# Patient Record
Sex: FEMALE | Race: WHITE | NOT HISPANIC OR LATINO | Employment: FULL TIME | URBAN - METROPOLITAN AREA
[De-identification: names, ages, dates, MRNs, and addresses within clinical notes are randomized per-mention and may not be internally consistent; named-entity substitution may affect disease eponyms.]

---

## 2017-01-05 ENCOUNTER — PATIENT MESSAGE (OUTPATIENT)
Dept: OTHER | Facility: OTHER | Age: 42
End: 2017-01-05

## 2017-01-05 ENCOUNTER — PATIENT OUTREACH (OUTPATIENT)
Dept: OTHER | Facility: OTHER | Age: 42
End: 2017-01-05
Payer: COMMERCIAL

## 2017-01-05 NOTE — PROGRESS NOTES
Last 5 Patient Entered Readings                                                               Current 30 Day Average: 133/78     Recent Readings 1/2/2017 1/1/2017 12/26/2016 12/25/2016 12/24/2016    Systolic BP (mmHg) 127 141 132 135 147    Diastolic BP (mmHg) 69 88 79 84 81    Pulse 68 68 68 66 64        Sent [facility] message to follow up with Mrs. Enriqueta Mccarthy (she was unavailable at her desk number). Inquired about how her low sodium diet and exercise is going. Overall, her readings are looking good and she continues to take readings weekly. Advised pt to call or message back if she has any questions or concerns.

## 2017-01-16 RX ORDER — DILTIAZEM HYDROCHLORIDE 180 MG/1
CAPSULE, COATED, EXTENDED RELEASE ORAL
Qty: 60 CAPSULE | Refills: 3 | Status: SHIPPED | OUTPATIENT
Start: 2017-01-16 | End: 2017-04-17 | Stop reason: SDUPTHER

## 2017-01-19 ENCOUNTER — INITIAL CONSULT (OUTPATIENT)
Dept: OPTOMETRY | Facility: CLINIC | Age: 42
End: 2017-01-19
Payer: COMMERCIAL

## 2017-01-19 DIAGNOSIS — E11.3292 TYPE 2 DIABETES MELLITUS WITH LEFT EYE AFFECTED BY MILD NONPROLIFERATIVE RETINOPATHY WITHOUT MACULAR EDEMA, WITHOUT LONG-TERM CURRENT USE OF INSULIN: Primary | ICD-10-CM

## 2017-01-19 DIAGNOSIS — H04.123 BILATERAL DRY EYES: ICD-10-CM

## 2017-01-19 DIAGNOSIS — H52.7 REFRACTIVE ERROR: ICD-10-CM

## 2017-01-19 PROCEDURE — 92004 COMPRE OPH EXAM NEW PT 1/>: CPT | Mod: S$GLB,,, | Performed by: OPTOMETRIST

## 2017-01-19 PROCEDURE — 99999 PR PBB SHADOW E&M-EST. PATIENT-LVL II: CPT | Mod: PBBFAC,,, | Performed by: OPTOMETRIST

## 2017-01-19 NOTE — PATIENT INSTRUCTIONS
Thank you very much for coming in for your eye examination. It was a pleasure working with you today. Below is some information you might find helpful.     You have dryness of your eyes. Please use over-the-counter artificial tears or lubricants (such as Systane, Refresh, Blink, Genteal), 1 drop in each eye 3-4 times per day. Please avoid drops that advertise redness-relief as these can be unhealthy for your eyes and can cause permanent redness if used long-term.    ==============================================        What Is Diabetic Retinopathy?  Diabetic retinopathy is the leading cause of blindness in adults. It occurs when diabetes harms blood vessels inside the eye. These weak vessels leak fluid into an area of the eye called the retina. Weak new vessels can break and bleed into the retina. Old vessels can leak and cause swelling. These vessels can damage areas of the retina, causing blurry, distorted vision.    What causes diabetic retinopathy?  Diabetes is the cause of this eye disease. Over time, diabetes makes blood vessels weaken all over the body, including in the eyes. Poor blood sugar control can make retinopathy worse. So can smoking, high cholesterol, or poorly controlled high blood pressure. Pregnancy can also cause retinopathy to worsen.  What are the symptoms?  You can have diabetic retinopathy without knowing it. Usually, there is no pain and no outward sign. Over time, you may notice gradual blurring or some vision loss. Symptoms may come and go. Early treatment and good control of risk factors may help prevent vision loss or blindness.  What you can do  Have your eyes examined regularly by an eye specialist. Your healthcare provider will tell you when and how often you need these exams. You can also help control your diabetes through exercise, diet, and medicine, as instructed by your healthcare provider. These same steps may also help control diabetic retinopathy.           Treating Diabetic  Retinopathy  Treatment may help slow the progress of diabetic retinopathy. Your treatment plan depends on your condition. It may include frequent exams to monitor your condition, laser treatment, and other procedures.      Laser is one type of treatment that may help limit vision loss from diabetic retinopathy.      Monitoring Your Vision  At first, your doctor may simply want to monitor your vision. In some cases, you may also have an angiogram. This test uses a special dye to create detailed images of the retina. These images help your doctor decide whether special treatments are needed. You may also have ocular coherence tomography (OCT) testing. This uses light waves to see if there is fluid leaking into certain parts of the eye. It can also measure the thickness of the retina.  Types of Treatment  Special treatments can help stop bleeding, slow or stop new vessel growth, and preserve vision. The type of treatment you get depends on your condition:  · Laser treatment can help stop leaks and limit abnormal vessel growth.  · Surgery can remove the vitreous or repair a retina that is damaged by scar tissue formation. This may help if the vitreous becomes filled with blood and obscures your vision.  · Cryotherapy shrinks blood vessels and repairs the retina.  · Medications injected in the eye can help decrease swelling of the retina or slow the abnormal growth of blood vessels.   ·   © 9445-0816 [employer]. [address] [place] [address], [place] [address]. All rights reserved. This information is not intended as a substitute for professional medical care. Always follow your healthcare professional's instructions.         Managing Type 2 Diabetes  Type 2 diabetes is a long-term (chronic) condition. Managing your diabetes means making some changes that may be hard. Your health care provider, nurse, diabetes educator, and others can help you.  Managing type 2 diabetes means balancing your medicine with diet and  activity. Managing your diabetes may also include checking your blood sugar. And, working with your health care provider to prevent complications.  Take your medicine as prescribed  You may take pills (oral medicine) or give yourself shots (insulin injections) for diabetes. Or you may use both. Taking your medicines or giving yourself insulin at the right times will help you control your blood sugar. Think about ways that will help you remember to take your medicines the right way every day. Ask your health care provider, nurse, or diabetes educator for ideas.  Although you may only take pills for your diabetes, this may change. Over time, most people with type 2 diabetes also use insulin.  Eat healthy  A healthy, well-planned diet helps control the amount of sugar in your blood. It also helps you stay at a healthy weight or lose weight, if you are overweight. Extra weight makes it harder to control diabetes.  Your health care provider, nurse, a dietitian, or diabetes educator will help you create a plan that works for you. You don't have to give up all the foods you like. Having meals and snacks with vegetables, fruits, lean meats, or other healthy proteins, whole grains, and low- or no-fat dairy products will help control your blood sugar.  Be physically active  Being active helps lower your blood sugar. It does this by helping your body use insulin to turn food into energy. Activity also helps you manage your weight:  Ask your health care provider to work with you to create an activity program that's right for you. Your activity programs is based on your age, general health, and types of activity you enjoy. You should start slowly, but aim for at least 30 minutes of exercise or activity on most days.  Check your blood sugar  Checking your own blood sugar may be a regular part of your care. Or you may only check your blood sugar from time to time. Your health care provider will give you instructions about checking  your blood sugar at home. Checking it tells you if your blood sugar is in your target range. A target range means that you are managing your diabetes well.  If your blood sugar levels are too high or too low, your health care provider may suggest changes to your diet or activity level. He or she may also adjust your medication.  Your health care provider may also tell you to check your blood sugar more often when you are sick. At certain times, for example, when you have a cold or the flu, you may need to check it more often.  Take care of yourself  When you have diabetes, you may be more likely to develop other health problems. They include foot, eye, heart, and kidney problems. By controlling your blood sugar, and taking good care of yourself, you can help prevent these problems. Your health care provider, nurse, diabetes educator, and others can assist you.  · Checkups. You should have regular checkups with your health care provider. At those visits, you will have a physical exam that includes checking your feet. Your health care provider will also check your blood pressure and weight.  · Other exams. You should also have complete eye, foot, and dental exams at least once every year.  · Lab tests. You will have blood and urine tests.  ¨ At least 2 times a year, your health care provider will check your hemoglobin A1C. This blood test shows how well you have been controlling your blood sugar over 2 to 3 months. The results help your health care provider manage your diabetes.    ¨ You will also have other lab tests. For example, to check for kidney problems and abnormal cholesterol levels.  · Smoking. If you smoke, you will need to quit. Smoking increases the chance that you will develop complications from diabetes. Ask your health care provider about ways to quit.  · Vaccines. Get a yearly flu shot. And, ask your health care provider about vaccines to prevent pneumonia and hepatitis B.  Stress and depression  Most  people have challenges throughout their lives. Living with diabetes, or any serious condition, can increase your stress and make you feel a lot of different emotions. In diabetes, feeling stressed or depressed can actually affect your blood sugar levels.  If you are having trouble dealing with diabetes, tell your health care provider. He or she can help or refer you to other health care providers or programs.  Support and resources  Know where you can get help. You can try the following:  · Support. Ask family and friends to support your effects to take care of yourself. Or, look for a diabetes support group locally or on the internet. (Check the Connect with Others link on www.diabetes.org)  · Counseling. Talk with a , psychologist, psychiatrist, or other counselor.  · Information. Contact the American Diabetes Association at 929-590-3953 or www.diabetes.org      © 5402-4626 [employer]. [address] [place] [address], [place] [address]. All rights reserved. This information is not intended as a substitute for professional medical care. Always follow your healthcare professional's instructions.    Adriana Lynn OD

## 2017-01-19 NOTE — Clinical Note
Dear Dr. Lr,  I had the pleasure of seeing Ms. Mccarthy today for a diabetic eye examination. She has mild non-proliferative diabetic retinopathy in the left eye only (it appears the same compared to the diabetic eye screening photos she had performed in November 2016). I will check her eyes again in 6 months. Please let me know if you have questions.  Sincerely, Adriana Lynn OD

## 2017-01-19 NOTE — MR AVS SNAPSHOT
[facility]  [address]  [facility] [address]  Phone: [phone]                  Enriqueta Mccarthy   2017 9:40 AM   Initial consult    Description:  Female : 1975   Provider:  Adriana Lynn OD   Department:  [facility]           Reason for Visit     Diabetic Eye Exam                To Do List           Future Appointments        Provider Department Dept Phone    2017 7:10 AM LAB, APPOINTMENT [place] [facility] [phone]    2017 8:00 AM Jose Luis Parekh MD [facility] [phone]    2017 7:10 AM LAB, APPOINTMENT [place] [facility] [phone]    3/24/2017 8:45 AM Dez Lr MD [facility] [phone]    3/30/2017 2:00 PM Apple Sampson RD [facility] [phone]      Goals (5 Years of Data)              17    Blood Pressure <= 140/90   135/77  125/64  127/69    Exercise at least 150 minutes per week.           Take at least one BP reading per week at various times of the day           Weight < 96.956 kg (213 lb 12 oz)           Notes - Note created  3/17/2016  2:53 PM by Tracy Cotton, PharmD    Decrease weight by 5% to improve cardiovascular outcomes.         Follow-Up and Disposition     Return in about 6 months (around 2017) for recheck diabetic retinopathy.      [facility]     [facility] -  Assistance  Registered nurses in the [facility] provide clinical advisement, health education, appointment booking, and other advisory services.  Call for this free service at [phone].             Medications           Message regarding Medications     Verify the changes and/or additions to your medication regime listed below are the same as discussed with your clinician today.  If any of these changes or additions are incorrect, please notify your  healthcare provider.        STOP taking these medications     metformin (GLUCOPHAGE-XR) 500 MG 24 hr tablet Take 1 tablet (500 mg total) by mouth 2 (two) times daily with meals.    ondansetron (ZOFRAN, AS HYDROCHLORIDE,) 4 MG tablet Take 1 tablet (4 mg total) by mouth every 8 (eight) hours as needed for Nausea.           Verify that the below list of medications is an accurate representation of the medications you are currently taking.  If none reported, the list may be blank. If incorrect, please contact your healthcare provider. Carry this list with you in case of emergency.           Current Medications     atorvastatin (LIPITOR) 80 MG tablet Take 80 mg by mouth every evening.     azelastine (ASTELIN) 137 mcg (0.1 %) nasal spray     blood sugar diagnostic (ACCU-CHEK MIGUEL) Strp 1 each by Misc.(Non-Drug; Combo Route) route 3 (three) times daily.    carvedilol (COREG) 25 MG tablet Take 1 tablet (25 mg total) by mouth 2 (two) times daily. .5-1 twice daily or as directed    chlorthalidone (HYGROTEN) 25 MG Tab Take a half tablet (12.5mg) daily    diltiaZEM (CARDIZEM CD) 180 MG 24 hr capsule TAKE 2 CAPSULES (360 MG TOTAL) BY MOUTH ONCE DAILY.    ergocalciferol (ERGOCALCIFEROL) 50,000 unit Cap One tab twice weekly.    gabapentin (NEURONTIN) 300 MG capsule Take 1 capsule (300 mg total) by mouth every evening.    hydrALAZINE (APRESOLINE) 100 MG tablet Take 1 tablet (100 mg total) by mouth every 8 (eight) hours.    icosapent ethyl (VASCEPA) 1 gram Cap Take 2 g by mouth 2 (two) times daily.    lancets (ACCU-CHEK FASTCLIX) Misc 1 each by Misc.(Non-Drug; Combo Route) route 3 (three) times daily.    TRULICITY 1.5 mg/0.5 mL PnIj Inject 1.5 mg into the skin every 7 days.    valacyclovir (VALTREX) 1000 MG tablet Take 1,000 mg by mouth 2 (two) times daily.    valsartan (DIOVAN) 320 MG tablet Take 1 tablet (320 mg total) by mouth once daily.    multivitamin with minerals tablet Take 1 tablet by mouth once daily.    potassium  gluconate 595 mg (99 mg) Tab Take 2 tablets by mouth once daily.           Clinical Reference Information           Allergies as of 1/19/2017     No Known Allergies      Immunizations Administered on Date of Encounter - 1/19/2017     None      Instructions    Thank you very much for coming in for your eye examination. It was a pleasure working with you today. Below is some information you might find helpful.     You have dryness of your eyes. Please use over-the-counter artificial tears or lubricants (such as Systane, Refresh, Blink, Genteal), 1 drop in each eye 3-4 times per day. Please avoid drops that advertise redness-relief as these can be unhealthy for your eyes and can cause permanent redness if used long-term.    ==============================================        What Is Diabetic Retinopathy?  Diabetic retinopathy is the leading cause of blindness in adults. It occurs when diabetes harms blood vessels inside the eye. These weak vessels leak fluid into an area of the eye called the retina. Weak new vessels can break and bleed into the retina. Old vessels can leak and cause swelling. These vessels can damage areas of the retina, causing blurry, distorted vision.    What causes diabetic retinopathy?  Diabetes is the cause of this eye disease. Over time, diabetes makes blood vessels weaken all over the body, including in the eyes. Poor blood sugar control can make retinopathy worse. So can smoking, high cholesterol, or poorly controlled high blood pressure. Pregnancy can also cause retinopathy to worsen.  What are the symptoms?  You can have diabetic retinopathy without knowing it. Usually, there is no pain and no outward sign. Over time, you may notice gradual blurring or some vision loss. Symptoms may come and go. Early treatment and good control of risk factors may help prevent vision loss or blindness.  What you can do  Have your eyes examined regularly by an eye specialist. Your healthcare provider will  tell you when and how often you need these exams. You can also help control your diabetes through exercise, diet, and medicine, as instructed by your healthcare provider. These same steps may also help control diabetic retinopathy.           Treating Diabetic Retinopathy  Treatment may help slow the progress of diabetic retinopathy. Your treatment plan depends on your condition. It may include frequent exams to monitor your condition, laser treatment, and other procedures.      Laser is one type of treatment that may help limit vision loss from diabetic retinopathy.      Monitoring Your Vision  At first, your doctor may simply want to monitor your vision. In some cases, you may also have an angiogram. This test uses a special dye to create detailed images of the retina. These images help your doctor decide whether special treatments are needed. You may also have ocular coherence tomography (OCT) testing. This uses light waves to see if there is fluid leaking into certain parts of the eye. It can also measure the thickness of the retina.  Types of Treatment  Special treatments can help stop bleeding, slow or stop new vessel growth, and preserve vision. The type of treatment you get depends on your condition:  · Laser treatment can help stop leaks and limit abnormal vessel growth.  · Surgery can remove the vitreous or repair a retina that is damaged by scar tissue formation. This may help if the vitreous becomes filled with blood and obscures your vision.  · Cryotherapy shrinks blood vessels and repairs the retina.  · Medications injected in the eye can help decrease swelling of the retina or slow the abnormal growth of blood vessels.   ·   © 7506-9658 [employer]. [address] [place] [address], [place] [address]. All rights reserved. This information is not intended as a substitute for professional medical care. Always follow your healthcare professional's instructions.         Managing Type 2 Diabetes  Type 2  diabetes is a long-term (chronic) condition. Managing your diabetes means making some changes that may be hard. Your health care provider, nurse, diabetes educator, and others can help you.  Managing type 2 diabetes means balancing your medicine with diet and activity. Managing your diabetes may also include checking your blood sugar. And, working with your health care provider to prevent complications.  Take your medicine as prescribed  You may take pills (oral medicine) or give yourself shots (insulin injections) for diabetes. Or you may use both. Taking your medicines or giving yourself insulin at the right times will help you control your blood sugar. Think about ways that will help you remember to take your medicines the right way every day. Ask your health care provider, nurse, or diabetes educator for ideas.  Although you may only take pills for your diabetes, this may change. Over time, most people with type 2 diabetes also use insulin.  Eat healthy  A healthy, well-planned diet helps control the amount of sugar in your blood. It also helps you stay at a healthy weight or lose weight, if you are overweight. Extra weight makes it harder to control diabetes.  Your health care provider, nurse, a dietitian, or diabetes educator will help you create a plan that works for you. You don't have to give up all the foods you like. Having meals and snacks with vegetables, fruits, lean meats, or other healthy proteins, whole grains, and low- or no-fat dairy products will help control your blood sugar.  Be physically active  Being active helps lower your blood sugar. It does this by helping your body use insulin to turn food into energy. Activity also helps you manage your weight:  Ask your health care provider to work with you to create an activity program that's right for you. Your activity programs is based on your age, general health, and types of activity you enjoy. You should start slowly, but aim for at least 30  minutes of exercise or activity on most days.  Check your blood sugar  Checking your own blood sugar may be a regular part of your care. Or you may only check your blood sugar from time to time. Your health care provider will give you instructions about checking your blood sugar at home. Checking it tells you if your blood sugar is in your target range. A target range means that you are managing your diabetes well.  If your blood sugar levels are too high or too low, your health care provider may suggest changes to your diet or activity level. He or she may also adjust your medication.  Your health care provider may also tell you to check your blood sugar more often when you are sick. At certain times, for example, when you have a cold or the flu, you may need to check it more often.  Take care of yourself  When you have diabetes, you may be more likely to develop other health problems. They include foot, eye, heart, and kidney problems. By controlling your blood sugar, and taking good care of yourself, you can help prevent these problems. Your health care provider, nurse, diabetes educator, and others can assist you.  · Checkups. You should have regular checkups with your health care provider. At those visits, you will have a physical exam that includes checking your feet. Your health care provider will also check your blood pressure and weight.  · Other exams. You should also have complete eye, foot, and dental exams at least once every year.  · Lab tests. You will have blood and urine tests.  ¨ At least 2 times a year, your health care provider will check your hemoglobin A1C. This blood test shows how well you have been controlling your blood sugar over 2 to 3 months. The results help your health care provider manage your diabetes.    ¨ You will also have other lab tests. For example, to check for kidney problems and abnormal cholesterol levels.  · Smoking. If you smoke, you will need to quit. Smoking increases  the chance that you will develop complications from diabetes. Ask your health care provider about ways to quit.  · Vaccines. Get a yearly flu shot. And, ask your health care provider about vaccines to prevent pneumonia and hepatitis B.  Stress and depression  Most people have challenges throughout their lives. Living with diabetes, or any serious condition, can increase your stress and make you feel a lot of different emotions. In diabetes, feeling stressed or depressed can actually affect your blood sugar levels.  If you are having trouble dealing with diabetes, tell your health care provider. He or she can help or refer you to other health care providers or programs.  Support and resources  Know where you can get help. You can try the following:  · Support. Ask family and friends to support your effects to take care of yourself. Or, look for a diabetes support group locally or on the internet. (Check the Connect with Others link on www.diabetes.org)  · Counseling. Talk with a , psychologist, psychiatrist, or other counselor.  · Information. Contact the American Diabetes Association at 021-593-1127 or www.diabetes.org      © 0807-5006 [employer]. [address] [place] [address]. All rights reserved. This information is not intended as a substitute for professional medical care. Always follow your healthcare professional's instructions.    Adriana Lynn OD

## 2017-01-19 NOTE — PROGRESS NOTES
HPI     Ms. Mccarthy was referred by Dr. Lucia for diabetic eye exam after   diabetic eye photo 11/22/16, Dr. Lucia noted mild BDR OS     She reports satisfactory vision all ranges without glasses.     Patient complains of occasionally feeling of pressure behind OU, may last   for hours at a time, last occurred 1 month ago, no pressure today.  Pt c/o it often seems like she is looking through a film or fog OU.    (-)drops  (-)pain  (-)flashes  (+)floaters OU had had for about 3 years   (-)diplopia    Diabetic yes    this morning (pt reports fluctuations in am blood glucose   depending on what she ate the night before).  Hemoglobin A1C       Date                     Value               Ref Range             Status                11/15/2016               9.6 (H)             4.5 - 6.2 %           Final                 11/15/2016               9.6 (H)             4.5 - 6.2 %           Final                 10/03/2012               4.6                   4.0 - 6.2 %           Final            ----------    OCULAR HISTORY  Last Eye Exam photo 11/22/16, urgent care visit with Dr. Germain 2007,   comprehensive eye exam Dr. Bhakta 2004  (-)eye surgery   (-)eye injury   +mild background diabetic retinopathy OS (noted on diabetic eye screening   photo 11/2016)    FAMILY HISTORY  (-)Glaucoma none   (-)Macular Degeneration none          Last edited by Adriana Lynn, OD on 1/19/2017 10:27 AM.         Assessment /Plan     For exam results, see Encounter Report.    Type 2 diabetes mellitus with left eye affected by mild nonproliferative retinopathy without macular edema, without long-term current use of insulin   No retinopathy OD, mild non-proliferative diabetic retinopathy OS (appears relatively stable vs 11/2016 photos). Continue management of DM as directed by PCP. Continue regular exercise and diet changes. Recheck in 6 months.    Bilateral dry eyes   Likely cause of pressure sensation and intermittent blurred  vision. Recommended artificial tears BID OU. RTC if no improvement in at last 2 weeks.    Refractive error   Minimal refractive error OU based on uncorrected visual acuity and auto-refraction. Refraction deferred today due to high/unstable blood glucose. Patient educated that refractive error changes with blood glucose fluctuations. RTC once blood glucose lower/stable for refraction in the future if needed.       RTC 6 months to recheck diabetic retinopathy

## 2017-01-20 ENCOUNTER — PATIENT MESSAGE (OUTPATIENT)
Dept: OPTOMETRY | Facility: CLINIC | Age: 42
End: 2017-01-20

## 2017-01-24 ENCOUNTER — PATIENT MESSAGE (OUTPATIENT)
Dept: INTERNAL MEDICINE | Facility: CLINIC | Age: 42
End: 2017-01-24

## 2017-01-24 ENCOUNTER — PATIENT MESSAGE (OUTPATIENT)
Dept: DIABETES | Facility: CLINIC | Age: 42
End: 2017-01-24

## 2017-01-24 DIAGNOSIS — E11.42 TYPE 2 DIABETES MELLITUS WITH DIABETIC POLYNEUROPATHY, WITHOUT LONG-TERM CURRENT USE OF INSULIN: Chronic | ICD-10-CM

## 2017-01-24 RX ORDER — GABAPENTIN 300 MG/1
600 CAPSULE ORAL NIGHTLY
Qty: 60 CAPSULE | Refills: 3 | Status: SHIPPED | OUTPATIENT
Start: 2017-01-24 | End: 2017-12-22 | Stop reason: SDUPTHER

## 2017-01-26 DIAGNOSIS — I10 ESSENTIAL HYPERTENSION: ICD-10-CM

## 2017-01-26 RX ORDER — HYDRALAZINE HYDROCHLORIDE 100 MG/1
100 TABLET, FILM COATED ORAL EVERY 8 HOURS
Qty: 90 TABLET | Refills: 2 | Status: CANCELLED | OUTPATIENT
Start: 2017-01-26

## 2017-01-26 RX ORDER — HYDRALAZINE HYDROCHLORIDE 100 MG/1
TABLET, FILM COATED ORAL
Qty: 90 TABLET | Refills: 2 | Status: SHIPPED | OUTPATIENT
Start: 2017-01-26 | End: 2017-06-15 | Stop reason: SDUPTHER

## 2017-01-27 ENCOUNTER — PATIENT MESSAGE (OUTPATIENT)
Dept: DIABETES | Facility: CLINIC | Age: 42
End: 2017-01-27

## 2017-01-30 ENCOUNTER — LAB VISIT (OUTPATIENT)
Dept: LAB | Facility: HOSPITAL | Age: 42
End: 2017-01-30
Attending: INTERNAL MEDICINE
Payer: COMMERCIAL

## 2017-01-30 ENCOUNTER — PATIENT MESSAGE (OUTPATIENT)
Dept: DIABETES | Facility: CLINIC | Age: 42
End: 2017-01-30

## 2017-01-30 DIAGNOSIS — E11.65 TYPE 2 DIABETES MELLITUS WITH HYPERGLYCEMIA, WITHOUT LONG-TERM CURRENT USE OF INSULIN: Primary | ICD-10-CM

## 2017-01-30 DIAGNOSIS — R73.01 IMPAIRED FASTING GLUCOSE: ICD-10-CM

## 2017-01-30 DIAGNOSIS — E78.5 HYPERLIPIDEMIA, UNSPECIFIED HYPERLIPIDEMIA TYPE: ICD-10-CM

## 2017-01-30 LAB
ALBUMIN SERPL BCP-MCNC: 3.9 G/DL
ALP SERPL-CCNC: 47 U/L
ALT SERPL W/O P-5'-P-CCNC: 26 U/L
ANION GAP SERPL CALC-SCNC: 7 MMOL/L
AST SERPL-CCNC: 24 U/L
BILIRUB SERPL-MCNC: 0.7 MG/DL
BUN SERPL-MCNC: 11 MG/DL
CALCIUM SERPL-MCNC: 9 MG/DL
CHLORIDE SERPL-SCNC: 107 MMOL/L
CHOLEST/HDLC SERPL: 4.2 {RATIO}
CO2 SERPL-SCNC: 26 MMOL/L
CREAT SERPL-MCNC: 1.1 MG/DL
EST. GFR  (AFRICAN AMERICAN): >60 ML/MIN/1.73 M^2
EST. GFR  (NON AFRICAN AMERICAN): >60 ML/MIN/1.73 M^2
GLUCOSE SERPL-MCNC: 121 MG/DL
HDL/CHOLESTEROL RATIO: 23.6 %
HDLC SERPL-MCNC: 110 MG/DL
HDLC SERPL-MCNC: 26 MG/DL
LDLC SERPL CALC-MCNC: 44.6 MG/DL
NONHDLC SERPL-MCNC: 84 MG/DL
POTASSIUM SERPL-SCNC: 3.8 MMOL/L
PROT SERPL-MCNC: 7.2 G/DL
SODIUM SERPL-SCNC: 140 MMOL/L
TRIGL SERPL-MCNC: 197 MG/DL

## 2017-01-30 PROCEDURE — 80053 COMPREHEN METABOLIC PANEL: CPT

## 2017-01-30 PROCEDURE — 36415 COLL VENOUS BLD VENIPUNCTURE: CPT

## 2017-01-30 PROCEDURE — 80061 LIPID PANEL: CPT

## 2017-01-30 PROCEDURE — 83036 HEMOGLOBIN GLYCOSYLATED A1C: CPT

## 2017-01-30 RX ORDER — BLOOD-GLUCOSE METER
1 EACH MISCELLANEOUS 3 TIMES DAILY
Qty: 100 STRIP | Refills: 11 | Status: SHIPPED | OUTPATIENT
Start: 2017-01-30 | End: 2018-01-31

## 2017-01-31 ENCOUNTER — PATIENT OUTREACH (OUTPATIENT)
Dept: OTHER | Facility: OTHER | Age: 42
End: 2017-01-31
Payer: COMMERCIAL

## 2017-01-31 ENCOUNTER — OFFICE VISIT (OUTPATIENT)
Dept: CARDIOLOGY | Facility: CLINIC | Age: 42
End: 2017-01-31
Payer: COMMERCIAL

## 2017-01-31 VITALS
WEIGHT: 212.06 LBS | BODY MASS INDEX: 37.57 KG/M2 | SYSTOLIC BLOOD PRESSURE: 132 MMHG | DIASTOLIC BLOOD PRESSURE: 78 MMHG | HEIGHT: 63 IN | HEART RATE: 72 BPM

## 2017-01-31 DIAGNOSIS — I10 ESSENTIAL HYPERTENSION: Primary | Chronic | ICD-10-CM

## 2017-01-31 DIAGNOSIS — E55.9 VITAMIN D DEFICIENCY: ICD-10-CM

## 2017-01-31 DIAGNOSIS — E78.1 HYPERTRIGLYCERIDEMIA: Chronic | ICD-10-CM

## 2017-01-31 DIAGNOSIS — E11.42 TYPE 2 DIABETES MELLITUS WITH DIABETIC POLYNEUROPATHY, WITHOUT LONG-TERM CURRENT USE OF INSULIN: Chronic | ICD-10-CM

## 2017-01-31 DIAGNOSIS — E11.65 UNCONTROLLED TYPE 2 DIABETES MELLITUS WITH HYPERGLYCEMIA, WITHOUT LONG-TERM CURRENT USE OF INSULIN: Chronic | ICD-10-CM

## 2017-01-31 DIAGNOSIS — E66.9 OBESITY (BMI 30-39.9): Chronic | ICD-10-CM

## 2017-01-31 DIAGNOSIS — E78.5 DYSLIPIDEMIA: ICD-10-CM

## 2017-01-31 LAB — HEMOGLOBIN A1C REFERRAL TEST: 5.9 % (ref 4–5.6)

## 2017-01-31 PROCEDURE — 99999 PR PBB SHADOW E&M-EST. PATIENT-LVL III: CPT | Mod: PBBFAC,,, | Performed by: INTERNAL MEDICINE

## 2017-01-31 PROCEDURE — 99215 OFFICE O/P EST HI 40 MIN: CPT | Mod: S$GLB,,, | Performed by: INTERNAL MEDICINE

## 2017-01-31 NOTE — PROGRESS NOTES
Last 5 Patient Entered Readings                                                               Current 30 Day Average: 127/72     Recent Readings 1/30/2017 1/20/2017 1/8/2017 1/6/2017 1/2/2017    Systolic BP (mmHg) 118 117 135 125 127    Diastolic BP (mmHg) 69 68 77 64 69    Pulse 62 66 89 63 68        LVM to follow up with Mrs. Enriqueta Mccarthy. Inquired about how her low sodium diet and exercise is going. Overall, her readings are looking good and she continues to take readings weekly. Advised pt to call or message back if she has any questions or concerns.

## 2017-01-31 NOTE — PATIENT INSTRUCTIONS
Discussed diet , achieving and maintaining ideal body weight, and exercise.   We reviewed meds in detail.  Reassured  Discussed goals

## 2017-01-31 NOTE — PROGRESS NOTES
Subjective:   Patient ID:  Enriqueta Mccarthy is a 41 y.o. female who presents for follow-up of Hypertension      HPI: The patient is here for CAD risk factors.Patient is here for evaluation and treatment of hypertension.     The patient has no chest pain, SOB, TIA, palpitations, syncope or pre-syncope.Patient does  exercise as much as directed and lost weight.-low 130s.        Review of Systems   Constitution: Positive for weight loss. Negative for chills, decreased appetite, diaphoresis, fever, weakness, malaise/fatigue, night sweats and weight gain.   HENT: Negative for congestion, headaches, hoarse voice, nosebleeds, sore throat and tinnitus.    Eyes: Negative for blurred vision, double vision, vision loss in left eye, vision loss in right eye, visual disturbance and visual halos.   Cardiovascular: Negative for chest pain, claudication, cyanosis, dyspnea on exertion, irregular heartbeat, leg swelling, near-syncope, orthopnea, palpitations, paroxysmal nocturnal dyspnea and syncope.   Respiratory: Negative for cough, hemoptysis, shortness of breath, sleep disturbances due to breathing, snoring, sputum production and wheezing.    Endocrine: Negative for cold intolerance, heat intolerance, polydipsia, polyphagia and polyuria.   Hematologic/Lymphatic: Negative for adenopathy and bleeding problem. Does not bruise/bleed easily.   Skin: Negative for color change, dry skin, flushing, itching, nail changes, poor wound healing, rash, skin cancer, suspicious lesions and unusual hair distribution.   Musculoskeletal: Negative for arthritis, back pain, falls, gout, joint pain, joint swelling, muscle cramps, muscle weakness, myalgias and stiffness.   Gastrointestinal: Negative for abdominal pain, anorexia, change in bowel habit, constipation, diarrhea, dysphagia, heartburn, hematemesis, hematochezia, melena and vomiting.   Genitourinary: Negative for decreased libido, dysuria, hematuria, hesitancy and urgency.  "  Neurological: Negative for excessive daytime sleepiness, dizziness, focal weakness, light-headedness, loss of balance, numbness, paresthesias, seizures, sensory change, tremors and vertigo.   Psychiatric/Behavioral: Negative for altered mental status, depression, hallucinations, memory loss, substance abuse and suicidal ideas. The patient does not have insomnia and is not nervous/anxious.    Allergic/Immunologic: Negative for environmental allergies and hives.       Objective:   Visit Vitals    /78 (BP Location: Left arm, Patient Position: Sitting, BP Method: Manual)    Pulse 72    Ht 5' 3" (1.6 m)    Wt 96.2 kg (212 lb 1.3 oz)    BMI 37.57 kg/m2        Physical Exam   Constitutional: She is oriented to person, place, and time. She appears well-developed and well-nourished.   HENT:   Head: Normocephalic.   Eyes: EOM are normal. Pupils are equal, round, and reactive to light.   Neck: Normal range of motion. Normal carotid pulses, no hepatojugular reflux and no JVD present. Carotid bruit is not present. No thyromegaly present.   Cardiovascular: Normal rate, regular rhythm, normal heart sounds and intact distal pulses.  Exam reveals no gallop and no friction rub.    No murmur heard.  Pulmonary/Chest: Effort normal and breath sounds normal. No tachypnea. No respiratory distress. She has no wheezes. She has no rales. She exhibits no tenderness.   Abdominal: Soft. Bowel sounds are normal. She exhibits no distension and no mass. There is no tenderness. There is no rebound and no guarding.   Musculoskeletal: Normal range of motion. She exhibits no edema or tenderness.   Lymphadenopathy:     She has no cervical adenopathy.   Neurological: She is alert and oriented to person, place, and time. No cranial nerve deficit. Coordination normal.   Skin: Skin is warm. No rash noted. No erythema.   Psychiatric: She has a normal mood and affect. Her behavior is normal. Judgment and thought content normal.       Assessment: "     1. Essential hypertension    2. Hypertriglyceridemia    3. Obesity (BMI 30-39.9)    4. Type 2 diabetes mellitus with diabetic polyneuropathy, without long-term current use of insulin    5. Uncontrolled type 2 diabetes mellitus with hyperglycemia, without long-term current use of insulin    6. Vitamin D deficiency    7. Dyslipidemia        Plan:   Discussed diet , achieving and maintaining ideal body weight, and exercise.   We reviewed meds in detail.  Reassured  Discussed goals    Enriqueta was seen today for hypertension.    Diagnoses and all orders for this visit:    Essential hypertension  -     Comprehensive metabolic panel; Future; Expected date: 1/31/18  -     TSH; Future; Expected date: 1/31/18    Hypertriglyceridemia  -     Lipid panel; Future; Expected date: 1/31/18  -     Comprehensive metabolic panel; Future; Expected date: 1/31/18  -     TSH; Future; Expected date: 1/31/18    Obesity (BMI 30-39.9)  -     TSH; Future; Expected date: 1/31/18    Type 2 diabetes mellitus with diabetic polyneuropathy, without long-term current use of insulin  -     Comprehensive metabolic panel; Future; Expected date: 1/31/18  -     Hemoglobin A1c; Future; Expected date: 1/31/18    Uncontrolled type 2 diabetes mellitus with hyperglycemia, without long-term current use of insulin    Vitamin D deficiency    Dyslipidemia  -     Lipid panel; Future; Expected date: 1/31/18  -     Comprehensive metabolic panel; Future; Expected date: 1/31/18  -     TSH; Future; Expected date: 1/31/18            Return in about 1 year (around 1/31/2018) for with labs.

## 2017-01-31 NOTE — MR AVS SNAPSHOT
[facility]  [address]  [facility] [address]  Phone: [phone]                  Enriqueta Mccarthy   2017 8:00 AM   Office Visit    Description:  Female : 1975   Provider:  Jose Luis Parekh MD   Department:  [facility]           Reason for Visit     Hypertension           Diagnoses this Visit        Comments    Essential hypertension    -  Primary     Hypertriglyceridemia         Obesity (BMI 30-39.9)         Type 2 diabetes mellitus with diabetic polyneuropathy, without long-term current use of insulin         Uncontrolled type 2 diabetes mellitus with hyperglycemia, without long-term current use of insulin         Vitamin D deficiency         Dyslipidemia                To Do List           Future Appointments        Provider Department Dept Phone    2017 7:10 AM LAB, APPOINTMENT [place] [facility] [phone]    3/30/2017 2:00 PM [facility] [phone]      Goals (5 Years of Data)              Today    17    Blood Pressure <= 140/90   132/78  118/69  117/68    Exercise at least 150 minutes per week.           Take at least one BP reading per week at various times of the day           Weight < 96.956 kg (213 lb 12 oz)   96.2 kg (212 lb 1.3 oz)        Notes - Note created  3/17/2016  2:53 PM by Tracy Cotton, PharmD    Decrease weight by 5% to improve cardiovascular outcomes.         Follow-Up and Disposition     Return in about 1 year (around 2018) for with labs.    Follow-up and Disposition History      [facility]     [facility] -  Assistance  Registered nurses in the [facility] provide clinical advisement, health education, appointment booking, and other advisory services.  Call for this free service at [phone].             Medications           Message regarding Medications     Verify the changes and/or additions  to your medication regime listed below are the same as discussed with your clinician today.  If any of these changes or additions are incorrect, please notify your healthcare provider.        STOP taking these medications     valacyclovir (VALTREX) 1000 MG tablet Take 1,000 mg by mouth 2 (two) times daily.           Verify that the below list of medications is an accurate representation of the medications you are currently taking.  If none reported, the list may be blank. If incorrect, please contact your healthcare provider. Carry this list with you in case of emergency.           Current Medications     atorvastatin (LIPITOR) 80 MG tablet Take 80 mg by mouth every evening.     azelastine (ASTELIN) 137 mcg (0.1 %) nasal spray     carvedilol (COREG) 25 MG tablet Take 1 tablet (25 mg total) by mouth 2 (two) times daily. .5-1 twice daily or as directed    chlorthalidone (HYGROTEN) 25 MG Tab Take a half tablet (12.5mg) daily    diltiaZEM (CARDIZEM CD) 180 MG 24 hr capsule TAKE 2 CAPSULES (360 MG TOTAL) BY MOUTH ONCE DAILY.    ergocalciferol (ERGOCALCIFEROL) 50,000 unit Cap One tab twice weekly.    gabapentin (NEURONTIN) 300 MG capsule Take 2 capsules (600 mg total) by mouth every evening.    hydrALAZINE (APRESOLINE) 100 MG tablet TAKE 1 TABLET (100 MG TOTAL) BY MOUTH EVERY 8 (EIGHT) HOURS.    icosapent ethyl (VASCEPA) 1 gram Cap Take 2 g by mouth 2 (two) times daily.    multivitamin with minerals tablet Take 1 tablet by mouth once daily.    ONETOUCH DELICA LANCETS 30 gauge Misc 1 lancet by Misc.(Non-Drug; Combo Route) route 3 (three) times daily. Test blood glucose up to 3 times daily as needed.    ONETOUCH VERIO Strp 1 strip by Misc.(Non-Drug; Combo Route) route 3 (three) times daily. Test blood glucose up to 3 times daily as needed.    valsartan (DIOVAN) 320 MG tablet Take 1 tablet (320 mg total) by mouth once daily.    potassium gluconate 595 mg (99 mg) Tab Take 2 tablets by mouth once daily.    TRULICITY 1.5 mg/0.5  "mL PnIj Inject 1.5 mg into the skin every 7 days.           Clinical Reference Information           Vital Signs - Last Recorded  Most recent update: 1/31/2017  8:12 AM by Gina Hall MA    BP Pulse Ht Wt BMI    132/78 (BP Location: Left arm, Patient Position: Sitting, BP Method: Manual) 72 5' 3" (1.6 m) 96.2 kg (212 lb 1.3 oz) 37.57 kg/m2      Blood Pressure          Most Recent Value    Right Arm BP - Sitting  128/78    Left Arm BP - Sitting  132/78    BP  132/78      Allergies as of 1/31/2017     No Known Allergies      Immunizations Administered on Date of Encounter - 1/31/2017     None      Orders Placed During Today's Visit     Future Labs/Procedures Expected by Expires    Comprehensive metabolic panel  1/31/2018 (Approximate) 4/30/2018    Hemoglobin A1c  1/31/2018 (Approximate) 4/30/2018    Lipid panel  1/31/2018 (Approximate) 4/30/2018    TSH  1/31/2018 (Approximate) 4/30/2018      Instructions    Discussed diet , achieving and maintaining ideal body weight, and exercise.   We reviewed meds in detail.  Reassured  Discussed goals       "

## 2017-02-02 ENCOUNTER — PATIENT MESSAGE (OUTPATIENT)
Dept: DIABETES | Facility: CLINIC | Age: 42
End: 2017-02-02

## 2017-02-03 NOTE — TELEPHONE ENCOUNTER
Spoke with the pt and cancelled the [facility]t for [facility] since she just had the same labs done last week.

## 2017-02-08 ENCOUNTER — DOCUMENTATION ONLY (OUTPATIENT)
Dept: RESEARCH | Facility: HOSPITAL | Age: 42
End: 2017-02-08

## 2017-02-08 NOTE — RESEARCH
Documentation of Informed Consent Obtained for Research by Non-[facility] Personnel    Study: PROmoting Successful Weight Loss in Primary CarE in [place] (PROP)  IRB Number: #[id number]; [facility] IRB ID: [id number]  : Archie Ridley, PhD.  Coordinating Site: [facility] Co-Investigators: Haleigh Max MD and Arnie Parekh MD  [facility] IRB Approval Date: 11/6/15  This study is reviewed and acknowledged by the [facility] IRB. The IRB of Record is the [facility] ([facility]) IRB.    Is the volunteer currently enrolled in any other research trials (per Epic)? [ ] Yes  [X] No  Former[facility] staff administering informed consent: TRACI CORDERO   Date:   1/3/2017  Does the volunteer agree to participate in the trial? [X] Yes  [ ] No  If no, document the reason here:       [X] I acknowledge that I have reviewed the informed consent form and the volunteer signed and dated the signature section of the consent forms.    Name: ([facility] personnel reviewing consent form):           Braxton Erickson  Review and Scan Date (if different than date of note): 1/24/17, 2/8/2017  Comments: See  for Consent Forms    [facility]-trained recruiters will obtain informed consent form all participants as well as HIPAA authorization.  Study protocol states all questions and concerns are clarified before any forms are signed. The following elements of the informed consent document were to be discussed:  · What you should know about a research study (e.g. purpose of the consent form; right to refuse or agree and change your mind later; participation is voluntary)?  · Who is doing the study?    · Where is the study being conducted?    · What is the purpose of this study?  · Who is eligible to participate in the study?     · What will happen to you if you take part in the study (e.g. Screening, Measurement visits, Lifestyle change meetings activities)?  · What  are the possible risks and discomforts? Benefits?  · If you do not want to take part in the study, are there other choices?  · If you have any questions or problems, whom can you call?  · What information will be kept private?  · Can your taking part in the study end early?  · What if information becomes available that might affect your decision to stay in the study?  · What charges will you have to pay? What payment will you receive?  · Will you be compensated for a study-related injury or medical illness?

## 2017-02-13 ENCOUNTER — PATIENT MESSAGE (OUTPATIENT)
Dept: ADMINISTRATIVE | Facility: OTHER | Age: 42
End: 2017-02-13

## 2017-02-16 ENCOUNTER — HOSPITAL ENCOUNTER (OUTPATIENT)
Dept: RADIOLOGY | Facility: HOSPITAL | Age: 42
Discharge: HOME | End: 2017-02-16
Attending: NURSE PRACTITIONER
Payer: COMMERCIAL

## 2017-02-16 ENCOUNTER — PATIENT MESSAGE (OUTPATIENT)
Dept: GASTROENTEROLOGY | Facility: CLINIC | Age: 42
End: 2017-02-16

## 2017-02-16 ENCOUNTER — OFFICE VISIT (OUTPATIENT)
Dept: GASTROENTEROLOGY | Facility: CLINIC | Age: 42
End: 2017-02-16
Payer: COMMERCIAL

## 2017-02-16 VITALS
HEIGHT: 63 IN | DIASTOLIC BLOOD PRESSURE: 55 MMHG | SYSTOLIC BLOOD PRESSURE: 112 MMHG | WEIGHT: 207.69 LBS | HEART RATE: 68 BPM | BODY MASS INDEX: 36.8 KG/M2

## 2017-02-16 DIAGNOSIS — R10.13 ABDOMINAL PAIN, EPIGASTRIC: ICD-10-CM

## 2017-02-16 DIAGNOSIS — R10.11 ABDOMINAL PAIN, RUQ (RIGHT UPPER QUADRANT): Primary | ICD-10-CM

## 2017-02-16 DIAGNOSIS — R19.7 DIARRHEA, UNSPECIFIED TYPE: ICD-10-CM

## 2017-02-16 DIAGNOSIS — R10.11 ABDOMINAL PAIN, RUQ (RIGHT UPPER QUADRANT): ICD-10-CM

## 2017-02-16 PROCEDURE — 99204 OFFICE O/P NEW MOD 45 MIN: CPT | Mod: S$GLB,,, | Performed by: NURSE PRACTITIONER

## 2017-02-16 PROCEDURE — 76700 US EXAM ABDOM COMPLETE: CPT | Mod: TC,PO

## 2017-02-16 PROCEDURE — 99999 PR PBB SHADOW E&M-EST. PATIENT-LVL V: CPT | Mod: PBBFAC,,, | Performed by: NURSE PRACTITIONER

## 2017-02-16 RX ORDER — HYOSCYAMINE SULFATE 0.125 MG
125 TABLET ORAL EVERY 6 HOURS PRN
Qty: 90 TABLET | Refills: 6 | Status: SHIPPED | OUTPATIENT
Start: 2017-02-16 | End: 2017-03-16

## 2017-02-16 NOTE — PROGRESS NOTES
"Subjective:       Patient ID: Enriqueta Mccarthy is a 41 y.o. female.    Chief Complaint: Diarrhea    HPI  Reports 6 weeks of belching with foul odor and loose stools with associated "twitching" and gurgling in the stomach.  Recent development of upper abdominal pain that is a sharp pain.  She left work yesterday due to the pain.  Occasional nausea.  Diagnosed with diabetes in November.  Initially had diarrhea with metformin but this was discontinued and diarrhea resolved until the last 6 weeks.  Reports watery stools up to 8 daily with fecal urgency.  No blood with bowel movements.  Sporadic dark stools.  Added probiotic with no relief.  No fever.  No recent antibiotics.  2005 had EGD and GES with diagnosis of gastroparesis.  Was on reglan for a few years.  She has never had colonoscopy.  She denies family history of IBD, colon cancer or colon polyps.    She had abdominal US 2 years ago with gallbladder sludge vs stones.  Recent labs unremarkable.    Review of Systems   Constitutional: Negative.  Negative for activity change, appetite change, fatigue, fever and unexpected weight change.   HENT: Negative.  Negative for sore throat and trouble swallowing.    Respiratory: Negative.  Negative for cough, choking and shortness of breath.    Cardiovascular: Negative.  Negative for chest pain.   Gastrointestinal: Positive for abdominal pain, diarrhea and nausea. Negative for blood in stool, constipation and vomiting.   Genitourinary: Negative.  Negative for difficulty urinating, dysuria and hematuria.   Musculoskeletal: Negative.  Negative for neck pain and neck stiffness.   Skin: Negative.    Neurological: Negative.  Negative for dizziness, syncope, weakness and light-headedness.   Psychiatric/Behavioral: Negative.        Objective:      Physical Exam   Constitutional: She is oriented to person, place, and time. She appears well-developed and well-nourished. No distress.   HENT:   Head: Normocephalic.   Eyes: No scleral " icterus.   Neck: Neck supple.   Cardiovascular: Normal rate.    Pulmonary/Chest: Effort normal. No respiratory distress.   Abdominal: Soft. Bowel sounds are normal. She exhibits no distension and no mass. There is tenderness in the right upper quadrant and epigastric area.   Musculoskeletal: Normal range of motion.   Neurological: She is alert and oriented to person, place, and time.   Skin: Skin is warm and dry. She is not diaphoretic. No pallor.   Psychiatric: She has a normal mood and affect. Her behavior is normal. Judgment and thought content normal.   Vitals reviewed.      Assessment:       1. Abdominal pain, RUQ (right upper quadrant)    2. Abdominal pain, epigastric    3. Diarrhea, unspecified type        Plan:         Enriqueta was seen today for diarrhea.    Diagnoses and all orders for this visit:    Abdominal pain, RUQ (right upper quadrant)  -     US Abdomen Complete; Future    Abdominal pain, epigastric  -     US Abdomen Complete; Future    Diarrhea, unspecified type  -     Clostridium difficile EIA; Future  -     Giardia / Cryptosporidum, EIA; Future  -     Stool culture; Future  -     Stool Exam-Ova,Cysts,Parasites; Future    Other orders  -     Case request GI: ESOPHAGOGASTRODUODENOSCOPY (EGD)  -     hyoscyamine (ANASPAZ,LEVSIN) 0.125 mg Tab; Take 1 tablet (125 mcg total) by mouth every 6 (six) hours as needed.    I have explained the planned procedures to the patient.The risks, benefits and alternatives of the procedure were also explained in detail. Patient verbalized understanding, all questions were answered. The patient agrees to proceed as planned.    Can consider course of xifaxan if stool studies unremarkable.  May need to consider colonoscopy.

## 2017-02-20 ENCOUNTER — PATIENT MESSAGE (OUTPATIENT)
Dept: GASTROENTEROLOGY | Facility: CLINIC | Age: 42
End: 2017-02-20

## 2017-02-20 ENCOUNTER — TELEPHONE (OUTPATIENT)
Dept: GASTROENTEROLOGY | Facility: CLINIC | Age: 42
End: 2017-02-20

## 2017-02-20 RX ORDER — ERGOCALCIFEROL 1.25 MG/1
CAPSULE ORAL
Qty: 8 CAPSULE | Refills: 7 | Status: SHIPPED | OUTPATIENT
Start: 2017-02-20 | End: 2018-01-31

## 2017-02-23 ENCOUNTER — PATIENT MESSAGE (OUTPATIENT)
Dept: GASTROENTEROLOGY | Facility: CLINIC | Age: 42
End: 2017-02-23

## 2017-02-23 ENCOUNTER — TELEPHONE (OUTPATIENT)
Dept: GASTROENTEROLOGY | Facility: CLINIC | Age: 42
End: 2017-02-23

## 2017-02-23 NOTE — TELEPHONE ENCOUNTER
Spoke with Pt and she explained to me that she had talked to Zayra right before I called. Verbal agreement.  ----- Message from CROW Higginbotham sent at 2/23/2017  3:25 PM CST -----  Let pt know that all stool studies have now returned and are normal

## 2017-02-27 ENCOUNTER — PATIENT OUTREACH (OUTPATIENT)
Dept: OTHER | Facility: OTHER | Age: 42
End: 2017-02-27
Payer: COMMERCIAL

## 2017-03-01 ENCOUNTER — PATIENT OUTREACH (OUTPATIENT)
Dept: OTHER | Facility: OTHER | Age: 42
End: 2017-03-01
Payer: COMMERCIAL

## 2017-03-01 NOTE — PROGRESS NOTES
Last 5 Patient Entered Readings                                                               Current 30 Day Average: 118/68     Recent Readings 2/28/2017 2/27/2017 2/4/2017 1/30/2017 1/20/2017    Systolic BP (mmHg) 118 117 120 118 117    Diastolic BP (mmHg) 68 68 68 69 68    Pulse 68 71 66 62 66        LVM to follow up with Mrs. Enriqueta Mccarthy. Inquired about how her low sodium diet and exercise is going. Checking in to see how she is doing. I noticed she took a break for a few weeks with taking readings so I requested that she call or message me to let me know ifs he is having any issues or needs assistance. It appears that she started taking readings again on Monday so I encouraged her to continue to do this weekly. Advised pt to call or message back if she has any questions or concerns.

## 2017-03-07 ENCOUNTER — ANESTHESIA EVENT (OUTPATIENT)
Dept: ENDOSCOPY | Facility: HOSPITAL | Age: 42
End: 2017-03-07
Payer: COMMERCIAL

## 2017-03-07 ENCOUNTER — SURGERY (OUTPATIENT)
Age: 42
End: 2017-03-07

## 2017-03-07 ENCOUNTER — ANESTHESIA (OUTPATIENT)
Dept: ENDOSCOPY | Facility: HOSPITAL | Age: 42
End: 2017-03-07
Attending: INTERNAL MEDICINE
Payer: COMMERCIAL

## 2017-03-07 ENCOUNTER — HOSPITAL ENCOUNTER (OUTPATIENT)
Facility: HOSPITAL | Age: 42
Discharge: HOME | End: 2017-03-07
Attending: INTERNAL MEDICINE | Admitting: INTERNAL MEDICINE
Payer: COMMERCIAL

## 2017-03-07 DIAGNOSIS — K80.00 GALLSTONES AND INFLAMMATION OF GALLBLADDER WITHOUT OBSTRUCTION: ICD-10-CM

## 2017-03-07 DIAGNOSIS — R10.11 RIGHT UPPER QUADRANT ABDOMINAL PAIN: Primary | ICD-10-CM

## 2017-03-07 DIAGNOSIS — R10.13 ABDOMINAL PAIN, EPIGASTRIC: ICD-10-CM

## 2017-03-07 LAB
GLUCOSE SERPL-MCNC: 134 MG/DL (ref 70–110)
POCT GLUCOSE: 134 MG/DL (ref 70–110)

## 2017-03-07 PROCEDURE — 25000003 PHARM REV CODE 250: Performed by: NURSE ANESTHETIST, CERTIFIED REGISTERED

## 2017-03-07 PROCEDURE — 43239 EGD BIOPSY SINGLE/MULTIPLE: CPT | Mod: ,,, | Performed by: INTERNAL MEDICINE

## 2017-03-07 PROCEDURE — 25000003 PHARM REV CODE 250: Performed by: INTERNAL MEDICINE

## 2017-03-07 PROCEDURE — 88305 TISSUE EXAM BY PATHOLOGIST: CPT | Mod: 26,,, | Performed by: PATHOLOGY

## 2017-03-07 PROCEDURE — 37000009 HC ANESTHESIA EA ADD 15 MINS: Performed by: INTERNAL MEDICINE

## 2017-03-07 PROCEDURE — 88305 TISSUE EXAM BY PATHOLOGIST: CPT | Performed by: PATHOLOGY

## 2017-03-07 PROCEDURE — 27201012 HC FORCEPS, HOT/COLD, DISP: Performed by: INTERNAL MEDICINE

## 2017-03-07 PROCEDURE — 37000008 HC ANESTHESIA 1ST 15 MINUTES: Performed by: INTERNAL MEDICINE

## 2017-03-07 PROCEDURE — 43239 EGD BIOPSY SINGLE/MULTIPLE: CPT | Performed by: INTERNAL MEDICINE

## 2017-03-07 PROCEDURE — 63600175 PHARM REV CODE 636 W HCPCS: Performed by: NURSE ANESTHETIST, CERTIFIED REGISTERED

## 2017-03-07 RX ORDER — PROPOFOL 10 MG/ML
VIAL (ML) INTRAVENOUS
Status: DISCONTINUED | OUTPATIENT
Start: 2017-03-07 | End: 2017-03-07

## 2017-03-07 RX ORDER — SODIUM CHLORIDE 9 MG/ML
INJECTION, SOLUTION INTRAVENOUS CONTINUOUS
Status: DISCONTINUED | OUTPATIENT
Start: 2017-03-08 | End: 2017-03-07 | Stop reason: HOSPADM

## 2017-03-07 RX ORDER — LIDOCAINE HCL/PF 100 MG/5ML
SYRINGE (ML) INTRAVENOUS
Status: DISCONTINUED | OUTPATIENT
Start: 2017-03-07 | End: 2017-03-07

## 2017-03-07 RX ORDER — PROPOFOL 10 MG/ML
VIAL (ML) INTRAVENOUS CONTINUOUS PRN
Status: DISCONTINUED | OUTPATIENT
Start: 2017-03-07 | End: 2017-03-07

## 2017-03-07 RX ADMIN — LIDOCAINE HYDROCHLORIDE 50 MG: 20 INJECTION, SOLUTION INTRAVENOUS at 10:03

## 2017-03-07 RX ADMIN — TOPICAL ANESTHETIC 1 EACH: 200 SPRAY DENTAL; PERIODONTAL at 10:03

## 2017-03-07 RX ADMIN — PROPOFOL 150 MCG/KG/MIN: 10 INJECTION, EMULSION INTRAVENOUS at 10:03

## 2017-03-07 RX ADMIN — PROPOFOL 70 MG: 10 INJECTION, EMULSION INTRAVENOUS at 10:03

## 2017-03-07 RX ADMIN — SODIUM CHLORIDE: 0.9 INJECTION, SOLUTION INTRAVENOUS at 10:03

## 2017-03-07 NOTE — INTERVAL H&P NOTE
The patient has been examined and the H&P has been reviewed:    I concur with the findings and changes have been noted since the H&P was written: Abd U/S consistent w GB disease / stones    Anesthesia/Surgery risks, benefits and alternative options discussed and understood by patient/family.          Active Hospital Problems    Diagnosis  POA    Right upper quadrant abdominal pain [R10.11]  Yes      Resolved Hospital Problems    Diagnosis Date Resolved POA   No resolved problems to display.

## 2017-03-07 NOTE — TRANSFER OF CARE
"Anesthesia Transfer of Care Note    Patient: Enriqueta Mccarthy    Procedure(s) Performed: Procedure(s) (LRB):  ESOPHAGOGASTRODUODENOSCOPY (EGD) (N/A)    Patient location: GI    Anesthesia Type: MAC    Transport from OR: Transported from OR on room air with adequate spontaneous ventilation    Post pain: adequate analgesia    Post assessment: no apparent anesthetic complications and tolerated procedure well    Post vital signs: stable    Level of consciousness: awake, alert and oriented    Nausea/Vomiting: no nausea/vomiting    Complications: none          Last vitals:   Visit Vitals    BP (!) 119/58 (BP Location: Right arm, Patient Position: Lying)    Pulse 71    Temp 36.8 °C (98.2 °F) (Oral)    Resp 16    Ht 5' 3" (1.6 m)    Wt 94.3 kg (208 lb)    SpO2 99%    Breastfeeding No    BMI 36.85 kg/m2     "

## 2017-03-07 NOTE — ANESTHESIA POSTPROCEDURE EVALUATION
"Anesthesia Post Evaluation    Patient: Enriqueta Mccarthy    Procedure(s) Performed: Procedure(s) (LRB):  ESOPHAGOGASTRODUODENOSCOPY (EGD) (N/A)    Final Anesthesia Type: MAC  Patient location during evaluation: GI PACU  Patient participation: Yes- Able to Participate  Level of consciousness: awake and alert and oriented  Post-procedure vital signs: reviewed and stable  Pain management: adequate  Airway patency: patent  PONV status at discharge: No PONV  Anesthetic complications: no      Cardiovascular status: blood pressure returned to baseline and hemodynamically stable  Respiratory status: unassisted, room air and spontaneous ventilation  Hydration status: euvolemic  Follow-up not needed.        Visit Vitals    BP (!) 119/58 (BP Location: Right arm, Patient Position: Lying)    Pulse 71    Temp 36.8 °C (98.2 °F) (Oral)    Resp 16    Ht 5' 3" (1.6 m)    Wt 94.3 kg (208 lb)    SpO2 99%    Breastfeeding No    BMI 36.85 kg/m2       Pain/Becka Score: Presence of Pain: denmaia (3/7/2017  9:50 AM)      "

## 2017-03-07 NOTE — IP AVS SNAPSHOT
[facility]  [address]  [place] [address]  Phone: [phone]           Patient Discharge Instructions     Our goal is to set you up for success. This packet includes information on your condition, medications, and your home care. It will help you to care for yourself so you don't get sicker and need to go back to the hospital.     Please ask your nurse if you have any questions.        There are many details to remember when preparing to leave the hospital. Here is what you will need to do:    1. Take your medicine. If you are prescribed medications, review your Medication List in the following pages. You may have new medications to  at the pharmacy and others that you'll need to stop taking. Review the instructions for how and when to take your medications. Talk with your doctor or nurses if you are unsure of what to do.     2. Go to your follow-up appointments. Specific follow-up information is listed in the following pages. Your may be contacted by a transition nurse or clinical provider about future appointments. Be sure we have all of the phone numbers to reach you, if needed. Please contact your provider's office if you are unable to make an appointment.     3. Watch for warning signs. Your doctor or nurse will give you detailed warning signs to watch for and when to call for assistance. These instructions may also include educational information about your condition. If you experience any of warning signs to your health, call your doctor.               ** Verify the list of medication(s) below is accurate and up to date. Carry this with you in case of emergency. If your medications have changed, please notify your healthcare provider.             Medication List      CONTINUE taking these medications        Additional Info                      atorvastatin 80 MG tablet   Commonly known as:  LIPITOR   Refills:  0   Dose:  80 mg    Instructions:  Take 80 mg by mouth every  evening.     Begin Date    AM    Noon    PM    Bedtime       azelastine 137 mcg (0.1 %) nasal spray   Commonly known as:  ASTELIN   Refills:  0      Begin Date    AM    Noon    PM    Bedtime       carvedilol 25 MG tablet   Commonly known as:  COREG   Quantity:  180 tablet   Refills:  3   Dose:  25 mg    Instructions:  Take 1 tablet (25 mg total) by mouth 2 (two) times daily. .5-1 twice daily or as directed     Begin Date    AM    Noon    PM    Bedtime       chlorthalidone 25 MG Tab   Commonly known as:  HYGROTEN   Quantity:  90 tablet   Refills:  1    Instructions:  Take a half tablet (12.5mg) daily     Begin Date    AM    Noon    PM    Bedtime       diltiaZEM 180 MG 24 hr capsule   Commonly known as:  CARDIZEM CD   Quantity:  60 capsule   Refills:  3    Instructions:  TAKE 2 CAPSULES (360 MG TOTAL) BY MOUTH ONCE DAILY.     Begin Date    AM    Noon    PM    Bedtime       gabapentin 300 MG capsule   Commonly known as:  NEURONTIN   Quantity:  60 capsule   Refills:  3   Dose:  600 mg    Instructions:  Take 2 capsules (600 mg total) by mouth every evening.     Begin Date    AM    Noon    PM    Bedtime       hydrALAZINE 100 MG tablet   Commonly known as:  APRESOLINE   Quantity:  90 tablet   Refills:  2    Instructions:  TAKE 1 TABLET (100 MG TOTAL) BY MOUTH EVERY 8 (EIGHT) HOURS.     Begin Date    AM    Noon    PM    Bedtime       hyoscyamine 0.125 mg Tab   Commonly known as:  ANASPAZ,LEVSIN   Quantity:  90 tablet   Refills:  6   Dose:  125 mcg    Instructions:  Take 1 tablet (125 mcg total) by mouth every 6 (six) hours as needed.     Begin Date    AM    Noon    PM    Bedtime       icosapent ethyl 1 gram Cap   Commonly known as:  VASCEPA   Quantity:  120 capsule   Refills:  11   Dose:  2 g    Instructions:  Take 2 g by mouth 2 (two) times daily.     Begin Date    AM    Noon    PM    Bedtime       multivitamin with minerals tablet   Refills:  0   Dose:  1 tablet    Instructions:  Take 1 tablet by mouth once daily.      Begin Date    AM    Noon    PM    Bedtime       ONETOUCH DELICA LANCETS 30 gauge Misc   Quantity:  100 each   Refills:  11   Dose:  1 lancet   Generic drug:  lancets    Instructions:  1 lancet by Misc.(Non-Drug; Combo Route) route 3 (three) times daily. Test blood glucose up to 3 times daily as needed.     Begin Date    AM    Noon    PM    Bedtime       ONETOUCH VERIO Strp   Quantity:  100 strip   Refills:  11   Dose:  1 strip   Generic drug:  blood sugar diagnostic    Instructions:  1 strip by Misc.(Non-Drug; Combo Route) route 3 (three) times daily. Test blood glucose up to 3 times daily as needed.     Begin Date    AM    Noon    PM    Bedtime       TRULICITY 1.5 mg/0.5 mL Pnij   Quantity:  4 Syringe   Refills:  6   Dose:  1.5 mg   Generic drug:  dulaglutide    Instructions:  Inject 1.5 mg into the skin every 7 days.     Begin Date    AM    Noon    PM    Bedtime       valsartan 320 MG tablet   Commonly known as:  DIOVAN   Quantity:  90 tablet   Refills:  1   Dose:  320 mg    Instructions:  Take 1 tablet (320 mg total) by mouth once daily.     Begin Date    AM    Noon    PM    Bedtime       VITAMIN D2 50,000 unit Cap   Quantity:  8 capsule   Refills:  7   Generic drug:  ergocalciferol    Instructions:  TAKE 1 CAPSULE TWICE WEEKLY     Begin Date    AM    Noon    PM    Bedtime         ASK your doctor about these medications        Additional Info                      potassium gluconate 595 mg (99 mg) Tab   Refills:  0   Dose:  2 tablet    Instructions:  Take 2 tablets by mouth once daily.     Begin Date    AM    Noon    PM    Bedtime                  Please bring to all follow up appointments:    1. A copy of your discharge instructions.  2. All medicines you are currently taking in their original bottles.  3. Identification and insurance card.    Please arrive 15 minutes ahead of scheduled appointment time.    Please call 24 hours in advance if you must reschedule your appointment and/or time.        Your Scheduled  Appointments     Mar 30, 2017  2:00 PM CDT   Diabetes Education with Apple Sampson RD   [facility] ([facility] )    [address]  [facility] [address] [phone]            Jun 23, 2017 11:00 AM CDT   Established Patient Visit with Erlinda Murdock MD   [facility]    [address]  [address]  [facility] [address] [phone]            Jul 18, 2017  9:40 AM CDT   Established Patient Visit with YVONNE [facility] ([facility])    [address]  [facility] [address] [phone]              Your Future Surgeries/Procedures     Mar 07, 2017   Surgery with Stef Saba MD   [facility] ([facility])    [address]  [facility] [address] [phone]              Follow-up Information     Follow up with Dez Lr MD.    Specialty:  Internal Medicine    Why:  As needed    Contact information:    [address]  [facility] [id number] [phone]          Follow up with CROW Higginbotham In 2 weeks.    Specialty:  Gastroenterology    Contact information:    [address]  [facility] [address] [phone]          Follow up with Stef Saba MD.    Specialty:  Gastroenterology    Why:  As needed, Office will call with biopsy / pathology report    Contact information:    [address]  [address]  [facility] [address] [phone]          Discharge Instructions     Future Orders    Diet general     Questions:    Total calories:      Fat restriction, if any:      Protein restriction, if any:      Na restriction, if any:      Fluid restriction:      Additional restrictions:          Discharge Instructions       Post EGD Discharge Instruction    Enriqueta Mccarthy  3/7/2017  Stef Cornelius*    RESTRICTIONS ON ACTIVITY:    -DO NOT drive a car or operate machinery until the day after  "procedure.  -Following Day: Return to full activities including work.  -Diet: Eat and drink normally unless instructed otherwise.    TREATMENT FOR COMMON SIDE EFFECTS:  *Sore Throat - treat with throat lozenges, gargle with warm salt water.  *Mild abdominal pain & bloating- rest and take liquids only.    SYMPTOMS TO WATCH FOR AND REPORT TO YOUR PHYSICIAN:  1. Chills or fever occurring 24 hours after procedure.  2. Pain in chest.  3. SEVERE abdominal pain or bloating.  4. Rectal bleeding which could be maroon or black.    If you have any questions or problems, please call your Physician:    Stef Cornelius* Phone: ***    Lab Results: [phone]    If a complication or emergency situation arises and you are unable to reach your Physician - GO TO THE EMERGENCY ROOM.        Primary Diagnosis     Your primary diagnosis was:  Abdominal Pain, Right Upper Quadrant      Admission Information     Date & Time Provider Department CSN    3/7/2017  9:05 AM Stef Saba MD [facility] [id number]      Care Providers     Provider Role Specialty Primary office phone    Stef Saba MD Attending Provider Gastroenterology [phone]    Stef Saba MD Surgeon  Gastroenterology [phone]      Your Vitals Were     BP Pulse Temp Resp Height Weight    108/52 74 98.2 °F (36.8 °C) (Oral) 18 5' 3" (1.6 m) 94.3 kg (208 lb)    SpO2 BMI             97% 36.85 kg/m2         Recent Lab Values        11/30/2005 1/4/2010 1/14/2011 10/3/2012 11/15/2016               8:43 AM 12:47 PM  4:42 PM  7:45 AM  7:26 AM       A1C 4.1 (L) 4.9 4.6 4.6 9.6 (H)            9.6 (H)       Comment for A1C at  7:26 AM on 11/15/2016:  According to ADA guidelines, hemoglobin A1C <7.0% represents  optimal control in non-pregnant diabetic patients.  Different  metrics may apply to specific populations.   Standards of Medical Care in Diabetes - 2016.  For the purpose of screening for the presence " of diabetes:  <5.7%     Consistent with the absence of diabetes  5.7-6.4%  Consistent with increasing risk for diabetes   (prediabetes)  >or=6.5%  Consistent with diabetes  Currently no consensus exists for use of hemoglobin A1C  for diagnosis of diabetes for children.      Comment for A1C at  7:26 AM on 11/15/2016:  According to ADA guidelines, hemoglobin A1C <7.0% represents  optimal control in non-pregnant diabetic patients.  Different  metrics may apply to specific populations.   Standards of Medical Care in Diabetes - 2016.  For the purpose of screening for the presence of diabetes:  <5.7%     Consistent with the absence of diabetes  5.7-6.4%  Consistent with increasing risk for diabetes   (prediabetes)  >or=6.5%  Consistent with diabetes  Currently no consensus exists for use of hemoglobin A1C  for diagnosis of diabetes for children.        Pending Labs     Order Current Status    Specimen to Pathology - Surgery Collected (03/07/17 [id number])      Allergies as of 3/7/2017        Reactions    No Known Allergies       [facility]     [facility] Nurse Care Line - 24/7 Assistance  Unless otherwise directed by your provider, please contact [facility], our nurse care line that is available for 24/7 assistance.     Registered nurses in the [facility] provide clinical advisement, health education, appointment booking, and other advisory services.  Call for this free service at [phone].        Advance Directives     An advance directive is a document which, in the event you are no longer able to make decisions for yourself, tells your healthcare team what kind of treatment you do or do not want to receive, or who you would like to make those decisions for you.  If you do not currently have an advance directive, [facility] encourages you to create one.  For more information call:  [phone]), [phone] ([phone]),  or log on to [email or web address].        Language  Assistance Services     ATTENTION: Language assistance services are available, free of charge. Please call [phone].      ATENCIÓN: Si habla donita, tiene a baires disposición servicios gratuitos de asistencia lingüística. Llame al [phone].     CHÚ Ý: N?u b?n nói Ti?ng Vi?t, có các d?ch v? h? tr? ngôn ng? mi?n phí dành cho b?n. G?i s? [phone].        Diabetes Discharge Instructions                                    [facility] complies with applicable Federal civil rights laws and does not discriminate on the basis of race, color, national origin, age, disability, or sex.

## 2017-03-07 NOTE — ANESTHESIA PREPROCEDURE EVALUATION
03/07/2017  Enriqueta Mccarthy is a 41 y.o., female for EGD under MAC    Past Medical History:   Diagnosis Date    Essential hypertension     Herpes simplex virus (HSV) infection     Hypertension     Hypertriglyceridemia 11/17/2016    Neuropathy     Obesity (BMI 30-39.9) 4/5/2016    Type 2 diabetes mellitus with diabetic polyneuropathy, without long-term current use of insulin 11/17/2016    Uterine fibroid            [facility] Anesthesia Evaluation     I have reviewed the Nursing Notes.   I have reviewed the Medications.     Review of Systems  Anesthesia Hx:   Denies Personal Hx of Anesthesia complications.   Social:  Non-Smoker, No Alcohol Use   Hematology/Oncology:         -- Anemia:   Cardiovascular:   Exercise tolerance: good Hypertension, well controlled hyperlipidemia    Pulmonary:  Pulmonary Normal    Renal/:  Renal/ Normal     Neurological:  Neurology Normal    Endocrine:   Diabetes        Physical Exam  General:  Well nourished, Obesity    Airway/Jaw/Neck:  Airway Findings: Mouth Opening: Normal Tongue: Normal  Mallampati: II  TM Distance: Normal, at least 6 cm  Jaw/Neck Findings:  Neck ROM: Normal ROM      Dental:  Dental Findings: In tact   Chest/Lungs:  Chest/Lungs Findings: Clear to auscultation     Heart/Vascular:  Heart Findings: Rate: Normal  Rhythm: Regular Rhythm  Sounds: Normal        Mental Status:  Mental Status Findings:  Alert and Oriented, Cooperative         Anesthesia Plan  Type of Anesthesia, risks & benefits discussed:  Anesthesia Type:  MAC  Patient's Preference:   Intra-op Monitoring Plan:   Intra-op Monitoring Plan Comments:   Post Op Pain Control Plan:   Post Op Pain Control Plan Comments:   Induction:   IV  Beta Blocker:  Patient is not currently on a Beta-Blocker (No further documentation required).       Informed Consent: Patient understands risks and agrees with  Anesthesia plan.  Questions answered. Anesthesia consent signed with patient.  ASA Score: 2     Day of Surgery Review of History & Physical:            Ready For Surgery From Anesthesia Perspective.

## 2017-03-07 NOTE — DISCHARGE INSTRUCTIONS
Post EGD Discharge Instruction    Enriqueta Nationell  3/7/2017  Stef Cornelius*    RESTRICTIONS ON ACTIVITY:    -DO NOT drive a car or operate machinery until the day after procedure.  -Following Day: Return to full activities including work.  -Diet: Eat and drink normally unless instructed otherwise.    TREATMENT FOR COMMON SIDE EFFECTS:  *Sore Throat - treat with throat lozenges, gargle with warm salt water.  *Mild abdominal pain & bloating- rest and take liquids only.    SYMPTOMS TO WATCH FOR AND REPORT TO YOUR PHYSICIAN:  1. Chills or fever occurring 24 hours after procedure.  2. Pain in chest.  3. SEVERE abdominal pain or bloating.  4. Rectal bleeding which could be maroon or black.    If you have any questions or problems, please call your Physician:    Stef Cornelius* Phone: ***    Lab Results: [phone]    If a complication or emergency situation arises and you are unable to reach your Physician - GO TO THE EMERGENCY ROOM.

## 2017-03-08 ENCOUNTER — PATIENT MESSAGE (OUTPATIENT)
Dept: GASTROENTEROLOGY | Facility: CLINIC | Age: 42
End: 2017-03-08

## 2017-03-08 RX ORDER — DICYCLOMINE HYDROCHLORIDE 20 MG/1
20 TABLET ORAL EVERY 6 HOURS
Qty: 120 TABLET | Refills: 0 | Status: SHIPPED | OUTPATIENT
Start: 2017-03-08 | End: 2017-04-07

## 2017-03-08 NOTE — PROGRESS NOTES
Enriqueta Mccarthy is doing well. No questions or concerns. She awaits endoscopy results; thinks she may have gallbladder trouble.     Last 5 Patient Entered Readings                                                               Current 30 Day Average: 119/68     Recent Readings 3/5/2017 2/28/2017 2/27/2017 2/4/2017 1/30/2017    Systolic BP (mmHg) 122 118 117 120 118    Diastolic BP (mmHg) 69 68 68 68 69    Pulse 67 68 71 66 62          BP at goal  Continue monitoring    Hypertension Medications             carvedilol (COREG) 25 MG tablet Take 1 tablet (25 mg total) by mouth 2 (two) times daily. .5-1 twice daily or as directed    chlorthalidone (HYGROTEN) 25 MG Tab Take a half tablet (12.5mg) daily    diltiaZEM (CARDIZEM CD) 180 MG 24 hr capsule TAKE 2 CAPSULES (360 MG TOTAL) BY MOUTH ONCE DAILY.    hydrALAZINE (APRESOLINE) 100 MG tablet TAKE 1 TABLET (100 MG TOTAL) BY MOUTH EVERY 8 (EIGHT) HOURS.    valsartan (DIOVAN) 320 MG tablet Take 1 tablet (320 mg total) by mouth once daily.

## 2017-03-09 ENCOUNTER — PATIENT MESSAGE (OUTPATIENT)
Dept: DIABETES | Facility: CLINIC | Age: 42
End: 2017-03-09

## 2017-03-09 VITALS
TEMPERATURE: 98 F | HEIGHT: 63 IN | SYSTOLIC BLOOD PRESSURE: 130 MMHG | HEART RATE: 67 BPM | WEIGHT: 208 LBS | DIASTOLIC BLOOD PRESSURE: 72 MMHG | BODY MASS INDEX: 36.86 KG/M2 | RESPIRATION RATE: 18 BRPM | OXYGEN SATURATION: 98 %

## 2017-03-10 ENCOUNTER — NUTRITION (OUTPATIENT)
Dept: DIABETES | Facility: CLINIC | Age: 42
End: 2017-03-10
Payer: COMMERCIAL

## 2017-03-10 ENCOUNTER — PATIENT MESSAGE (OUTPATIENT)
Dept: GASTROENTEROLOGY | Facility: CLINIC | Age: 42
End: 2017-03-10

## 2017-03-10 DIAGNOSIS — E11.42 TYPE 2 DIABETES MELLITUS WITH DIABETIC POLYNEUROPATHY, WITHOUT LONG-TERM CURRENT USE OF INSULIN: Chronic | ICD-10-CM

## 2017-03-10 PROCEDURE — G0108 DIAB MANAGE TRN  PER INDIV: HCPCS | Mod: S$GLB,,, | Performed by: DIETITIAN, REGISTERED

## 2017-03-10 NOTE — Clinical Note
Micah Espinoza,   Ms. Mccarthy came back for [facility] follow-up and is doing great! Lost 15 pounds. And pre-prandial readings - 112, 111, 107, 114, 115, 131, 134, 109, 100. Wasn't sure if you had already sent the discharge from empowerment letter?   Thanks! Apple

## 2017-03-10 NOTE — PROGRESS NOTES
Diabetes Education  Author: Apple Sampson RD  Date: 3/10/2017    Diabetes Education Visit  Diabetes Education Record Assessment/Progress: Post Program/Follow-up    Diabetes Type  Diabetes Type : Type II         Nutrition  Meal Planning: 3 meals per day, eats out often, water (continues to focus on portion control and limiting sweets to occasional treats)    Monitoring   Self Monitoring : brought logs - all readings 100-134  Blood Glucose Logs: Yes    Exercise   Exercise Type: walking    Current Diabetes Treatment   Current Treatment: Injectable (Trulicity 1.5mg once weekly)    Social History  Preferred Learning Method: Face to Face, Reading Materials  Primary Support: Self  Occupation: [employer] employee                     Barriers to Change  Barriers to Change: None  Learning Challenges : None    Readiness to Learn   Readiness to Learn : Acceptance    Cultural Influences  Cultural Influences: No    Diabetes Education Assessment/Progress    Acute Complications (preventing, detecting, and treating acute complications): Discussion, Individual Session (Denies any hypoglycemia.)    Nutrition (Incorporating nutritional management into one's lifestyle): Instructed, Discussion, Individual Session, Written Materials Provided, Competent (verbalizes/demonstrates) (Pt shows good understanding of basic CHO counting principles - is reading labels and controlling portions at meals and snacks. Reports does not buy foods that will tempt her. Has lost 15 pounds! Recently having gallbladder problems and has questions about incorporating low fat diet. Provided education on gallbladder nutrition therapy - high fat foods to avoid and low fat food choices, label reading guidelines, appropriate portion sizes for fats, and provided low fat MNT written material and sample meal plan from the Nutrition Care Manual. )    Medications (states correct name, dose, onset, peak, duration, side effects & timing of meds): Discussion, Individual  Session, Competent (verbalizes/demonstrates) (Reviewed MOA of Trulicity. Pt reports taking appropriately each week. Denies any side effects or problems with injection site. )    Monitoring (monitoring blood glucose/other parameters & using results): Discussion, Individual Session (Reviewed logs and encouraged continuing to SMBG 2 times daily. Will share logs with NP. )    Goal Setting and Problem Solving (verbalizes behavior change strategies & sets realistic goals): Discussion, Individual Session, Competent (verbalizes/demonstrates)    Behavior Change (developing personal strategies to health & behavior change): Discussion, Individual Session, Comnpetent (verbalizes/demonstrates)    Goals  Healthy Eating: % Met (Pt to follow consistent CHO eating pattern with recommended amounts. )  Met Percentage : 100%  Monitoring: % Met (Check BG BID)         Diabetes Care Plan/Intervention  Education Plan/Intervention:  (Pt is meeting goals and additional DE follow-up not needed at this time. Pt will send message via patient portal with questions.)    Diabetes Meal Plan  Restrictions: Low Fat, Restricted Carbohydrate  Carbohydrate Per Meal: 30-45g  Carbohydrate Per Snack : 15-20g    Education Units of Time   Time Spent: 30 min              Health Maintenance Due   Topic Date Due    TETANUS VACCINE  12/12/1993    Mammogram  12/12/2015    Influenza Vaccine  08/01/2016

## 2017-03-11 ENCOUNTER — PATIENT MESSAGE (OUTPATIENT)
Dept: ADMINISTRATIVE | Facility: OTHER | Age: 42
End: 2017-03-11

## 2017-03-13 ENCOUNTER — PATIENT MESSAGE (OUTPATIENT)
Dept: ADMINISTRATIVE | Facility: OTHER | Age: 42
End: 2017-03-13

## 2017-03-13 ENCOUNTER — TELEPHONE (OUTPATIENT)
Dept: DIABETES | Facility: CLINIC | Age: 42
End: 2017-03-13

## 2017-03-13 DIAGNOSIS — E11.42 TYPE 2 DIABETES MELLITUS WITH DIABETIC POLYNEUROPATHY, WITHOUT LONG-TERM CURRENT USE OF INSULIN: Primary | Chronic | ICD-10-CM

## 2017-03-13 NOTE — TELEPHONE ENCOUNTER
A1c not completed, need repeat A1c. Please schedule A1c for patient. Please tell pt that if A1c at goal, she will be discharged from empowerment and will not need a f/u visit. I will notify her of results once I receive them.    Thanks

## 2017-03-13 NOTE — TELEPHONE ENCOUNTER
Spoke to the pt. Informed her A1c not completed, need repeat A1c. Pt is  scheduled for  A1c lab work. Informed the pt that if A1c at goal, she will be discharged from empowerment and will not need a f/u visit. Olga will notify her of results once she receive them. Pt verbalized understanding.

## 2017-03-16 ENCOUNTER — PATIENT MESSAGE (OUTPATIENT)
Dept: DIABETES | Facility: CLINIC | Age: 42
End: 2017-03-16

## 2017-03-16 ENCOUNTER — OFFICE VISIT (OUTPATIENT)
Dept: GASTROENTEROLOGY | Facility: CLINIC | Age: 42
End: 2017-03-16
Payer: COMMERCIAL

## 2017-03-16 ENCOUNTER — LAB VISIT (OUTPATIENT)
Dept: LAB | Facility: HOSPITAL | Age: 42
End: 2017-03-16
Attending: NURSE PRACTITIONER
Payer: COMMERCIAL

## 2017-03-16 VITALS
HEIGHT: 63 IN | HEART RATE: 66 BPM | WEIGHT: 208.13 LBS | SYSTOLIC BLOOD PRESSURE: 127 MMHG | BODY MASS INDEX: 36.88 KG/M2 | DIASTOLIC BLOOD PRESSURE: 71 MMHG

## 2017-03-16 DIAGNOSIS — R10.11 ABDOMINAL PAIN, RIGHT UPPER QUADRANT: ICD-10-CM

## 2017-03-16 DIAGNOSIS — E11.42 TYPE 2 DIABETES MELLITUS WITH DIABETIC POLYNEUROPATHY, WITHOUT LONG-TERM CURRENT USE OF INSULIN: Chronic | ICD-10-CM

## 2017-03-16 DIAGNOSIS — R93.5 ABNORMAL US (ULTRASOUND) OF ABDOMEN: Primary | ICD-10-CM

## 2017-03-16 DIAGNOSIS — R10.13 DYSPEPSIA: ICD-10-CM

## 2017-03-16 DIAGNOSIS — R19.7 DIARRHEA, UNSPECIFIED TYPE: ICD-10-CM

## 2017-03-16 PROCEDURE — 99999 PR PBB SHADOW E&M-EST. PATIENT-LVL IV: CPT | Mod: PBBFAC,,, | Performed by: NURSE PRACTITIONER

## 2017-03-16 PROCEDURE — 36415 COLL VENOUS BLD VENIPUNCTURE: CPT

## 2017-03-16 PROCEDURE — 99213 OFFICE O/P EST LOW 20 MIN: CPT | Mod: S$GLB,,, | Performed by: NURSE PRACTITIONER

## 2017-03-16 PROCEDURE — 83036 HEMOGLOBIN GLYCOSYLATED A1C: CPT

## 2017-03-16 NOTE — PROGRESS NOTES
Subjective:       Patient ID: Enriqueta Mccarthy is a 41 y.o. female.    Chief Complaint: Other (EGD Results)    HPI Presents to follow up after recent workup for abdominal pain, diarrhea, dyspepsia.  US reviewed:  1. Hepatosplenomegaly. Suspect fatty change in liver.   2. Borderline gallbladder wall thickening. There is a filling defect which may represent a stone although I cannot determine this with absolute certainty due to lack of posterior acoustical shadowing. Consider follow studies as indicated.  3. No additional significant findings.    Recent labs unremarkable.    Stool studies negative.    EGD:  - Mucosal variant in the duodenum.                        - Erythematous mucosa in the greater curvature of                         the gastric body and antrum. Biopsied.                        - A single mucosal papule (nodule) found in the                         stomach. Biopsied.                        - Normal cardia, gastric fundus, lesser curvature                         of the stomach, incisura and pylorus.                        - Normal esophagus.                        - Two biopsies were obtained in the duodenal bulb.  Pathology:  SPECIMEN  1) Duodenal bulb biopsy.  2) Gastric biopsy.  3) Antrum nodule biopsy.   FINAL PATHOLOGIC DIAGNOSIS  1. DUODENAL BULB (BIOPSY):  -Benign duodenal mucosal with mild reactive change and vascular congestion  -No increase in intraepithelial lymphocytes or expansion of villi with plasma cells  -Negative for dysplasia or malignancy  2. GASTRIC (BIOPSY):  -Focal mild chronic inflammation in background of reactive change  -Patchy epithelial denudation  -No Helicobacter organisms identified on H&E  -Negative for intestinal metaplasia, dysplasia or malignancy  3. ANTRAL NODULE (BIOPSY):  -Mild chronic inflammation in background of prominent reactive regenerative change  -No Helicobacter organisms identified on H&E  -Negative for intestinal metaplasia, dysplasia or  malignancy    Bentyl has been helpful with her abdominal pain.  She will use this as needed.  Reports pain when present in the upper abdomen and at times may be associated with eating.  She does not have pain every day.  Continues to have loose- mushy stools with 4-6 bowel movements daily.  She denies blood with bowel movements.   No family history of colon cancer or colon polyps or IBD.  She continues to report increased belching.    Review of Systems   Constitutional: Negative for activity change, appetite change, fatigue, fever and unexpected weight change.   Respiratory: Negative for shortness of breath.    Cardiovascular: Negative for chest pain.   Gastrointestinal: Positive for abdominal pain and diarrhea. Negative for blood in stool, constipation, nausea and vomiting.   Musculoskeletal: Negative.    Skin: Negative.    Psychiatric/Behavioral: Negative.        Objective:      Physical Exam   Constitutional: She is oriented to person, place, and time. She appears well-developed and well-nourished. No distress.   HENT:   Head: Normocephalic.   Eyes: No scleral icterus.   Cardiovascular: Normal rate.    Pulmonary/Chest: Effort normal. No respiratory distress.   Neurological: She is alert and oriented to person, place, and time.   Skin: Skin is warm and dry. She is not diaphoretic. No pallor.   Psychiatric: She has a normal mood and affect. Her behavior is normal. Judgment and thought content normal.   Vitals reviewed.      Assessment:       1. Abnormal US (ultrasound) of abdomen    2. Abdominal pain, right upper quadrant    3. Dyspepsia    4. Diarrhea, unspecified type        Plan:         Enriqueta was seen today for other.    Diagnoses and all orders for this visit:    Abnormal US (ultrasound) of abdomen  -     Ambulatory referral to General Surgery    Abdominal pain, right upper quadrant  -     Ambulatory referral to General Surgery    Dyspepsia  -     Ambulatory referral to General Surgery    Diarrhea, unspecified  type    Other orders  -     rifAXIMin (XIFAXAN) 550 mg Tab; Take 1 tablet (550 mg total) by mouth 3 (three) times daily.    Refer to surgery for abnormal gallbladder on US.    Xifaxan followed by probiotic.    If complaints continue, can consider colonoscopy.

## 2017-03-17 LAB
ESTIMATED AVG GLUCOSE: 91 MG/DL
HBA1C MFR BLD HPLC: 4.8 %

## 2017-03-19 ENCOUNTER — PATIENT MESSAGE (OUTPATIENT)
Dept: GASTROENTEROLOGY | Facility: CLINIC | Age: 42
End: 2017-03-19

## 2017-03-20 ENCOUNTER — PATIENT MESSAGE (OUTPATIENT)
Dept: GASTROENTEROLOGY | Facility: CLINIC | Age: 42
End: 2017-03-20

## 2017-03-21 ENCOUNTER — PATIENT MESSAGE (OUTPATIENT)
Dept: SURGERY | Facility: CLINIC | Age: 42
End: 2017-03-21

## 2017-03-21 ENCOUNTER — TELEPHONE (OUTPATIENT)
Dept: GASTROENTEROLOGY | Facility: CLINIC | Age: 42
End: 2017-03-21

## 2017-03-21 RX ORDER — METRONIDAZOLE 250 MG/1
250 TABLET ORAL 3 TIMES DAILY
Qty: 30 TABLET | Refills: 0 | Status: SHIPPED | OUTPATIENT
Start: 2017-03-21 | End: 2017-03-31

## 2017-03-21 NOTE — TELEPHONE ENCOUNTER
Spoke with Pt to inform her that her Rx was sent to her [facility]. Verbal Understanding.

## 2017-03-21 NOTE — TELEPHONE ENCOUNTER
----- Message from Katie Terrell MA sent at 3/21/2017  2:15 PM CDT -----  Contact: [facility]/[phone]      ----- Message -----     From: Tamra Reynoso     Sent: 3/21/2017   2:10 PM       To: [facility]ITTON Staff    Patient's script rifAXIMin (XIFAXAN) 550 mg Tab was denied by her insurance.  Please advise

## 2017-03-24 DIAGNOSIS — I10 ESSENTIAL HYPERTENSION: ICD-10-CM

## 2017-03-24 RX ORDER — VALSARTAN 320 MG/1
TABLET ORAL
Qty: 90 TABLET | Refills: 1 | Status: SHIPPED | OUTPATIENT
Start: 2017-03-24 | End: 2017-06-15 | Stop reason: SDUPTHER

## 2017-03-28 ENCOUNTER — PATIENT MESSAGE (OUTPATIENT)
Dept: SURGERY | Facility: CLINIC | Age: 42
End: 2017-03-28

## 2017-03-28 ENCOUNTER — PATIENT OUTREACH (OUTPATIENT)
Dept: OTHER | Facility: OTHER | Age: 42
End: 2017-03-28
Payer: COMMERCIAL

## 2017-03-28 ENCOUNTER — OFFICE VISIT (OUTPATIENT)
Dept: SURGERY | Facility: CLINIC | Age: 42
End: 2017-03-28
Payer: COMMERCIAL

## 2017-03-28 ENCOUNTER — LAB VISIT (OUTPATIENT)
Dept: LAB | Facility: HOSPITAL | Age: 42
End: 2017-03-28
Attending: SURGERY
Payer: COMMERCIAL

## 2017-03-28 ENCOUNTER — PATIENT MESSAGE (OUTPATIENT)
Dept: OTHER | Facility: OTHER | Age: 42
End: 2017-03-28

## 2017-03-28 VITALS
HEIGHT: 63 IN | DIASTOLIC BLOOD PRESSURE: 58 MMHG | SYSTOLIC BLOOD PRESSURE: 123 MMHG | HEART RATE: 68 BPM | BODY MASS INDEX: 36.64 KG/M2 | WEIGHT: 206.81 LBS | TEMPERATURE: 98 F

## 2017-03-28 DIAGNOSIS — K80.50 BILIARY COLIC: ICD-10-CM

## 2017-03-28 DIAGNOSIS — E11.9 CONTROLLED TYPE 2 DIABETES MELLITUS WITHOUT COMPLICATION, WITHOUT LONG-TERM CURRENT USE OF INSULIN: ICD-10-CM

## 2017-03-28 DIAGNOSIS — E11.42 TYPE 2 DIABETES MELLITUS WITH DIABETIC POLYNEUROPATHY, WITHOUT LONG-TERM CURRENT USE OF INSULIN: ICD-10-CM

## 2017-03-28 DIAGNOSIS — E66.01 SEVERE OBESITY (BMI 35.0-35.9 WITH COMORBIDITY): ICD-10-CM

## 2017-03-28 DIAGNOSIS — K80.10 CHRONIC CALCULOUS CHOLECYSTITIS: ICD-10-CM

## 2017-03-28 DIAGNOSIS — K80.50 BILIARY COLIC: Primary | ICD-10-CM

## 2017-03-28 LAB
ALBUMIN SERPL BCP-MCNC: 4.2 G/DL
ALP SERPL-CCNC: 42 U/L
ALT SERPL W/O P-5'-P-CCNC: 13 U/L
ANION GAP SERPL CALC-SCNC: 10 MMOL/L
AST SERPL-CCNC: 11 U/L
BASOPHILS # BLD AUTO: 0.03 K/UL
BASOPHILS NFR BLD: 0.4 %
BILIRUB SERPL-MCNC: 0.6 MG/DL
BUN SERPL-MCNC: 11 MG/DL
CALCIUM SERPL-MCNC: 9.5 MG/DL
CHLORIDE SERPL-SCNC: 107 MMOL/L
CO2 SERPL-SCNC: 24 MMOL/L
CREAT SERPL-MCNC: 1 MG/DL
DIFFERENTIAL METHOD: ABNORMAL
EOSINOPHIL # BLD AUTO: 0.2 K/UL
EOSINOPHIL NFR BLD: 2.2 %
ERYTHROCYTE [DISTWIDTH] IN BLOOD BY AUTOMATED COUNT: 14.2 %
EST. GFR  (AFRICAN AMERICAN): >60 ML/MIN/1.73 M^2
EST. GFR  (NON AFRICAN AMERICAN): >60 ML/MIN/1.73 M^2
GLUCOSE SERPL-MCNC: 107 MG/DL
HCT VFR BLD AUTO: 32 %
HGB BLD-MCNC: 10.8 G/DL
LYMPHOCYTES # BLD AUTO: 3 K/UL
LYMPHOCYTES NFR BLD: 35.7 %
MCH RBC QN AUTO: 28.3 PG
MCHC RBC AUTO-ENTMCNC: 33.8 %
MCV RBC AUTO: 84 FL
MONOCYTES # BLD AUTO: 0.4 K/UL
MONOCYTES NFR BLD: 5.2 %
NEUTROPHILS # BLD AUTO: 4.7 K/UL
NEUTROPHILS NFR BLD: 56.4 %
PLATELET # BLD AUTO: 275 K/UL
PMV BLD AUTO: 10.1 FL
POTASSIUM SERPL-SCNC: 3.8 MMOL/L
PROT SERPL-MCNC: 7.5 G/DL
RBC # BLD AUTO: 3.82 M/UL
SODIUM SERPL-SCNC: 141 MMOL/L
WBC # BLD AUTO: 8.28 K/UL

## 2017-03-28 PROCEDURE — 85025 COMPLETE CBC W/AUTO DIFF WBC: CPT

## 2017-03-28 PROCEDURE — 80053 COMPREHEN METABOLIC PANEL: CPT

## 2017-03-28 PROCEDURE — 99205 OFFICE O/P NEW HI 60 MIN: CPT | Mod: 57,S$GLB,, | Performed by: SURGERY

## 2017-03-28 PROCEDURE — 99999 PR PBB SHADOW E&M-EST. PATIENT-LVL III: CPT | Mod: PBBFAC,,, | Performed by: SURGERY

## 2017-03-28 PROCEDURE — 36415 COLL VENOUS BLD VENIPUNCTURE: CPT

## 2017-03-28 NOTE — PROGRESS NOTES
Last 5 Patient Entered Readings                                                               Current 30 Day Average: 124/71     Recent Readings 3/27/2017 3/18/2017 3/5/2017 2/28/2017 2/27/2017    Systolic BP (mmHg) 134 130 122 118 117    Diastolic BP (mmHg) 78 73 69 68 68    Pulse 64 65 67 68 71        Sent [employer] message to follow up with Mrs. Enriqueta Mccarthy. Inquired about how her low sodium diet and exercise is going. Overall, her readings are looking good and I did remind her to take her readings at least once per week. Advised pt to call or message back if she has any questions or concerns.

## 2017-03-28 NOTE — LETTER
March 28, 2017      Zayra Barnett, KEVIN  [address]  [place] [address]           [facility]  [address]  [address]  [place] [address]  Phone: [phone]          Patient: Enriqueta Mccarthy   MR Number: [id number]   YOB: 1975   Date of Visit: 3/28/2017       Dear Zayra Barnett:    Thank you for referring Enriqueta Mccarthy to me for evaluation. Attached you will find relevant portions of my assessment and plan of care.    If you have questions, please do not hesitate to call me. I look forward to following Enriqueta Mccarthy along with you.    Sincerely,    Sangita Phelan, DO    Enclosure  CC:  No Recipients    If you would like to receive this communication electronically, please contact [email or web address] or [phone] to request more information on [employer] Link access.    For providers and/or their staff who would like to refer a patient to [facility], please contact us through our one-stop-shop provider referral line, Ender Contreras, at [phone].    If you feel you have received this communication in error or would no longer like to receive these types of communications, please e-mail [email or web address]

## 2017-03-28 NOTE — PROGRESS NOTES
Subjective:      Patient ID: Enriqueta Mccarthy is a 41 y.o. female.    Chief Complaint: Consult  Patient presents for evaluation of possible bladder disease.  She has had multiple abdominal ultrasounds for abdominal pain over the years dating back as far , which documents gallstones.  Her most recent ultrasound now documents borderline gallbladder wall thickening.  The patient complains of epigastric discomfort that radiates to the right upper quadrant with associated dyspepsia and occasional diarrhea.  The pain is intermittent and sometimes is severe as 10 out of 10.  It has been increasing in frequency, not always associated with meals.  No nausea or vomiting.  No fevers or chills.  She was diagnosed this year with diabetes and has lost 15 pounds while on treatment.  She now has a normal hemoglobin A1c She was started on Bentyl week and a half ago and states this has helped his discomfort.  She had an EGD with biopsies that were negative performed on 2017.  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    Past Medical History:   Diagnosis Date    Essential hypertension     Herpes simplex virus (HSV) infection     Hypertension     Hypertriglyceridemia 2016    Neuropathy     Obesity (BMI 30-39.9) 2016    Type 2 diabetes mellitus with diabetic polyneuropathy, without long-term current use of insulin 2016    Uterine fibroid      Past Surgical History:   Procedure Laterality Date    ablation       ADENOIDECTOMY       SECTION      x 2    cyst removal left shoulder Left 2013    cyst removed right thigh      SINUS SURGERY      TONSILLECTOMY      TUBAL LIGATION       Family History   Problem Relation Age of Onset    Diabetes Father     Hypertension Father     Hyperlipidemia Father     Hypertension Mother     Anemia Daughter     Anemia Son     Diabetes Maternal Uncle     Diabetes Maternal Grandmother      Social History     Social  History    Marital status:      Spouse name: N/A    Number of children: N/A    Years of education: N/A     Social History Main Topics    Smoking status: Never Smoker    Smokeless tobacco: None    Alcohol use No    Drug use: No    Sexual activity: Not Currently     Partners: Male     Birth control/ protection: Surgical     Other Topics Concern    None     Social History Narrative       Current Outpatient Prescriptions   Medication Sig Dispense Refill    atorvastatin (LIPITOR) 80 MG tablet Take 80 mg by mouth every evening.       azelastine (ASTELIN) 137 mcg (0.1 %) nasal spray       carvedilol (COREG) 25 MG tablet Take 1 tablet (25 mg total) by mouth 2 (two) times daily. .5-1 twice daily or as directed 180 tablet 3    chlorthalidone (HYGROTEN) 25 MG Tab Take a half tablet (12.5mg) daily 90 tablet 1    dicyclomine (BENTYL) 20 mg tablet Take 1 tablet (20 mg total) by mouth every 6 (six) hours. 120 tablet 0    diltiaZEM (CARDIZEM CD) 180 MG 24 hr capsule TAKE 2 CAPSULES (360 MG TOTAL) BY MOUTH ONCE DAILY. 60 capsule 3    gabapentin (NEURONTIN) 300 MG capsule Take 2 capsules (600 mg total) by mouth every evening. 60 capsule 3    hydrALAZINE (APRESOLINE) 100 MG tablet TAKE 1 TABLET (100 MG TOTAL) BY MOUTH EVERY 8 (EIGHT) HOURS. 90 tablet 2    icosapent ethyl (VASCEPA) 1 gram Cap Take 2 g by mouth 2 (two) times daily. 120 capsule 11    metronidazole (FLAGYL) 250 MG tablet Take 1 tablet (250 mg total) by mouth 3 (three) times daily. 30 tablet 0    multivitamin with minerals tablet Take 1 tablet by mouth once daily.      ONETOUCH DELICA LANCETS 30 gauge Misc 1 lancet by Misc.(Non-Drug; Combo Route) route 3 (three) times daily. Test blood glucose up to 3 times daily as needed. 100 each 11    ONETOUCH VERIO Strp 1 strip by Misc.(Non-Drug; Combo Route) route 3 (three) times daily. Test blood glucose up to 3 times daily as needed. 100 strip 11    potassium gluconate 595 mg (99 mg) Tab Take 2  "tablets by mouth once daily.      TRULICITY 1.5 mg/0.5 mL PnIj Inject 1.5 mg into the skin every 7 days. 4 Syringe 6    valsartan (DIOVAN) 320 MG tablet TAKE 1 TABLET EVERY DAY 90 tablet 1    VITAMIN D2 50,000 unit capsule TAKE 1 CAPSULE TWICE WEEKLY 8 capsule 7     No current facility-administered medications for this visit.      Review of patient's allergies indicates:   Allergen Reactions    No known allergies        ROS:  All systems were reviewed and are negative, except that mentioned in the HPI.    Objective:     Vitals:    03/28/17 1357   BP: (!) 123/58   Pulse: 68   Temp: 98.3 °F (36.8 °C)   Weight: 93.8 kg (206 lb 12.7 oz)   Height: 5' 3" (1.6 m)     Physical Exam   Constitutional: She is oriented to person, place, and time. She appears well-developed and well-nourished. No distress.   HENT:   Head: Normocephalic and atraumatic.   Eyes: EOM are normal. Pupils are equal, round, and reactive to light. No scleral icterus.   Neck: Normal range of motion. No JVD present.   Cardiovascular: Normal rate and regular rhythm.    Pulmonary/Chest: Effort normal and breath sounds normal. No respiratory distress.   Abdominal: Soft. Bowel sounds are normal. She exhibits no distension and no mass. There is no tenderness. There is no rebound and no guarding.   Musculoskeletal: Normal range of motion.   Neurological: She is alert and oriented to person, place, and time. No cranial nerve deficit.   Skin: Skin is warm and dry. She is not diaphoretic.   Psychiatric: She has a normal mood and affect.       Lab Review: CBC:   Lab Results   Component Value Date    WBC 8.28 03/28/2017    RBC 3.82 (L) 03/28/2017    HGB 10.8 (L) 03/28/2017    HCT 32.0 (L) 03/28/2017     03/28/2017     BMP:   Lab Results   Component Value Date     03/28/2017     03/28/2017    K 3.8 03/28/2017     03/28/2017    CO2 24 03/28/2017    BUN 11 03/28/2017    CREATININE 1.0 03/28/2017    CALCIUM 9.5 03/28/2017     Lab Results "   Component Value Date    ALT 13 03/28/2017    AST 11 03/28/2017    ALKPHOS 42 (L) 03/28/2017    BILITOT 0.6 03/28/2017     Lab Results   Component Value Date    HGBA1C 4.8 03/16/2017       Diagnostics Review:  U/S 2/16/17 images and reports were personally reviewed.  The report states:  The liver is mildly enlarged measuring up to 17.1 cm in length. There is mild splenomegaly with spleen measuring up to 15.4 cm. The liver does show increased echogenicity consistent with fatty change.  The right and left kidneys are grossly normal. Portal vein is patent. Doppler analysis shows hepatopedal flow.  The gallbladder demonstrates a 4 mm filling defect which is mobile. Difficult to call shadowing with certainty however on the submitted images. Gallbladder wall measures 3 mm in diameter. No dilatation of the common duct.  Impression:  1. Hepatosplenomegaly. Suspect fatty change in liver.   2. Borderline gallbladder wall thickening. There is a filling defect which may represent a stone although I cannot determine this with absolute certainty due to lack of posterior acoustical shadowing. Consider follow studies as indicated.  3. No additional significant findings.    *Ultrasound performed in 2005 and 2011 report of gallstones    EGD 3/7/17 biopsies were benign    Assessment:     1. Biliary colic    2. Chronic calculous cholecystitis    3. Severe obesity (BMI 35.0-35.9 with comorbidity)    4. Controlled type 2 diabetes mellitus without complication, without long-term current use of insulin    5. Type 2 diabetes mellitus with diabetic polyneuropathy, without long-term current use of insulin      Plan:   The pathology of the patient's disease was discussed. Written handouts were explained and given to the patient.  Elective laparoscopic cholecystectomy was recommended. The surgical procedure, risks, postop recovery and consent were discussed. All questions were answered and the consent was signed. Signs and symptoms of  worsening disease was discussed.  The patient will call or go to ER should those symptoms develop.  Surgery scheduled for April 12 by patient request.  We requested that she hold fish oil for 1 week prior to surgery.  We will contact her cardiologist (Dr. Parekh) to see if any additional pre-op workup is needed.

## 2017-03-29 ENCOUNTER — PATIENT MESSAGE (OUTPATIENT)
Dept: RESEARCH | Facility: HOSPITAL | Age: 42
End: 2017-03-29

## 2017-03-29 DIAGNOSIS — K81.1 CHRONIC CHOLECYSTITIS: ICD-10-CM

## 2017-03-29 DIAGNOSIS — K80.50 BILIARY COLIC: Primary | ICD-10-CM

## 2017-03-30 RX ORDER — METRONIDAZOLE 500 MG/100ML
500 INJECTION, SOLUTION INTRAVENOUS
Status: CANCELLED | OUTPATIENT
Start: 2017-03-30

## 2017-03-31 ENCOUNTER — TELEPHONE (OUTPATIENT)
Dept: SURGERY | Facility: CLINIC | Age: 42
End: 2017-03-31

## 2017-03-31 NOTE — TELEPHONE ENCOUNTER
----- Message from Sangita Phelan, DO sent at 3/29/2017  2:12 PM CDT -----  Please let patient know that her cardiologist (Dr. Jose Luis Parekh) does not need to see her pre-op and no additional studies are needed. We can proceed as planned with her surgery. Thanks  3-31-17 Per Dr. Phelan, patient notified of above recommendations. Surgery date confirmed for 4-12-17. Patient voiced understanding.

## 2017-04-05 DIAGNOSIS — Z12.31 OTHER SCREENING MAMMOGRAM: ICD-10-CM

## 2017-04-10 ENCOUNTER — PATIENT MESSAGE (OUTPATIENT)
Dept: SURGERY | Facility: CLINIC | Age: 42
End: 2017-04-10

## 2017-04-11 ENCOUNTER — ANESTHESIA EVENT (OUTPATIENT)
Dept: SURGERY | Facility: HOSPITAL | Age: 42
End: 2017-04-11
Payer: COMMERCIAL

## 2017-04-11 ENCOUNTER — HOSPITAL ENCOUNTER (OUTPATIENT)
Dept: PREADMISSION TESTING | Facility: HOSPITAL | Age: 42
Discharge: HOME | End: 2017-04-11
Attending: SURGERY
Payer: COMMERCIAL

## 2017-04-11 VITALS
HEART RATE: 67 BPM | DIASTOLIC BLOOD PRESSURE: 63 MMHG | OXYGEN SATURATION: 100 % | RESPIRATION RATE: 18 BRPM | WEIGHT: 208 LBS | BODY MASS INDEX: 36.86 KG/M2 | HEIGHT: 63 IN | SYSTOLIC BLOOD PRESSURE: 121 MMHG

## 2017-04-11 DIAGNOSIS — I10 ESSENTIAL HYPERTENSION: ICD-10-CM

## 2017-04-11 DIAGNOSIS — K80.50 BILIARY COLIC: ICD-10-CM

## 2017-04-11 DIAGNOSIS — K81.1 CHRONIC CHOLECYSTITIS: ICD-10-CM

## 2017-04-11 RX ORDER — LIDOCAINE HYDROCHLORIDE 10 MG/ML
1 INJECTION, SOLUTION EPIDURAL; INFILTRATION; INTRACAUDAL; PERINEURAL ONCE
Status: CANCELLED | OUTPATIENT
Start: 2017-04-11 | End: 2017-04-11

## 2017-04-11 RX ORDER — CARVEDILOL 25 MG/1
25 TABLET ORAL 2 TIMES DAILY
Qty: 180 TABLET | Refills: 3 | Status: SHIPPED | OUTPATIENT
Start: 2017-04-11 | End: 2017-04-25 | Stop reason: SDUPTHER

## 2017-04-11 RX ORDER — SODIUM CHLORIDE, SODIUM LACTATE, POTASSIUM CHLORIDE, CALCIUM CHLORIDE 600; 310; 30; 20 MG/100ML; MG/100ML; MG/100ML; MG/100ML
INJECTION, SOLUTION INTRAVENOUS CONTINUOUS
Status: CANCELLED | OUTPATIENT
Start: 2017-04-11

## 2017-04-11 NOTE — DISCHARGE INSTRUCTIONS
Your surgery is scheduled for 4/12/17.    Please report to Outpatient Surgery Intake Office on the 2nd FLOOR at 7:45 a.m.          INSTRUCTIONS IMPORTANT!!!  ¨ Do not eat or drink after 12 midnight-including water. OK to brush teeth, no   gum, candy or mints!    ¨ Take only these medicines with a small swallow of water-morning of surgery:  Valsartan, Hydralazine and Diltiazem        ____  Proceed to [facility] on 4/11/17 for additional blood test.        ____  Do not wear makeup, including mascara.  ____  No powder, lotions or creams to surgical area.  ____  Please remove all jewelry, including piercings and leave at home.  ____  No money or valuables needed. Please leave at home.  ____  Please bring any documents given by your doctor.  ____  If going home the same day, arrange for a ride home. You will not be able to             drive if Anesthesia was used.  ____  Wear loose fitting clothing. Allow for dressings, bandages.  ____  Stop Aspirin, Ibuprofen, Motrin and Aleve at least 3-5 days before surgery, unless otherwise instructed by your doctor, or the nurse.   You MAY use Tylenol/acetaminophen until day of surgery.  ____  Wash the surgical area with Hibiclens the night before surgery, and again the             morning of surgery.  Be sure to rinse hibiclens off completely (if instructed by   nurse).  ____  If you take diabetic medication, do not take am of surgery unless instructed by Doctor.  ____  Call MD for temperature above 101 degrees.  ____ Stop taking any Fish Oil supplement or any Vitamins that contain Vitamin E at least 5 days prior to surgery.  ____ Do Not wear your contact lenses the day of your procedure.  You may wear your glasses.        I have read or had read and explained to me, and understand the above information.  Additional comments or instructions:  For additional questions call [phone]     Take a Hibiclens shower twice a day for 3 days prior to surgery, including the  morning of surgery.   Gargle with Listerine twice a day for 2 days prior to surgery, including the morning of surgery.      Pre-Op Bathing Instructions    Before surgery, you can play an important role in your own health.    Because skin is not sterile, we need to be sure that your skin is as free of germs as possible. By following the instructions below, you can reduce the number of germs on your skin before surgery.    IMPORTANT: You will need to shower with a special soap called Hibiclens*, available at any pharmacy.  If you are allergic to Chlorhexidine (the antiseptic in Hibiclens), use an antibacterial soap such as Dial Soap for your preoperative shower.  You will shower with Hibiclens both the night before your surgery and the morning of your surgery.  Do not use Hibiclens on the head, face or genitals to avoid injury to those areas.    STEP #1: THE NIGHT BEFORE YOUR SURGERY     1. Do not shave the area of your body where your surgery will be performed.  2. Shower and wash your hair and body as usual with your normal soap and shampoo.  3. Rinse your hair and body thoroughly after you shower to remove all soap residue.  4. With your hand, apply one packet of Hibiclens soap to the surgical site.   5. Wash the site gently for five (5) minutes. Do not scrub your skin too hard.   6. Do not wash with your regular soap after Hibiclens is used.  7. Rinse your body thoroughly.  8. Pat yourself dry with a clean, soft towel.  9. Do not use lotion, cream, or powder.  10. Wear clean clothes.    STEP #2: THE MORNING OF YOUR SURGERY     1. Repeat Step #1.    * Not to be used by people allergic to Chlorhexidine.          Anesthesia: General Anesthesia  Youre due to have surgery. During surgery, youll be given medication called anesthesia. (It is also called anesthetic.) This will keep you comfortable and pain-free. Your anesthesia provider will use general anesthesia. This sheet tells you more about it.  What is general  anesthesia?     You are watched continuously during your procedure by the anesthesia provider   General anesthesia puts you into a state like deep sleep. It goes into the bloodstream (IV anesthetics), into the lungs (gas anesthetics), or both. You feel nothing during the procedure. You will not remember it. During the procedure, the anesthesia provider monitors you continuously. He or she checks your heart rate and rhythm, blood pressure, breathing, and blood oxygen.  · IV Anesthetics. IV anesthetics are given through an IV line in your arm. Theyre often given first. This is so you are asleep before a gas anesthetic is started. Some kinds of IV anesthetics relieve pain. Others relax you. Your doctor will decide which kind is best in your case.  · Gas Anesthetics. Gas anesthetics are breathed into the lungs. They are often used to keep you asleep. They can be given through a facemask or a tube placed in your larynx or trachea (breathing tube).  ¨ If you have a facemask, your anesthesia provider will most likely place it over your nose and mouth while youre still awake. Youll breathe oxygen through the mask as your IV anesthetic is started. Gas anesthetic may be added through the mask.  ¨ If you have a tube in the larynx or trachea, it will be inserted into your throat after youre asleep.  Anesthesia tools and medications  You will likely have:  · IV anesthetics. These are put into an IV line into your bloodstream.  · Gas anesthetics. You breathe these anesthetics into your lungs, where they pass into your bloodstream.  · Pulse oximeter. This is a small clip that is attached to the end of your finger. This measures your blood oxygen level.  · Electrocardiography leads (electrodes). These are small sticky pads that are placed on your chest. They record your heart rate and rhythm.  · Blood pressure cuff. This reads your blood pressure.  Risks and possible complications  General anesthesia has some risks. These  include:  · Breathing problems  · Nausea and vomiting  · Sore throat or hoarseness (usually temporary)  · Allergic reaction to the anesthetic  · Irregular heartbeat (rare)  · Cardiac arrest (rare)   Anesthesia safety  · Follow all instructions you are given for how long not to eat or drink before your procedure.  · Be sure your doctor knows what medications and drugs you take. This includes over-the-counter medications, herbs, supplements, alcohol or other drugs. You will be asked when those were last taken.  · Have an adult family member or friend drive you home after the procedure.  · For the first 24 hours after your surgery:  ¨ Do not drive or use heavy equipment.  ¨ Have a trusted family member or spouse make important decisions or sign documents.  ¨ Avoid alcohol.  ¨ Have a responsible adult stay with you. He or she can watch for problems and help keep you safe.  Date Last Reviewed: 10/16/2014  © 4750-4354 [employer]. [address] [place] [address]. All rights reserved. This information is not intended as a substitute for professional medical care. Always follow your healthcare professional's instructions.        Cholecystectomy     Clips close off the duct connecting the gallbladder to the bile duct. The gallbladder is then removed.     Youve had painful attacks caused by gallstones. To treat the problem, your healthcare provider wants to remove your gallbladder. This surgery is called cholecystectomy. Removing the gallbladder can relieve pain. It will also prevent future attacks. You can live a healthy life without your gallbladder. You may also be able to go back to eating foods you enjoyed before your gallbladder problems started.  Before your surgery  Be prepared:  · Tell your provider what medicines you take. Include those bought over the counter. Also include herbs or supplements. Be sure to mention if you take prescription blood thinners. This includes warfarin, clopidogrel,  and aspirin.  · Have any tests your provider asks for, such as blood tests.  · Dont eat or drink after midnight, the night before your surgery. This includes water, coffee, and mints. However, you may need to take some medicine with sips of water--talk with your healthcare provider.  The day of surgery  When you arrive, you will prepare for surgery:  · An IV line will be put into a vein in your arm or hand. This gives you fluids and medicine.  · An anesthesiologist will talk with you about anesthesia. This is medicine used to prevent pain. You will receive general anesthesia. This puts you into a state like deep sleep through the procedure.  During surgery  There are 2 methods for removing the gallbladder. Your healthcare provider will choose which method is best for you:  · Laparoscopic cholecystectomy. This is most common. During surgery, 2 to 4 small incisions are made. A thin tube with a camera is used. This is called a laparoscope. The scope is put through one of the incisions. It sends images to a video screen. Surgical tools are put through other incisions. The gallbladder is removed using the scope and these tools.  · Open cholecystectomy. One larger incision is made. The surgeon sees and works through this incision. Open surgery is most often used when scarring or other factors make it a better choice for you.  In some cases, safety requires a change from laparoscopic to open surgery during the procedure.  After surgery  You will be sent to a room to wake up from the anesthesia. You will likely go home the same day. In some cases, an overnight stay is needed. If you had open cholecystectomy, you may need to stay in the hospital for a few days. When you are released to go home, have a family member or friend ready to drive you.  Risks and possible complications of gallbladder surgery  All surgeries have risks. The risks of gallbladder surgery include:  · Bleeding  · Infection  · Injury to the common bile  duct or nearby organs  · Blood clots in the legs  · Bile leaks  · Hernia at incision site  · Pnemonia   Date Last Reviewed: 7/1/2016 © 2000-2016 [employer] [address] [place] [address], [place] [address]. All rights reserved. This information is not intended as a substitute for professional medical care. Always follow your healthcare professional's instructions.

## 2017-04-11 NOTE — IP AVS SNAPSHOT
hospitals  [address]  [place] [address]  Phone: [phone]           Patient Discharge Instructions  Our goal is to set you up for success. This packet includes information on your condition, medications, and your home care. It will help you care for yourself to prevent having to return to the hospital.     Please ask your nurse if you have any questions.      There are many details to remember when preparing for your surgery. Here is what you will need to do, please ask your nurse if there are more specific instructions and if you have any questions:    1. Before procedure Do not smoke or drink alcoholic beverages 24 hours prior to your procedure. Do not eat or drink anything 8 hours before your procedure - this includes gum, mints, and candy.     2. Day of procedure Please remove all jewelry for the procedure. If you wear contact lenses, dentures, hearing aids or glasses, bring a container to put them in during your surgery and give to a family member.  If your doctor has scheduled you for an overnight stay, bring a small overnight bag with any personal items that you need.      3. After procedure  Make arrangements in advance for transportation home by a responsible adult. It is not safe to drive a vehicle during the 24 hours following surgery.     PLEASE NOTE: You may be contacted the day before your surgery to confirm your surgery date and arrival time. The Surgery schedule has many variables which may affect the time of your surgery case. Family members should be available if your surgery time changes.           [facility]  Unless otherwise directed by your provider, please   contact [facility], our nurse care line   that is available for 24/7 assistance.     [phone] (toll-free)     Registered nurses in the [facility]   provide: appointment scheduling, clinical advisement, health education, and other advisory services.                  ** Verify the list  of medication(s) below is accurate and up to date. Carry this with you in case of emergency. If your medications have changed, please notify your healthcare provider.             Medication List      TAKE these medications        Additional Info                      atorvastatin 80 MG tablet   Commonly known as:  LIPITOR   Refills:  0   Dose:  80 mg    Instructions:  Take 80 mg by mouth every evening.     Begin Date    AM    Noon    PM    Bedtime       azelastine 137 mcg (0.1 %) nasal spray   Commonly known as:  ASTELIN   Refills:  0      Begin Date    AM    Noon    PM    Bedtime       carvedilol 25 MG tablet   Commonly known as:  COREG   Quantity:  180 tablet   Refills:  3   Dose:  25 mg    Instructions:  Take 1 tablet (25 mg total) by mouth 2 (two) times daily. .5-1 twice daily or as directed     Begin Date    AM    Noon    PM    Bedtime       chlorthalidone 25 MG Tab   Commonly known as:  HYGROTEN   Quantity:  90 tablet   Refills:  1    Instructions:  Take a half tablet (12.5mg) daily     Begin Date    AM    Noon    PM    Bedtime       diltiaZEM 180 MG 24 hr capsule   Commonly known as:  CARDIZEM CD   Quantity:  60 capsule   Refills:  3    Instructions:  TAKE 2 CAPSULES (360 MG TOTAL) BY MOUTH ONCE DAILY.     Begin Date    AM    Noon    PM    Bedtime       gabapentin 300 MG capsule   Commonly known as:  NEURONTIN   Quantity:  60 capsule   Refills:  3   Dose:  600 mg    Instructions:  Take 2 capsules (600 mg total) by mouth every evening.     Begin Date    AM    Noon    PM    Bedtime       hydrALAZINE 100 MG tablet   Commonly known as:  APRESOLINE   Quantity:  90 tablet   Refills:  2    Instructions:  TAKE 1 TABLET (100 MG TOTAL) BY MOUTH EVERY 8 (EIGHT) HOURS.     Begin Date    AM    Noon    PM    Bedtime       icosapent ethyl 1 gram Cap   Commonly known as:  VASCEPA   Quantity:  120 capsule   Refills:  11   Dose:  2 g    Instructions:  Take 2 g by mouth 2 (two) times daily.     Begin Date    AM    Noon    PM     Bedtime       multivitamin with minerals tablet   Refills:  0   Dose:  1 tablet    Instructions:  Take 1 tablet by mouth once daily.     Begin Date    AM    Noon    PM    Bedtime       ONETOUCH DELICA LANCETS 30 gauge Misc   Quantity:  100 each   Refills:  11   Dose:  1 lancet   Generic drug:  lancets    Instructions:  1 lancet by Misc.(Non-Drug; Combo Route) route 3 (three) times daily. Test blood glucose up to 3 times daily as needed.     Begin Date    AM    Noon    PM    Bedtime       ONETOUCH VERIO Strp   Quantity:  100 strip   Refills:  11   Dose:  1 strip   Generic drug:  blood sugar diagnostic    Instructions:  1 strip by Misc.(Non-Drug; Combo Route) route 3 (three) times daily. Test blood glucose up to 3 times daily as needed.     Begin Date    AM    Noon    PM    Bedtime       TRULICITY 1.5 mg/0.5 mL Pnij   Quantity:  4 Syringe   Refills:  6   Dose:  1.5 mg   Generic drug:  dulaglutide    Instructions:  Inject 1.5 mg into the skin every 7 days.     Begin Date    AM    Noon    PM    Bedtime       valsartan 320 MG tablet   Commonly known as:  DIOVAN   Quantity:  90 tablet   Refills:  1    Instructions:  TAKE 1 TABLET EVERY DAY     Begin Date    AM    Noon    PM    Bedtime       VITAMIN D2 50,000 unit Cap   Quantity:  8 capsule   Refills:  7   Generic drug:  ergocalciferol    Instructions:  TAKE 1 CAPSULE TWICE WEEKLY     Begin Date    AM    Noon    PM    Bedtime                  Please bring to all follow up appointments:    1. A copy of your discharge instructions.  2. All medicines you are currently taking in their original bottles.  3. Identification and insurance card.    Please arrive 15 minutes ahead of scheduled appointment time.    Please call 24 hours in advance if you must reschedule your appointment and/or time.        Your Scheduled Appointments     Apr 24, 2017 11:20 AM CDT   Post OP with Sangita Phelan DO   [facility] ([facility])    [address] [place] [address]  4th Floor  [place] [address]   [phone]            Jul 18, 2017  9:40 AM CDT   Established Patient Visit with [facility] ([facility])    [address]  [facility] [id number] [phone]              Your Future Surgeries/Procedures     Apr 12, 2017   Surgery with Sangita Phelan DO   [facility] ([facility])    [address] [facility]  [place] [id number] [phone]                  Discharge Instructions       Your surgery is scheduled for 4/12/17.    Please report to Outpatient Surgery Intake Office on the 2nd FLOOR at 7:45 a.m.          INSTRUCTIONS IMPORTANT!!!  ¨ Do not eat or drink after 12 midnight-including water. OK to brush teeth, no   gum, candy or mints!    ¨ Take only these medicines with a small swallow of water-morning of surgery:  Valsartan, Hydralazine and Diltiazem        ____  Proceed to [facility] on 4/11/17 for additional blood test.        ____  Do not wear makeup, including mascara.  ____  No powder, lotions or creams to surgical area.  ____  Please remove all jewelry, including piercings and leave at home.  ____  No money or valuables needed. Please leave at home.  ____  Please bring any documents given by your doctor.  ____  If going home the same day, arrange for a ride home. You will not be able to             drive if Anesthesia was used.  ____  Wear loose fitting clothing. Allow for dressings, bandages.  ____  Stop Aspirin, Ibuprofen, Motrin and Aleve at least 3-5 days before surgery, unless otherwise instructed by your doctor, or the nurse.   You MAY use Tylenol/acetaminophen until day of surgery.  ____  Wash the surgical area with Hibiclens the night before surgery, and again the             morning of surgery.  Be sure to rinse hibiclens off completely (if instructed by   nurse).  ____  If you take diabetic medication, do not take am of surgery  unless instructed by Doctor.  ____  Call MD for temperature above 101 degrees.  ____ Stop taking any Fish Oil supplement or any Vitamins that contain Vitamin E at least 5 days prior to surgery.  ____ Do Not wear your contact lenses the day of your procedure.  You may wear your glasses.        I have read or had read and explained to me, and understand the above information.  Additional comments or instructions:  For additional questions call [phone]     Take a Hibiclens shower twice a day for 3 days prior to surgery, including the morning of surgery.   Gargle with Listerine twice a day for 2 days prior to surgery, including the morning of surgery.      Pre-Op Bathing Instructions    Before surgery, you can play an important role in your own health.    Because skin is not sterile, we need to be sure that your skin is as free of germs as possible. By following the instructions below, you can reduce the number of germs on your skin before surgery.    IMPORTANT: You will need to shower with a special soap called Hibiclens*, available at any pharmacy.  If you are allergic to Chlorhexidine (the antiseptic in Hibiclens), use an antibacterial soap such as Dial Soap for your preoperative shower.  You will shower with Hibiclens both the night before your surgery and the morning of your surgery.  Do not use Hibiclens on the head, face or genitals to avoid injury to those areas.    STEP #1: THE NIGHT BEFORE YOUR SURGERY     1. Do not shave the area of your body where your surgery will be performed.  2. Shower and wash your hair and body as usual with your normal soap and shampoo.  3. Rinse your hair and body thoroughly after you shower to remove all soap residue.  4. With your hand, apply one packet of Hibiclens soap to the surgical site.   5. Wash the site gently for five (5) minutes. Do not scrub your skin too hard.   6. Do not wash with your regular soap after Hibiclens is used.  7. Rinse your body thoroughly.  8. Pat  yourself dry with a clean, soft towel.  9. Do not use lotion, cream, or powder.  10. Wear clean clothes.    STEP #2: THE MORNING OF YOUR SURGERY     1. Repeat Step #1.    * Not to be used by people allergic to Chlorhexidine.          Anesthesia: General Anesthesia  Youre due to have surgery. During surgery, youll be given medication called anesthesia. (It is also called anesthetic.) This will keep you comfortable and pain-free. Your anesthesia provider will use general anesthesia. This sheet tells you more about it.  What is general anesthesia?     You are watched continuously during your procedure by the anesthesia provider   General anesthesia puts you into a state like deep sleep. It goes into the bloodstream (IV anesthetics), into the lungs (gas anesthetics), or both. You feel nothing during the procedure. You will not remember it. During the procedure, the anesthesia provider monitors you continuously. He or she checks your heart rate and rhythm, blood pressure, breathing, and blood oxygen.  · IV Anesthetics. IV anesthetics are given through an IV line in your arm. Theyre often given first. This is so you are asleep before a gas anesthetic is started. Some kinds of IV anesthetics relieve pain. Others relax you. Your doctor will decide which kind is best in your case.  · Gas Anesthetics. Gas anesthetics are breathed into the lungs. They are often used to keep you asleep. They can be given through a facemask or a tube placed in your larynx or trachea (breathing tube).  ¨ If you have a facemask, your anesthesia provider will most likely place it over your nose and mouth while youre still awake. Youll breathe oxygen through the mask as your IV anesthetic is started. Gas anesthetic may be added through the mask.  ¨ If you have a tube in the larynx or trachea, it will be inserted into your throat after youre asleep.  Anesthesia tools and medications  You will likely have:  · IV anesthetics. These are put into  an IV line into your bloodstream.  · Gas anesthetics. You breathe these anesthetics into your lungs, where they pass into your bloodstream.  · Pulse oximeter. This is a small clip that is attached to the end of your finger. This measures your blood oxygen level.  · Electrocardiography leads (electrodes). These are small sticky pads that are placed on your chest. They record your heart rate and rhythm.  · Blood pressure cuff. This reads your blood pressure.  Risks and possible complications  General anesthesia has some risks. These include:  · Breathing problems  · Nausea and vomiting  · Sore throat or hoarseness (usually temporary)  · Allergic reaction to the anesthetic  · Irregular heartbeat (rare)  · Cardiac arrest (rare)   Anesthesia safety  · Follow all instructions you are given for how long not to eat or drink before your procedure.  · Be sure your doctor knows what medications and drugs you take. This includes over-the-counter medications, herbs, supplements, alcohol or other drugs. You will be asked when those were last taken.  · Have an adult family member or friend drive you home after the procedure.  · For the first 24 hours after your surgery:  ¨ Do not drive or use heavy equipment.  ¨ Have a trusted family member or spouse make important decisions or sign documents.  ¨ Avoid alcohol.  ¨ Have a responsible adult stay with you. He or she can watch for problems and help keep you safe.  Date Last Reviewed: 10/16/2014  © 0668-6183 [employer]. [address] [place] [address]. All rights reserved. This information is not intended as a substitute for professional medical care. Always follow your healthcare professional's instructions.        Cholecystectomy     Clips close off the duct connecting the gallbladder to the bile duct. The gallbladder is then removed.     Youve had painful attacks caused by gallstones. To treat the problem, your healthcare provider wants to remove your  gallbladder. This surgery is called cholecystectomy. Removing the gallbladder can relieve pain. It will also prevent future attacks. You can live a healthy life without your gallbladder. You may also be able to go back to eating foods you enjoyed before your gallbladder problems started.  Before your surgery  Be prepared:  · Tell your provider what medicines you take. Include those bought over the counter. Also include herbs or supplements. Be sure to mention if you take prescription blood thinners. This includes warfarin, clopidogrel, and aspirin.  · Have any tests your provider asks for, such as blood tests.  · Dont eat or drink after midnight, the night before your surgery. This includes water, coffee, and mints. However, you may need to take some medicine with sips of water--talk with your healthcare provider.  The day of surgery  When you arrive, you will prepare for surgery:  · An IV line will be put into a vein in your arm or hand. This gives you fluids and medicine.  · An anesthesiologist will talk with you about anesthesia. This is medicine used to prevent pain. You will receive general anesthesia. This puts you into a state like deep sleep through the procedure.  During surgery  There are 2 methods for removing the gallbladder. Your healthcare provider will choose which method is best for you:  · Laparoscopic cholecystectomy. This is most common. During surgery, 2 to 4 small incisions are made. A thin tube with a camera is used. This is called a laparoscope. The scope is put through one of the incisions. It sends images to a video screen. Surgical tools are put through other incisions. The gallbladder is removed using the scope and these tools.  · Open cholecystectomy. One larger incision is made. The surgeon sees and works through this incision. Open surgery is most often used when scarring or other factors make it a better choice for you.  In some cases, safety requires a change from laparoscopic to  open surgery during the procedure.  After surgery  You will be sent to a room to wake up from the anesthesia. You will likely go home the same day. In some cases, an overnight stay is needed. If you had open cholecystectomy, you may need to stay in the hospital for a few days. When you are released to go home, have a family member or friend ready to drive you.  Risks and possible complications of gallbladder surgery  All surgeries have risks. The risks of gallbladder surgery include:  · Bleeding  · Infection  · Injury to the common bile duct or nearby organs  · Blood clots in the legs  · Bile leaks  · Hernia at incision site  · Pnemonia   Date Last Reviewed: 7/1/2016  © 1978-3481 [employer] [address] [place] [address], [place] [address]. All rights reserved. This information is not intended as a substitute for professional medical care. Always follow your healthcare professional's instructions.            Admission Information     Date & Time Provider Department CSN    4/11/2017  1:30 PM Sangita Phelan DO [facility] [id number]      Care Providers     Provider Role Specialty Primary office phone    Sangita Phelan DO Attending Provider General Surgery [phone]      Recent Lab Values        11/30/2005 1/4/2010 1/14/2011 10/3/2012 11/15/2016 3/16/2017            8:43 AM 12:47 PM  4:42 PM  7:45 AM  7:26 AM  8:21 AM      A1C 4.1 (L) 4.9 4.6 4.6 9.6 (H) 4.8           9.6 (H)       Comment for A1C at  7:26 AM on 11/15/2016:  According to ADA guidelines, hemoglobin A1C <7.0% represents  optimal control in non-pregnant diabetic patients.  Different  metrics may apply to specific populations.   Standards of Medical Care in Diabetes - 2016.  For the purpose of screening for the presence of diabetes:  <5.7%     Consistent with the absence of diabetes  5.7-6.4%  Consistent with increasing risk for diabetes   (prediabetes)  >or=6.5%  Consistent with diabetes  Currently no consensus  exists for use of hemoglobin A1C  for diagnosis of diabetes for children.      Comment for A1C at  8:21 AM on 3/16/2017:  According to ADA guidelines, hemoglobin A1C <7.0% represents  optimal control in non-pregnant diabetic patients.  Different  metrics may apply to specific populations.   Standards of Medical Care in Diabetes - 2016.  For the purpose of screening for the presence of diabetes:  <5.7%     Consistent with the absence of diabetes  5.7-6.4%  Consistent with increasing risk for diabetes   (prediabetes)  >or=6.5%  Consistent with diabetes  Currently no consensus exists for use of hemoglobin A1C  for diagnosis of diabetes for children.      Comment for A1C at  7:26 AM on 11/15/2016:  According to ADA guidelines, hemoglobin A1C <7.0% represents  optimal control in non-pregnant diabetic patients.  Different  metrics may apply to specific populations.   Standards of Medical Care in Diabetes - 2016.  For the purpose of screening for the presence of diabetes:  <5.7%     Consistent with the absence of diabetes  5.7-6.4%  Consistent with increasing risk for diabetes   (prediabetes)  >or=6.5%  Consistent with diabetes  Currently no consensus exists for use of hemoglobin A1C  for diagnosis of diabetes for children.        Allergies as of 4/11/2017        Reactions    No Known Allergies       Advance Directives     An advance directive is a document which, in the event you are no longer able to make decisions for yourself, tells your healthcare team what kind of treatment you do or do not want to receive, or who you would like to make those decisions for you.  If you do not currently have an advance directive, [facility] encourages you to create one.  For more information call:  [phone]), [phone] ([phone]),  or log on to [email or web address].        Language Assistance Services     ATTENTION: Language assistance services are available, free of charge. Please call [phone].       ATENCIÓN: Si habla español, tiene a baires disposición servicios gratuitos de asistencia lingüística. Arielle al [phone].     CHÚ Ý: N?u b?n nói Ti?ng Vi?t, có các d?ch v? h? tr? ngôn ng? mi?n phí dành cho b?n. G?i s? [phone].        Diabetes Discharge Instructions                                    [facility] complies with applicable Federal civil rights laws and does not discriminate on the basis of race, color, national origin, age, disability, or sex.

## 2017-04-11 NOTE — ANESTHESIA PREPROCEDURE EVALUATION
2017     Enriqueta Mccarthy is a 41 y.o., female is scheduled for laparoscopic cholecystectomy under [facility] on 2017.    Past Surgical History:   Procedure Laterality Date     SECTION      x 2    ENDOMETRIAL ABLATION  2016    LIPOMA RESECTION Left 2013    SINUS SURGERY  2006    TONSILLECTOMY, ADENOIDECTOMY      TUBAL LIGATION           [facility] Anesthesia Evaluation    I have reviewed the Patient Summary Reports.    I have reviewed the Nursing Notes.   I have reviewed the Medications.     Review of Systems  Anesthesia Hx:  No problems with previous Anesthesia  History of prior surgery of interest to airway management or planning: Previous anesthesia: General, MAC   Procedure performed at an [facility].  Denies Personal Hx of Anesthesia complications.   Social:  Non-Smoker, No Alcohol Use    Hematology/Oncology:         -- Anemia (chronic):   EENT/Dental:EENT/Dental Normal   Cardiovascular:   Exercise tolerance: good Hypertension, well controlled Denies Dysrhythmias.   Denies Angina. hyperlipidemia     ECG has been reviewed.    Pulmonary:   Denies Shortness of breath.    Renal/:  Renal/ Normal     Hepatic/GI:   GERD, well controlled Denies pain; does c/o intermittent nausea   Neurological:  Neurology Normal    Endocrine:   Diabetes, well controlled, type 2  Diabetes, Type 2 Diabetes , controlled by oral hypoglycemics. , most recent HgA1c value was 4.8 on 2017.        Physical Exam  General:  Obesity    Airway/Jaw/Neck:  Airway Findings: Mouth Opening: Normal Tongue: Normal  General Airway Assessment: Adult  Mallampati: II  TM Distance: Normal, at least 6 cm        Eyes/Ears/Nose:  EYES/EARS/NOSE FINDINGS: Normal   Dental:  Dental Findings: In tact, Periodontal disease, Mild   Chest/Lungs:  Chest/Lungs Clear    Heart/Vascular:  Heart Findings: Normal Heart murmur: negative     Abdomen:  Abdomen Findings: Normal      Mental Status:  Mental Status Findings: Normal      EKG 5/2016  Normal sinus rhythm  Normal ECG  When compared with ECG of 01-OCT-2013 16:00,  No significant change was found  Confirmed by DEE DEE REY MD ([id number]) on 5/31/2016 10:42:41 AM      Anesthesia Plan  Type of Anesthesia, risks & benefits discussed:  Anesthesia Type:  general  Patient's Preference:   Intra-op Monitoring Plan:   Intra-op Monitoring Plan Comments:   Post Op Pain Control Plan:   Post Op Pain Control Plan Comments:   Induction:   IV  Beta Blocker:  Patient is not currently on a Beta-Blocker (No further documentation required).       Informed Consent: Patient understands risks and agrees with Anesthesia plan.  Questions answered.   ASA Score: 2     Day of Surgery Review of History & Physical:        Anesthesia Plan Notes: Anesthesia consent will be obtained prior to procedure on 4/12/2017.          Ready For Surgery From Anesthesia Perspective.

## 2017-04-12 ENCOUNTER — HOSPITAL ENCOUNTER (OUTPATIENT)
Facility: HOSPITAL | Age: 42
Discharge: HOME | End: 2017-04-12
Attending: SURGERY | Admitting: SURGERY
Payer: COMMERCIAL

## 2017-04-12 ENCOUNTER — ANESTHESIA (OUTPATIENT)
Dept: SURGERY | Facility: HOSPITAL | Age: 42
End: 2017-04-12
Payer: COMMERCIAL

## 2017-04-12 ENCOUNTER — SURGERY (OUTPATIENT)
Age: 42
End: 2017-04-12

## 2017-04-12 DIAGNOSIS — K81.1 CHRONIC CHOLECYSTITIS: ICD-10-CM

## 2017-04-12 DIAGNOSIS — K80.50 BILIARY COLIC: ICD-10-CM

## 2017-04-12 LAB — POCT GLUCOSE: 120 MG/DL (ref 70–110)

## 2017-04-12 PROCEDURE — 25000003 PHARM REV CODE 250: Performed by: NURSE PRACTITIONER

## 2017-04-12 PROCEDURE — 63600175 PHARM REV CODE 636 W HCPCS: Performed by: ANESTHESIOLOGY

## 2017-04-12 PROCEDURE — 36000709 HC OR TIME LEV III EA ADD 15 MIN: Performed by: SURGERY

## 2017-04-12 PROCEDURE — 71000015 HC POSTOP RECOV 1ST HR: Performed by: SURGERY

## 2017-04-12 PROCEDURE — 25000003 PHARM REV CODE 250: Performed by: ANESTHESIOLOGY

## 2017-04-12 PROCEDURE — 25000003 PHARM REV CODE 250: Performed by: SURGERY

## 2017-04-12 PROCEDURE — 71000016 HC POSTOP RECOV ADDL HR: Performed by: SURGERY

## 2017-04-12 PROCEDURE — 27201423 OPTIME MED/SURG SUP & DEVICES STERILE SUPPLY: Performed by: SURGERY

## 2017-04-12 PROCEDURE — 11200 RMVL SKIN TAGS UP TO&INC 15: CPT | Mod: 51,,, | Performed by: SURGERY

## 2017-04-12 PROCEDURE — 37000009 HC ANESTHESIA EA ADD 15 MINS: Performed by: SURGERY

## 2017-04-12 PROCEDURE — 71000033 HC RECOVERY, INTIAL HOUR: Performed by: SURGERY

## 2017-04-12 PROCEDURE — 25000003 PHARM REV CODE 250: Performed by: NURSE ANESTHETIST, CERTIFIED REGISTERED

## 2017-04-12 PROCEDURE — 63600175 PHARM REV CODE 636 W HCPCS: Performed by: NURSE ANESTHETIST, CERTIFIED REGISTERED

## 2017-04-12 PROCEDURE — 37000008 HC ANESTHESIA 1ST 15 MINUTES: Performed by: SURGERY

## 2017-04-12 PROCEDURE — 47562 LAPAROSCOPIC CHOLECYSTECTOMY: CPT | Mod: ,,, | Performed by: SURGERY

## 2017-04-12 PROCEDURE — 63600175 PHARM REV CODE 636 W HCPCS: Performed by: SURGERY

## 2017-04-12 PROCEDURE — 88304 TISSUE EXAM BY PATHOLOGIST: CPT | Performed by: PATHOLOGY

## 2017-04-12 PROCEDURE — 36000708 HC OR TIME LEV III 1ST 15 MIN: Performed by: SURGERY

## 2017-04-12 PROCEDURE — 88304 TISSUE EXAM BY PATHOLOGIST: CPT | Mod: 26,,, | Performed by: PATHOLOGY

## 2017-04-12 RX ORDER — HYDROCODONE BITARTRATE AND ACETAMINOPHEN 5; 325 MG/1; MG/1
1 TABLET ORAL EVERY 4 HOURS PRN
Status: DISCONTINUED | OUTPATIENT
Start: 2017-04-12 | End: 2017-04-12 | Stop reason: HOSPADM

## 2017-04-12 RX ORDER — KETOROLAC TROMETHAMINE 30 MG/ML
INJECTION, SOLUTION INTRAMUSCULAR; INTRAVENOUS
Status: DISCONTINUED | OUTPATIENT
Start: 2017-04-12 | End: 2017-04-12

## 2017-04-12 RX ORDER — METRONIDAZOLE 500 MG/100ML
500 INJECTION, SOLUTION INTRAVENOUS
Status: COMPLETED | OUTPATIENT
Start: 2017-04-12 | End: 2017-04-12

## 2017-04-12 RX ORDER — ONDANSETRON 2 MG/ML
4 INJECTION INTRAMUSCULAR; INTRAVENOUS ONCE AS NEEDED
Status: DISCONTINUED | OUTPATIENT
Start: 2017-04-12 | End: 2017-04-12 | Stop reason: HOSPADM

## 2017-04-12 RX ORDER — SODIUM CHLORIDE 0.9 % (FLUSH) 0.9 %
3 SYRINGE (ML) INJECTION EVERY 8 HOURS
Status: DISCONTINUED | OUTPATIENT
Start: 2017-04-12 | End: 2017-04-12 | Stop reason: HOSPADM

## 2017-04-12 RX ORDER — SODIUM CHLORIDE 9 MG/ML
INJECTION, SOLUTION INTRAVENOUS CONTINUOUS
Status: DISCONTINUED | OUTPATIENT
Start: 2017-04-12 | End: 2017-04-12 | Stop reason: HOSPADM

## 2017-04-12 RX ORDER — NEOSTIGMINE METHYLSULFATE 1 MG/ML
INJECTION, SOLUTION INTRAVENOUS
Status: DISCONTINUED | OUTPATIENT
Start: 2017-04-12 | End: 2017-04-12

## 2017-04-12 RX ORDER — HYDROCODONE BITARTRATE AND ACETAMINOPHEN 5; 325 MG/1; MG/1
TABLET ORAL
Qty: 40 TABLET | Refills: 0 | Status: SHIPPED | OUTPATIENT
Start: 2017-04-12 | End: 2017-07-14

## 2017-04-12 RX ORDER — SODIUM CHLORIDE, SODIUM LACTATE, POTASSIUM CHLORIDE, CALCIUM CHLORIDE 600; 310; 30; 20 MG/100ML; MG/100ML; MG/100ML; MG/100ML
INJECTION, SOLUTION INTRAVENOUS CONTINUOUS
Status: DISCONTINUED | OUTPATIENT
Start: 2017-04-12 | End: 2017-04-12 | Stop reason: HOSPADM

## 2017-04-12 RX ORDER — HYDROCODONE BITARTRATE AND ACETAMINOPHEN 10; 325 MG/1; MG/1
1 TABLET ORAL EVERY 4 HOURS PRN
Status: DISCONTINUED | OUTPATIENT
Start: 2017-04-12 | End: 2017-04-12 | Stop reason: HOSPADM

## 2017-04-12 RX ORDER — HYDROMORPHONE HYDROCHLORIDE 2 MG/ML
0.5 INJECTION, SOLUTION INTRAMUSCULAR; INTRAVENOUS; SUBCUTANEOUS EVERY 5 MIN PRN
Status: DISCONTINUED | OUTPATIENT
Start: 2017-04-12 | End: 2017-04-12 | Stop reason: HOSPADM

## 2017-04-12 RX ORDER — ACETAMINOPHEN 10 MG/ML
INJECTION, SOLUTION INTRAVENOUS
Status: DISCONTINUED | OUTPATIENT
Start: 2017-04-12 | End: 2017-04-12

## 2017-04-12 RX ORDER — FENTANYL CITRATE 50 UG/ML
INJECTION, SOLUTION INTRAMUSCULAR; INTRAVENOUS
Status: DISCONTINUED | OUTPATIENT
Start: 2017-04-12 | End: 2017-04-12

## 2017-04-12 RX ORDER — LIDOCAINE HYDROCHLORIDE 10 MG/ML
1 INJECTION, SOLUTION EPIDURAL; INFILTRATION; INTRACAUDAL; PERINEURAL ONCE
Status: DISCONTINUED | OUTPATIENT
Start: 2017-04-12 | End: 2017-04-12 | Stop reason: HOSPADM

## 2017-04-12 RX ORDER — PROPOFOL 10 MG/ML
VIAL (ML) INTRAVENOUS
Status: DISCONTINUED | OUTPATIENT
Start: 2017-04-12 | End: 2017-04-12

## 2017-04-12 RX ORDER — BUPIVACAINE HYDROCHLORIDE 5 MG/ML
INJECTION, SOLUTION EPIDURAL; INTRACAUDAL
Status: DISCONTINUED | OUTPATIENT
Start: 2017-04-12 | End: 2017-04-12 | Stop reason: HOSPADM

## 2017-04-12 RX ORDER — LIDOCAINE HCL/PF 100 MG/5ML
SYRINGE (ML) INTRAVENOUS
Status: DISCONTINUED | OUTPATIENT
Start: 2017-04-12 | End: 2017-04-12

## 2017-04-12 RX ORDER — SODIUM CHLORIDE 0.9 % (FLUSH) 0.9 %
3 SYRINGE (ML) INJECTION
Status: DISCONTINUED | OUTPATIENT
Start: 2017-04-12 | End: 2017-04-12 | Stop reason: HOSPADM

## 2017-04-12 RX ORDER — ROCURONIUM BROMIDE 10 MG/ML
INJECTION, SOLUTION INTRAVENOUS
Status: DISCONTINUED | OUTPATIENT
Start: 2017-04-12 | End: 2017-04-12

## 2017-04-12 RX ORDER — ONDANSETRON HYDROCHLORIDE 2 MG/ML
INJECTION, SOLUTION INTRAMUSCULAR; INTRAVENOUS
Status: DISCONTINUED | OUTPATIENT
Start: 2017-04-12 | End: 2017-04-12

## 2017-04-12 RX ORDER — SUCCINYLCHOLINE CHLORIDE 20 MG/ML
INJECTION INTRAMUSCULAR; INTRAVENOUS
Status: DISCONTINUED | OUTPATIENT
Start: 2017-04-12 | End: 2017-04-12

## 2017-04-12 RX ORDER — GLYCOPYRROLATE 0.2 MG/ML
INJECTION INTRAMUSCULAR; INTRAVENOUS
Status: DISCONTINUED | OUTPATIENT
Start: 2017-04-12 | End: 2017-04-12

## 2017-04-12 RX ORDER — MIDAZOLAM HYDROCHLORIDE 1 MG/ML
INJECTION INTRAMUSCULAR; INTRAVENOUS
Status: DISCONTINUED | OUTPATIENT
Start: 2017-04-12 | End: 2017-04-12

## 2017-04-12 RX ADMIN — SODIUM CHLORIDE, SODIUM LACTATE, POTASSIUM CHLORIDE, AND CALCIUM CHLORIDE: .6; .31; .03; .02 INJECTION, SOLUTION INTRAVENOUS at 09:04

## 2017-04-12 RX ADMIN — SUCCINYLCHOLINE CHLORIDE 100 MG: 20 INJECTION, SOLUTION INTRAMUSCULAR; INTRAVENOUS at 09:04

## 2017-04-12 RX ADMIN — CEFTRIAXONE 2 G: 2 INJECTION, SOLUTION INTRAVENOUS at 10:04

## 2017-04-12 RX ADMIN — MIDAZOLAM HYDROCHLORIDE 2 MG: 1 INJECTION, SOLUTION INTRAMUSCULAR; INTRAVENOUS at 09:04

## 2017-04-12 RX ADMIN — SODIUM CHLORIDE, SODIUM LACTATE, POTASSIUM CHLORIDE, AND CALCIUM CHLORIDE 1000 ML: .6; .31; .03; .02 INJECTION, SOLUTION INTRAVENOUS at 08:04

## 2017-04-12 RX ADMIN — METRONIDAZOLE 500 MG: 500 SOLUTION INTRAVENOUS at 10:04

## 2017-04-12 RX ADMIN — KETOROLAC TROMETHAMINE 30 MG: 30 INJECTION, SOLUTION INTRAMUSCULAR; INTRAVENOUS at 11:04

## 2017-04-12 RX ADMIN — ACETAMINOPHEN 1000 MG: 10 INJECTION, SOLUTION INTRAVENOUS at 10:04

## 2017-04-12 RX ADMIN — ROCURONIUM BROMIDE 40 MG: 10 INJECTION, SOLUTION INTRAVENOUS at 10:04

## 2017-04-12 RX ADMIN — ONDANSETRON 8 MG: 2 INJECTION, SOLUTION INTRAMUSCULAR; INTRAVENOUS at 10:04

## 2017-04-12 RX ADMIN — BUPIVACAINE HYDROCHLORIDE 30 ML: 5 INJECTION, SOLUTION EPIDURAL; INTRACAUDAL; PERINEURAL at 10:04

## 2017-04-12 RX ADMIN — SODIUM CHLORIDE, SODIUM LACTATE, POTASSIUM CHLORIDE, AND CALCIUM CHLORIDE: .6; .31; .03; .02 INJECTION, SOLUTION INTRAVENOUS at 10:04

## 2017-04-12 RX ADMIN — SODIUM CHLORIDE, SODIUM LACTATE, POTASSIUM CHLORIDE, AND CALCIUM CHLORIDE: .6; .31; .03; .02 INJECTION, SOLUTION INTRAVENOUS at 01:04

## 2017-04-12 RX ADMIN — FENTANYL CITRATE 150 MCG: 50 INJECTION, SOLUTION INTRAMUSCULAR; INTRAVENOUS at 09:04

## 2017-04-12 RX ADMIN — GLYCOPYRROLATE 0.6 MG: 0.2 INJECTION, SOLUTION INTRAMUSCULAR; INTRAVENOUS at 10:04

## 2017-04-12 RX ADMIN — HYDROMORPHONE HYDROCHLORIDE 0.5 MG: 2 INJECTION, SOLUTION INTRAMUSCULAR; INTRAVENOUS; SUBCUTANEOUS at 12:04

## 2017-04-12 RX ADMIN — LIDOCAINE HYDROCHLORIDE 75 MG: 20 INJECTION, SOLUTION INTRAVENOUS at 09:04

## 2017-04-12 RX ADMIN — PROMETHAZINE HYDROCHLORIDE 6.25 MG: 25 INJECTION INTRAMUSCULAR; INTRAVENOUS at 12:04

## 2017-04-12 RX ADMIN — PROPOFOL 150 MG: 10 INJECTION, EMULSION INTRAVENOUS at 09:04

## 2017-04-12 RX ADMIN — NEOSTIGMINE METHYLSULFATE 3 MG: 1 INJECTION INTRAVENOUS at 11:04

## 2017-04-12 NOTE — OP NOTE
[facility]  Surgery Department  Operative Note    SUMMARY     Date of Procedure: 4/12/2017     Procedure: Procedure(s):  CHOLECYSTECTOMY-LAPAROSCOPIC   Excision skin tag    Surgeon(s) and Role:     * Sangita Phelan DO - Primary    Assisting Surgeon: None    Pre-Operative Diagnosis: Chronic cholecystitis [K81.1]  Biliary colic [K80.50]  Morbid obesity    Post-Operative Diagnosis: Post-Op Diagnosis Codes:     * Chronic cholecystitis [K81.1]     * Biliary colic [K80.50]     * Morbid obesity    Anesthesia: General    Technical Procedures Used:   The patient was seen again in the Holding Room. The risks, benefits, complications, treatment options, and expected outcomes were discussed with the patient. The possibilities of reaction to medication, pulmonary aspiration, perforation of viscus, bleeding, recurrent infection, finding a normal gallbladder, the need for additional procedures, failure to diagnose a condition, the possible need to convert to an open procedure, and creating a complication requiring transfusion or operation were discussed with the patient. The patient concurred with the proposed plan, giving informed consent. The patient was taken to Operating Room and a Time Out was held.    Prior to the induction of general anesthesia, antibiotic prophylaxis was administered.  General endotracheal anesthesia was then administered and tolerated well. After the induction, the abdomen was prepped in the usual sterile fashion. The patient was positioned in the supine position.  Local anesthetic agent was injected into the skin above the umbilicus and an incision made. The Veress needle was inserted through this defect and the abdomen was insufflated.  A 5mm trocar was placed through this defect next (using the Optiview technique) and the 5mm high definition camera was then inserted.  Additional trocars were introduced under direct vision. Two 5mm trocars were placed in the right upper quadrant  and an 11mm trocar was placed in the epigastric region. All skin incisions were infiltrated with a local anesthetic agent before making the incision and placing the trocars. Additional local was infiltrated at the conclusion of the procedure as well. A total of 30cc of 0.5% bupivicaine was used.    Large distended gallbladder noted, with adhesions from the gallbladder infundibulum to the stomach seen. The tail of the gallbladder was retracted cephalad. Adhesions were lysed bluntly and with the electrocautery where indicated, taking care not to injure any adjacent organs or viscus. The infundibulum was grasped and retracted laterally, exposing the peritoneum overlying the triangle of Calot. This was then divided and exposed in a blunt fashion. The cystic duct was clearly identified and bluntly dissected circumferentially. The junctions of the gallbladder and cystic duct were clearly identified prior to the division of any linear structure, photos were taken and stored.     The cystic duct was then doubly ligated with surgical clips proximally and singly clipped distally and divided. A laterally inserting cystic artery was identified, dissected free, ligated with clips and divided in a similar fashion.  The gallbladder was dissected from the liver bed in retrograde fashion with the electrocautery. There was bile leakage from the tail of the gallbladder during manipulation.  This was aspirated.  The gallbladder was removed using the endopouch bag. The liver bed inspected. Hemostasis was achieved with the electrocautery.  The abdomen was irrigated and the irrigant was aspirated.  Pneumoperitoneum was aspirated and the trocars were removed. The epigastric fascial layer was closed with a 0 Vicryl interrupted suture. The subcuticular tissues were then closed with 4-0 undyed monocryl, steri-strips were applied, followed by gauze and a tegaderm.  Of note, there was a skin tag at the site of the medial right upper quadrant  incision.  It was transected at the base with Metzenbaum scissors due to the concern of future irritation with the steri strips and adhesive dressing.  The wound steri-strip covered the base of the lesion (2mm base). There was no bleeding. The patient was extubated successfully and transferred in stable condition to PACU.    Instrument, sponge, and needle counts were correct at closure and at the conclusion of the case.     Description of the Findings of the Procedure: large distended gallbladder with moderate adhesions fromt he infundibulum to the stomach antrum    Significant Surgical Tasks Conducted by the Assistant(s), if Applicable: none    Complications: No    Estimated Blood Loss (EBL): * <10mL           Implants: * No implants in log *    Specimens:   Specimen (12h ago through future)    Start     Ordered    04/12/17 1104  Specimen to Pathology - Surgery  Once     Comments:  2. Skin tag-perm    04/12/17 1103    04/12/17 1054  Specimen to Pathology - Surgery  Once     Comments:  Gallbladder-perm    04/12/17 1054                  Condition: Good    Disposition: PACU - hemodynamically stable.    Attestation: I was present and scrubbed for the entire procedure.

## 2017-04-12 NOTE — PLAN OF CARE
Discharge criteria met,voicing desire to go home. Discharge instructions given to patient & mother ; verbalized understanding. Discharge home via wheelchair in care of mother.

## 2017-04-12 NOTE — DISCHARGE INSTRUCTIONS
NO heavy lifting, pushing, pulling, or other exertion greater than 10 pounds - to prevent the formation of postoperative hernias.      Incision Care  Remember: Follow-up visits allow your doctor to make sure your incision is healing well. Be sure to keep your appointments.   Sutures (stitches), surgical staples, adhesive tapes,or surgical glue may be used to close incisions. They also help stop bleeding and speed healing. Instead of sutures, your wound may have been closed with special strips of tape called Steri-Strips. Treat these the same way you would sutures. To help your incision heal, follow the tips on this handout.    Keeping Your Incision Clean and Dry  · Avoid doing things that could cause dirt or sweat to get on your incision.  · Dont pick at scabs. They help protect the wound.  · To keep the wound dry when around water, cover it with a plastic bag or plastic wrap. You could also use rubber gloves to protect sutures on a hand.  · If sutures get damp, pat them dry.    Changing Your Dressing  Leave the dressing (bandage) in place until 48 hours (2 days) after surgery. Change it only as directed, using clean hands.  · After the first 48 hours the incision wound will have closed. At this point, you may leave the incision uncovered and open to the air. However, if your incision is in the axilla or groin (or another area that gets damp/moist), apply gauze to this region to keep your incision dry.  Change the gauze daily and as needed.   · Cover your incision if your clothing is rubbing it or causing irritation.  · Change your dressing if it gets wet or soiled.  · Leave the Steri-Strips (surgical tape) in place, these will fall off on their own.    Call Your Health Care Provider If You Notice Any of These Signs:  · The wound opens spontaneously  · Increased soreness, pain, or tenderness after 24 hours  · A red streak, increased redness, or puffiness near the wound  · White, yellowish, or bad-smelling discharge  from the wound  · Bleeding that cant be stopped by applying pressure  · Steri-Strips fall off or stitches dissolve before the wound heals  · Fever above 101.0°F (38.3°C)   © 5246-5391 [employer]. [address] [place] [address], [place] [address]. All rights reserved. This information is not intended as a substitute for professional medical care. Always follow your healthcare professional's instructions.      CONSTIPATION  Narcotic pain medication may cause constipation. Take over-the-counter stool softeners as directed (Colace, Senna, Dulcolax, etc.)    Pain Control  You make alternate ibuprofen with your narcotic pain medication for additional pain relief. Do not take extra Tylenol with your narcotic presciption.    DIET: You may resume your home diet. If nausea is present, increase your diet gradually with fluids and bland foods    ACTIVITY LEVEL: If you have received sedation or an anesthetic, you may feel sleepy for several hours. Rest until you are more awake. Gradually resume your normal activities    Medications: Pain medication should be taken only if needed and as directed. If antibiotics are prescribed, the medication should be taken until completed. You will be given an updated list of you medications.    No driving, alcoholic beverages or signing legal documents for next 24 hours or while taking pain medication.       CALL THE DOCTOR:    For any obvious bleeding (some dried blood over the incision is normal).      Redness, swelling, foul smell around incision or fever over 101.   Shortness of breath, Coughing up Bloody sputum, Pains or Swelling in your Calves .   Persistent pain or nausea not relieved by medication.    If any unusual problems or difficulties occur contact your doctor. If you cannot contact your doctor but feel your signs and symptoms warrant a physicians attention return to the emergency room.

## 2017-04-12 NOTE — ANESTHESIA POSTPROCEDURE EVALUATION
"Anesthesia Post Evaluation    Patient: Enriqueta Mccarthy    Procedure(s) Performed: Procedure(s):  CHOLECYSTECTOMY-LAPAROSCOPIC    Final Anesthesia Type: general  Patient location during evaluation: PACU  Patient participation: Yes- Able to Participate  Level of consciousness: awake and alert  Post-procedure vital signs: reviewed and stable  Pain management: adequate  Airway patency: patent  PONV status at discharge: No PONV  Anesthetic complications: no      Cardiovascular status: hemodynamically stable  Respiratory status: unassisted  Hydration status: euvolemic  Follow-up not needed.        Visit Vitals    /65    Pulse 64    Temp 36.6 °C (97.8 °F) (Oral)    Resp 19    Ht 5' 3" (1.6 m)    Wt 94.3 kg (208 lb)    SpO2 96%    Breastfeeding No    BMI 36.85 kg/m2       Pain/Becka Score: Pain Assessment Performed: Yes (4/12/2017 12:18 PM)  Presence of Pain: complains of pain/discomfort (4/12/2017 12:18 PM)  Pain Rating Prior to Med Admin: 6 (4/12/2017 12:03 PM)  Becka Score: 9 (4/12/2017 12:18 PM)      "

## 2017-04-12 NOTE — DISCHARGE SUMMARY
[facility]  Discharge Summary  General Surgery      Admit Date: 4/12/2017    Discharge Date and Time:  04/12/2017 3:45 PM    Attending Physician: Sangita Phelan     Discharge Provider: Sangita Phelan    Reason for Admission: outpatient surgery    Procedures Performed: Procedure(s):  CHOLECYSTECTOMY-LAPAROSCOPIC    Hospital Course (synopsis of major diagnoses, care, treatment, and services provided during the course of the hospital stay):  Patient presented for outpatient surgery, tolerated it well and was discharged home.     Consults: none    Significant Diagnostic Studies:   Specimen (12h ago through future)    Start     Ordered    04/12/17 1104  Specimen to Pathology - Surgery  Once     Comments:  2. Skin tag-perm    04/12/17 1103    04/12/17 1054  Specimen to Pathology - Surgery  Once     Comments:  Gallbladder-perm    04/12/17 1054          Final Diagnoses:   Principal Problem: Biliary colic   Secondary Diagnoses:   Active Hospital Problems    Diagnosis  POA    *Biliary colic [K80.50]  Yes    Uncontrolled type 2 diabetes mellitus with hyperglycemia, without long-term current use of insulin [E11.65]  Yes     Chronic    Obesity (BMI 30-39.9) [E66.9]  Yes     Chronic    Essential hypertension [I10]  Yes     Chronic      Resolved Hospital Problems    Diagnosis Date Resolved POA   No resolved problems to display.       Discharged Condition: good    Disposition: Home or Self Care    Follow Up/Patient Instructions: see discharge instructions    Medications:  Reconciled Home Medications:   Current Discharge Medication List      START taking these medications    Details   hydrocodone-acetaminophen 5-325mg (NORCO) 5-325 mg per tablet Take 1-2 tablets PO q4-6hours PRN pain  Qty: 40 tablet, Refills: 0         CONTINUE these medications which have NOT CHANGED    Details   atorvastatin (LIPITOR) 80 MG tablet Take 80 mg by mouth every evening.       carvedilol (COREG) 25 MG tablet Take 1 tablet (25 mg  total) by mouth 2 (two) times daily. .5-1 twice daily or as directed  Qty: 180 tablet, Refills: 3    Associated Diagnoses: Essential hypertension      diltiaZEM (CARDIZEM CD) 180 MG 24 hr capsule TAKE 2 CAPSULES (360 MG TOTAL) BY MOUTH ONCE DAILY.  Qty: 60 capsule, Refills: 3      hydrALAZINE (APRESOLINE) 100 MG tablet TAKE 1 TABLET (100 MG TOTAL) BY MOUTH EVERY 8 (EIGHT) HOURS.  Qty: 90 tablet, Refills: 2    Associated Diagnoses: Essential hypertension      icosapent ethyl (VASCEPA) 1 gram Cap Take 2 g by mouth 2 (two) times daily.  Qty: 120 capsule, Refills: 11      multivitamin with minerals tablet Take 1 tablet by mouth once daily.      valsartan (DIOVAN) 320 MG tablet TAKE 1 TABLET EVERY DAY  Qty: 90 tablet, Refills: 1    Associated Diagnoses: Essential hypertension      azelastine (ASTELIN) 137 mcg (0.1 %) nasal spray       chlorthalidone (HYGROTEN) 25 MG Tab Take a half tablet (12.5mg) daily  Qty: 90 tablet, Refills: 1    Associated Diagnoses: Essential hypertension      gabapentin (NEURONTIN) 300 MG capsule Take 2 capsules (600 mg total) by mouth every evening.  Qty: 60 capsule, Refills: 3    Associated Diagnoses: Type 2 diabetes mellitus with diabetic polyneuropathy, without long-term current use of insulin      ONETOUCH DELICA LANCETS 30 gauge Misc 1 lancet by Misc.(Non-Drug; Combo Route) route 3 (three) times daily. Test blood glucose up to 3 times daily as needed.  Qty: 100 each, Refills: 11    Associated Diagnoses: Type 2 diabetes mellitus with hyperglycemia, without long-term current use of insulin      ONETOUCH VERIO Strp 1 strip by Misc.(Non-Drug; Combo Route) route 3 (three) times daily. Test blood glucose up to 3 times daily as needed.  Qty: 100 strip, Refills: 11    Associated Diagnoses: Type 2 diabetes mellitus with hyperglycemia, without long-term current use of insulin      TRULICITY 1.5 mg/0.5 mL PnIj Inject 1.5 mg into the skin every 7 days.  Qty: 4 Syringe, Refills: 6    Associated  Diagnoses: Type II or unspecified type diabetes mellitus without mention of complication, uncontrolled      VITAMIN D2 50,000 unit capsule TAKE 1 CAPSULE TWICE WEEKLY  Qty: 8 capsule, Refills: 7           No discharge procedures on file.  Follow-up Information     Follow up with Sangita Phelan DO On 4/24/2017.    Specialties:  General Surgery, Surgery    Why:  For wound re-check at 11:20am, As needed    Contact information:    [address]  [address]  [place] [address] [phone]

## 2017-04-12 NOTE — ANESTHESIA RELEASE NOTE
"Anesthesia Release from PACU Note    Patient: Enriqueta Mccarthy    Procedure(s) Performed: Procedure(s):  CHOLECYSTECTOMY-LAPAROSCOPIC    Anesthesia type: general    Post pain: Adequate analgesia    Post assessment: no apparent anesthetic complications, tolerated procedure well and no evidence of recall    Last Vitals:   Visit Vitals    /65    Pulse 64    Temp 36.6 °C (97.8 °F) (Oral)    Resp 19    Ht 5' 3" (1.6 m)    Wt 94.3 kg (208 lb)    SpO2 96%    Breastfeeding No    BMI 36.85 kg/m2       Post vital signs: stable    Level of consciousness: awake, alert  and oriented    Nausea/Vomiting: no nausea/no vomiting    Complications: none    Airway Patency: patent    Respiratory: unassisted    Cardiovascular: stable and blood pressure at baseline    Hydration: euvolemic  "

## 2017-04-12 NOTE — IP AVS SNAPSHOT
[facility]  [address]  [place] [address]  Phone: [phone]           Patient Discharge Instructions   Our goal is to set you up for success. This packet includes information on your condition, medications, and your home care.  It will help you care for yourself to prevent having to return to the hospital.     Please ask your nurse if you have any questions.      There are many details to remember when preparing to leave the hospital. Here is what you will need to do:    1. Take your medicine. If you are prescribed medications, review your Medication List on the following pages. You may have new medications to  at the pharmacy and others that you'll need to stop taking. Review the instructions for how and when to take your medications. Talk with your doctor or nurses if you are unsure of what to do.     2. Go to your follow-up appointments. Specific follow-up information is listed in the following pages. Your may be contacted by a nurse or clinical provider about future appointments. Be sure we have all of the phone numbers to reach you. Please contact your provider's office if you are unable to make an appointment.     3. Watch for warning signs. Your doctor or nurse will give you detailed warning signs to watch for and when to call for assistance. These instructions may also include educational information about your condition. If you experience any of warning signs to your health, call your doctor.               ** Verify the list of medication(s) below is accurate and up to date. Carry this with you in case of emergency. If your medications have changed, please notify your healthcare provider.             Medication List      START taking these medications        Additional Info                      hydrocodone-acetaminophen 5-325mg 5-325 mg per tablet   Commonly known as:  NORCO   Quantity:  40 tablet   Refills:  0    Instructions:  Take 1-2 tablets PO q4-6hours PRN pain      Begin Date    AM    Noon    PM    Bedtime         ASK your doctor about these medications        Additional Info                      atorvastatin 80 MG tablet   Commonly known as:  LIPITOR   Refills:  0   Dose:  80 mg    Instructions:  Take 80 mg by mouth every evening.     Begin Date    AM    Noon    PM    Bedtime       azelastine 137 mcg (0.1 %) nasal spray   Commonly known as:  ASTELIN   Refills:  0      Begin Date    AM    Noon    PM    Bedtime       carvedilol 25 MG tablet   Commonly known as:  COREG   Quantity:  180 tablet   Refills:  3   Dose:  25 mg    Instructions:  Take 1 tablet (25 mg total) by mouth 2 (two) times daily. .5-1 twice daily or as directed     Begin Date    AM    Noon    PM    Bedtime       chlorthalidone 25 MG Tab   Commonly known as:  HYGROTEN   Quantity:  90 tablet   Refills:  1    Instructions:  Take a half tablet (12.5mg) daily     Begin Date    AM    Noon    PM    Bedtime       diltiaZEM 180 MG 24 hr capsule   Commonly known as:  CARDIZEM CD   Quantity:  60 capsule   Refills:  3    Instructions:  TAKE 2 CAPSULES (360 MG TOTAL) BY MOUTH ONCE DAILY.     Begin Date    AM    Noon    PM    Bedtime       gabapentin 300 MG capsule   Commonly known as:  NEURONTIN   Quantity:  60 capsule   Refills:  3   Dose:  600 mg    Instructions:  Take 2 capsules (600 mg total) by mouth every evening.     Begin Date    AM    Noon    PM    Bedtime       hydrALAZINE 100 MG tablet   Commonly known as:  APRESOLINE   Quantity:  90 tablet   Refills:  2    Instructions:  TAKE 1 TABLET (100 MG TOTAL) BY MOUTH EVERY 8 (EIGHT) HOURS.     Begin Date    AM    Noon    PM    Bedtime       icosapent ethyl 1 gram Cap   Commonly known as:  VASCEPA   Quantity:  120 capsule   Refills:  11   Dose:  2 g    Instructions:  Take 2 g by mouth 2 (two) times daily.     Begin Date    AM    Noon    PM    Bedtime       multivitamin with minerals tablet   Refills:  0   Dose:  1 tablet    Instructions:  Take 1 tablet by mouth once daily.      Begin Date    AM    Noon    PM    Bedtime       ONETOUCH DELICA LANCETS 30 gauge Misc   Quantity:  100 each   Refills:  11   Dose:  1 lancet   Generic drug:  lancets    Instructions:  1 lancet by Misc.(Non-Drug; Combo Route) route 3 (three) times daily. Test blood glucose up to 3 times daily as needed.     Begin Date    AM    Noon    PM    Bedtime       ONETOUCH VERIO Strp   Quantity:  100 strip   Refills:  11   Dose:  1 strip   Generic drug:  blood sugar diagnostic    Instructions:  1 strip by Misc.(Non-Drug; Combo Route) route 3 (three) times daily. Test blood glucose up to 3 times daily as needed.     Begin Date    AM    Noon    PM    Bedtime       TRULICITY 1.5 mg/0.5 mL Pnij   Quantity:  4 Syringe   Refills:  6   Dose:  1.5 mg   Generic drug:  dulaglutide    Instructions:  Inject 1.5 mg into the skin every 7 days.     Begin Date    AM    Noon    PM    Bedtime       valsartan 320 MG tablet   Commonly known as:  DIOVAN   Quantity:  90 tablet   Refills:  1    Instructions:  TAKE 1 TABLET EVERY DAY     Begin Date    AM    Noon    PM    Bedtime       VITAMIN D2 50,000 unit Cap   Quantity:  8 capsule   Refills:  7   Generic drug:  ergocalciferol    Instructions:  TAKE 1 CAPSULE TWICE WEEKLY     Begin Date    AM    Noon    PM    Bedtime            Where to Get Your Medications      These medications were sent to [facility] - [address] [facility]  [address], [place] [address]     Phone:  [phone]     hydrocodone-acetaminophen 5-325mg 5-325 mg per tablet                  Please bring to all follow up appointments:    1. A copy of your discharge instructions.  2. All medicines you are currently taking in their original bottles.  3. Identification and insurance card.    Please arrive 15 minutes ahead of scheduled appointment time.    Please call 24 hours in advance if you must reschedule your appointment and/or time.        Your Scheduled Appointments      Apr 24, 2017 11:20 AM CDT   Post OP with DO [place] [facility] - General Surgery ([facility] [place])    [address]  [address]  [place] [address] [phone]            Jul 18, 2017  9:40 AM CDT   Established Patient Visit with [facility] ([facility])    [address]  [facility] [address] [phone]              Follow-up Information     Follow up with Sangita Phelan DO On 4/24/2017.    Specialties:  General Surgery, Surgery    Why:  For wound re-check at 11:20am, As needed    Contact information:    [address]  [address]  [place] [address]  [phone]            Discharge Instructions       NO heavy lifting, pushing, pulling, or other exertion greater than 10 pounds - to prevent the formation of postoperative hernias.      Incision Care  Remember: Follow-up visits allow your doctor to make sure your incision is healing well. Be sure to keep your appointments.   Sutures (stitches), surgical staples, adhesive tapes,or surgical glue may be used to close incisions. They also help stop bleeding and speed healing. Instead of sutures, your wound may have been closed with special strips of tape called Steri-Strips. Treat these the same way you would sutures. To help your incision heal, follow the tips on this handout.    Keeping Your Incision Clean and Dry  · Avoid doing things that could cause dirt or sweat to get on your incision.  · Dont pick at scabs. They help protect the wound.  · To keep the wound dry when around water, cover it with a plastic bag or plastic wrap. You could also use rubber gloves to protect sutures on a hand.  · If sutures get damp, pat them dry.    Changing Your Dressing  Leave the dressing (bandage) in place until 48 hours (2 days) after surgery. Change it only as directed, using clean hands.  · After the first 48 hours the incision wound will have closed. At  this point, you may leave the incision uncovered and open to the air. However, if your incision is in the axilla or groin (or another area that gets damp/moist), apply gauze to this region to keep your incision dry.  Change the gauze daily and as needed.   · Cover your incision if your clothing is rubbing it or causing irritation.  · Change your dressing if it gets wet or soiled.  · Leave the Steri-Strips (surgical tape) in place, these will fall off on their own.    Call Your Health Care Provider If You Notice Any of These Signs:  · The wound opens spontaneously  · Increased soreness, pain, or tenderness after 24 hours  · A red streak, increased redness, or puffiness near the wound  · White, yellowish, or bad-smelling discharge from the wound  · Bleeding that cant be stopped by applying pressure  · Steri-Strips fall off or stitches dissolve before the wound heals  · Fever above 101.0°F (38.3°C)   © 5993-0395 [employer]. [address] [place] [address]. All rights reserved. This information is not intended as a substitute for professional medical care. Always follow your healthcare professional's instructions.      CONSTIPATION  Narcotic pain medication may cause constipation. Take over-the-counter stool softeners as directed (Colace, Senna, Dulcolax, etc.)    Pain Control  You make alternate ibuprofen with your narcotic pain medication for additional pain relief. Do not take extra Tylenol with your narcotic presciption.    DIET: You may resume your home diet. If nausea is present, increase your diet gradually with fluids and bland foods    ACTIVITY LEVEL: If you have received sedation or an anesthetic, you may feel sleepy for several hours. Rest until you are more awake. Gradually resume your normal activities    Medications: Pain medication should be taken only if needed and as directed. If antibiotics are prescribed, the medication should be taken until completed. You will be given an  "updated list of you medications.    No driving, alcoholic beverages or signing legal documents for next 24 hours or while taking pain medication.       CALL THE DOCTOR:    For any obvious bleeding (some dried blood over the incision is normal).      Redness, swelling, foul smell around incision or fever over 101.   Shortness of breath, Coughing up Bloody sputum, Pains or Swelling in your Calves .   Persistent pain or nausea not relieved by medication.    If any unusual problems or difficulties occur contact your doctor. If you cannot contact your doctor but feel your signs and symptoms warrant a physicians attention return to the emergency room.          Primary Diagnosis     Your primary diagnosis was:  Gall Bladder Pain      Admission Information     Date & Time Provider Department CSN    4/12/2017  7:37 AM Sangita Phelan DO [facility] [id number]      Care Providers     Provider Role Specialty Primary office phone    Sangita Phelan DO Attending Provider General Surgery [phone]    Sangita Phelan DO Surgeon  General Surgery [phone]      Your Vitals Were     BP Pulse Temp Resp Height Weight    141/78 69 97.8 °F (36.6 °C) (Oral) 18 5' 3" (1.6 m) 94.3 kg (208 lb)    SpO2 BMI             96% 36.85 kg/m2         Recent Lab Values        11/30/2005 1/4/2010 1/14/2011 10/3/2012 11/15/2016 3/16/2017            8:43 AM 12:47 PM  4:42 PM  7:45 AM  7:26 AM  8:21 AM      A1C 4.1 (L) 4.9 4.6 4.6 9.6 (H) 4.8           9.6 (H)       Comment for A1C at  7:26 AM on 11/15/2016:  According to ADA guidelines, hemoglobin A1C <7.0% represents  optimal control in non-pregnant diabetic patients.  Different  metrics may apply to specific populations.   Standards of Medical Care in Diabetes - 2016.  For the purpose of screening for the presence of diabetes:  <5.7%     Consistent with the absence of diabetes  5.7-6.4%  Consistent with increasing risk for diabetes   (prediabetes)  >or=6.5%  " Consistent with diabetes  Currently no consensus exists for use of hemoglobin A1C  for diagnosis of diabetes for children.      Comment for A1C at  8:21 AM on 3/16/2017:  According to ADA guidelines, hemoglobin A1C <7.0% represents  optimal control in non-pregnant diabetic patients.  Different  metrics may apply to specific populations.   Standards of Medical Care in Diabetes - 2016.  For the purpose of screening for the presence of diabetes:  <5.7%     Consistent with the absence of diabetes  5.7-6.4%  Consistent with increasing risk for diabetes   (prediabetes)  >or=6.5%  Consistent with diabetes  Currently no consensus exists for use of hemoglobin A1C  for diagnosis of diabetes for children.      Comment for A1C at  7:26 AM on 11/15/2016:  According to ADA guidelines, hemoglobin A1C <7.0% represents  optimal control in non-pregnant diabetic patients.  Different  metrics may apply to specific populations.   Standards of Medical Care in Diabetes - 2016.  For the purpose of screening for the presence of diabetes:  <5.7%     Consistent with the absence of diabetes  5.7-6.4%  Consistent with increasing risk for diabetes   (prediabetes)  >or=6.5%  Consistent with diabetes  Currently no consensus exists for use of hemoglobin A1C  for diagnosis of diabetes for children.        Pending Labs     Order Current Status    Specimen to Pathology - Surgery Collected (04/12/17 1103)    Specimen to Pathology - Surgery In process      Allergies as of 4/12/2017        Reactions    No Known Allergies       [facility] Care [facility] - 24/7 Assistance  Unless otherwise directed by your provider, please contact [facility], our nurse care line that is available for 24/7 assistance.     Registered nurses in the [facility] provide clinical advisement, health education, appointment booking, and other advisory services.  Call for this free service at [phone].        Advance Directives     An  advance directive is a document which, in the event you are no longer able to make decisions for yourself, tells your healthcare team what kind of treatment you do or do not want to receive, or who you would like to make those decisions for you.  If you do not currently have an advance directive, [facility] encourages you to create one.  For more information call:  [phone]), [phone] ([phone]),  or log on to [email or web address].        Language Assistance Services     ATTENTION: Language assistance services are available, free of charge. Please call [phone].      ATENCIÓN: Si habla donita, tiene a baires disposición servicios gratuitos de asistencia lingüística. Llame al [phone].     CHÚ Ý: N?u b?n nói Ti?ng Vi?t, có các d?ch v? h? tr? ngôn ng? mi?n phí dành cho b?n. G?i s? [phone].        Diabetes Discharge Instructions                                    [facility] complies with applicable Federal civil rights laws and does not discriminate on the basis of race, color, national origin, age, disability, or sex.

## 2017-04-12 NOTE — TRANSFER OF CARE
"Anesthesia Transfer of Care Note    Patient: Enriqueta Mccarthy    Procedure(s) Performed: Procedure(s):  CHOLECYSTECTOMY-LAPAROSCOPIC    Patient location: PACU    Anesthesia Type: general    Transport from OR: Transported from OR on 100% O2 by closed face mask with adequate spontaneous ventilation    Post pain: adequate analgesia    Post assessment: no apparent anesthetic complications    Post vital signs: stable    Level of consciousness: awake and alert    Nausea/Vomiting: no nausea/vomiting    Complications: none          Last vitals:   Visit Vitals    /75 (BP Location: Right arm, Patient Position: Lying, BP Method: Automatic)    Pulse 62    Temp 37 °C (98.6 °F) (Oral)    Resp 20    Ht 5' 3" (1.6 m)    Wt 94.3 kg (208 lb)    SpO2 97%    Breastfeeding No    BMI 36.85 kg/m2     "

## 2017-04-13 VITALS
WEIGHT: 208 LBS | SYSTOLIC BLOOD PRESSURE: 132 MMHG | BODY MASS INDEX: 36.86 KG/M2 | OXYGEN SATURATION: 96 % | HEART RATE: 65 BPM | HEIGHT: 63 IN | DIASTOLIC BLOOD PRESSURE: 71 MMHG | TEMPERATURE: 98 F | RESPIRATION RATE: 20 BRPM

## 2017-04-13 RX ORDER — ATORVASTATIN CALCIUM 80 MG/1
80 TABLET, FILM COATED ORAL NIGHTLY
Qty: 30 TABLET | Refills: 11 | Status: SHIPPED | OUTPATIENT
Start: 2017-04-13 | End: 2017-07-10 | Stop reason: SDUPTHER

## 2017-04-13 RX ORDER — ATORVASTATIN CALCIUM 80 MG/1
TABLET, FILM COATED ORAL
Qty: 90 TABLET | Refills: 1 | Status: SHIPPED | OUTPATIENT
Start: 2017-04-13 | End: 2017-12-22

## 2017-04-17 RX ORDER — DILTIAZEM HYDROCHLORIDE 180 MG/1
180 CAPSULE, COATED, EXTENDED RELEASE ORAL DAILY
Qty: 90 CAPSULE | Refills: 3 | Status: SHIPPED | OUTPATIENT
Start: 2017-04-17 | End: 2017-05-17 | Stop reason: SDUPTHER

## 2017-04-24 ENCOUNTER — OFFICE VISIT (OUTPATIENT)
Dept: SURGERY | Facility: CLINIC | Age: 42
End: 2017-04-24
Payer: COMMERCIAL

## 2017-04-24 VITALS
WEIGHT: 206.38 LBS | SYSTOLIC BLOOD PRESSURE: 125 MMHG | HEART RATE: 67 BPM | HEIGHT: 63 IN | BODY MASS INDEX: 36.57 KG/M2 | DIASTOLIC BLOOD PRESSURE: 72 MMHG | TEMPERATURE: 98 F

## 2017-04-24 DIAGNOSIS — K81.1 CHRONIC CHOLECYSTITIS: Primary | ICD-10-CM

## 2017-04-24 PROCEDURE — 99999 PR PBB SHADOW E&M-EST. PATIENT-LVL III: CPT | Mod: PBBFAC,,, | Performed by: SURGERY

## 2017-04-24 PROCEDURE — 99024 POSTOP FOLLOW-UP VISIT: CPT | Mod: S$GLB,,, | Performed by: SURGERY

## 2017-04-24 NOTE — MR AVS SNAPSHOT
[facility]  [address] [place] [address]  [address]th [address] [place] [address]  Phone: [phone]                  Enriqueta Mccarthy   2017 11:20 AM   Office Visit    Description:  Female : 1975   Provider:  Sangita Phelan DO   Department:  [facility]                To Do List           Future Appointments        Provider Department Dept Phone    2017 9:40 AM Adriana Lynn, OD [facility] [phone]      Goals (5 Years of Data)              Today    17    Blood Pressure <= 140/90   125/72  125/72  125/72    Exercise at least 150 minutes per week.           Take at least one BP reading per week at various times of the day           Weight < 96.956 kg (213 lb 12 oz)   93.6 kg (206 lb 5.6 oz)    94.3 kg (208 lb)    Notes - Note created  3/17/2016  2:53 PM by Tracy Cotton, PharmD    Decrease weight by 5% to improve cardiovascular outcomes.         [facility]     [facility] Care [facility] -  Assistance  Unless otherwise directed by your provider, please contact [facility], our nurse care line that is available for  assistance.     Registered nurses in the [facility] provide: appointment scheduling, clinical advisement, health education, and other advisory services.  Call: [phone] (toll free)               Medications           Message regarding Medications     Verify the changes and/or additions to your medication regime listed below are the same as discussed with your clinician today.  If any of these changes or additions are incorrect, please notify your healthcare provider.             Verify that the below list of medications is an accurate representation of the medications you are currently taking.  If none reported, the list may be blank. If incorrect, please contact your healthcare provider. Carry this list with you in case of emergency.           Current Medications      "atorvastatin (LIPITOR) 80 MG tablet Take 1 tablet (80 mg total) by mouth every evening.    atorvastatin (LIPITOR) 80 MG tablet TAKE 1 TABLET EVERY DAY    azelastine (ASTELIN) 137 mcg (0.1 %) nasal spray     carvedilol (COREG) 25 MG tablet Take 1 tablet (25 mg total) by mouth 2 (two) times daily. .5-1 twice daily or as directed    chlorthalidone (HYGROTEN) 25 MG Tab Take a half tablet (12.5mg) daily    diltiaZEM (CARDIZEM CD) 180 MG 24 hr capsule Take 1 capsule (180 mg total) by mouth once daily.    gabapentin (NEURONTIN) 300 MG capsule Take 2 capsules (600 mg total) by mouth every evening.    hydrALAZINE (APRESOLINE) 100 MG tablet TAKE 1 TABLET (100 MG TOTAL) BY MOUTH EVERY 8 (EIGHT) HOURS.    icosapent ethyl (VASCEPA) 1 gram Cap Take 2 g by mouth 2 (two) times daily.    multivitamin with minerals tablet Take 1 tablet by mouth once daily.    ONETOUCH DELICA LANCETS 30 gauge Misc 1 lancet by Misc.(Non-Drug; Combo Route) route 3 (three) times daily. Test blood glucose up to 3 times daily as needed.    ONETOUCH VERIO Strp 1 strip by Misc.(Non-Drug; Combo Route) route 3 (three) times daily. Test blood glucose up to 3 times daily as needed.    TRULICITY 1.5 mg/0.5 mL PnIj Inject 1.5 mg into the skin every 7 days.    valsartan (DIOVAN) 320 MG tablet TAKE 1 TABLET EVERY DAY    VITAMIN D2 50,000 unit capsule TAKE 1 CAPSULE TWICE WEEKLY    hydrocodone-acetaminophen 5-325mg (NORCO) 5-325 mg per tablet Take 1-2 tablets PO q4-6hours PRN pain           Clinical Reference Information           Your Vitals Were     BP Pulse Temp Height Weight BMI    125/72 (BP Location: Right arm, Patient Position: Sitting) 67 98 °F (36.7 °C) (Oral) 5' 3" (1.6 m) 93.6 kg (206 lb 5.6 oz) 36.55 kg/m2      Blood Pressure          Most Recent Value    BP  125/72      Allergies as of 4/24/2017     No Known Allergies      Immunizations Administered on Date of Encounter - 4/24/2017     None      Language Assistance Services     ATTENTION: Language " assistance services are available, free of charge. Please call [phone].      ATENCIÓN: Si habla donita, tiene a baires disposición servicios gratuitos de asistencia lingüística. Llame al [phone].     CHÚ Ý: N?u b?n nói Ti?ng Vi?t, có các d?ch v? h? tr? ngôn ng? mi?n phí dành cho b?n. G?i s? [phone].         [facility] complies with applicable Federal civil rights laws and does not discriminate on the basis of race, color, national origin, age, disability, or sex.

## 2017-04-24 NOTE — LETTER
April 24, 2017    Enriqueta Mccarthy  [address] [place] [address]         [facility]  [address] [place] [address]  4th Floor [facility]  [place] [address]  Phone: [phone] April 24, 2017     Patient: Enriqueta Mccarthy   YOB: 1975   Date of Visit: 4/24/2017       To Whom It May Concern:    Effective 04/25/17, Enriqueta Mccarthy is ok to return to work with restrictions. No lifting, pushing, pulling, carrying or any other exertion over 15lbs. Patient will be reevaluated on 05/22/17.     If you have any questions or concerns, please don't hesitate to call.    Sincerely,            Sangita Phelan DO, FACS  Yvrose Hamlin LPN

## 2017-04-25 DIAGNOSIS — I10 ESSENTIAL HYPERTENSION: ICD-10-CM

## 2017-04-25 RX ORDER — CARVEDILOL 25 MG/1
TABLET ORAL
Qty: 180 TABLET | Refills: 0 | Status: SHIPPED | OUTPATIENT
Start: 2017-04-25 | End: 2018-04-05 | Stop reason: SDUPTHER

## 2017-04-25 NOTE — PROGRESS NOTES
"Enriqueta Mccarthy is a 41 y.o. female patient.   1. Chronic cholecystitis      Past Medical History:   Diagnosis Date    Essential hypertension     Herpes simplex virus (HSV) infection     Hypertension     Hypertriglyceridemia 11/17/2016    Neuropathy     Obesity (BMI 30-39.9) 4/5/2016    Type 2 diabetes mellitus with diabetic polyneuropathy, without long-term current use of insulin 11/17/2016    Uterine fibroid      Past Surgical History Pertinent Negatives:   Procedure Date Noted    TYMPANOSTOMY TUBE PLACEMENT 02/14/2013       Review of patient's allergies indicates:   Allergen Reactions    No known allergies      There are no hospital problems to display for this patient.    Blood pressure 125/72, pulse 67, temperature 98 °F (36.7 °C), temperature source Oral, height 5' 3" (1.6 m), weight 93.6 kg (206 lb 5.6 oz).    Subjective   Patient states she feels much better compared with preop.  Occasional soreness at her incision.  No nausea or vomiting.  Tolerating regular diet.  Normal bowel movements.  Occasionally she experiences a diffuse abdominal pressure sensation, but no pain.  She requested to return to work April 25.    Objective   NAD  ABd: incisions c/d/i,  nttp, healing well    Pathology: Chronic cholecystitis     Assessment & Plan    41-year-old female 2 weeks status post laparoscopic cholecystectomy  Pathology discussed  The patient is healing well.  All questions were answered.  Can return to work with restrictions  Work release given  Follow-up in 4 weeks    Sangita Phelan, DO  4/25/2017  "

## 2017-04-26 ENCOUNTER — PATIENT MESSAGE (OUTPATIENT)
Dept: SURGERY | Facility: CLINIC | Age: 42
End: 2017-04-26

## 2017-04-26 ENCOUNTER — PATIENT MESSAGE (OUTPATIENT)
Dept: GASTROENTEROLOGY | Facility: CLINIC | Age: 42
End: 2017-04-26

## 2017-04-26 ENCOUNTER — PATIENT OUTREACH (OUTPATIENT)
Dept: OTHER | Facility: OTHER | Age: 42
End: 2017-04-26
Payer: COMMERCIAL

## 2017-04-26 ENCOUNTER — TELEPHONE (OUTPATIENT)
Dept: SURGERY | Facility: CLINIC | Age: 42
End: 2017-04-26

## 2017-04-26 NOTE — TELEPHONE ENCOUNTER
"Patient called stating " I started having diarrhea last night and stomach cramps just like before I had my surgery.No fever. No n/v."  Explained to patient that Dr./ Phelan was in surgery to day but that I could get her an appointment tomorrow. Patient voiced understanding and is in agreement.   "

## 2017-04-26 NOTE — PROGRESS NOTES
Last 5 Patient Entered Readings                                                               Current 30 Day Average: 131/75     Recent Readings 4/16/2017 4/9/2017 4/1/2017 4/1/2017 4/1/2017    Systolic BP (mmHg) 130 122 130 144 151    Diastolic BP (mmHg) 76 69 68 84 79    Pulse 66 62 66 73 70          Follow up with Mrs. Enriqueta Mccarthy completed. Patient is maintaining a low sodium diet and continuing her exercise regime. She reports that she is doing well and feeling good. She has recovered from her recent gull bladder surgery and she will take a BP readings this week. Patient did not have any further questions or concerns. I will follow up in a few weeks to see how she is doing and progressing.

## 2017-04-27 ENCOUNTER — OFFICE VISIT (OUTPATIENT)
Dept: GASTROENTEROLOGY | Facility: CLINIC | Age: 42
End: 2017-04-27
Payer: COMMERCIAL

## 2017-04-27 VITALS
BODY MASS INDEX: 35.97 KG/M2 | SYSTOLIC BLOOD PRESSURE: 116 MMHG | HEIGHT: 63 IN | DIASTOLIC BLOOD PRESSURE: 74 MMHG | WEIGHT: 203 LBS

## 2017-04-27 DIAGNOSIS — R19.7 DIARRHEA, UNSPECIFIED TYPE: ICD-10-CM

## 2017-04-27 DIAGNOSIS — R10.11 RIGHT UPPER QUADRANT ABDOMINAL PAIN: Primary | ICD-10-CM

## 2017-04-27 PROCEDURE — 99999 PR PBB SHADOW E&M-EST. PATIENT-LVL II: CPT | Mod: PBBFAC,,, | Performed by: INTERNAL MEDICINE

## 2017-04-27 PROCEDURE — 99214 OFFICE O/P EST MOD 30 MIN: CPT | Mod: S$GLB,,, | Performed by: INTERNAL MEDICINE

## 2017-04-27 RX ORDER — CHOLESTYRAMINE 4 G/9G
4 POWDER, FOR SUSPENSION ORAL DAILY
Qty: 378 G | Refills: 0 | Status: SHIPPED | OUTPATIENT
Start: 2017-04-27 | End: 2017-12-22

## 2017-04-27 RX ORDER — DICYCLOMINE HYDROCHLORIDE 10 MG/1
20 CAPSULE ORAL 4 TIMES DAILY PRN
Qty: 120 CAPSULE | Refills: 0 | Status: SHIPPED | OUTPATIENT
Start: 2017-04-27 | End: 2017-05-27

## 2017-04-27 NOTE — PROGRESS NOTES
"Subjective:       Patient ID: Enriqueta Mccarthy is a 41 y.o. female.    Chief Complaint: Abdominal Pain and Diarrhea    This is a 41-year-old female here for evaluation of recurrent, chronic diarrhea and abdominal pain.  The symptoms began initially last November, with diarrhea after beginning metformin for newly diagnosed diabetes.  She discontinue the metformin and the symptoms got better but unfortunately recurred.  She then began having right upper quadrant pain which is somewhat related to the changes in bowel habits.  The abdominal pain is located along the right side of the abdomen mostly in the right upper quadrant but also somewhat in the right flank.  No association with urination or hematuria.  She underwent a laparoscopic cholecystectomy 2 weeks ago and did experience relief in the pain but it recurred yesterday.  As a sharp pain and there is now a new "pinching sensation " in the center of her abdomen.  The loose stools are frequent occurring numerous times other day, few nocturnal symptoms.  Prior stool studies were negative.  Upper endoscopy was without explanation as well.  No change in the symptoms.  She does experience a mild to moderate improvement in her pain with bowel movements.  No personal or family history of other gastrointestinal diseases or malignancy.  The pain is improved by Bentyl.  Probiotics have been helpful.  No other exacerbating or relieving factors or other associated symptoms, the pain is moderate in intensity.    The following portions of the patient's history were reviewed and updated as appropriate: allergies, current medications, past family history, past medical history, past social history, past surgical history and problem list.    (Portions of this note were dictated using voice recognition software and may contain dictation related errors in spelling/grammar/syntax not found on text review)    HPI  Review of Systems   Cardiovascular: Negative for chest pain and leg " swelling.   Gastrointestinal: Positive for abdominal pain and diarrhea. Negative for blood in stool.   Genitourinary: Negative for difficulty urinating and hematuria.       Objective:      Physical Exam   Constitutional: She is oriented to person, place, and time. She appears well-developed and well-nourished. No distress.   HENT:   Head: Normocephalic and atraumatic.   Eyes: Conjunctivae are normal. No scleral icterus.   Neck: Normal range of motion. Neck supple. No tracheal deviation present. No thyromegaly present.   Cardiovascular: Normal rate and regular rhythm.  Exam reveals no gallop and no friction rub.    No murmur heard.  Pulmonary/Chest: Effort normal and breath sounds normal. No respiratory distress. She has no wheezes.   Abdominal: Soft. Bowel sounds are normal. She exhibits no distension. There is no tenderness.   Musculoskeletal:        Right wrist: She exhibits normal range of motion and no tenderness.        Left wrist: She exhibits normal range of motion and no tenderness.   Lymphadenopathy:        Head (right side): No submental and no submandibular adenopathy present.        Head (left side): No submental and no submandibular adenopathy present.   Neurological: She is alert and oriented to person, place, and time.   Skin: Skin is warm and dry. No rash noted. She is not diaphoretic. No erythema.   Psychiatric: She has a normal mood and affect. Her behavior is normal.   Nursing note and vitals reviewed.      Labs; stool studies negative  Pathology: gb with chr cholecystitis  Assessment:       1. Right upper quadrant abdominal pain    2. Diarrhea, unspecified type        Plan:   1. Continue Bentyl  2. Trial of questran

## 2017-05-02 ENCOUNTER — PATIENT MESSAGE (OUTPATIENT)
Dept: GASTROENTEROLOGY | Facility: CLINIC | Age: 42
End: 2017-05-02

## 2017-05-04 ENCOUNTER — TELEPHONE (OUTPATIENT)
Dept: GASTROENTEROLOGY | Facility: CLINIC | Age: 42
End: 2017-05-04

## 2017-05-04 DIAGNOSIS — R10.9 ABDOMINAL PAIN, UNSPECIFIED LOCATION: Primary | ICD-10-CM

## 2017-05-12 ENCOUNTER — PATIENT MESSAGE (OUTPATIENT)
Dept: GASTROENTEROLOGY | Facility: CLINIC | Age: 42
End: 2017-05-12

## 2017-05-17 ENCOUNTER — PATIENT MESSAGE (OUTPATIENT)
Dept: INTERNAL MEDICINE | Facility: CLINIC | Age: 42
End: 2017-05-17

## 2017-05-17 RX ORDER — DILTIAZEM HYDROCHLORIDE 180 MG/1
360 CAPSULE, COATED, EXTENDED RELEASE ORAL DAILY
Qty: 180 CAPSULE | Refills: 3 | Status: SHIPPED | OUTPATIENT
Start: 2017-05-17 | End: 2017-06-07 | Stop reason: SDUPTHER

## 2017-05-23 ENCOUNTER — PATIENT MESSAGE (OUTPATIENT)
Dept: GASTROENTEROLOGY | Facility: CLINIC | Age: 42
End: 2017-05-23

## 2017-05-23 ENCOUNTER — HOSPITAL ENCOUNTER (OUTPATIENT)
Dept: RADIOLOGY | Facility: HOSPITAL | Age: 42
Discharge: HOME | End: 2017-05-23
Attending: INTERNAL MEDICINE
Payer: COMMERCIAL

## 2017-05-23 DIAGNOSIS — R10.9 ABDOMINAL PAIN, UNSPECIFIED LOCATION: ICD-10-CM

## 2017-05-23 PROCEDURE — 74177 CT ABD & PELVIS W/CONTRAST: CPT | Mod: TC

## 2017-05-23 PROCEDURE — 74177 CT ABD & PELVIS W/CONTRAST: CPT | Mod: 26,,, | Performed by: RADIOLOGY

## 2017-05-23 PROCEDURE — 25500020 PHARM REV CODE 255: Performed by: INTERNAL MEDICINE

## 2017-05-23 RX ADMIN — IOHEXOL 100 ML: 350 INJECTION, SOLUTION INTRAVENOUS at 11:05

## 2017-05-23 RX ADMIN — IOHEXOL 30 ML: 350 INJECTION, SOLUTION INTRAVENOUS at 09:05

## 2017-05-25 ENCOUNTER — TELEPHONE (OUTPATIENT)
Dept: GASTROENTEROLOGY | Facility: CLINIC | Age: 42
End: 2017-05-25

## 2017-05-25 NOTE — TELEPHONE ENCOUNTER
----- Message from Demetris Parsons MD sent at 5/25/2017  9:36 AM CDT -----  CT scan looks ok. 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 She has already had an EGD; if she continues to have pain and diarrhea I think a colonoscopy would be the next step

## 2017-05-26 ENCOUNTER — PATIENT OUTREACH (OUTPATIENT)
Dept: OTHER | Facility: OTHER | Age: 42
End: 2017-05-26
Payer: COMMERCIAL

## 2017-05-26 ENCOUNTER — PATIENT MESSAGE (OUTPATIENT)
Dept: GASTROENTEROLOGY | Facility: CLINIC | Age: 42
End: 2017-05-26

## 2017-05-26 NOTE — PROGRESS NOTES
Last 5 Patient Entered Readings                                                               Current 30 Day Average: 128/78     Recent Readings 5/20/2017 5/12/2017 5/2/2017 4/26/2017 4/16/2017    Systolic BP (mmHg) 132 127 129 126 130    Diastolic BP (mmHg) 82 79 80 74 76    Pulse 71 70 62 70 66          Follow up with Mrs. Enriqueta Mccarthy completed. Patient is maintaining a low sodium diet and continuing her exercise regime. She reports that overall, she is doing well and feeling good. Her BP medications were changed last week so she has been worried about taking her readings. She knows that she needs to take them to see how the medication is working. She will take some this weekend. Patient did not have any further questions or concerns. I will follow up in a few weeks to see how she is doing and progressing.

## 2017-05-28 RX ORDER — DILTIAZEM HYDROCHLORIDE 180 MG/1
CAPSULE, EXTENDED RELEASE ORAL
Qty: 60 CAPSULE | Refills: 1 | Status: SHIPPED | OUTPATIENT
Start: 2017-05-28 | End: 2017-06-07 | Stop reason: SDUPTHER

## 2017-05-30 ENCOUNTER — PATIENT MESSAGE (OUTPATIENT)
Dept: GASTROENTEROLOGY | Facility: CLINIC | Age: 42
End: 2017-05-30

## 2017-05-30 ENCOUNTER — TELEPHONE (OUTPATIENT)
Dept: GASTROENTEROLOGY | Facility: CLINIC | Age: 42
End: 2017-05-30

## 2017-05-30 DIAGNOSIS — R19.7 DIARRHEA, UNSPECIFIED TYPE: Primary | ICD-10-CM

## 2017-06-07 ENCOUNTER — PATIENT MESSAGE (OUTPATIENT)
Dept: OTHER | Facility: OTHER | Age: 42
End: 2017-06-07

## 2017-06-07 ENCOUNTER — PATIENT OUTREACH (OUTPATIENT)
Dept: OTHER | Facility: OTHER | Age: 42
End: 2017-06-07
Payer: COMMERCIAL

## 2017-06-07 DIAGNOSIS — I10 ESSENTIAL HYPERTENSION: Primary | Chronic | ICD-10-CM

## 2017-06-07 RX ORDER — DILTIAZEM HYDROCHLORIDE 180 MG/1
360 CAPSULE, COATED, EXTENDED RELEASE ORAL DAILY
Qty: 180 CAPSULE | Refills: 3 | Status: SHIPPED | OUTPATIENT
Start: 2017-06-07 | End: 2017-12-22 | Stop reason: SDUPTHER

## 2017-06-07 NOTE — PROGRESS NOTES
Patient called in with difficulty filling cardizem script. Called pharmacy to verify RX.     Last 5 Patient Entered Readings                                                               Current 30 Day Average: 137/82     Recent Readings 6/7/2017 6/7/2017 6/4/2017 6/3/2017 5/30/2017    Systolic BP (mmHg) 151 149 138 139 136    Diastolic BP (mmHg) 99 89 75 83 77    Pulse 84 81 82 76 85      BP trending up 2/2 incorrect Cardizem CD dose x 3 weeks  RX clarified with pharmacy  Continue monitoring    Hypertension Medications             carvedilol (COREG) 25 MG tablet TAKE 1/2- 1 TABLET BY MOUTH TWICE A DAY    diltiaZEM (CARDIZEM CD) 180 MG 24 hr capsule Take 2 capsules (360 mg total) by mouth once daily.    hydrALAZINE (APRESOLINE) 100 MG tablet TAKE 1 TABLET (100 MG TOTAL) BY MOUTH EVERY 8 (EIGHT) HOURS.    valsartan (DIOVAN) 320 MG tablet TAKE 1 TABLET EVERY DAY

## 2017-06-09 ENCOUNTER — PATIENT MESSAGE (OUTPATIENT)
Dept: ENDOSCOPY | Facility: HOSPITAL | Age: 42
End: 2017-06-09

## 2017-06-15 ENCOUNTER — OFFICE VISIT (OUTPATIENT)
Dept: FAMILY MEDICINE | Facility: CLINIC | Age: 42
End: 2017-06-15
Payer: COMMERCIAL

## 2017-06-15 VITALS
HEIGHT: 63 IN | TEMPERATURE: 98 F | OXYGEN SATURATION: 98 % | BODY MASS INDEX: 36.39 KG/M2 | SYSTOLIC BLOOD PRESSURE: 130 MMHG | HEART RATE: 74 BPM | DIASTOLIC BLOOD PRESSURE: 80 MMHG | WEIGHT: 205.38 LBS

## 2017-06-15 DIAGNOSIS — E11.42 TYPE 2 DIABETES MELLITUS WITH DIABETIC POLYNEUROPATHY, WITHOUT LONG-TERM CURRENT USE OF INSULIN: Primary | Chronic | ICD-10-CM

## 2017-06-15 DIAGNOSIS — E11.8 TYPE 2 DIABETES MELLITUS WITH COMPLICATION, WITHOUT LONG-TERM CURRENT USE OF INSULIN: ICD-10-CM

## 2017-06-15 DIAGNOSIS — E11.65 UNCONTROLLED TYPE 2 DIABETES MELLITUS WITH HYPERGLYCEMIA, WITHOUT LONG-TERM CURRENT USE OF INSULIN: Chronic | ICD-10-CM

## 2017-06-15 DIAGNOSIS — E55.9 VITAMIN D DEFICIENCY: ICD-10-CM

## 2017-06-15 DIAGNOSIS — E53.8 B12 DEFICIENCY: ICD-10-CM

## 2017-06-15 DIAGNOSIS — I10 ESSENTIAL HYPERTENSION: Chronic | ICD-10-CM

## 2017-06-15 PROCEDURE — 99214 OFFICE O/P EST MOD 30 MIN: CPT | Mod: S$GLB,,, | Performed by: FAMILY MEDICINE

## 2017-06-15 PROCEDURE — 3044F HG A1C LEVEL LT 7.0%: CPT | Mod: S$GLB,,, | Performed by: FAMILY MEDICINE

## 2017-06-15 PROCEDURE — 4010F ACE/ARB THERAPY RXD/TAKEN: CPT | Mod: S$GLB,,, | Performed by: FAMILY MEDICINE

## 2017-06-15 PROCEDURE — 99999 PR PBB SHADOW E&M-EST. PATIENT-LVL III: CPT | Mod: PBBFAC,,, | Performed by: FAMILY MEDICINE

## 2017-06-15 RX ORDER — VALSARTAN 320 MG/1
320 TABLET ORAL DAILY
Qty: 90 TABLET | Refills: 0 | Status: SHIPPED | OUTPATIENT
Start: 2017-06-15 | End: 2017-09-11 | Stop reason: SDUPTHER

## 2017-06-15 RX ORDER — DULAGLUTIDE 1.5 MG/.5ML
1.5 INJECTION, SOLUTION SUBCUTANEOUS
Qty: 12 SYRINGE | Refills: 0 | Status: SHIPPED | OUTPATIENT
Start: 2017-06-15 | End: 2017-07-14 | Stop reason: SDUPTHER

## 2017-06-15 RX ORDER — HYDRALAZINE HYDROCHLORIDE 100 MG/1
TABLET, FILM COATED ORAL
Qty: 270 TABLET | Refills: 0 | Status: SHIPPED | OUTPATIENT
Start: 2017-06-15 | End: 2017-10-05 | Stop reason: SDUPTHER

## 2017-06-15 NOTE — PROGRESS NOTES
HPI:  Enriqueta Mccarthy is a 41 y.o. year old female that  presents to get established as a patient. She is finding the HTN program very helpful and her diabetes is very well controlled  BS 89- 120 on average.. She needs med refill. No complaints today  Chief Complaint   Patient presents with    Follow-up     check diabeties    Medication Refill   .     HPI    Past Medical History:   Diagnosis Date    Essential hypertension     Herpes simplex virus (HSV) infection     Hypertension     Hypertriglyceridemia 2016    Neuropathy     Obesity (BMI 30-39.9) 2016    Type 2 diabetes mellitus with diabetic polyneuropathy, without long-term current use of insulin 2016    Uterine fibroid      Social History     Social History    Marital status:      Spouse name: N/A    Number of children: N/A    Years of education: N/A     Occupational History    Not on file.     Social History Main Topics    Smoking status: Never Smoker    Smokeless tobacco: Not on file    Alcohol use No    Drug use: No    Sexual activity: Not Currently     Partners: Male     Birth control/ protection: Surgical     Other Topics Concern    Not on file     Social History Narrative    No narrative on file     Past Surgical History:   Procedure Laterality Date     SECTION      x 2    ENDOMETRIAL ABLATION  2016    GALLBLADDER SURGERY  2017    removed    LIPOMA RESECTION Left 2013    SINUS SURGERY  2006    TONSILLECTOMY, ADENOIDECTOMY      TUBAL LIGATION       Family History   Problem Relation Age of Onset    Diabetes Father     Hypertension Father     Hyperlipidemia Father     Hypertension Mother     Anemia Daughter     Anemia Son     Diabetes Maternal Uncle     Diabetes Maternal Grandmother            Review of Systems  General ROS: negative for chills, fever or weight loss  Psychological ROS: negative for hallucination, depression or suicidal ideation  Ophthalmic ROS: negative for blurry  "vision, photophobia or eye pain  ENT ROS: negative for epistaxis, sore throat or rhinorrhea  Respiratory ROS: no cough, shortness of breath, or wheezing  Cardiovascular ROS: no chest pain or dyspnea on exertion  Gastrointestinal ROS: no abdominal pain, change in bowel habits, or black/ bloody stools  Genito-Urinary ROS: no dysuria, trouble voiding, or hematuria  Musculoskeletal ROS: negative for gait disturbance or muscular weakness  Neurological ROS: no syncope or seizures; no ataxia  Dermatological ROS: negative for pruritis, rash and jaundice      Physical Exam:  /80 (BP Location: Left arm, Patient Position: Sitting, BP Method: Manual)   Pulse 74   Temp 98.2 °F (36.8 °C) (Oral)   Ht 5' 3" (1.6 m)   Wt 93.2 kg (205 lb 5.7 oz)   SpO2 98%   BMI 36.38 kg/m²   General appearance: alert, cooperative, no distress  Constitutional:Oriented to person, place, and time.appears well-developed and well-nourished.  HEENT: Normocephalic, atraumatic, neck symmetrical, no nasal discharge, TM - clear bilaterally   Eyes: conjunctivae/corneas clear, PERRL, EOM's intact  Lungs: clear to auscultation bilaterally, no dullness to percussion bilaterally  Heart: regular rate and rhythm without rub; no displacement of the PMI   Abdomen: soft, non-tender; bowel sounds normoactive; no organomegaly  Extremities: extremities symmetric; no clubbing, cyanosis, or edema  Integument: Skin color, texture, turgor normal; no rashes; hair distrubution normal  Neurologic: Alert and oriented X 3, normal strength, normal coordination and gait  Psychiatric: no pressured speech; normal affect; no evidence of impaired cognition   Physical Exam  LABS:    Complete Blood Count  Lab Results   Component Value Date    RBC 3.82 (L) 03/28/2017    HGB 10.8 (L) 03/28/2017    HCT 32.0 (L) 03/28/2017    MCV 84 03/28/2017    MCH 28.3 03/28/2017    MCHC 33.8 03/28/2017    RDW 14.2 03/28/2017     03/28/2017    MPV 10.1 03/28/2017    GRAN 4.7 03/28/2017 "    GRAN 56.4 03/28/2017    LYMPH 3.0 03/28/2017    LYMPH 35.7 03/28/2017    MONO 0.4 03/28/2017    MONO 5.2 03/28/2017    EOS 0.2 03/28/2017    BASO 0.03 03/28/2017    EOSINOPHIL 2.2 03/28/2017    BASOPHIL 0.4 03/28/2017    DIFFMETHOD Automated 03/28/2017       Comprehensive Metabolic Panel  Lab Results   Component Value Date     03/28/2017    BUN 11 03/28/2017    CREATININE 1.0 03/28/2017     03/28/2017    K 3.8 03/28/2017     03/28/2017    PROT 7.5 03/28/2017    ALBUMIN 4.2 03/28/2017    BILITOT 0.6 03/28/2017    AST 11 03/28/2017    ALKPHOS 42 (L) 03/28/2017    CO2 24 03/28/2017    ALT 13 03/28/2017    ANIONGAP 10 03/28/2017    EGFRNONAA >60 03/28/2017    ESTGFRAFRICA >60 03/28/2017       LIPID  Lab Results   Component Value Date    CHOL 110 (L) 01/30/2017    HDL 26 (L) 01/30/2017       TSH  Lab Results   Component Value Date    TSH 1.198 11/15/2016       Current Outpatient Prescriptions   Medication Sig Dispense Refill    atorvastatin (LIPITOR) 80 MG tablet Take 1 tablet (80 mg total) by mouth every evening. 30 tablet 11    carvedilol (COREG) 25 MG tablet TAKE 1/2- 1 TABLET BY MOUTH TWICE A  tablet 0    diltiaZEM (CARDIZEM CD) 180 MG 24 hr capsule Take 2 capsules (360 mg total) by mouth once daily. 180 capsule 3    hydrALAZINE (APRESOLINE) 100 MG tablet TAKE 1 TABLET (100 MG TOTAL) BY MOUTH EVERY 8 (EIGHT) HOURS. 270 tablet 0    icosapent ethyl (VASCEPA) 1 gram Cap Take 2 g by mouth 2 (two) times daily. 120 capsule 11    multivitamin with minerals tablet Take 1 tablet by mouth once daily.      ONETOUCH DELICA LANCETS 30 gauge Misc 1 lancet by Misc.(Non-Drug; Combo Route) route 3 (three) times daily. Test blood glucose up to 3 times daily as needed. 100 each 11    ONETOUCH VERIO Strp 1 strip by Misc.(Non-Drug; Combo Route) route 3 (three) times daily. Test blood glucose up to 3 times daily as needed. 100 strip 11    TRULICITY 1.5 mg/0.5 mL Deb Inject 1.5 mg into the skin every  7 days. 12 Syringe 0    valsartan (DIOVAN) 320 MG tablet Take 1 tablet (320 mg total) by mouth once daily. 90 tablet 0    VITAMIN D2 50,000 unit capsule TAKE 1 CAPSULE TWICE WEEKLY 8 capsule 7    atorvastatin (LIPITOR) 80 MG tablet TAKE 1 TABLET EVERY DAY 90 tablet 1    azelastine (ASTELIN) 137 mcg (0.1 %) nasal spray       cholestyramine, with sugar, 4 gram Powd Take 4 g by mouth once daily. 378 g 0    gabapentin (NEURONTIN) 300 MG capsule Take 2 capsules (600 mg total) by mouth every evening. 60 capsule 3    hydrocodone-acetaminophen 5-325mg (NORCO) 5-325 mg per tablet Take 1-2 tablets PO q4-6hours PRN pain 40 tablet 0     No current facility-administered medications for this visit.        Assessment:    ICD-10-CM ICD-9-CM    1. Type 2 diabetes mellitus with diabetic polyneuropathy, without long-term current use of insulin E11.42 250.60 Hemoglobin A1c     357.2    2. Essential hypertension I10 401.9 hydrALAZINE (APRESOLINE) 100 MG tablet      valsartan (DIOVAN) 320 MG tablet   3. B12 deficiency E53.8 266.2 Vitamin B12   4. Vitamin D deficiency E55.9 268.9 Vitamin D   5. Type 2 diabetes mellitus with complication, without long-term current use of insulin E11.8 250.90 TRULICITY 1.5 mg/0.5 mL PnIj   6. Uncontrolled type 2 diabetes mellitus with hyperglycemia, without long-term current use of insulin E11.65 250.02          Plan:  Pt states that she sees Dr Murdock for Pap smear and discussed that she will not need a mammogram at this time.She will discuss this further with her about her concerns about radiation exposure at her next visit. I explained the risk and benefits of mammogram studies.  Return in 6 months (on 12/21/2017).          Lexus Cotton MD

## 2017-06-20 ENCOUNTER — PATIENT MESSAGE (OUTPATIENT)
Dept: FAMILY MEDICINE | Facility: CLINIC | Age: 42
End: 2017-06-20

## 2017-06-23 ENCOUNTER — OFFICE VISIT (OUTPATIENT)
Dept: FAMILY MEDICINE | Facility: CLINIC | Age: 42
End: 2017-06-23
Payer: COMMERCIAL

## 2017-06-23 VITALS
DIASTOLIC BLOOD PRESSURE: 72 MMHG | OXYGEN SATURATION: 97 % | HEIGHT: 63 IN | BODY MASS INDEX: 36.21 KG/M2 | SYSTOLIC BLOOD PRESSURE: 130 MMHG | WEIGHT: 204.38 LBS | HEART RATE: 75 BPM

## 2017-06-23 DIAGNOSIS — B36.9 FUNGAL INFECTION OF SKIN: Primary | ICD-10-CM

## 2017-06-23 PROCEDURE — 99213 OFFICE O/P EST LOW 20 MIN: CPT | Mod: S$GLB,,, | Performed by: NURSE PRACTITIONER

## 2017-06-23 PROCEDURE — 99999 PR PBB SHADOW E&M-EST. PATIENT-LVL IV: CPT | Mod: PBBFAC,,, | Performed by: NURSE PRACTITIONER

## 2017-06-23 RX ORDER — CLOTRIMAZOLE AND BETAMETHASONE DIPROPIONATE 10; .64 MG/G; MG/G
CREAM TOPICAL 2 TIMES DAILY
Qty: 45 G | Refills: 0 | Status: SHIPPED | OUTPATIENT
Start: 2017-06-23 | End: 2018-04-27

## 2017-06-23 NOTE — PROGRESS NOTES
Subjective:       Patient ID: Enriqueta Mccarthy is a 41 y.o. female.    Chief Complaint: Rash    40 y/o female presents to clinic with complaints of a pruritic rash on her scalp and neck.  She says that she has had a similar rash a couple of years ago.  She has not tried anything for the rash.      Rash   This is a new problem. The current episode started yesterday. The problem is unchanged. The affected locations include the scalp and neck. The rash is characterized by redness, itchiness and scaling. She was exposed to nothing. Past treatments include nothing.     Review of Systems   Skin: Positive for rash.   All other systems reviewed and are negative.      Objective:      Physical Exam   Constitutional: She is oriented to person, place, and time. She appears well-developed. No distress.   obese   HENT:   Head: Normocephalic and atraumatic.   Eyes: EOM are normal. Pupils are equal, round, and reactive to light.   Neck: Neck supple. No JVD present. No tracheal deviation present.   Cardiovascular: Normal rate, regular rhythm, normal heart sounds and intact distal pulses.    No murmur heard.  Pulmonary/Chest: Effort normal and breath sounds normal. No respiratory distress. She has no wheezes. She has no rales.   Abdominal: Soft. Bowel sounds are normal. She exhibits no distension and no mass. There is no tenderness.   Musculoskeletal: Normal range of motion. She exhibits no edema or tenderness.   Neurological: She is alert and oriented to person, place, and time. Coordination normal.   Skin: Skin is warm and dry. Rash (macular erythemtous rash with plaque on scalp (occipital region) and cervical neck) noted. No erythema. No pallor.   Psychiatric: She has a normal mood and affect. Her behavior is normal. Judgment and thought content normal. Cognition and memory are normal. She expresses no homicidal and no suicidal ideation.   Nursing note and vitals reviewed.      Assessment:       1. Fungal infection of skin         Plan:     Enriqueta was seen today for rash.    Diagnoses and all orders for this visit:    Fungal infection of skin  -     clotrimazole-betamethasone 1-0.05% (LOTRISONE) cream; Apply topically 2 (two) times daily.  -     Ambulatory consult to Dermatology    Follow-up with PCP if symptoms worsen or fail to improve.

## 2017-06-26 ENCOUNTER — PATIENT MESSAGE (OUTPATIENT)
Dept: ENDOSCOPY | Facility: HOSPITAL | Age: 42
End: 2017-06-26

## 2017-06-26 ENCOUNTER — TELEPHONE (OUTPATIENT)
Dept: GASTROENTEROLOGY | Facility: CLINIC | Age: 42
End: 2017-06-26

## 2017-06-29 ENCOUNTER — LAB VISIT (OUTPATIENT)
Dept: LAB | Facility: HOSPITAL | Age: 42
End: 2017-06-29
Attending: FAMILY MEDICINE
Payer: COMMERCIAL

## 2017-06-29 ENCOUNTER — PATIENT OUTREACH (OUTPATIENT)
Dept: OTHER | Facility: OTHER | Age: 42
End: 2017-06-29

## 2017-06-29 DIAGNOSIS — E11.42 TYPE 2 DIABETES MELLITUS WITH DIABETIC POLYNEUROPATHY, WITHOUT LONG-TERM CURRENT USE OF INSULIN: Chronic | ICD-10-CM

## 2017-06-29 DIAGNOSIS — E55.9 VITAMIN D DEFICIENCY: ICD-10-CM

## 2017-06-29 DIAGNOSIS — E53.8 B12 DEFICIENCY: ICD-10-CM

## 2017-06-29 LAB
25(OH)D3+25(OH)D2 SERPL-MCNC: 46 NG/ML
ESTIMATED AVG GLUCOSE: 100 MG/DL
HBA1C MFR BLD HPLC: 5.1 %
VIT B12 SERPL-MCNC: 397 PG/ML

## 2017-06-29 PROCEDURE — 36415 COLL VENOUS BLD VENIPUNCTURE: CPT

## 2017-06-29 PROCEDURE — 82607 VITAMIN B-12: CPT

## 2017-06-29 PROCEDURE — 83036 HEMOGLOBIN GLYCOSYLATED A1C: CPT

## 2017-06-29 PROCEDURE — 82306 VITAMIN D 25 HYDROXY: CPT

## 2017-06-29 NOTE — PROGRESS NOTES
Last 5 Patient Entered Readings                                                               Current 30 Day Average: 143/84     Recent Readings 6/7/2017 6/7/2017 6/4/2017 6/3/2017 5/30/2017    Systolic BP (mmHg) 151 149 138 139 136    Diastolic BP (mmHg) 99 89 75 83 77    Pulse 84 81 82 76 85        LVM to follow up with . Enriqueta Mccarthy. Inquired about how her low sodium diet and exercise is going. Also inquired about her lack of readings because it is not like her not to submit readings. Requested that she let me know if she is out of town or having any tech issues so we can assist her. Requested pt to call or message back so we can discuss this.

## 2017-06-30 ENCOUNTER — PATIENT MESSAGE (OUTPATIENT)
Dept: FAMILY MEDICINE | Facility: CLINIC | Age: 42
End: 2017-06-30

## 2017-07-05 ENCOUNTER — PATIENT OUTREACH (OUTPATIENT)
Dept: OTHER | Facility: OTHER | Age: 42
End: 2017-07-05

## 2017-07-05 NOTE — PROGRESS NOTES
Called pt to review readings. LVM.  BP much improved with correct cardizem dose.   Continue monitoring    Last 5 Patient Entered Readings                                                               Current 30 Day Average: 133/82     Recent Readings 7/1/2017 6/29/2017 6/29/2017 6/7/2017 6/7/2017    Systolic BP (mmHg) 123 117 117 151 149    Diastolic BP (mmHg) 81 72 72 99 89    Pulse 69 83 83 84 81

## 2017-07-10 ENCOUNTER — PATIENT MESSAGE (OUTPATIENT)
Dept: CARDIOLOGY | Facility: CLINIC | Age: 42
End: 2017-07-10

## 2017-07-10 RX ORDER — ATORVASTATIN CALCIUM 80 MG/1
80 TABLET, FILM COATED ORAL NIGHTLY
Qty: 90 TABLET | Refills: 3 | Status: SHIPPED | OUTPATIENT
Start: 2017-07-10 | End: 2018-07-25 | Stop reason: SDUPTHER

## 2017-07-14 ENCOUNTER — PATIENT MESSAGE (OUTPATIENT)
Dept: ENDOSCOPY | Facility: HOSPITAL | Age: 42
End: 2017-07-14

## 2017-07-14 DIAGNOSIS — E11.8 TYPE 2 DIABETES MELLITUS WITH COMPLICATION, WITHOUT LONG-TERM CURRENT USE OF INSULIN: ICD-10-CM

## 2017-07-14 RX ORDER — DULAGLUTIDE 1.5 MG/.5ML
1.5 INJECTION, SOLUTION SUBCUTANEOUS
Qty: 6 ML | Refills: 6 | Status: SHIPPED | OUTPATIENT
Start: 2017-07-14 | End: 2018-06-12 | Stop reason: DRUGHIGH

## 2017-07-20 ENCOUNTER — PATIENT MESSAGE (OUTPATIENT)
Dept: OTHER | Facility: OTHER | Age: 42
End: 2017-07-20

## 2017-07-20 ENCOUNTER — PATIENT MESSAGE (OUTPATIENT)
Dept: FAMILY MEDICINE | Facility: CLINIC | Age: 42
End: 2017-07-20

## 2017-07-20 NOTE — PROGRESS NOTES
Last 5 Patient Entered Redings Current 30 Day Average: 123/79     Recent Readings 7/16/2017 7/16/2017 7/9/2017 7/1/2017 6/29/2017    Systolic BP (mmHg) 132 139 121 123 117    Diastolic BP (mmHg) 84 82 77 81 72    Pulse 68 65 72 69 83          Sent [employer] Message to follow up with Mrs. Enriqueta Mccarthy. Inquired about how her low sodium diet and exercise is going. She is back on track with taking readings and her BP is back in control since her PharmD adjusted medications. Advised pt to call or message back if she has any questions or concerns.    Patient messaged back and informed me that she is doing well and has lost 19 pounds since the beginning of the year. She is working on portion control with her meals. She will let me know if she has any other questions.

## 2017-07-26 ENCOUNTER — TELEPHONE (OUTPATIENT)
Dept: OPTOMETRY | Facility: CLINIC | Age: 42
End: 2017-07-26

## 2017-07-26 ENCOUNTER — PATIENT MESSAGE (OUTPATIENT)
Dept: OPTOMETRY | Facility: CLINIC | Age: 42
End: 2017-07-26

## 2017-07-27 ENCOUNTER — OFFICE VISIT (OUTPATIENT)
Dept: OPTOMETRY | Facility: CLINIC | Age: 42
End: 2017-07-27
Payer: COMMERCIAL

## 2017-07-27 DIAGNOSIS — H10.11 ALLERGIC CONJUNCTIVITIS, RIGHT EYE: ICD-10-CM

## 2017-07-27 DIAGNOSIS — E11.3292 TYPE 2 DIABETES MELLITUS WITH LEFT EYE AFFECTED BY MILD NONPROLIFERATIVE RETINOPATHY WITHOUT MACULAR EDEMA, WITHOUT LONG-TERM CURRENT USE OF INSULIN: Primary | ICD-10-CM

## 2017-07-27 DIAGNOSIS — Z79.84 LONG TERM CURRENT USE OF ORAL HYPOGLYCEMIC DRUG: ICD-10-CM

## 2017-07-27 PROCEDURE — 99999 PR PBB SHADOW E&M-EST. PATIENT-LVL I: CPT | Mod: PBBFAC,,, | Performed by: OPTOMETRIST

## 2017-07-27 PROCEDURE — 92014 COMPRE OPH EXAM EST PT 1/>: CPT | Mod: S$GLB,,, | Performed by: OPTOMETRIST

## 2017-07-27 NOTE — PROGRESS NOTES
HPI     Ms. Enriqueta Mccarthy is here for 6 month diabetic retinopathy check OS    Patient complains of a slight feeling of pressure OS and itching OD. Worse   at the end of the day.   She declines refraction today.    (+)drops: OTC artificial tears (blue bottle,unsure of name) about 2 times   per week  (-)flashes  (-)floaters  (-)diplopia    Diabetic yes  Hemoglobin A1C       Date                     Value               Ref Range             Status                06/29/2017               5.1                 4.0 - 5.6 %           Final                  03/16/2017               4.8                 4.5 - 6.2 %           Final                  11/15/2016               9.6 (H)             4.5 - 6.2 %           Final                  11/15/2016               9.6 (H)             4.5 - 6.2 %           Final             ----------    OCULAR HISTORY  Last Eye Exam 1/19/17 Dr Lynn  (-)eye surgery   Mild NPDR OS (01/2017)  Dry eyes    FAMILY HISTORY  (-)Glaucoma        Last edited by Adriana Lynn, OD on 7/27/2017  3:38 PM. (History)            Assessment /Plan     For exam results, see Encounter Report.    Type 2 diabetes mellitus with left eye affected by mild nonproliferative retinopathy without macular edema, without long-term current use of insulin  Long term current use of oral hypoglycemic drug   A1c has significantly improved since last visit (recent A1c was 5.1%). Very mild NPDR OS (single MA), no retinopathy OD. Continue management of DM as directed by PCP. Monitor with repeat DFE in 6 months, or RTC sooner with any vision changes.     Allergic conjunctivitis, right eye   Very mild, cause of itchiness OS. Resolved with use of artificial tears prn. Continue same management.      RTC 6 months to recheck diabetic retinopathy

## 2017-08-01 ENCOUNTER — PATIENT MESSAGE (OUTPATIENT)
Dept: CARDIOLOGY | Facility: CLINIC | Age: 42
End: 2017-08-01

## 2017-08-04 ENCOUNTER — LAB VISIT (OUTPATIENT)
Dept: LAB | Facility: HOSPITAL | Age: 42
End: 2017-08-04
Attending: OBSTETRICS & GYNECOLOGY
Payer: COMMERCIAL

## 2017-08-04 ENCOUNTER — OFFICE VISIT (OUTPATIENT)
Dept: OBSTETRICS AND GYNECOLOGY | Facility: CLINIC | Age: 42
End: 2017-08-04
Payer: COMMERCIAL

## 2017-08-04 ENCOUNTER — PATIENT MESSAGE (OUTPATIENT)
Dept: OBSTETRICS AND GYNECOLOGY | Facility: CLINIC | Age: 42
End: 2017-08-04

## 2017-08-04 VITALS
BODY MASS INDEX: 36.83 KG/M2 | SYSTOLIC BLOOD PRESSURE: 120 MMHG | WEIGHT: 207.88 LBS | DIASTOLIC BLOOD PRESSURE: 74 MMHG

## 2017-08-04 DIAGNOSIS — Z12.39 SCREENING FOR BREAST CANCER: ICD-10-CM

## 2017-08-04 DIAGNOSIS — Z12.4 SCREENING FOR CERVICAL CANCER: ICD-10-CM

## 2017-08-04 DIAGNOSIS — Z01.419 WELL WOMAN EXAM WITH ROUTINE GYNECOLOGICAL EXAM: Primary | ICD-10-CM

## 2017-08-04 DIAGNOSIS — Z01.419 WELL WOMAN EXAM WITH ROUTINE GYNECOLOGICAL EXAM: ICD-10-CM

## 2017-08-04 LAB
ESTRADIOL SERPL-MCNC: 240 PG/ML
FSH SERPL-ACNC: 7.2 MIU/ML

## 2017-08-04 PROCEDURE — 99999 PR PBB SHADOW E&M-EST. PATIENT-LVL III: CPT | Mod: PBBFAC,,, | Performed by: OBSTETRICS & GYNECOLOGY

## 2017-08-04 PROCEDURE — 36415 COLL VENOUS BLD VENIPUNCTURE: CPT

## 2017-08-04 PROCEDURE — 82670 ASSAY OF TOTAL ESTRADIOL: CPT

## 2017-08-04 PROCEDURE — 99396 PREV VISIT EST AGE 40-64: CPT | Mod: S$GLB,,, | Performed by: OBSTETRICS & GYNECOLOGY

## 2017-08-04 PROCEDURE — 87624 HPV HI-RISK TYP POOLED RSLT: CPT

## 2017-08-04 PROCEDURE — 83001 ASSAY OF GONADOTROPIN (FSH): CPT

## 2017-08-04 PROCEDURE — 88175 CYTOPATH C/V AUTO FLUID REDO: CPT

## 2017-08-04 NOTE — PROGRESS NOTES
GYNECOLOGY OFFICE NOTE    Reason for visit: annual    HPI: Pt is a 41 y.o.  female  who presents for annual. Cycle: menarche- 10, interval variable with recent ablation. She is not sexually active.  She uses bilateral tubal ligation for contraception.  She does not desire STI screening. She denies vaginal discharge.  Last pap: 2014, denies hx of abnormal. Last MMG never.     Past Medical History:   Diagnosis Date    Essential hypertension     Herpes simplex virus (HSV) infection     Hypertension     Hypertriglyceridemia 2016    Neuropathy     Obesity (BMI 30-39.9) 2016    Type 2 diabetes mellitus with diabetic polyneuropathy, without long-term current use of insulin 2016    Uterine fibroid        Past Surgical History:   Procedure Laterality Date     SECTION      x 2    ENDOMETRIAL ABLATION  2016    GALLBLADDER SURGERY  2017    removed    LIPOMA RESECTION Left 2013    SINUS SURGERY      TONSILLECTOMY, ADENOIDECTOMY      TUBAL LIGATION         Family History   Problem Relation Age of Onset    Diabetes Father     Hypertension Father     Hyperlipidemia Father     Hypertension Mother     Anemia Daughter     Anemia Son     Diabetes Maternal Uncle     Diabetes Maternal Grandmother     Blindness Neg Hx     Glaucoma Neg Hx     Macular degeneration Neg Hx     Retinal detachment Neg Hx        Social History   Substance Use Topics    Smoking status: Never Smoker    Smokeless tobacco: Not on file    Alcohol use No       OB History    Para Term  AB Living   2 2 1 1   2   SAB TAB Ectopic Multiple Live Births           2      # Outcome Date GA Lbr Tu/2nd Weight Sex Delivery Anes PTL Lv   2  11   2.948 kg (6 lb 8 oz) F CS-LTranv   STEPHANIE   1 Term 10/14/03   4.366 kg (9 lb 10 oz) M CS-LTranv   STEPHANIE          Current Outpatient Prescriptions   Medication Sig    atorvastatin (LIPITOR) 80 MG tablet Take 1 tablet (80 mg total) by  mouth every evening.    azelastine (ASTELIN) 137 mcg (0.1 %) nasal spray     carvedilol (COREG) 25 MG tablet TAKE 1/2- 1 TABLET BY MOUTH TWICE A DAY    diltiaZEM (CARDIZEM CD) 180 MG 24 hr capsule Take 2 capsules (360 mg total) by mouth once daily.    hydrALAZINE (APRESOLINE) 100 MG tablet TAKE 1 TABLET (100 MG TOTAL) BY MOUTH EVERY 8 (EIGHT) HOURS.    icosapent ethyl (VASCEPA) 1 gram Cap Take 2 g by mouth 2 (two) times daily.    multivitamin with minerals tablet Take 1 tablet by mouth once daily.    ONETOUCH DELICA LANCETS 30 gauge Misc 1 lancet by Misc.(Non-Drug; Combo Route) route 3 (three) times daily. Test blood glucose up to 3 times daily as needed.    ONETOUCH VERIO Strp 1 strip by Misc.(Non-Drug; Combo Route) route 3 (three) times daily. Test blood glucose up to 3 times daily as needed.    TRULICITY 1.5 mg/0.5 mL PnIj Inject 1.5 mg into the skin every 7 days.    valsartan (DIOVAN) 320 MG tablet Take 1 tablet (320 mg total) by mouth once daily.    VITAMIN D2 50,000 unit capsule TAKE 1 CAPSULE TWICE WEEKLY    atorvastatin (LIPITOR) 80 MG tablet TAKE 1 TABLET EVERY DAY    cholestyramine, with sugar, 4 gram Powd Take 4 g by mouth once daily.    clotrimazole-betamethasone 1-0.05% (LOTRISONE) cream Apply topically 2 (two) times daily.    gabapentin (NEURONTIN) 300 MG capsule Take 2 capsules (600 mg total) by mouth every evening.     No current facility-administered medications for this visit.        Allergies: No known allergies     /74   Wt 94.3 kg (207 lb 14.3 oz)   BMI 36.83 kg/m²     ROS:  GENERAL: Denies fever or chills.   SKIN: Denies rash or lesions.   HEAD: Denies head injury or headache.   CHEST: Denies chest pain or shortness of breath.   CARDIOVASCULAR: Denies palpitations or chest pain.   ABDOMEN: No abdominal pain, constipation, diarrhea, nausea, vomiting or rectal bleeding.   URINARY: No dysuria, hematuria, or burning on urination.  REPRODUCTIVE: See HPI.   BREASTS: Denies  pain, lumps, or nipple discharge.   NEUROLOGIC: Denies syncope or weakness.     Physical Exam:  GENERAL: alert, appears stated age and cooperative  CHEST: Normal respiratory effort  HEART: S1 and S2 normal, regular rate and rhythm  NECK: normal appearance, no thyromegaly masses or tenderness  SKIN: no acne, striae, hirsutism  BREAST EXAM: breasts appear normal, no suspicious masses, no skin or nipple changes or axillary nodes  ABDOMEN: abdomen is soft without significant tenderness, masses, organomegaly or guarding, no hernias noted  EXTERNAL GENITALIA:  normal general appearance  URETHRA: normal urethra, normal urethral meatus  VAGINA:  normal mucosa without prolapse or lesions  CERVIX:  Normal  UTERUS:  normal size, mobile, non-tender, normal shape and consistency  ADNEXA:  normal adnexa in size, nontender and no masses    Diagnosis:  1. Well woman exam with routine gynecological exam    2. Screening for cervical cancer    3. Screening for breast cancer        Plan:   1. Annual today  2. Pap/hpv  3. MMG order placed    Orders Placed This Encounter    HPV High Risk Genotypes, PCR    Mammo Digital Screening Bilat with CAD    Estradiol    Follicle stimulating hormone    Liquid-based pap smear, screening       Patient was counseled today on the new ACS guidelines for cervical cytology screening as well as the current recommendations for breast cancer screening. She was counseled to follow up with her PCP for other routine health maintenance.     Return in about 1 year (around 8/4/2018) for annual.      Erlinda Murdock MD  OB/GYN  Pager: [phone]

## 2017-08-05 ENCOUNTER — HOSPITAL ENCOUNTER (OUTPATIENT)
Dept: RADIOLOGY | Facility: HOSPITAL | Age: 42
Discharge: HOME | End: 2017-08-05
Attending: OBSTETRICS & GYNECOLOGY
Payer: COMMERCIAL

## 2017-08-05 VITALS — BODY MASS INDEX: 36.68 KG/M2 | WEIGHT: 207 LBS | HEIGHT: 63 IN

## 2017-08-05 DIAGNOSIS — Z12.39 SCREENING FOR BREAST CANCER: ICD-10-CM

## 2017-08-05 PROCEDURE — 77067 SCR MAMMO BI INCL CAD: CPT | Mod: TC

## 2017-08-05 PROCEDURE — 77063 BREAST TOMOSYNTHESIS BI: CPT | Mod: 26,,, | Performed by: RADIOLOGY

## 2017-08-05 PROCEDURE — 77067 SCR MAMMO BI INCL CAD: CPT | Mod: 26,,, | Performed by: RADIOLOGY

## 2017-08-07 ENCOUNTER — TELEPHONE (OUTPATIENT)
Dept: OBSTETRICS AND GYNECOLOGY | Facility: CLINIC | Age: 42
End: 2017-08-07

## 2017-08-07 ENCOUNTER — PATIENT MESSAGE (OUTPATIENT)
Dept: OTHER | Facility: OTHER | Age: 42
End: 2017-08-07

## 2017-08-07 NOTE — TELEPHONE ENCOUNTER
I have a question about FOLLICLE STIMULATING HORMONE resulted on 8/4/17 at 4:53 PM.     What do the results mean for both FSH and Estradiol?

## 2017-08-07 NOTE — TELEPHONE ENCOUNTER
Pt was notified through [facility] this morning that her results means she is not postmenopausal.     Erlinda Murdock MD, FACOG  OB/GYN  Pager: [phone]

## 2017-08-09 LAB
HPV HR 12 DNA CVX QL NAA+PROBE: NEGATIVE
HPV16 DNA SPEC QL NAA+PROBE: NEGATIVE
HPV18 DNA SPEC QL NAA+PROBE: NEGATIVE

## 2017-08-18 ENCOUNTER — PATIENT OUTREACH (OUTPATIENT)
Dept: OTHER | Facility: OTHER | Age: 42
End: 2017-08-18

## 2017-08-18 NOTE — PROGRESS NOTES
Last 5 Patient Entered Red[place] Current 30 Day Average: 124/74     Recent Readings 8/17/2017 7/30/2017 7/23/2017 7/16/2017 7/16/2017    Systolic BP (mmHg) 129 106 138 132 139    Diastolic BP (mmHg) 77 62 82 84 82    Pulse 68 69 79 68 65          LVM to follow up with Mrs. Enriqueta Mccarthy. Inquired about how her low sodium diet and exercise is going. Advised pt to call or message back if she has any questions or concerns.    Patients BP readings are overall controlled? yes    Patient takes weekly BP readings? yes  : patient is starting to get back on track.     Reminded patient about what to do incase of a medical emergency (call 911, call [facility] on call or go to the ER).     Provided patient with my contact information.

## 2017-09-04 ENCOUNTER — PATIENT MESSAGE (OUTPATIENT)
Dept: ADMINISTRATIVE | Facility: OTHER | Age: 42
End: 2017-09-04

## 2017-09-11 DIAGNOSIS — I10 ESSENTIAL HYPERTENSION: Chronic | ICD-10-CM

## 2017-09-13 ENCOUNTER — PATIENT OUTREACH (OUTPATIENT)
Dept: OTHER | Facility: OTHER | Age: 42
End: 2017-09-13

## 2017-09-13 NOTE — PROGRESS NOTES
Last 5 Patient Entered Redings Current 30 Day Average: 124/74     Recent Readings 9/12/2017 9/4/2017 9/2/2017 9/1/2017 8/23/2017    Systolic BP (mmHg) 115 134 130 125 107    Diastolic BP (mmHg) 84 82 70 69 63    Pulse 71 75 74 78 70          Hypertension Digital Medicine Program (HDMP): Health  Follow Up    Lifestyle Modifications:    1. Low sodium diet: yes    2.Physical activity: yes : Patient walks everyday and uses the stairs at work.     3.Hypotension/Hypertension symptoms: no   Frequency/Alleviating factors/Precipitating factors, etc.     4. Patient  has been been compliant with the medication regimen     Follow up with Mrs. Enriqueta Mccarthy completed. No further questions or concerns. I will follow up in a few weeks to assess progress.

## 2017-09-14 DIAGNOSIS — I10 ESSENTIAL HYPERTENSION: Chronic | ICD-10-CM

## 2017-09-15 ENCOUNTER — PATIENT MESSAGE (OUTPATIENT)
Dept: FAMILY MEDICINE | Facility: CLINIC | Age: 42
End: 2017-09-15

## 2017-09-15 RX ORDER — VALSARTAN 320 MG/1
TABLET ORAL
Qty: 90 TABLET | Refills: 0 | OUTPATIENT
Start: 2017-09-15

## 2017-09-15 RX ORDER — VALSARTAN 320 MG/1
320 TABLET ORAL DAILY
Qty: 30 TABLET | Refills: 2 | Status: SHIPPED | OUTPATIENT
Start: 2017-09-15 | End: 2017-12-22 | Stop reason: SDUPTHER

## 2017-09-18 ENCOUNTER — PATIENT MESSAGE (OUTPATIENT)
Dept: FAMILY MEDICINE | Facility: CLINIC | Age: 42
End: 2017-09-18

## 2017-10-04 ENCOUNTER — PATIENT MESSAGE (OUTPATIENT)
Dept: CARDIOLOGY | Facility: CLINIC | Age: 42
End: 2017-10-04

## 2017-10-05 ENCOUNTER — PATIENT MESSAGE (OUTPATIENT)
Dept: FAMILY MEDICINE | Facility: CLINIC | Age: 42
End: 2017-10-05

## 2017-10-05 DIAGNOSIS — I10 ESSENTIAL HYPERTENSION: Chronic | ICD-10-CM

## 2017-10-06 RX ORDER — HYDRALAZINE HYDROCHLORIDE 100 MG/1
TABLET, FILM COATED ORAL
Qty: 90 TABLET | Refills: 2 | Status: SHIPPED | OUTPATIENT
Start: 2017-10-06 | End: 2017-12-22 | Stop reason: SDUPTHER

## 2017-10-09 ENCOUNTER — PATIENT OUTREACH (OUTPATIENT)
Dept: OTHER | Facility: OTHER | Age: 42
End: 2017-10-09

## 2017-10-09 NOTE — PROGRESS NOTES
Last 5 Patient Entered Redings Current 30 Day Average: 121/77     Recent Readings 10/8/2017 10/1/2017 9/23/2017 9/20/2017 9/12/2017    Systolic BP (mmHg) 122 118 123 128 115    Diastolic BP (mmHg) 73 73 79 76 84    Pulse 76 76 68 69 71          Hypertension Digital Medicine (HDMP) Health  Follow Up    LVM to follow up with Henry Enriqueta Mora Fabio.    Per 30 day average, blood pressure is well controlled 121/77 mmHg. Encouraged adherence to low sodium diet and physical activity guidelines. Advised patient to call or message with questions or concerns.

## 2017-11-08 ENCOUNTER — PATIENT OUTREACH (OUTPATIENT)
Dept: OTHER | Facility: OTHER | Age: 42
End: 2017-11-08

## 2017-11-08 NOTE — PROGRESS NOTES
Last 5 Patient Entered Readings Current 30 Day Average: 131/79     Recent Readings 11/7/2017 11/3/2017 10/22/2017 10/21/2017 10/15/2017    Systolic BP (mmHg) 129 135 136 138 117    Diastolic BP (mmHg) 75 85 83 84 69    Pulse 73 76 68 68 71          Hypertension Digital Medicine (HDMP) Health  Follow Up    LVM to follow up with  Enriqueta Herrcandice Mccarthy.    Per 30 day average, blood pressure is well controlled 131/79 mmHg. Encouraged adherence to low sodium diet and physical activity guidelines. Advised patient to call or message with questions or concerns.

## 2017-11-14 ENCOUNTER — PATIENT MESSAGE (OUTPATIENT)
Dept: ADMINISTRATIVE | Facility: OTHER | Age: 42
End: 2017-11-14

## 2017-11-22 RX ORDER — ICOSAPENT ETHYL 1000 MG/1
CAPSULE ORAL
Qty: 120 CAPSULE | Refills: 5 | Status: SHIPPED | OUTPATIENT
Start: 2017-11-22 | End: 2018-12-01 | Stop reason: SDUPTHER

## 2017-11-28 ENCOUNTER — PATIENT OUTREACH (OUTPATIENT)
Dept: OTHER | Facility: OTHER | Age: 42
End: 2017-11-28

## 2017-11-28 DIAGNOSIS — E11.42 TYPE 2 DIABETES MELLITUS WITH DIABETIC POLYNEUROPATHY, WITHOUT LONG-TERM CURRENT USE OF INSULIN: Primary | Chronic | ICD-10-CM

## 2017-11-28 NOTE — PROGRESS NOTES
Ms Mccarthy reports she is no longer under the care of Dr. Lr. Care transferred to Dr. Cotton. Patient has not yet picked up BG meter, but will go to [facility]. She is also due to A1c on 12/29. I have placed orders and she will go to lab. Reviewed DDMP requirements, as she is already familiar with HDMP. Reviewed A1c goal <7%. Patient verbalizes understanding.     Lab Results   Component Value Date    HGBA1C 5.1 06/29/2017       Last 5 Patient Entered Readings Current 30 Day Average: 134/80     Recent Readings 11/12/2017 11/7/2017 11/3/2017 10/22/2017 10/21/2017    Systolic BP (mmHg) 139 129 135 136 138    Diastolic BP (mmHg) 79 75 85 83 84    Pulse 72 73 76 68 68          Last A1c at goal  Await BG readings    Diabetes Medications             TRULICITY 1.5 mg/0.5 mL PnIj Inject 1.5 mg into the skin every 7 days.

## 2017-12-01 ENCOUNTER — PATIENT OUTREACH (OUTPATIENT)
Dept: OTHER | Facility: OTHER | Age: 42
End: 2017-12-01

## 2017-12-01 NOTE — PROGRESS NOTES
Last 5 Patient Entered Readings Current 30 Day Average: 134/81     Recent Readings 11/29/2017 11/29/2017 11/12/2017 11/7/2017 11/3/2017    Systolic BP (mmHg) - 131 139 129 135    Diastolic BP (mmHg) - 83 79 75 85    Pulse 76 76 72 73 76          Patient called because she was having some issues getting her glucometer from the [facility]. Her surveys were filled out incorrectly so it showing that she was a non-carmen so we are working on getting the correct surveys to go through to the patient fully set up. For now, she is going to go back to the [facility] and get the glucometer and start testing again while we work with IT to get the other issues straightened out.

## 2017-12-04 ENCOUNTER — PATIENT MESSAGE (OUTPATIENT)
Dept: OTHER | Facility: OTHER | Age: 42
End: 2017-12-04

## 2017-12-05 ENCOUNTER — PATIENT OUTREACH (OUTPATIENT)
Dept: OTHER | Facility: OTHER | Age: 42
End: 2017-12-05

## 2017-12-05 NOTE — PROGRESS NOTES
Last 5 Patient Entered Readings Current 30 Day Average: 133/80     Recent Readings 12/3/2017 12/3/2017 11/29/2017 11/29/2017 11/12/2017    Systolic BP (mmHg) 132 - - 131 139    Diastolic BP (mmHg) 81 - - 83 79    Pulse 78 78 76 76 72          Hypertension Digital Medicine Program (HDMP): Health  Follow Up    Lifestyle Modifications:    1.Low sodium diet: yes : She continues maintaining a low sodium diet by reading food labels and switching to lower sodium options. She will be sure to be more focused on this over the next few weeks to try and get her BP even lower since the new guidelines have come out.     2.Physical activity: Deferred    3.Hypotension/Hypertension symptoms: no   Frequency/Alleviating factors/Precipitating factors, etc.     4.Patient has been compliant with the medication regimen.     We discussed the new BP guidelines and she expressed understanding.     Diabetes Digital Medicine Program (DDMP): Health  Follow Up    Patient is not up and running. She did mention that she takes her BS reading with her mayco open but it has not been automatically syncing the reading and she has to manually put it in. She is going to go by the [facility] to see if they can show her how to get it to come across automatically. She will let me know if she has any further questions.     Follow up with . Enriqueta Mccarthy completed. No further questions or concerns. I will follow up in a few weeks to assess progress.

## 2017-12-07 ENCOUNTER — LAB VISIT (OUTPATIENT)
Dept: LAB | Facility: HOSPITAL | Age: 42
End: 2017-12-07
Attending: FAMILY MEDICINE
Payer: COMMERCIAL

## 2017-12-07 ENCOUNTER — PATIENT MESSAGE (OUTPATIENT)
Dept: OTHER | Facility: OTHER | Age: 42
End: 2017-12-07

## 2017-12-07 ENCOUNTER — PATIENT MESSAGE (OUTPATIENT)
Dept: FAMILY MEDICINE | Facility: CLINIC | Age: 42
End: 2017-12-07

## 2017-12-07 DIAGNOSIS — E11.42 TYPE 2 DIABETES MELLITUS WITH DIABETIC POLYNEUROPATHY, WITHOUT LONG-TERM CURRENT USE OF INSULIN: Chronic | ICD-10-CM

## 2017-12-07 LAB
ESTIMATED AVG GLUCOSE: 94 MG/DL
HBA1C MFR BLD HPLC: 4.9 %

## 2017-12-07 PROCEDURE — 83036 HEMOGLOBIN GLYCOSYLATED A1C: CPT

## 2017-12-07 PROCEDURE — 36415 COLL VENOUS BLD VENIPUNCTURE: CPT

## 2017-12-08 DIAGNOSIS — D64.9 ANEMIA, UNSPECIFIED TYPE: Primary | ICD-10-CM

## 2017-12-08 NOTE — PROGRESS NOTES
Last 5 Patient Entered Readings Current 30 Day Average: 134/81     Recent Readings 12/3/2017 12/3/2017 11/29/2017 11/29/2017 11/12/2017    Systolic BP (mmHg) 132 - - 131 139    Diastolic BP (mmHg) 81 - - 83 79    Pulse 78 78 76 76 72          Patient called concerned about her BS readings this morning (118 before eating breakfast and 170 after eating oatmeal and strawberries). She informed me that she ate rice and beans last night along with black licorice. Her A1C came back yesterday at 4.9% so after speaking with her PharmD YINKA Cotton, I informed the patient not to worry too much and that it is normal to have some fluctuations especially based on some of the foods she was eating. She expressed understanding and stated that she will continue to monitor. I also informed her that licorice could cause an increase in her BP and advised her to do her best to limit eating this as much as possible (she did stated that she has eaten some for the past two days). No further questions at this time.

## 2017-12-16 ENCOUNTER — PATIENT MESSAGE (OUTPATIENT)
Dept: FAMILY MEDICINE | Facility: CLINIC | Age: 42
End: 2017-12-16

## 2017-12-18 ENCOUNTER — PATIENT OUTREACH (OUTPATIENT)
Dept: OTHER | Facility: OTHER | Age: 42
End: 2017-12-18

## 2017-12-18 NOTE — PROGRESS NOTES
Ms. Mccarthy is doing well. She reports medication compliance. She continues to monitor BG without problems. No questions or concerns.       Last 5 Patient Entered Readings Current 30 Day Average: 130/81       Units 12/18/2017 12/17/2017 12/15/2017 12/14/2017 12/13/2017    Time - 12:00 AM  2:01 PM 12:00 AM 12:00 AM 12:00 AM    Systolic Blood Pressure - - 126 - - -    Diastolic Blood Pressure - - 81 - - -    Pulse bpm - 65 - - -    Steps Walked - 9197 - [phone] [phone]        Lab Results   Component Value Date    HGBA1C 4.9 12/07/2017         BP close to goal, <130/80 mmHg  A1c at goal , <7%  Continue monitoring    Diabetes Medications             TRULICITY 1.5 mg/0.5 mL PnIj Inject 1.5 mg into the skin every 7 days.        Hypertension Medications             carvedilol (COREG) 25 MG tablet TAKE 1/2- 1 TABLET BY MOUTH TWICE A DAY    diltiaZEM (CARDIZEM CD) 180 MG 24 hr capsule Take 2 capsules (360 mg total) by mouth once daily.    hydrALAZINE (APRESOLINE) 100 MG tablet TAKE 1 TABLET (100 MG TOTAL) BY MOUTH EVERY 8 (EIGHT) HOURS.    valsartan (DIOVAN) 320 MG tablet Take 1 tablet (320 mg total) by mouth once daily.

## 2017-12-22 ENCOUNTER — OFFICE VISIT (OUTPATIENT)
Dept: FAMILY MEDICINE | Facility: CLINIC | Age: 42
End: 2017-12-22
Payer: COMMERCIAL

## 2017-12-22 ENCOUNTER — LAB VISIT (OUTPATIENT)
Dept: LAB | Facility: HOSPITAL | Age: 42
End: 2017-12-22
Attending: INTERNAL MEDICINE
Payer: COMMERCIAL

## 2017-12-22 VITALS
DIASTOLIC BLOOD PRESSURE: 62 MMHG | OXYGEN SATURATION: 98 % | RESPIRATION RATE: 18 BRPM | HEIGHT: 63 IN | SYSTOLIC BLOOD PRESSURE: 104 MMHG | BODY MASS INDEX: 36.83 KG/M2 | WEIGHT: 207.88 LBS | TEMPERATURE: 99 F | HEART RATE: 63 BPM

## 2017-12-22 DIAGNOSIS — E11.42 TYPE 2 DIABETES MELLITUS WITH DIABETIC POLYNEUROPATHY, WITHOUT LONG-TERM CURRENT USE OF INSULIN: Chronic | ICD-10-CM

## 2017-12-22 DIAGNOSIS — G89.29 CHRONIC PAIN OF BOTH KNEES: Primary | ICD-10-CM

## 2017-12-22 DIAGNOSIS — E78.1 HYPERTRIGLYCERIDEMIA: Chronic | ICD-10-CM

## 2017-12-22 DIAGNOSIS — I10 ESSENTIAL HYPERTENSION: Chronic | ICD-10-CM

## 2017-12-22 DIAGNOSIS — M25.562 CHRONIC PAIN OF BOTH KNEES: Primary | ICD-10-CM

## 2017-12-22 DIAGNOSIS — M25.561 CHRONIC PAIN OF BOTH KNEES: Primary | ICD-10-CM

## 2017-12-22 DIAGNOSIS — E78.5 DYSLIPIDEMIA: ICD-10-CM

## 2017-12-22 DIAGNOSIS — E66.9 OBESITY (BMI 30-39.9): Chronic | ICD-10-CM

## 2017-12-22 DIAGNOSIS — D64.9 ANEMIA, UNSPECIFIED TYPE: ICD-10-CM

## 2017-12-22 LAB
ALBUMIN SERPL BCP-MCNC: 4 G/DL
ALP SERPL-CCNC: 46 U/L
ALT SERPL W/O P-5'-P-CCNC: 17 U/L
ANION GAP SERPL CALC-SCNC: 8 MMOL/L
AST SERPL-CCNC: 11 U/L
BASOPHILS # BLD AUTO: 0.03 K/UL
BASOPHILS NFR BLD: 0.4 %
BILIRUB SERPL-MCNC: 0.9 MG/DL
BUN SERPL-MCNC: 12 MG/DL
CALCIUM SERPL-MCNC: 9.4 MG/DL
CHLORIDE SERPL-SCNC: 108 MMOL/L
CHOLEST SERPL-MCNC: 117 MG/DL
CHOLEST/HDLC SERPL: 3.3 {RATIO}
CO2 SERPL-SCNC: 25 MMOL/L
CREAT SERPL-MCNC: 0.9 MG/DL
DIFFERENTIAL METHOD: ABNORMAL
EOSINOPHIL # BLD AUTO: 0.1 K/UL
EOSINOPHIL NFR BLD: 1.9 %
ERYTHROCYTE [DISTWIDTH] IN BLOOD BY AUTOMATED COUNT: 13.8 %
EST. GFR  (AFRICAN AMERICAN): >60 ML/MIN/1.73 M^2
EST. GFR  (NON AFRICAN AMERICAN): >60 ML/MIN/1.73 M^2
GLUCOSE SERPL-MCNC: 124 MG/DL
HCT VFR BLD AUTO: 33.1 %
HDLC SERPL-MCNC: 36 MG/DL
HDLC SERPL: 30.8 %
HGB BLD-MCNC: 11.2 G/DL
LDLC SERPL CALC-MCNC: 58.6 MG/DL
LYMPHOCYTES # BLD AUTO: 2.2 K/UL
LYMPHOCYTES NFR BLD: 32.9 %
MCH RBC QN AUTO: 28.8 PG
MCHC RBC AUTO-ENTMCNC: 33.8 G/DL
MCV RBC AUTO: 85 FL
MONOCYTES # BLD AUTO: 0.4 K/UL
MONOCYTES NFR BLD: 5.9 %
NEUTROPHILS # BLD AUTO: 4 K/UL
NEUTROPHILS NFR BLD: 58.6 %
NONHDLC SERPL-MCNC: 81 MG/DL
PLATELET # BLD AUTO: 205 K/UL
PMV BLD AUTO: 9.6 FL
POTASSIUM SERPL-SCNC: 4.1 MMOL/L
PROT SERPL-MCNC: 7.4 G/DL
RBC # BLD AUTO: 3.89 M/UL
SODIUM SERPL-SCNC: 141 MMOL/L
TRIGL SERPL-MCNC: 112 MG/DL
TSH SERPL DL<=0.005 MIU/L-ACNC: 1.19 UIU/ML
WBC # BLD AUTO: 6.78 K/UL

## 2017-12-22 PROCEDURE — 80061 LIPID PANEL: CPT

## 2017-12-22 PROCEDURE — 80053 COMPREHEN METABOLIC PANEL: CPT

## 2017-12-22 PROCEDURE — 99214 OFFICE O/P EST MOD 30 MIN: CPT | Mod: S$GLB,,, | Performed by: FAMILY MEDICINE

## 2017-12-22 PROCEDURE — 36415 COLL VENOUS BLD VENIPUNCTURE: CPT

## 2017-12-22 PROCEDURE — 99999 PR PBB SHADOW E&M-EST. PATIENT-LVL IV: CPT | Mod: PBBFAC,,, | Performed by: FAMILY MEDICINE

## 2017-12-22 PROCEDURE — 84443 ASSAY THYROID STIM HORMONE: CPT

## 2017-12-22 PROCEDURE — 85025 COMPLETE CBC W/AUTO DIFF WBC: CPT

## 2017-12-22 RX ORDER — GABAPENTIN 300 MG/1
600 CAPSULE ORAL NIGHTLY
Qty: 60 CAPSULE | Refills: 3 | Status: SHIPPED | OUTPATIENT
Start: 2017-12-22 | End: 2020-07-31

## 2017-12-22 RX ORDER — VALSARTAN 320 MG/1
320 TABLET ORAL DAILY
Qty: 30 TABLET | Refills: 2 | Status: SHIPPED | OUTPATIENT
Start: 2017-12-22 | End: 2017-12-22 | Stop reason: SDUPTHER

## 2017-12-22 RX ORDER — MELOXICAM 7.5 MG/1
7.5 TABLET ORAL DAILY
Qty: 30 TABLET | Refills: 2 | Status: SHIPPED | OUTPATIENT
Start: 2017-12-22 | End: 2018-12-19

## 2017-12-22 RX ORDER — VALSARTAN 320 MG/1
TABLET ORAL
Qty: 30 TABLET | Refills: 2 | Status: SHIPPED | OUTPATIENT
Start: 2017-12-22 | End: 2018-04-27 | Stop reason: SDUPTHER

## 2017-12-22 RX ORDER — HYDRALAZINE HYDROCHLORIDE 100 MG/1
TABLET, FILM COATED ORAL
Qty: 90 TABLET | Refills: 2 | Status: SHIPPED | OUTPATIENT
Start: 2017-12-22 | End: 2018-04-05 | Stop reason: SDUPTHER

## 2017-12-22 RX ORDER — DILTIAZEM HYDROCHLORIDE 180 MG/1
360 CAPSULE, COATED, EXTENDED RELEASE ORAL DAILY
Qty: 180 CAPSULE | Refills: 3 | Status: SHIPPED | OUTPATIENT
Start: 2017-12-22 | End: 2018-01-04 | Stop reason: CLARIF

## 2017-12-22 NOTE — PROGRESS NOTES
HPI:  Enriqueta Mccarthy is a 42 y.o. year old female that  presents for f/u. She has an average 108 - 130. She knows that she will have to better with her carbohydrate restrictions.   Chief Complaint   Patient presents with    Follow-up     6 month f/u--lab results    Knee Pain    Medication Refill     gabapentin, valsartan, hydralazine, cardizem   .     HPI      Past Medical History:   Diagnosis Date    Essential hypertension     Herpes simplex virus (HSV) infection     Hypertension     Hypertriglyceridemia 2016    Neuropathy     Obesity (BMI 30-39.9) 2016    Type 2 diabetes mellitus with diabetic polyneuropathy, without long-term current use of insulin 2016    Uterine fibroid      Social History     Social History    Marital status:      Spouse name: N/A    Number of children: N/A    Years of education: N/A     Occupational History    Not on file.     Social History Main Topics    Smoking status: Never Smoker    Smokeless tobacco: Not on file    Alcohol use No    Drug use: No    Sexual activity: Not Currently     Partners: Male     Birth control/ protection: Surgical     Other Topics Concern    Not on file     Social History Narrative    No narrative on file     Past Surgical History:   Procedure Laterality Date     SECTION      x 2    ENDOMETRIAL ABLATION  2016    GALLBLADDER SURGERY  2017    removed    LIPOMA RESECTION Left 2013    SINUS SURGERY  2006    TONSILLECTOMY, ADENOIDECTOMY      TUBAL LIGATION       Family History   Problem Relation Age of Onset    Diabetes Father     Hypertension Father     Hyperlipidemia Father     Hypertension Mother     Anemia Daughter     Anemia Son     Diabetes Maternal Uncle     Diabetes Maternal Grandmother     Blindness Neg Hx     Glaucoma Neg Hx     Macular degeneration Neg Hx     Retinal detachment Neg Hx            Review of Systems  General ROS: negative for chills, fever or weight loss  ENT  "ROS: negative for epistaxis, sore throat or rhinorrhea  Respiratory ROS: no cough, shortness of breath, or wheezing  Cardiovascular ROS: no chest pain or dyspnea on exertion  Gastrointestinal ROS: no abdominal pain, change in bowel habits, or black/ bloody stools    Physical Exam:  /62 (BP Location: Left arm, Patient Position: Sitting, BP Method: Large (Manual))   Pulse 63   Temp 99.1 °F (37.3 °C) (Oral)   Resp 18   Ht 5' 3" (1.6 m)   Wt 94.3 kg (207 lb 14.3 oz)   SpO2 98%   BMI 36.83 kg/m²   General appearance: alert, cooperative, no distress  Constitutional:Oriented to person, place, and time.appears well-developed and well-nourished.  HEENT: Normocephalic, atraumatic, neck symmetrical, no nasal discharge, TM- clear bilaterally  Lungs: clear to auscultation bilaterally, no dullness to percussion bilaterally  Heart: regular rate and rhythm without rub; no displacement of the PMI , S1&S2 present  Abdomen: soft, non-tender; bowel sounds normoactive; no organomegaly  Physical Exam   Cardiovascular:   Pulses:       Dorsalis pedis pulses are 3+ on the right side, and 3+ on the left side.        Posterior tibial pulses are 3+ on the right side, and 3+ on the left side.   Musculoskeletal:        Right foot: There is normal range of motion and no deformity.        Left foot: There is normal range of motion and no deformity.   Feet:   Right Foot:   Protective Sensation: 6 sites tested. 6 sites sensed.   Skin Integrity: Negative for ulcer, blister, skin breakdown, erythema, warmth, callus or dry skin.   Left Foot:   Protective Sensation: 6 sites tested. 6 sites sensed.   Skin Integrity: Negative for ulcer, blister, skin breakdown, erythema, warmth, callus or dry skin.       LABS:    Complete Blood Count  Lab Results   Component Value Date    RBC 3.89 (L) 12/22/2017    HGB 11.2 (L) 12/22/2017    HCT 33.1 (L) 12/22/2017    MCV 85 12/22/2017    MCH 28.8 12/22/2017    MCHC 33.8 12/22/2017    RDW 13.8 12/22/2017    "  12/22/2017    MPV 9.6 12/22/2017    GRAN 4.0 12/22/2017    GRAN 58.6 12/22/2017    LYMPH 2.2 12/22/2017    LYMPH 32.9 12/22/2017    MONO 0.4 12/22/2017    MONO 5.9 12/22/2017    EOS 0.1 12/22/2017    BASO 0.03 12/22/2017    EOSINOPHIL 1.9 12/22/2017    BASOPHIL 0.4 12/22/2017    DIFFMETHOD Automated 12/22/2017       Comprehensive Metabolic Panel  Lab Results   Component Value Date     (H) 12/22/2017    BUN 12 12/22/2017    CREATININE 0.9 12/22/2017     12/22/2017    K 4.1 12/22/2017     12/22/2017    PROT 7.4 12/22/2017    ALBUMIN 4.0 12/22/2017    BILITOT 0.9 12/22/2017    AST 11 12/22/2017    ALKPHOS 46 (L) 12/22/2017    CO2 25 12/22/2017    ALT 17 12/22/2017    ANIONGAP 8 12/22/2017    EGFRNONAA >60 12/22/2017    ESTGFRAFRICA >60 12/22/2017       LIPID  Lab Results   Component Value Date    CHOL 117 (L) 12/22/2017    HDL 36 (L) 12/22/2017         TSH  Lab Results   Component Value Date    TSH 1.194 12/22/2017       Current Outpatient Prescriptions   Medication Sig Dispense Refill    atorvastatin (LIPITOR) 80 MG tablet Take 1 tablet (80 mg total) by mouth every evening. 90 tablet 3    azelastine (ASTELIN) 137 mcg (0.1 %) nasal spray       carvedilol (COREG) 25 MG tablet TAKE 1/2- 1 TABLET BY MOUTH TWICE A  tablet 0    clotrimazole-betamethasone 1-0.05% (LOTRISONE) cream Apply topically 2 (two) times daily. 45 g 0    diltiaZEM (CARDIZEM CD) 180 MG 24 hr capsule Take 2 capsules (360 mg total) by mouth once daily. 180 capsule 3    gabapentin (NEURONTIN) 300 MG capsule Take 2 capsules (600 mg total) by mouth every evening. 60 capsule 3    hydrALAZINE (APRESOLINE) 100 MG tablet TAKE 1 TABLET (100 MG TOTAL) BY MOUTH EVERY 8 (EIGHT) HOURS. 90 tablet 2    multivitamin with minerals tablet Take 1 tablet by mouth once daily.      ONETOUCH DELICA LANCETS 30 gauge Misc 1 lancet by Misc.(Non-Drug; Combo Route) route 3 (three) times daily. Test blood glucose up to 3 times daily as  needed. 100 each 11    ONETOUCH VERIO Strp 1 strip by Misc.(Non-Drug; Combo Route) route 3 (three) times daily. Test blood glucose up to 3 times daily as needed. 100 strip 11    TRULICITY 1.5 mg/0.5 mL PnIj Inject 1.5 mg into the skin every 7 days. 12 Syringe 6    VASCEPA 1 gram Cap TAKE TWO CAPSULES BY MOUTH TWICE A  capsule 5    VITAMIN D2 50,000 unit capsule TAKE 1 CAPSULE TWICE WEEKLY 8 capsule 7    meloxicam (MOBIC) 7.5 MG tablet Take 1 tablet (7.5 mg total) by mouth once daily. 30 tablet 2    valsartan (DIOVAN) 320 MG tablet TAKE 1 TABLET BY MOUTH ONCE DAILY 30 tablet 2     No current facility-administered medications for this visit.        Assessment:    ICD-10-CM ICD-9-CM    1. Chronic pain of both knees M25.561 719.46 meloxicam (MOBIC) 7.5 MG tablet    M25.562 338.29     G89.29     2. Type 2 diabetes mellitus with diabetic polyneuropathy, without long-term current use of insulin E11.42 250.60 gabapentin (NEURONTIN) 300 MG capsule     357.2    3. Essential hypertension I10 401.9 hydrALAZINE (APRESOLINE) 100 MG tablet      diltiaZEM (CARDIZEM CD) 180 MG 24 hr capsule      DISCONTINUED: valsartan (DIOVAN) 320 MG tablet         Plan:    No Follow-up on file.          Lexus Cotton MD

## 2017-12-30 ENCOUNTER — PATIENT MESSAGE (OUTPATIENT)
Dept: FAMILY MEDICINE | Facility: CLINIC | Age: 42
End: 2017-12-30

## 2018-01-02 ENCOUNTER — OFFICE VISIT (OUTPATIENT)
Dept: OPTOMETRY | Facility: CLINIC | Age: 43
End: 2018-01-02
Payer: COMMERCIAL

## 2018-01-02 DIAGNOSIS — E11.3292 TYPE 2 DIABETES MELLITUS WITH LEFT EYE AFFECTED BY MILD NONPROLIFERATIVE RETINOPATHY WITHOUT MACULAR EDEMA, WITHOUT LONG-TERM CURRENT USE OF INSULIN: Primary | ICD-10-CM

## 2018-01-02 DIAGNOSIS — H10.11 ALLERGIC CONJUNCTIVITIS, RIGHT EYE: ICD-10-CM

## 2018-01-02 DIAGNOSIS — Z79.84 LONG TERM CURRENT USE OF ORAL HYPOGLYCEMIC DRUG: ICD-10-CM

## 2018-01-02 PROCEDURE — 99999 PR PBB SHADOW E&M-EST. PATIENT-LVL I: CPT | Mod: PBBFAC,,, | Performed by: OPTOMETRIST

## 2018-01-02 PROCEDURE — 92014 COMPRE OPH EXAM EST PT 1/>: CPT | Mod: S$GLB,,, | Performed by: OPTOMETRIST

## 2018-01-02 NOTE — PROGRESS NOTES
HPI     Ms. Enriqueta Mccarthy is here for 6 month diabetic retinopathy check OS    Patient complains of a slight feeling of pressure OS (improved from last   visit) and itchiness OU. Itchiness slightly improved with Blink drops BID   OU.   She declines refraction today.    (+)drops: Blink BID OU  (-)flashes  (-)floaters  (-)diplopia  (-)eye pain    Diabetic yes  Dx about 1 year ago, but was told she may have had DM for about 4 years.  Hemoglobin A1C       Date                     Value               Ref Range             Status                12/07/2017               4.9                 4.0 - 5.6 %           Final                  06/29/2017               5.1                 4.0 - 5.6 %           Final                  03/16/2017               4.8                 4.5 - 6.2 %           Final                ----------    OCULAR HISTORY  Last Eye Exam 07/27/17 with Dr. Lynn  (-)eye surgery   Mild NPDR OS (01/2017)  Dry eyes    FAMILY HISTORY  (-)Glaucoma        Last edited by Adriana Lynn, OD on 1/2/2018 10:17 AM. (History)            Assessment /Plan     For exam results, see Encounter Report.    Type 2 diabetes mellitus with left eye affected by mild nonproliferative retinopathy without macular edema, without long-term current use of insulin  Long term current use of oral hypoglycemic drug   Single MA OS, as previously noted. No other signs of diabetic retinopathy and excellent A1c (4.9% last month). Continue management of DM as directed. Okay to recheck in 1 year, pt will call to schedule appointment for 6-month f/u if she feels it is necessary.     Allergic conjunctivitis, right eye   Cause of itchy eyes. Recommended OTC Zaditor/Alaway BID OU.      RTC 6-12 months

## 2018-01-02 NOTE — TELEPHONE ENCOUNTER
Patient's last Vitamin D level was in June and was 46, not sure if patient is instructed to still take 50,000 units of Vitamin D.

## 2018-01-03 ENCOUNTER — PATIENT MESSAGE (OUTPATIENT)
Dept: OTHER | Facility: OTHER | Age: 43
End: 2018-01-03

## 2018-01-03 ENCOUNTER — PATIENT MESSAGE (OUTPATIENT)
Dept: FAMILY MEDICINE | Facility: CLINIC | Age: 43
End: 2018-01-03

## 2018-01-04 ENCOUNTER — PATIENT OUTREACH (OUTPATIENT)
Dept: OTHER | Facility: OTHER | Age: 43
End: 2018-01-04

## 2018-01-04 DIAGNOSIS — I10 ESSENTIAL HYPERTENSION: Primary | ICD-10-CM

## 2018-01-04 DIAGNOSIS — E11.65 UNCONTROLLED TYPE 2 DIABETES MELLITUS WITH HYPERGLYCEMIA, WITHOUT LONG-TERM CURRENT USE OF INSULIN: Chronic | ICD-10-CM

## 2018-01-04 RX ORDER — VERAPAMIL HYDROCHLORIDE 180 MG/1
180 TABLET, FILM COATED, EXTENDED RELEASE ORAL 2 TIMES DAILY
Qty: 60 TABLET | Refills: 1 | Status: SHIPPED | OUTPATIENT
Start: 2018-01-04 | End: 2018-04-04 | Stop reason: SDUPTHER

## 2018-01-04 NOTE — PROGRESS NOTES
Ms. Mccarthy called to discuss formulary change for insurance. [employer] VALIENTE now tier 3 ($45). She would like an alternative. Patient is likely to start new product at the end of this month.     Last 5 Patient Entered Readings Current 30 Day Average: 128/81     Recent Readings 1/1/2018 1/1/2018 12/25/2017 12/25/2017 12/25/2017    SBP (mmHg) - 122 - 134 -    DBP (mmHg) - 85 - 78 -    Pulse 68 68 95 95 98          BP at goal  Formulary change- stop cardizem CD, start calan SR 180mg BID  Monitoring BP closely    Hypertension Medications             carvedilol (COREG) 25 MG tablet TAKE 1/2- 1 TABLET BY MOUTH TWICE A DAY    hydrALAZINE (APRESOLINE) 100 MG tablet TAKE 1 TABLET (100 MG TOTAL) BY MOUTH EVERY 8 (EIGHT) HOURS.    valsartan (DIOVAN) 320 MG tablet TAKE 1 TABLET BY MOUTH ONCE DAILY    verapamil (CALAN-SR) 180 MG CR tablet Take 1 tablet (180 mg total) by mouth 2 (two) times daily. DISCONTINUE DILTIAZEM

## 2018-01-23 ENCOUNTER — TELEPHONE (OUTPATIENT)
Dept: INTERNAL MEDICINE | Facility: CLINIC | Age: 43
End: 2018-01-23

## 2018-01-23 RX ORDER — ERGOCALCIFEROL 1.25 MG/1
CAPSULE ORAL
Qty: 8 CAPSULE | Refills: 7 | Status: CANCELLED | OUTPATIENT
Start: 2018-01-23

## 2018-01-29 ENCOUNTER — PATIENT MESSAGE (OUTPATIENT)
Dept: FAMILY MEDICINE | Facility: CLINIC | Age: 43
End: 2018-01-29

## 2018-01-29 ENCOUNTER — PATIENT OUTREACH (OUTPATIENT)
Dept: OTHER | Facility: OTHER | Age: 43
End: 2018-01-29

## 2018-01-29 NOTE — PROGRESS NOTES
Last 5 Patient Entered Readings                                      Current 30 Day Average: 128/80     Recent Readings 1/25/2018 1/25/2018 1/21/2018 1/21/2018 1/13/2018    SBP (mmHg) - 132 137 - -    DBP (mmHg) - 77 83 - -    Pulse 71 71 76 76 78          Last 6 Patient Entered Readings                                          Most Recent A1c: 4.9% (Goal: 7%)     Recent Readings 1/29/2018 1/26/2018 1/26/2018 1/24/2018 1/24/2018    Blood Glucose (mg/dL) 76 70 123 129 127          Hypertension Digital Medicine [facility] (HDMP) Diabetes Digital Medicine Program (DDMP): Health  Follow Up    Lifestyle Modifications:    1.Low sodium/carb diet: yes : Patient stated that she really watching her sodium and carb intake but focusing on the carbs even more. She thinks her BS readings are sometimes low because of the lack of carbs she has eaten for the day etc. She does believe the readings she has sent in are accurate but denies all symptoms.     2.Physical activity: Deferred    3.Hypotension/Hypertension/Hypoglycemic/Hyperglycemic symptoms: no   Frequency/Alleviating factors/Precipitating factors, etc.     4.Patient has been compliant with the medication regimen.     Follow up with Mrs. Enriqueta Mccarthy completed. No further questions or concerns. I will follow up in a few weeks to assess progress.

## 2018-01-30 NOTE — PROGRESS NOTES
Subjective:   Patient ID:  Enriqueta Mccarthy is a 42 y.o. female who presents for follow-up of Dyslipidemia    HPI:The patient is here for dyslipidemia/CAD risk'.      The patient has no chest pain, SOB, TIA, palpitations, syncope or pre-syncope.Patient does exercise a fair amount now and is getting 4681-1282 steps most days.BP-117-137 but averaging in the 120s        Review of Systems   Constitution: Negative for chills, decreased appetite, diaphoresis, fever, weakness, malaise/fatigue, night sweats, weight gain and weight loss.   HENT: Negative for congestion, hoarse voice, nosebleeds, sore throat and tinnitus.    Eyes: Negative for blurred vision, double vision, vision loss in left eye, vision loss in right eye, visual disturbance and visual halos.   Cardiovascular: Negative for chest pain, claudication, cyanosis, dyspnea on exertion, irregular heartbeat, leg swelling, near-syncope, orthopnea, palpitations, paroxysmal nocturnal dyspnea and syncope.   Respiratory: Negative for cough, hemoptysis, shortness of breath, sleep disturbances due to breathing, snoring, sputum production and wheezing.    Endocrine: Negative for cold intolerance, heat intolerance, polydipsia, polyphagia and polyuria.   Hematologic/Lymphatic: Negative for adenopathy and bleeding problem. Does not bruise/bleed easily.   Skin: Negative for color change, dry skin, flushing, itching, nail changes, poor wound healing, rash, skin cancer, suspicious lesions and unusual hair distribution.   Musculoskeletal: Positive for arthritis, joint pain and muscle weakness. Negative for back pain, falls, gout, joint swelling, muscle cramps, myalgias and stiffness.   Gastrointestinal: Negative for abdominal pain, anorexia, change in bowel habit, constipation, diarrhea, dysphagia, heartburn, hematemesis, hematochezia, melena and vomiting.   Genitourinary: Negative for decreased libido, dysuria, hematuria, hesitancy and urgency.   Neurological: Positive for  "numbness. Negative for excessive daytime sleepiness, dizziness, focal weakness, headaches, light-headedness, loss of balance, paresthesias, seizures, sensory change, tremors and vertigo.   Psychiatric/Behavioral: Negative for altered mental status, depression, hallucinations, memory loss, substance abuse and suicidal ideas. The patient does not have insomnia and is not nervous/anxious.    Allergic/Immunologic: Negative for environmental allergies and hives.       Objective: /60 (BP Location: Left arm, Patient Position: Sitting, BP Method: Large (Automatic))   Pulse 68   Ht 5' 3" (1.6 m)   Wt 96.9 kg (213 lb 10 oz)   BMI 37.84 kg/m²      Physical Exam   Constitutional: She is oriented to person, place, and time. She appears well-developed and well-nourished.   HENT:   Head: Normocephalic.   Eyes: EOM are normal. Pupils are equal, round, and reactive to light.   Neck: Normal range of motion. Normal carotid pulses, no hepatojugular reflux and no JVD present. Carotid bruit is not present. No thyromegaly present.   Cardiovascular: Normal rate, regular rhythm, normal heart sounds and intact distal pulses.  Exam reveals no gallop and no friction rub.    No murmur heard.  Pulmonary/Chest: Effort normal and breath sounds normal. No tachypnea. No respiratory distress. She has no wheezes. She has no rales. She exhibits no tenderness.   Abdominal: Soft. Bowel sounds are normal. She exhibits no distension and no mass. There is no tenderness. There is no rebound and no guarding.   Musculoskeletal: Normal range of motion. She exhibits no edema or tenderness.   Lymphadenopathy:     She has no cervical adenopathy.   Neurological: She is alert and oriented to person, place, and time. No cranial nerve deficit. Coordination normal.   Skin: Skin is warm. No rash noted. No erythema.   Psychiatric: She has a normal mood and affect. Her behavior is normal. Judgment and thought content normal.       Assessment:     1. Dyslipidemia "    2. Hypertriglyceridemia    3. Muscle weakness    4. Type 2 diabetes mellitus with diabetic polyneuropathy, without long-term current use of insulin    5. Uncontrolled type 2 diabetes mellitus with hyperglycemia, without long-term current use of insulin    6. Class 2 obesity due to excess calories with serious comorbidity and body mass index (BMI) of 35.0 to 35.9 in adult    7. Right hip pain    8. Vitamin D deficiency        Plan:   Discussed diet , achieving and maintaining ideal body weight, and exercise.   We reviewed meds in detail.  Reassured-discussed goals, options, plan.        Enriqueta was seen today for hypertension.    Diagnoses and all orders for this visit:    Dyslipidemia    Hypertriglyceridemia    Muscle weakness    Type 2 diabetes mellitus with diabetic polyneuropathy, without long-term current use of insulin    Uncontrolled type 2 diabetes mellitus with hyperglycemia, without long-term current use of insulin    Class 2 obesity due to excess calories with serious comorbidity and body mass index (BMI) of 35.0 to 35.9 in adult    Right hip pain    Vitamin D deficiency            Follow-up in about 18 months (around 7/31/2019) for with no labs -will review others.

## 2018-01-31 ENCOUNTER — OFFICE VISIT (OUTPATIENT)
Dept: CARDIOLOGY | Facility: CLINIC | Age: 43
End: 2018-01-31
Payer: COMMERCIAL

## 2018-01-31 ENCOUNTER — PATIENT MESSAGE (OUTPATIENT)
Dept: OPTOMETRY | Facility: CLINIC | Age: 43
End: 2018-01-31

## 2018-01-31 VITALS
BODY MASS INDEX: 37.85 KG/M2 | DIASTOLIC BLOOD PRESSURE: 60 MMHG | WEIGHT: 213.63 LBS | SYSTOLIC BLOOD PRESSURE: 131 MMHG | HEIGHT: 63 IN | HEART RATE: 68 BPM

## 2018-01-31 DIAGNOSIS — E78.1 HYPERTRIGLYCERIDEMIA: Chronic | ICD-10-CM

## 2018-01-31 DIAGNOSIS — M25.551 RIGHT HIP PAIN: ICD-10-CM

## 2018-01-31 DIAGNOSIS — E11.42 TYPE 2 DIABETES MELLITUS WITH DIABETIC POLYNEUROPATHY, WITHOUT LONG-TERM CURRENT USE OF INSULIN: Chronic | ICD-10-CM

## 2018-01-31 DIAGNOSIS — E55.9 VITAMIN D DEFICIENCY: ICD-10-CM

## 2018-01-31 DIAGNOSIS — E11.65 UNCONTROLLED TYPE 2 DIABETES MELLITUS WITH HYPERGLYCEMIA, WITHOUT LONG-TERM CURRENT USE OF INSULIN: Chronic | ICD-10-CM

## 2018-01-31 DIAGNOSIS — M62.81 MUSCLE WEAKNESS: ICD-10-CM

## 2018-01-31 DIAGNOSIS — E78.5 DYSLIPIDEMIA: Primary | ICD-10-CM

## 2018-01-31 PROCEDURE — 99999 PR PBB SHADOW E&M-EST. PATIENT-LVL III: CPT | Mod: PBBFAC,,, | Performed by: INTERNAL MEDICINE

## 2018-01-31 PROCEDURE — 3008F BODY MASS INDEX DOCD: CPT | Mod: S$GLB,,, | Performed by: INTERNAL MEDICINE

## 2018-01-31 PROCEDURE — 99214 OFFICE O/P EST MOD 30 MIN: CPT | Mod: S$GLB,,, | Performed by: INTERNAL MEDICINE

## 2018-01-31 NOTE — PATIENT INSTRUCTIONS
Discussed diet , achieving and maintaining ideal body weight, and exercise.   We reviewed meds in detail.  Reassured-discussed goals, options, plan.  Later can reduce meds

## 2018-02-06 ENCOUNTER — PATIENT MESSAGE (OUTPATIENT)
Dept: OTHER | Facility: OTHER | Age: 43
End: 2018-02-06

## 2018-02-08 ENCOUNTER — PATIENT MESSAGE (OUTPATIENT)
Dept: FAMILY MEDICINE | Facility: CLINIC | Age: 43
End: 2018-02-08

## 2018-02-08 RX ORDER — AZELASTINE 1 MG/ML
2 SPRAY, METERED NASAL 2 TIMES DAILY
Qty: 30 ML | Refills: 2 | Status: SHIPPED | OUTPATIENT
Start: 2018-02-20 | End: 2021-02-11

## 2018-02-12 ENCOUNTER — PATIENT MESSAGE (OUTPATIENT)
Dept: OTHER | Facility: OTHER | Age: 43
End: 2018-02-12

## 2018-02-15 DIAGNOSIS — E11.42 TYPE 2 DIABETES MELLITUS WITH DIABETIC POLYNEUROPATHY, WITHOUT LONG-TERM CURRENT USE OF INSULIN: Primary | Chronic | ICD-10-CM

## 2018-02-16 ENCOUNTER — PATIENT MESSAGE (OUTPATIENT)
Dept: OTHER | Facility: OTHER | Age: 43
End: 2018-02-16

## 2018-02-17 ENCOUNTER — PATIENT MESSAGE (OUTPATIENT)
Dept: OTHER | Facility: OTHER | Age: 43
End: 2018-02-17

## 2018-02-22 ENCOUNTER — PATIENT OUTREACH (OUTPATIENT)
Dept: OTHER | Facility: OTHER | Age: 43
End: 2018-02-22

## 2018-02-22 NOTE — PROGRESS NOTES
Called patient to review HTN/DM management goals. She reports she is feeling well; denies symptoms of hypoglycemia despite some BG readings <71g/dL. Has trouble with regularly scheduled meals. Will work with HC to plan appropriately.     Last 5 Patient Entered Readings                                      Current 30 Day Average: 127/75     Recent Readings 2/21/2018 2/21/2018 2/18/2018 2/18/2018 2/16/2018    SBP (mmHg) - 117 - 118 -    DBP (mmHg) - 74 - 67 -    Pulse 80 80 73 73 80          Last 6 Patient Entered Readings                                          Most Recent A1c: 4.9% (Goal: 7%)     Recent Readings 2/21/2018 2/21/2018 2/21/2018 2/19/2018 2/19/2018    Blood Glucose (mg/dL) 144 142 142 136 129        BP at goal, <130/80 mmHg  A1c at goal, <7%  Next A1c 3/13  Continue monitoring    Hypertension Medications             carvedilol (COREG) 25 MG tablet TAKE 1/2- 1 TABLET BY MOUTH TWICE A DAY    hydrALAZINE (APRESOLINE) 100 MG tablet TAKE 1 TABLET (100 MG TOTAL) BY MOUTH EVERY 8 (EIGHT) HOURS.    valsartan (DIOVAN) 320 MG tablet TAKE 1 TABLET BY MOUTH ONCE DAILY    verapamil (CALAN-SR) 180 MG CR tablet Take 1 tablet (180 mg total) by mouth 2 (two) times daily. DISCONTINUE DILTIAZEM            Diabetes Medications             TRULICITY 1.5 mg/0.5 mL PnIj Inject 1.5 mg into the skin every 7 days.

## 2018-02-26 ENCOUNTER — PATIENT MESSAGE (OUTPATIENT)
Dept: ADMINISTRATIVE | Facility: OTHER | Age: 43
End: 2018-02-26

## 2018-03-01 ENCOUNTER — PATIENT MESSAGE (OUTPATIENT)
Dept: FAMILY MEDICINE | Facility: CLINIC | Age: 43
End: 2018-03-01

## 2018-03-13 ENCOUNTER — LAB VISIT (OUTPATIENT)
Dept: LAB | Facility: HOSPITAL | Age: 43
End: 2018-03-13
Attending: FAMILY MEDICINE
Payer: COMMERCIAL

## 2018-03-13 DIAGNOSIS — E11.65 UNCONTROLLED TYPE 2 DIABETES MELLITUS WITH HYPERGLYCEMIA, WITHOUT LONG-TERM CURRENT USE OF INSULIN: Chronic | ICD-10-CM

## 2018-03-13 LAB
ESTIMATED AVG GLUCOSE: 88 MG/DL
HBA1C MFR BLD HPLC: 4.7 %

## 2018-03-13 PROCEDURE — 36415 COLL VENOUS BLD VENIPUNCTURE: CPT

## 2018-03-13 PROCEDURE — 83036 HEMOGLOBIN GLYCOSYLATED A1C: CPT

## 2018-03-21 ENCOUNTER — PATIENT OUTREACH (OUTPATIENT)
Dept: OTHER | Facility: OTHER | Age: 43
End: 2018-03-21

## 2018-03-21 NOTE — PROGRESS NOTES
Last 5 Patient Entered Readings                                      Current 30 Day Average: 122/73     Recent Readings 3/18/2018 3/18/2018 3/18/2018 3/18/2018 3/11/2018    SBP (mmHg) 124 - 145 - 112    DBP (mmHg) 68 - 86 - 75    Pulse 73 73 67 67 71          Last 6 Patient Entered Readings                                          Most Recent A1c: 4.7% (Goal: 7%)     Recent Readings 3/18/2018 3/15/2018 3/11/2018 3/11/2018 3/7/2018    Blood Glucose (mg/dL) 129 155 184 115 130          Hypertension Digital Medicine Program (HDMP)/ Diabetes Digital Medicine Program (DDMP): Health  Follow Up    Lifestyle Modifications:    1.Low sodium/carb diet: : Patient continues monitor her sodium and carb intake. She has been doing well maintaining a low sodium and low carb diet. She mentioned that there are 3 days a week where she is eating late dinners because she takes her daughter straight from school, over to dance class. This may be effecting some of her high morning readings (which she takes before eating or drinking anything). She will make sure to make notes to see if any of her higher fast readings are the days after she eats late dinners.     2.Physical activity: yes : Patient has started walking to and from her car at work. She works at main [employer] and [employer] across the street in the lot near [employer]. Its about a ten minute walk and sometimes, she even takes the bridge so she can use the stairs.     3.Hypotension/Hypertension/Hypoglycemic/Hyperglyemic symptoms: no   Frequency/Alleviating factors/Precipitating factors, etc.     4.Patient has been compliant with the medication regimen.     Follow up with Henry Enriqueta Abby Mccarthy completed. No further questions or concerns. I will follow up in a few weeks to assess progress.     3/23 - Patient called to let me know some issues she was having getting her testing strips for her i[employer] meter. She wanted to let me know that she will not have readings for about 7 days  because she is waiting for the strips to come in. I will discuss this with my supervisor to discuss the proper steps for patients to take to ensure they get their strips on time.

## 2018-04-04 DIAGNOSIS — I10 ESSENTIAL HYPERTENSION: ICD-10-CM

## 2018-04-04 RX ORDER — VERAPAMIL HYDROCHLORIDE 180 MG/1
TABLET, FILM COATED, EXTENDED RELEASE ORAL
Qty: 60 TABLET | Refills: 0 | Status: SHIPPED | OUTPATIENT
Start: 2018-04-04 | End: 2018-04-05 | Stop reason: SDUPTHER

## 2018-04-05 ENCOUNTER — PATIENT MESSAGE (OUTPATIENT)
Dept: OTHER | Facility: OTHER | Age: 43
End: 2018-04-05

## 2018-04-05 DIAGNOSIS — I10 ESSENTIAL HYPERTENSION: ICD-10-CM

## 2018-04-05 DIAGNOSIS — I10 ESSENTIAL HYPERTENSION: Chronic | ICD-10-CM

## 2018-04-05 RX ORDER — VERAPAMIL HYDROCHLORIDE 180 MG/1
TABLET, FILM COATED, EXTENDED RELEASE ORAL
Qty: 60 TABLET | Refills: 0 | Status: SHIPPED | OUTPATIENT
Start: 2018-04-05 | End: 2018-04-27 | Stop reason: SDUPTHER

## 2018-04-05 RX ORDER — HYDRALAZINE HYDROCHLORIDE 100 MG/1
TABLET, FILM COATED ORAL
Qty: 90 TABLET | Refills: 0 | Status: SHIPPED | OUTPATIENT
Start: 2018-04-05 | End: 2018-04-27 | Stop reason: SDUPTHER

## 2018-04-05 RX ORDER — CARVEDILOL 25 MG/1
TABLET ORAL
Qty: 180 TABLET | Refills: 3 | Status: SHIPPED | OUTPATIENT
Start: 2018-04-05 | End: 2018-08-10 | Stop reason: SDUPTHER

## 2018-04-05 RX ORDER — HYDRALAZINE HYDROCHLORIDE 100 MG/1
TABLET, FILM COATED ORAL
Qty: 90 TABLET | Refills: 0 | Status: SHIPPED | OUTPATIENT
Start: 2018-04-05 | End: 2018-04-27

## 2018-04-16 ENCOUNTER — PATIENT MESSAGE (OUTPATIENT)
Dept: FAMILY MEDICINE | Facility: CLINIC | Age: 43
End: 2018-04-16

## 2018-04-27 ENCOUNTER — OFFICE VISIT (OUTPATIENT)
Dept: FAMILY MEDICINE | Facility: CLINIC | Age: 43
End: 2018-04-27
Payer: COMMERCIAL

## 2018-04-27 ENCOUNTER — PATIENT MESSAGE (OUTPATIENT)
Dept: OTHER | Facility: OTHER | Age: 43
End: 2018-04-27

## 2018-04-27 ENCOUNTER — PATIENT OUTREACH (OUTPATIENT)
Dept: OTHER | Facility: OTHER | Age: 43
End: 2018-04-27

## 2018-04-27 VITALS
HEART RATE: 68 BPM | TEMPERATURE: 98 F | SYSTOLIC BLOOD PRESSURE: 102 MMHG | BODY MASS INDEX: 37.92 KG/M2 | WEIGHT: 214 LBS | OXYGEN SATURATION: 98 % | RESPIRATION RATE: 18 BRPM | DIASTOLIC BLOOD PRESSURE: 66 MMHG | HEIGHT: 63 IN

## 2018-04-27 DIAGNOSIS — E11.42 TYPE 2 DIABETES MELLITUS WITH DIABETIC POLYNEUROPATHY, WITHOUT LONG-TERM CURRENT USE OF INSULIN: Primary | Chronic | ICD-10-CM

## 2018-04-27 DIAGNOSIS — I10 ESSENTIAL HYPERTENSION: Chronic | ICD-10-CM

## 2018-04-27 DIAGNOSIS — E78.1 HYPERTRIGLYCERIDEMIA: Chronic | ICD-10-CM

## 2018-04-27 PROCEDURE — 3074F SYST BP LT 130 MM HG: CPT | Mod: CPTII,S$GLB,, | Performed by: FAMILY MEDICINE

## 2018-04-27 PROCEDURE — 99214 OFFICE O/P EST MOD 30 MIN: CPT | Mod: S$GLB,,, | Performed by: FAMILY MEDICINE

## 2018-04-27 PROCEDURE — 3078F DIAST BP <80 MM HG: CPT | Mod: CPTII,S$GLB,, | Performed by: FAMILY MEDICINE

## 2018-04-27 PROCEDURE — 3044F HG A1C LEVEL LT 7.0%: CPT | Mod: CPTII,S$GLB,, | Performed by: FAMILY MEDICINE

## 2018-04-27 PROCEDURE — 99999 PR PBB SHADOW E&M-EST. PATIENT-LVL III: CPT | Mod: PBBFAC,,, | Performed by: FAMILY MEDICINE

## 2018-04-27 RX ORDER — VERAPAMIL HYDROCHLORIDE 180 MG/1
TABLET, FILM COATED, EXTENDED RELEASE ORAL
Qty: 60 TABLET | Refills: 0 | Status: SHIPPED | OUTPATIENT
Start: 2018-04-27 | End: 2018-05-17 | Stop reason: SDUPTHER

## 2018-04-27 RX ORDER — VALSARTAN 320 MG/1
320 TABLET ORAL DAILY
Qty: 30 TABLET | Refills: 2 | Status: SHIPPED | OUTPATIENT
Start: 2018-04-27 | End: 2018-05-30 | Stop reason: SDUPTHER

## 2018-04-27 RX ORDER — HYDRALAZINE HYDROCHLORIDE 100 MG/1
100 TABLET, FILM COATED ORAL EVERY 8 HOURS
Qty: 30 TABLET | Refills: 2 | Status: SHIPPED | OUTPATIENT
Start: 2018-04-27 | End: 2018-05-30 | Stop reason: SDUPTHER

## 2018-04-27 NOTE — PROGRESS NOTES
HPI:  Enriqueta Mccarthy is a 42 y.o. year old female that  presents for follow-up of lab work and med refills.  Chief Complaint   Patient presents with    Follow-up     lab results    Medication Refill     verapamil, valsartan, hydralazine   .     HPI      Past Medical History:   Diagnosis Date    Essential hypertension     Herpes simplex virus (HSV) infection     Hypertension     Hypertriglyceridemia 2016    Neuropathy     Obesity (BMI 30-39.9) 2016    Type 2 diabetes mellitus with diabetic polyneuropathy, without long-term current use of insulin 2016    Uterine fibroid      Social History     Social History    Marital status:      Spouse name: N/A    Number of children: N/A    Years of education: N/A     Occupational History    Not on file.     Social History Main Topics    Smoking status: Never Smoker    Smokeless tobacco: Never Used    Alcohol use No    Drug use: No    Sexual activity: Not Currently     Partners: Male     Birth control/ protection: Surgical     Other Topics Concern    Not on file     Social History Narrative    No narrative on file     Past Surgical History:   Procedure Laterality Date     SECTION      x 2    ENDOMETRIAL ABLATION  2016    GALLBLADDER SURGERY  2017    removed    LIPOMA RESECTION Left 2013    SINUS SURGERY  2006    TONSILLECTOMY, ADENOIDECTOMY      TUBAL LIGATION       Family History   Problem Relation Age of Onset    Diabetes Father     Hypertension Father     Hyperlipidemia Father     Hypertension Mother     Anemia Daughter     Anemia Son     Diabetes Maternal Uncle     Diabetes Maternal Grandmother     Blindness Neg Hx     Glaucoma Neg Hx     Macular degeneration Neg Hx     Retinal detachment Neg Hx            Review of Systems  General ROS: negative for chills, fever or weight loss  ENT ROS: negative for epistaxis, sore throat or rhinorrhea  Respiratory ROS: no cough, shortness of breath, or  "wheezing  Cardiovascular ROS: no chest pain or dyspnea on exertion  Gastrointestinal ROS: no abdominal pain, change in bowel habits, or black/ bloody stools    Physical Exam:  /66 (BP Location: Right arm, Patient Position: Sitting, BP Method: Medium (Manual))   Pulse 68   Temp 98.4 °F (36.9 °C) (Oral)   Resp 18   Ht 5' 3" (1.6 m)   Wt 97.1 kg (214 lb)   SpO2 98%   BMI 37.91 kg/m²   General appearance: alert, cooperative, no distress  Constitutional:Oriented to person, place, and time.appears well-developed and well-nourished.  HEENT: Normocephalic, atraumatic, neck symmetrical, no nasal discharge, TM- clear bilaterally  Lungs: clear to auscultation bilaterally, no dullness to percussion bilaterally  Heart: regular rate and rhythm without rub; no displacement of the PMI , S1&S2 present  Abdomen: soft, non-tender; bowel sounds normoactive; no organomegaly  Physical Exam    LABS:    Complete Blood Count  Lab Results   Component Value Date    RBC 3.89 (L) 12/22/2017    HGB 11.2 (L) 12/22/2017    HCT 33.1 (L) 12/22/2017    MCV 85 12/22/2017    MCH 28.8 12/22/2017    MCHC 33.8 12/22/2017    RDW 13.8 12/22/2017     12/22/2017    MPV 9.6 12/22/2017    GRAN 4.0 12/22/2017    GRAN 58.6 12/22/2017    LYMPH 2.2 12/22/2017    LYMPH 32.9 12/22/2017    MONO 0.4 12/22/2017    MONO 5.9 12/22/2017    EOS 0.1 12/22/2017    BASO 0.03 12/22/2017    EOSINOPHIL 1.9 12/22/2017    BASOPHIL 0.4 12/22/2017    DIFFMETHOD Automated 12/22/2017       Comprehensive Metabolic Panel  Lab Results   Component Value Date     (H) 12/22/2017    BUN 12 12/22/2017    CREATININE 0.9 12/22/2017     12/22/2017    K 4.1 12/22/2017     12/22/2017    PROT 7.4 12/22/2017    ALBUMIN 4.0 12/22/2017    BILITOT 0.9 12/22/2017    AST 11 12/22/2017    ALKPHOS 46 (L) 12/22/2017    CO2 25 12/22/2017    ALT 17 12/22/2017    ANIONGAP 8 12/22/2017    EGFRNONAA >60 12/22/2017    ESTGFRAFRICA >60 12/22/2017       LIPID  Lab Results "   Component Value Date    CHOL 117 (L) 12/22/2017    HDL 36 (L) 12/22/2017         TSH  Lab Results   Component Value Date    TSH 1.194 12/22/2017       Current Outpatient Prescriptions   Medication Sig Dispense Refill    atorvastatin (LIPITOR) 80 MG tablet Take 1 tablet (80 mg total) by mouth every evening. 90 tablet 3    azelastine (ASTELIN) 137 mcg (0.1 %) nasal spray 2 sprays (274 mcg total) by Nasal route 2 (two) times daily. 30 mL 2    carvedilol (COREG) 25 MG tablet TAKE 1/2- 1 TABLET BY MOUTH TWICE A  tablet 3    gabapentin (NEURONTIN) 300 MG capsule Take 2 capsules (600 mg total) by mouth every evening. 60 capsule 3    hydrALAZINE (APRESOLINE) 100 MG tablet Take 1 tablet (100 mg total) by mouth every 8 (eight) hours. 30 tablet 2    meloxicam (MOBIC) 7.5 MG tablet Take 1 tablet (7.5 mg total) by mouth once daily. 30 tablet 2    TRULICITY 1.5 mg/0.5 mL PnIj Inject 1.5 mg into the skin every 7 days. 12 Syringe 6    valsartan (DIOVAN) 320 MG tablet Take 1 tablet (320 mg total) by mouth once daily. 30 tablet 2    VASCEPA 1 gram Cap TAKE TWO CAPSULES BY MOUTH TWICE A  capsule 5    verapamil (CALAN-SR) 180 MG CR tablet TAKE 1 TABLET BY MOUTH TWO TIMES A DAY ( DISCONTINUE DILTIAZEM ) 60 tablet 0     No current facility-administered medications for this visit.        Assessment:    ICD-10-CM ICD-9-CM    1. Type 2 diabetes mellitus with diabetic polyneuropathy, without long-term current use of insulin E11.42 250.60 Hemoglobin A1c     357.2    2. Essential hypertension I10 401.9 verapamil (CALAN-SR) 180 MG CR tablet      valsartan (DIOVAN) 320 MG tablet      hydrALAZINE (APRESOLINE) 100 MG tablet   3. Hypertriglyceridemia E78.1 272.1          Plan:    Follow-up in 3 months (on 7/27/2018).          Lexus Cotton MD  Answers for HPI/ROS submitted by the patient on 4/24/2018   activity change: No  unexpected weight change: No  neck pain: No  hearing loss: No  rhinorrhea: No  trouble  swallowing: No  eye discharge: No  visual disturbance: No  chest tightness: No  wheezing: No  chest pain: No  palpitations: No  blood in stool: No  constipation: No  vomiting: No  diarrhea: No  polydipsia: No  polyuria: No  difficulty urinating: No  hematuria: No  menstrual problem: No  dysuria: No  joint swelling: No  arthralgias: No  headaches: No  weakness: No  confusion: No  dysphoric mood: No

## 2018-04-27 NOTE — PROGRESS NOTES
Last 5 Patient Entered Readings                                      Current 30 Day Average: 124/67     Recent Readings 4/22/2018 4/22/2018 4/16/2018 4/16/2018 4/8/2018    SBP (mmHg) 116 - - 123 131    DBP (mmHg) 69 - - 72 58    Pulse 74 74 72 72 78          Last 6 Patient Entered Readings                                          Most Recent A1c: 4.7% (Goal: 7%)     Recent Readings 4/25/2018 4/25/2018 4/22/2018 4/22/2018 4/18/2018    Blood Glucose (mg/dL) 160 113 161 120 105          Hypertension Digital Medicine (HDMP)/ Diabetes Digital Medicine (DDMP) Health  Follow Up    LVM to follow up with Henry Enriqueta Mora Fabio.    Per 30 day average, blood pressure is well controlled 124/67 mmHg. Patient BS is also meeting goal. Encouraged adherence to low sodium diet and physical activity guidelines. Advised patient to call or message with questions or concerns.

## 2018-05-17 DIAGNOSIS — I10 ESSENTIAL HYPERTENSION: Chronic | ICD-10-CM

## 2018-05-21 RX ORDER — VERAPAMIL HYDROCHLORIDE 180 MG/1
TABLET, FILM COATED, EXTENDED RELEASE ORAL
Qty: 60 TABLET | Refills: 2 | Status: SHIPPED | OUTPATIENT
Start: 2018-05-21 | End: 2018-08-10 | Stop reason: SDUPTHER

## 2018-05-24 ENCOUNTER — PATIENT OUTREACH (OUTPATIENT)
Dept: OTHER | Facility: OTHER | Age: 43
End: 2018-05-24

## 2018-05-24 NOTE — PROGRESS NOTES
Ms. Mccarthy feels well. She continues to monitor BG and BP. She has been doing a better job of having regular meals even when she is not very hungry.     Last 5 Patient Entered Readings                                      Current 30 Day Average: 128/77     Recent Readings 5/20/2018 5/20/2018 5/20/2018 5/20/2018 5/20/2018    SBP (mmHg) - 129 - 154 -    DBP (mmHg) - 82 - 101 -    Pulse 70 70 70 70 64        Lab Results   Component Value Date    HGBA1C 4.7 03/13/2018         Last 6 Patient Entered Readings                                          Most Recent A1c: 4.7% (Goal: 7%)     Recent Readings 5/23/2018 5/23/2018 5/21/2018 5/21/2018 5/17/2018    Blood Glucose (mg/dL) 121 75 102 94 124          Last A1c 4.7%, at goal- consider decreasing trulicity dose  BP at goal, <130/80 mmHg  Repeat A1c 6/10     Diabetes Medications             TRULICITY 1.5 mg/0.5 mL PnIj Inject 1.5 mg into the skin every 7 days.        Hypertension Medications             carvedilol (COREG) 25 MG tablet TAKE 1/2- 1 TABLET BY MOUTH TWICE A DAY    hydrALAZINE (APRESOLINE) 100 MG tablet Take 1 tablet (100 mg total) by mouth every 8 (eight) hours.    valsartan (DIOVAN) 320 MG tablet Take 1 tablet (320 mg total) by mouth once daily.    verapamil (CALAN-SR) 180 MG CR tablet TAKE 1 TABLET BY MOUTH TWO TIMES A DAY ( DISCONTINUE DILTIAZEM )

## 2018-05-30 DIAGNOSIS — I10 ESSENTIAL HYPERTENSION: Chronic | ICD-10-CM

## 2018-05-31 RX ORDER — HYDRALAZINE HYDROCHLORIDE 100 MG/1
100 TABLET, FILM COATED ORAL EVERY 8 HOURS
Qty: 30 TABLET | Refills: 2 | Status: SHIPPED | OUTPATIENT
Start: 2018-05-31 | End: 2018-06-05 | Stop reason: SDUPTHER

## 2018-05-31 RX ORDER — VALSARTAN 320 MG/1
320 TABLET ORAL DAILY
Qty: 30 TABLET | Refills: 2 | Status: SHIPPED | OUTPATIENT
Start: 2018-05-31 | End: 2018-08-10 | Stop reason: SDUPTHER

## 2018-06-01 ENCOUNTER — PATIENT MESSAGE (OUTPATIENT)
Dept: FAMILY MEDICINE | Facility: CLINIC | Age: 43
End: 2018-06-01

## 2018-06-01 DIAGNOSIS — I10 ESSENTIAL HYPERTENSION: Chronic | ICD-10-CM

## 2018-06-04 RX ORDER — HYDRALAZINE HYDROCHLORIDE 100 MG/1
100 TABLET, FILM COATED ORAL EVERY 8 HOURS
Qty: 30 TABLET | Refills: 2 | Status: CANCELLED | OUTPATIENT
Start: 2018-06-04

## 2018-06-05 ENCOUNTER — PATIENT MESSAGE (OUTPATIENT)
Dept: FAMILY MEDICINE | Facility: CLINIC | Age: 43
End: 2018-06-05

## 2018-06-05 DIAGNOSIS — I10 ESSENTIAL HYPERTENSION: Chronic | ICD-10-CM

## 2018-06-05 RX ORDER — HYDRALAZINE HYDROCHLORIDE 100 MG/1
100 TABLET, FILM COATED ORAL EVERY 8 HOURS
Qty: 90 TABLET | Refills: 2 | Status: SHIPPED | OUTPATIENT
Start: 2018-06-05 | End: 2018-09-28 | Stop reason: SDUPTHER

## 2018-06-11 ENCOUNTER — PATIENT MESSAGE (OUTPATIENT)
Dept: OPTOMETRY | Facility: CLINIC | Age: 43
End: 2018-06-11

## 2018-06-11 ENCOUNTER — LAB VISIT (OUTPATIENT)
Dept: LAB | Facility: HOSPITAL | Age: 43
End: 2018-06-11
Attending: FAMILY MEDICINE
Payer: COMMERCIAL

## 2018-06-11 DIAGNOSIS — E11.65 UNCONTROLLED TYPE 2 DIABETES MELLITUS WITH HYPERGLYCEMIA, WITHOUT LONG-TERM CURRENT USE OF INSULIN: Chronic | ICD-10-CM

## 2018-06-11 LAB
ESTIMATED AVG GLUCOSE: 94 MG/DL
HBA1C MFR BLD HPLC: 4.9 %

## 2018-06-11 PROCEDURE — 36415 COLL VENOUS BLD VENIPUNCTURE: CPT

## 2018-06-11 PROCEDURE — 83036 HEMOGLOBIN GLYCOSYLATED A1C: CPT

## 2018-06-12 ENCOUNTER — PATIENT MESSAGE (OUTPATIENT)
Dept: FAMILY MEDICINE | Facility: CLINIC | Age: 43
End: 2018-06-12

## 2018-06-12 ENCOUNTER — PATIENT MESSAGE (OUTPATIENT)
Dept: OBSTETRICS AND GYNECOLOGY | Facility: CLINIC | Age: 43
End: 2018-06-12

## 2018-06-12 ENCOUNTER — PATIENT OUTREACH (OUTPATIENT)
Dept: OTHER | Facility: OTHER | Age: 43
End: 2018-06-12

## 2018-06-12 DIAGNOSIS — E11.65 UNCONTROLLED TYPE 2 DIABETES MELLITUS WITH HYPERGLYCEMIA, WITHOUT LONG-TERM CURRENT USE OF INSULIN: Primary | Chronic | ICD-10-CM

## 2018-06-12 NOTE — TELEPHONE ENCOUNTER
Patient will be coming in for wellness appointment, does any labs need to be added to the A1c that's already scheduled.

## 2018-06-12 NOTE — PROGRESS NOTES
Ms. Mccarthy is feeling well. She continues to monitor BG and BP. I reviewed in detail the risks of hypoglycemia given low A1c. Patient is amenable to trulicity dose decrease.     Last 5 Patient Entered Readings                                      Current 30 Day Average: 133/83     Recent Readings 6/11/2018 6/11/2018 6/4/2018 6/4/2018 5/27/2018    SBP (mmHg) - 135 - 131 -    DBP (mmHg) - 92 - 74 -    Pulse 72 72 79 79 78          Last 6 Patient Entered Readings                                          Most Recent A1c: 4.9% (Goal: 7%)     Recent Readings 6/11/2018 6/11/2018 6/7/2018 6/7/2018 6/5/2018    Blood Glucose (mg/dL) 130 92 107 129 112        BP slightly above goal, <130/80 mmHg  Decrease trulicity. Patient will start in ~ 7/5 (weekly injections on Thursday)      Hypertension Medications             carvedilol (COREG) 25 MG tablet TAKE 1/2- 1 TABLET BY MOUTH TWICE A DAY    hydrALAZINE (APRESOLINE) 100 MG tablet Take 1 tablet (100 mg total) by mouth every 8 (eight) hours.    valsartan (DIOVAN) 320 MG tablet Take 1 tablet (320 mg total) by mouth once daily.    verapamil (CALAN-SR) 180 MG CR tablet TAKE 1 TABLET BY MOUTH TWO TIMES A DAY ( DISCONTINUE DILTIAZEM )          Diabetes Medications             dulaglutide (TRULICITY) 0.75 mg/0.5 mL PnIj Inject 0.5 mLs (0.75 mg total) into the skin every 7 days.

## 2018-06-15 ENCOUNTER — PATIENT OUTREACH (OUTPATIENT)
Dept: OTHER | Facility: OTHER | Age: 43
End: 2018-06-15

## 2018-06-15 NOTE — PROGRESS NOTES
Last 5 Patient Entered Readings                                      Current 30 Day Average: 133/84     Recent Readings 6/11/2018 6/11/2018 6/4/2018 6/4/2018 5/27/2018    SBP (mmHg) - 135 - 131 -    DBP (mmHg) - 92 - 74 -    Pulse 72 72 79 79 78          Last 6 Patient Entered Readings                                          Most Recent A1c: 4.9% (Goal: 7%)     Recent Readings 6/14/2018 6/14/2018 6/11/2018 6/11/2018 6/7/2018    Blood Glucose (mg/dL) 142 126 130 92 107          Digital Medicine: Health  Follow Up    Lifestyle Modifications:    1.Dietary Modifications (Sodium intake <2,000mg/day, food labels, dining out): Patient continues following a low sodium/carb diet. No major changes in her diet that would cause an increase in salt/carb intake.     2.Physical Activity: Deferred    3.Medication Therapy: Patient has been compliant with the medication regimen.    4.Patient has the following medication side effects/concerns:   (Frequency/Alleviating factors/Precipitating factors, etc.)     Patient attributes higher BP readings to taking her BP reading before taking her BP medication. She stated that she is back on track and is feeling good.   She will reach out if she has any questions or concerns.     Follow up with . Enriqueta Abby Mccarthy completed. No further questions or concerns. Will continue follow up to achieve health goals.

## 2018-07-16 ENCOUNTER — PATIENT OUTREACH (OUTPATIENT)
Dept: OTHER | Facility: OTHER | Age: 43
End: 2018-07-16

## 2018-07-16 NOTE — PROGRESS NOTES
Patient called in to discuss valsartan drug recall. Asked her to contact the pharmacy to find out if her product is affected. Patient confirmed that her medication supply is not affected by the recall.        Last 5 Patient Entered Readings                                      Current 30 Day Average: 126/77     Recent Readings 7/11/2018 7/11/2018 7/1/2018 7/1/2018 6/24/2018    SBP (mmHg) 110 - - 136 -    DBP (mmHg) 72 - - 79 -    Pulse 67 67 79 79 80          Last 6 Patient Entered Readings                                          Most Recent A1c: 4.9% (Goal: 7%)     Recent Readings 7/11/2018 7/11/2018 7/9/2018 7/9/2018 7/4/2018    Blood Glucose (mg/dL) 121 86 111 90 125          BP at goal, <130/80 mmHg  Continue monitoring  Hypertension Medications             carvedilol (COREG) 25 MG tablet TAKE 1/2- 1 TABLET BY MOUTH TWICE A DAY    hydrALAZINE (APRESOLINE) 100 MG tablet Take 1 tablet (100 mg total) by mouth every 8 (eight) hours.    valsartan (DIOVAN) 320 MG tablet Take 1 tablet (320 mg total) by mouth once daily.    verapamil (CALAN-SR) 180 MG CR tablet TAKE 1 TABLET BY MOUTH TWO TIMES A DAY ( DISCONTINUE DILTIAZEM )

## 2018-07-17 ENCOUNTER — PATIENT OUTREACH (OUTPATIENT)
Dept: OTHER | Facility: OTHER | Age: 43
End: 2018-07-17

## 2018-07-17 NOTE — PROGRESS NOTES
Last 5 Patient Entered Readings                                      Current 30 Day Average: 125/76     Recent Readings 7/11/2018 7/11/2018 7/1/2018 7/1/2018 6/24/2018    SBP (mmHg) 110 - - 136 -    DBP (mmHg) 72 - - 79 -    Pulse 67 67 79 79 80          Last 6 Patient Entered Readings                                          Most Recent A1c: 4.9% (Goal: 7%)     Recent Readings 7/11/2018 7/11/2018 7/9/2018 7/9/2018 7/4/2018    Blood Glucose (mg/dL) 121 86 111 90 125          Digital Medicine: Health  Follow Up    Lifestyle Modifications:    1.Dietary Modifications (Sodium intake <2,000mg/day, food labels, dining out): Patient continues monitoring her sodium/carb intake. She stated that she still eats carbs but she makes sure it is in moderation. Overall, she feels like she is doing well and is back on track with this.     2.Physical Activity: Patient has been walking more. She blakely at the [facility] location and walking to main campus everyday. She will continue to work on getting more walking/exercise in each day.     3.Medication Therapy: Patient has been compliant with the medication regimen. Patient is doing well on all of her BP/Diabetes medication at this time.     4.Patient has the following medication side effects/concerns:   (Frequency/Alleviating factors/Precipitating factors, etc.)     Follow up with Mrs. Enriqueta Mccarthy completed. No further questions or concerns. Will continue follow up to achieve health goals.

## 2018-07-25 ENCOUNTER — PATIENT MESSAGE (OUTPATIENT)
Dept: CARDIOLOGY | Facility: CLINIC | Age: 43
End: 2018-07-25

## 2018-07-25 RX ORDER — ATORVASTATIN CALCIUM 80 MG/1
80 TABLET, FILM COATED ORAL NIGHTLY
Qty: 90 TABLET | Refills: 3 | Status: SHIPPED | OUTPATIENT
Start: 2018-07-25 | End: 2020-01-21 | Stop reason: SDUPTHER

## 2018-07-29 ENCOUNTER — PATIENT MESSAGE (OUTPATIENT)
Dept: OBSTETRICS AND GYNECOLOGY | Facility: CLINIC | Age: 43
End: 2018-07-29

## 2018-08-10 ENCOUNTER — OFFICE VISIT (OUTPATIENT)
Dept: FAMILY MEDICINE | Facility: CLINIC | Age: 43
End: 2018-08-10
Payer: COMMERCIAL

## 2018-08-10 VITALS
WEIGHT: 222 LBS | TEMPERATURE: 99 F | OXYGEN SATURATION: 98 % | RESPIRATION RATE: 20 BRPM | BODY MASS INDEX: 39.34 KG/M2 | DIASTOLIC BLOOD PRESSURE: 62 MMHG | HEIGHT: 63 IN | SYSTOLIC BLOOD PRESSURE: 118 MMHG | HEART RATE: 73 BPM

## 2018-08-10 DIAGNOSIS — E56.9 VITAMIN DEFICIENCY: ICD-10-CM

## 2018-08-10 DIAGNOSIS — Z00.00 ANNUAL PHYSICAL EXAM: Primary | ICD-10-CM

## 2018-08-10 DIAGNOSIS — I10 ESSENTIAL HYPERTENSION: Chronic | ICD-10-CM

## 2018-08-10 DIAGNOSIS — E11.65 UNCONTROLLED TYPE 2 DIABETES MELLITUS WITH HYPERGLYCEMIA, WITHOUT LONG-TERM CURRENT USE OF INSULIN: Chronic | ICD-10-CM

## 2018-08-10 PROCEDURE — 3074F SYST BP LT 130 MM HG: CPT | Mod: CPTII,S$GLB,, | Performed by: FAMILY MEDICINE

## 2018-08-10 PROCEDURE — 3008F BODY MASS INDEX DOCD: CPT | Mod: CPTII,S$GLB,, | Performed by: FAMILY MEDICINE

## 2018-08-10 PROCEDURE — 3078F DIAST BP <80 MM HG: CPT | Mod: CPTII,S$GLB,, | Performed by: FAMILY MEDICINE

## 2018-08-10 PROCEDURE — 3044F HG A1C LEVEL LT 7.0%: CPT | Mod: CPTII,S$GLB,, | Performed by: FAMILY MEDICINE

## 2018-08-10 PROCEDURE — 99999 PR PBB SHADOW E&M-EST. PATIENT-LVL III: CPT | Mod: PBBFAC,,, | Performed by: FAMILY MEDICINE

## 2018-08-10 PROCEDURE — 99396 PREV VISIT EST AGE 40-64: CPT | Mod: S$GLB,,, | Performed by: FAMILY MEDICINE

## 2018-08-10 RX ORDER — CARVEDILOL 25 MG/1
TABLET ORAL
Qty: 60 TABLET | Refills: 2 | Status: SHIPPED | OUTPATIENT
Start: 2018-08-10 | End: 2018-12-03 | Stop reason: SDUPTHER

## 2018-08-10 RX ORDER — VERAPAMIL HYDROCHLORIDE 180 MG/1
TABLET, FILM COATED, EXTENDED RELEASE ORAL
Qty: 60 TABLET | Refills: 2 | Status: SHIPPED | OUTPATIENT
Start: 2018-08-10 | End: 2018-11-30 | Stop reason: SDUPTHER

## 2018-08-10 RX ORDER — VALSARTAN 320 MG/1
320 TABLET ORAL DAILY
Qty: 30 TABLET | Refills: 2 | Status: SHIPPED | OUTPATIENT
Start: 2018-08-10 | End: 2018-11-30 | Stop reason: SDUPTHER

## 2018-08-10 NOTE — PROGRESS NOTES
HPI:  Enriqueta Mccarthy is a 42 y.o. year old female that  presents for lab result f/u. Her BS at home are 117 to 135.She has checked with the pharmacy and they have cleared her brand of Valsartan.  Chief Complaint   Patient presents with    Annual Exam     lab results    Medication Refill     Verapamil, Trulicity, Carvedilol, Valsartan   .     HPI      Past Medical History:   Diagnosis Date    Essential hypertension     Herpes simplex virus (HSV) infection     Hypertension     Hypertriglyceridemia 2016    Neuropathy     Obesity (BMI 30-39.9) 2016    Type 2 diabetes mellitus with diabetic polyneuropathy, without long-term current use of insulin 2016    Uterine fibroid      Social History     Social History    Marital status:      Spouse name: N/A    Number of children: N/A    Years of education: N/A     Occupational History    Not on file.     Social History Main Topics    Smoking status: Never Smoker    Smokeless tobacco: Never Used    Alcohol use No    Drug use: No    Sexual activity: Not Currently     Partners: Male     Birth control/ protection: Surgical     Other Topics Concern    Not on file     Social History Narrative    No narrative on file     Past Surgical History:   Procedure Laterality Date     SECTION      x 2    ENDOMETRIAL ABLATION  2016    GALLBLADDER SURGERY  2017    removed    LIPOMA RESECTION Left 2013    SINUS SURGERY  2006    TONSILLECTOMY, ADENOIDECTOMY      TUBAL LIGATION       Family History   Problem Relation Age of Onset    Diabetes Father     Hypertension Father     Hyperlipidemia Father     Hypertension Mother     Anemia Daughter     Anemia Son     Diabetes Maternal Uncle     Diabetes Maternal Grandmother     Blindness Neg Hx     Glaucoma Neg Hx     Macular degeneration Neg Hx     Retinal detachment Neg Hx            Review of Systems  General ROS: negative for chills, fever or weight loss  ENT ROS: negative  "for epistaxis, sore throat or rhinorrhea  Respiratory ROS: no cough, shortness of breath, or wheezing  Cardiovascular ROS: no chest pain or dyspnea on exertion  Gastrointestinal ROS: no abdominal pain, change in bowel habits, or black/ bloody stools    Physical Exam:  /62   Pulse 73   Temp 98.7 °F (37.1 °C) (Oral)   Resp 20   Ht 5' 3" (1.6 m)   Wt 100.7 kg (222 lb)   SpO2 98%   BMI 39.33 kg/m²   General appearance: alert, cooperative, no distress  Constitutional:Oriented to person, place, and time.appears well-developed and well-nourished.  Lungs: clear to auscultation bilaterally, no dullness to percussion bilaterally  Heart: regular rate and rhythm without rub; no displacement of the PMI , S1&S2 present    Physical Exam    LABS:    Complete Blood Count  Lab Results   Component Value Date    RBC 3.89 (L) 12/22/2017    HGB 11.2 (L) 12/22/2017    HCT 33.1 (L) 12/22/2017    MCV 85 12/22/2017    MCH 28.8 12/22/2017    MCHC 33.8 12/22/2017    RDW 13.8 12/22/2017     12/22/2017    MPV 9.6 12/22/2017    GRAN 4.0 12/22/2017    GRAN 58.6 12/22/2017    LYMPH 2.2 12/22/2017    LYMPH 32.9 12/22/2017    MONO 0.4 12/22/2017    MONO 5.9 12/22/2017    EOS 0.1 12/22/2017    BASO 0.03 12/22/2017    EOSINOPHIL 1.9 12/22/2017    BASOPHIL 0.4 12/22/2017    DIFFMETHOD Automated 12/22/2017       Comprehensive Metabolic Panel  Lab Results   Component Value Date     (H) 12/22/2017    BUN 12 12/22/2017    CREATININE 0.9 12/22/2017     12/22/2017    K 4.1 12/22/2017     12/22/2017    PROT 7.4 12/22/2017    ALBUMIN 4.0 12/22/2017    BILITOT 0.9 12/22/2017    AST 11 12/22/2017    ALKPHOS 46 (L) 12/22/2017    CO2 25 12/22/2017    ALT 17 12/22/2017    ANIONGAP 8 12/22/2017    EGFRNONAA >60 12/22/2017    ESTGFRAFRICA >60 12/22/2017       LIPID  Lab Results   Component Value Date    CHOL 117 (L) 12/22/2017    HDL 36 (L) 12/22/2017         TSH  Lab Results   Component Value Date    TSH 1.194 12/22/2017 "       Current Outpatient Prescriptions   Medication Sig Dispense Refill    atorvastatin (LIPITOR) 80 MG tablet Take 1 tablet (80 mg total) by mouth every evening. 90 tablet 3    azelastine (ASTELIN) 137 mcg (0.1 %) nasal spray 2 sprays (274 mcg total) by Nasal route 2 (two) times daily. 30 mL 2    carvedilol (COREG) 25 MG tablet TAKE1 TABLET BY MOUTH TWICE A DAY 60 tablet 2    dulaglutide (TRULICITY) 0.75 mg/0.5 mL PnIj Inject 0.5 mLs (0.75 mg total) into the skin every 7 days. 4 Syringe 1    gabapentin (NEURONTIN) 300 MG capsule Take 2 capsules (600 mg total) by mouth every evening. 60 capsule 3    hydrALAZINE (APRESOLINE) 100 MG tablet Take 1 tablet (100 mg total) by mouth every 8 (eight) hours. 90 tablet 2    icosapent ethyl 1 gram Cap TAKE TWO CAPSULES BY MOUTH TWICE A  capsule 5    meloxicam (MOBIC) 7.5 MG tablet Take 1 tablet (7.5 mg total) by mouth once daily. 30 tablet 2    valsartan (DIOVAN) 320 MG tablet Take 1 tablet (320 mg total) by mouth once daily. 30 tablet 2    verapamil (CALAN-SR) 180 MG CR tablet TAKE 1 TABLET BY MOUTH TWO TIMES A DAY ( DISCONTINUE DILTIAZEM ) 60 tablet 2     No current facility-administered medications for this visit.        Assessment:    ICD-10-CM ICD-9-CM    1. Annual physical exam Z00.00 V70.0 Comprehensive metabolic panel      Lipid panel      CBC auto differential      Microalbumin/creatinine urine ratio      TSH      Vitamin D      Vitamin B12   2. Uncontrolled type 2 diabetes mellitus with hyperglycemia, without long-term current use of insulin E11.65 250.02 dulaglutide (TRULICITY) 0.75 mg/0.5 mL PnIj      Hemoglobin A1c   3. Essential hypertension I10 401.9 valsartan (DIOVAN) 320 MG tablet      verapamil (CALAN-SR) 180 MG CR tablet      carvedilol (COREG) 25 MG tablet   4. Vitamin deficiency E56.9 269.2 Vitamin D      Vitamin B12         Plan:    Follow-up in 4 months (on 12/19/2018).          Lexus Cotton MD  Answers for HPI/ROS submitted by the  patient on 8/6/2018   activity change: No  unexpected weight change: No  neck pain: No  hearing loss: No  rhinorrhea: No  trouble swallowing: No  eye discharge: No  visual disturbance: No  chest tightness: No  wheezing: No  chest pain: No  palpitations: No  blood in stool: No  constipation: No  vomiting: No  diarrhea: No  polydipsia: No  polyuria: No  difficulty urinating: No  hematuria: No  menstrual problem: No  dysuria: No  joint swelling: No  arthralgias: No  headaches: No  weakness: No  confusion: No  dysphoric mood: No

## 2018-08-14 NOTE — PROGRESS NOTES
BP at goal, <130/80 mmHg  Await A1c 9/11  Continue monitoring    Last 5 Patient Entered Readings                                      Current 30 Day Average: 125/73     Recent Readings 8/5/2018 8/5/2018 7/29/2018 7/29/2018 7/21/2018    SBP (mmHg) - 132 - 128 -    DBP (mmHg) - 77 - 72 -    Pulse 79 79 67 67 70          Last 6 Patient Entered Readings                                          Most Recent A1c: 4.9% (Goal: 7%)     Recent Readings 8/12/2018 8/6/2018 8/6/2018 8/2/2018 8/2/2018    Blood Glucose (mg/dL) 162 117 145 120 123

## 2018-08-16 ENCOUNTER — PATIENT OUTREACH (OUTPATIENT)
Dept: OTHER | Facility: OTHER | Age: 43
End: 2018-08-16

## 2018-08-21 ENCOUNTER — PATIENT OUTREACH (OUTPATIENT)
Dept: OTHER | Facility: OTHER | Age: 43
End: 2018-08-21

## 2018-08-21 DIAGNOSIS — E11.65 UNCONTROLLED TYPE 2 DIABETES MELLITUS WITH HYPERGLYCEMIA, WITHOUT LONG-TERM CURRENT USE OF INSULIN: Primary | Chronic | ICD-10-CM

## 2018-08-21 RX ORDER — METFORMIN HYDROCHLORIDE 500 MG/1
250 TABLET ORAL 2 TIMES DAILY WITH MEALS
Qty: 30 TABLET | Refills: 1 | Status: SHIPPED | OUTPATIENT
Start: 2018-08-21 | End: 2018-10-31 | Stop reason: SDUPTHER

## 2018-08-21 NOTE — PROGRESS NOTES
Ms. Mccarthy is concerned about slightly elevated BG readings. She previously tried metformin (1000mg BID) and experienced GI distress. She is amenable to another trial. She denies dizziness weakness with lower BP on 8/19    Last 5 Patient Entered Readings                                      Current 30 Day Average: 117/76     Recent Readings 8/19/2018 8/19/2018 8/16/2018 8/16/2018 8/5/2018    SBP (mmHg) - 78 130 - -    DBP (mmHg) - 65 89 - -    Pulse 75 75 77 77 79          Last 6 Patient Entered Readings                                          Most Recent A1c: 4.9% (Goal: 7%)     Recent Readings 8/21/2018 8/21/2018 8/20/2018 8/20/2018 8/16/2018    Blood Glucose (mg/dL) 183 156 139 140 108          BP at goal, <130/80 mmHg  Start metformin- slow titration 250mg HS  Continue monitoring    Hypertension Medications             carvedilol (COREG) 25 MG tablet TAKE1 TABLET BY MOUTH TWICE A DAY    hydrALAZINE (APRESOLINE) 100 MG tablet Take 1 tablet (100 mg total) by mouth every 8 (eight) hours.    valsartan (DIOVAN) 320 MG tablet Take 1 tablet (320 mg total) by mouth once daily.    verapamil (CALAN-SR) 180 MG CR tablet TAKE 1 TABLET BY MOUTH TWO TIMES A DAY ( DISCONTINUE DILTIAZEM )

## 2018-08-29 ENCOUNTER — OFFICE VISIT (OUTPATIENT)
Dept: OBSTETRICS AND GYNECOLOGY | Facility: CLINIC | Age: 43
End: 2018-08-29
Payer: COMMERCIAL

## 2018-08-29 VITALS
BODY MASS INDEX: 39.54 KG/M2 | DIASTOLIC BLOOD PRESSURE: 70 MMHG | HEIGHT: 63 IN | WEIGHT: 223.13 LBS | SYSTOLIC BLOOD PRESSURE: 120 MMHG

## 2018-08-29 DIAGNOSIS — Z01.419 WELL WOMAN EXAM WITH ROUTINE GYNECOLOGICAL EXAM: Primary | ICD-10-CM

## 2018-08-29 DIAGNOSIS — N92.6 IRREGULAR BLEEDING: ICD-10-CM

## 2018-08-29 DIAGNOSIS — Z12.39 SCREENING FOR BREAST CANCER: ICD-10-CM

## 2018-08-29 PROCEDURE — 3078F DIAST BP <80 MM HG: CPT | Mod: CPTII,S$GLB,, | Performed by: OBSTETRICS & GYNECOLOGY

## 2018-08-29 PROCEDURE — 99396 PREV VISIT EST AGE 40-64: CPT | Mod: S$GLB,,, | Performed by: OBSTETRICS & GYNECOLOGY

## 2018-08-29 PROCEDURE — 99999 PR PBB SHADOW E&M-EST. PATIENT-LVL III: CPT | Mod: PBBFAC,,, | Performed by: OBSTETRICS & GYNECOLOGY

## 2018-08-29 PROCEDURE — 3074F SYST BP LT 130 MM HG: CPT | Mod: CPTII,S$GLB,, | Performed by: OBSTETRICS & GYNECOLOGY

## 2018-08-29 NOTE — PROGRESS NOTES
GYNECOLOGY OFFICE NOTE    Reason for visit: annual    HPI: Pt is a 42 y.o.  female  who presents for annual. Cycle: menarche- 10, interval- variable bleeding, every day since summer started, brown discharge that comes and goes. Some associated cramping. She is not sexually active.  She uses bilateral tubal ligation for contraception.  She does not desire STI screening. She denies vaginal discharge.  Last pap: 2017-neg pap/hpv, denies hx of abnormal. Last MMG 2017.     Past Medical History:   Diagnosis Date    Essential hypertension     Herpes simplex virus (HSV) infection     Hypertension     Hypertriglyceridemia 2016    Neuropathy     Obesity (BMI 30-39.9) 2016    Type 2 diabetes mellitus with diabetic polyneuropathy, without long-term current use of insulin 2016    Uterine fibroid        Past Surgical History:   Procedure Laterality Date     SECTION      x 2    ENDOMETRIAL ABLATION  2016    GALLBLADDER SURGERY  2017    removed    LIPOMA RESECTION Left 2013    SINUS SURGERY  2006    TONSILLECTOMY, ADENOIDECTOMY      TUBAL LIGATION         Family History   Problem Relation Age of Onset    Diabetes Father     Hypertension Father     Hyperlipidemia Father     Hypertension Mother     Anemia Daughter     Anemia Son     Diabetes Maternal Uncle     Diabetes Maternal Grandmother     Blindness Neg Hx     Glaucoma Neg Hx     Macular degeneration Neg Hx     Retinal detachment Neg Hx        Social History     Tobacco Use    Smoking status: Never Smoker    Smokeless tobacco: Never Used   Substance Use Topics    Alcohol use: No    Drug use: No       OB History    Para Term  AB Living   3 3 2 1   2   SAB TAB Ectopic Multiple Live Births           2      # Outcome Date GA Lbr Tu/2nd Weight Sex Delivery Anes PTL Lv   3  11   2.948 kg (6 lb 8 oz) F CS-LTranv   STEPHANIE   2 Term 10/14/03   4.366 kg (9 lb 10 oz) M CS-LTranv   STEPHANIE  "  1 Term                   Current Outpatient Medications   Medication Sig    atorvastatin (LIPITOR) 80 MG tablet Take 1 tablet (80 mg total) by mouth every evening.    azelastine (ASTELIN) 137 mcg (0.1 %) nasal spray 2 sprays (274 mcg total) by Nasal route 2 (two) times daily.    carvedilol (COREG) 25 MG tablet TAKE1 TABLET BY MOUTH TWICE A DAY    dulaglutide (TRULICITY) 0.75 mg/0.5 mL PnIj Inject 0.5 mLs (0.75 mg total) into the skin every 7 days.    gabapentin (NEURONTIN) 300 MG capsule Take 2 capsules (600 mg total) by mouth every evening.    hydrALAZINE (APRESOLINE) 100 MG tablet Take 1 tablet (100 mg total) by mouth every 8 (eight) hours.    icosapent ethyl 1 gram Cap TAKE TWO CAPSULES BY MOUTH TWICE A DAY    meloxicam (MOBIC) 7.5 MG tablet Take 1 tablet (7.5 mg total) by mouth once daily.    metFORMIN (GLUCOPHAGE) 500 MG tablet Take 1/2 tablet (250 mg total) by mouth 2 (two) times daily with meals.    valsartan (DIOVAN) 320 MG tablet Take 1 tablet (320 mg total) by mouth once daily.    verapamil (CALAN-SR) 180 MG CR tablet TAKE 1 TABLET BY MOUTH TWO TIMES A DAY ( DISCONTINUE DILTIAZEM )     No current facility-administered medications for this visit.        Allergies: No known allergies     /70   Ht 5' 3" (1.6 m)   Wt 101.2 kg (223 lb 1.7 oz)   BMI 39.52 kg/m²     ROS:  GENERAL: Denies fever or chills.   SKIN: Denies rash or lesions.   HEAD: Denies head injury or headache.   CHEST: Denies chest pain or shortness of breath.   CARDIOVASCULAR: Denies palpitations or chest pain.   ABDOMEN: No abdominal pain, constipation, diarrhea, nausea, vomiting or rectal bleeding.   URINARY: No dysuria, hematuria, or burning on urination.  REPRODUCTIVE: See HPI.   BREASTS: Denies pain, lumps, or nipple discharge.   NEUROLOGIC: Denies syncope or weakness.     Physical Exam:  GENERAL: alert, appears stated age and cooperative  CHEST: Normal respiratory effort  HEART: S1 and S2 normal, regular rate and " rhythm  NECK: normal appearance, no thyromegaly masses or tenderness  SKIN: no acne, striae, hirsutism  BREAST EXAM: breasts appear normal, no suspicious masses, no skin or nipple changes or axillary nodes  ABDOMEN: abdomen is soft without significant tenderness, masses, organomegaly or guarding, no hernias noted  EXTERNAL GENITALIA:  normal general appearance  URETHRA: normal urethra, normal urethral meatus  VAGINA:  normal mucosa without prolapse or lesions  CERVIX:  Normal  UTERUS: mobile, non-tender  ADNEXA:  normal adnexa in size, nontender and no masses    Diagnosis:  1. Well woman exam with routine gynecological exam    2. Irregular bleeding    3. Screening for breast cancer        Plan:   1. Annual- up to date with pap/hpv  2. irregular bleeding s/p ablation. Options progesterone only vs surgery. If bleeding persist or worsens will get U/S and go from there  3. Order for MMG 2018    Orders Placed This Encounter    Mammo Digital Screening Bilat with Tomosynthesis CAD    US OB/GYN Procedure ([facility])       Patient was counseled today on the new ACS guidelines for cervical cytology screening as well as the current recommendations for breast cancer screening. She was counseled to follow up with her PCP for other routine health maintenance.     Follow-up in about 1 year (around 8/29/2019), or if symptoms worsen or fail to improve, for annual.      Erlinda Murdock MD  OB/GYN  Pager: [phone]

## 2018-09-04 ENCOUNTER — PATIENT OUTREACH (OUTPATIENT)
Dept: OTHER | Facility: OTHER | Age: 43
End: 2018-09-04

## 2018-09-04 NOTE — PROGRESS NOTES
Ms. Mccarthy is tolerating 250mg metformin daily. Recently had elevated BG after starting gabapentin. Wondering if it would cause BG rise.     Last 5 Patient Entered Readings                                      Current 30 Day Average: 119/79     Recent Readings 9/3/2018 9/3/2018 8/26/2018 8/26/2018 8/26/2018    SBP (mmHg) - 127 - 128 -    DBP (mmHg) - 86 - 78 -    Pulse 87 87 62 62 65          Last 6 Patient Entered Readings                                          Most Recent A1c: 4.9% on 6/11/2018  (Goal: 7%)     Recent Readings 8/30/2018 8/30/2018 8/28/2018 8/28/2018 8/27/2018    Blood Glucose (mg/dL) 130 136 152 151 130          BP at goal, <130/80 mmHg  A1c scheduled 9/11  Continue monitoring    Hypertension Medications             carvedilol (COREG) 25 MG tablet TAKE1 TABLET BY MOUTH TWICE A DAY    hydrALAZINE (APRESOLINE) 100 MG tablet Take 1 tablet (100 mg total) by mouth every 8 (eight) hours.    valsartan (DIOVAN) 320 MG tablet Take 1 tablet (320 mg total) by mouth once daily.    verapamil (CALAN-SR) 180 MG CR tablet TAKE 1 TABLET BY MOUTH TWO TIMES A DAY ( DISCONTINUE DILTIAZEM )        Diabetes Medications             dulaglutide (TRULICITY) 0.75 mg/0.5 mL PnIj Inject 0.5 mLs (0.75 mg total) into the skin every 7 days.    metFORMIN (GLUCOPHAGE) 500 MG tablet Take 1/2 tablet (250 mg total) by mouth 2 (two) times daily with meals.

## 2018-09-11 ENCOUNTER — PATIENT MESSAGE (OUTPATIENT)
Dept: ADMINISTRATIVE | Facility: OTHER | Age: 43
End: 2018-09-11

## 2018-09-11 ENCOUNTER — LAB VISIT (OUTPATIENT)
Dept: LAB | Facility: HOSPITAL | Age: 43
End: 2018-09-11
Attending: FAMILY MEDICINE
Payer: COMMERCIAL

## 2018-09-11 ENCOUNTER — PATIENT MESSAGE (OUTPATIENT)
Dept: FAMILY MEDICINE | Facility: CLINIC | Age: 43
End: 2018-09-11

## 2018-09-11 DIAGNOSIS — E11.65 UNCONTROLLED TYPE 2 DIABETES MELLITUS WITH HYPERGLYCEMIA, WITHOUT LONG-TERM CURRENT USE OF INSULIN: Chronic | ICD-10-CM

## 2018-09-11 LAB
ESTIMATED AVG GLUCOSE: 97 MG/DL
HBA1C MFR BLD HPLC: 5 %

## 2018-09-11 PROCEDURE — 83036 HEMOGLOBIN GLYCOSYLATED A1C: CPT

## 2018-09-11 PROCEDURE — 36415 COLL VENOUS BLD VENIPUNCTURE: CPT

## 2018-09-11 NOTE — TELEPHONE ENCOUNTER
Patient follow up in December as requested. Patient had A1c done today, can results be released. Please advise.

## 2018-09-17 ENCOUNTER — PATIENT OUTREACH (OUTPATIENT)
Dept: OTHER | Facility: OTHER | Age: 43
End: 2018-09-17

## 2018-09-28 DIAGNOSIS — I10 ESSENTIAL HYPERTENSION: Chronic | ICD-10-CM

## 2018-09-28 RX ORDER — HYDRALAZINE HYDROCHLORIDE 100 MG/1
100 TABLET, FILM COATED ORAL EVERY 8 HOURS
Qty: 90 TABLET | Refills: 2 | Status: SHIPPED | OUTPATIENT
Start: 2018-09-28 | End: 2018-12-19 | Stop reason: SDUPTHER

## 2018-10-17 ENCOUNTER — PATIENT OUTREACH (OUTPATIENT)
Dept: OTHER | Facility: OTHER | Age: 43
End: 2018-10-17

## 2018-10-17 NOTE — PROGRESS NOTES
Last 5 Patient Entered Readings                                      Current 30 Day Average: 141/84     Recent Readings 10/10/2018 10/10/2018 10/3/2018 10/3/2018 9/23/2018    SBP (mmHg) - 135 - 152 -    DBP (mmHg) - 79 - 89 -    Pulse 67 67 76 76 74          Last 6 Patient Entered Readings                                          Most Recent A1c: 5% on 9/11/2018  (Goal: 7%)     Recent Readings 10/9/2018 10/9/2018 10/6/2018 10/6/2018 10/1/2018    Blood Glucose (mg/dL) 117 118 130 138 122            Digital Medicine: Health  Follow Up    Left voicemail to follow up with  Enriqueta Abby Fabio.  Current BP average 141/84 mmHg is not at goal, <130/80.

## 2018-10-31 DIAGNOSIS — E11.65 UNCONTROLLED TYPE 2 DIABETES MELLITUS WITH HYPERGLYCEMIA, WITHOUT LONG-TERM CURRENT USE OF INSULIN: Chronic | ICD-10-CM

## 2018-11-01 RX ORDER — METFORMIN HYDROCHLORIDE 500 MG/1
250 TABLET ORAL 2 TIMES DAILY WITH MEALS
Qty: 30 TABLET | Refills: 2 | Status: SHIPPED | OUTPATIENT
Start: 2018-11-01 | End: 2018-12-19 | Stop reason: SDUPTHER

## 2018-11-01 NOTE — PROGRESS NOTES
Last 5 Patient Entered Readings                                      Current 30 Day Average: 142/86     Recent Readings 10/28/2018 10/28/2018 10/20/2018 10/20/2018 10/20/2018    SBP (mmHg) - 145 - 135 -    DBP (mmHg) - 78 - 82 -    Pulse 76 76 68 68 72          Last 6 Patient Entered Readings                                          Most Recent A1c: 5% on 9/11/2018  (Goal: 7%)     Recent Readings 11/1/2018 10/29/2018 10/29/2018 10/26/2018 10/26/2018    Blood Glucose (mg/dL) 116 118 104 88 108          Digital Medicine: Health  Follow Up    Lifestyle Modifications:    1.Dietary Modifications (Sodium intake <2,000mg/day, food labels, dining out): Patient typically watches her sodium intake and rarely eats out. She did mention that she had a taco salad on Tuesday for lunch (but she usually's bring her own lunch) and she may  [employer] tonight for dinner because [place] under [place] boil advisory and doesn't want to risk cooking something with the water. I spoke in depth with her about the sodium content in [employer] and even sent her a handout with the nutritional facts for those items. She expressed understanding and will try to choose a better option. I will continue working on this with her.     2.Physical Activity: Deferred    3.Medication Therapy: Patient has been compliant with the medication regimen. Patient is doing well on her current regimen but she did mention that she does not always take her medication at the same time everyday. She is going to work on this.     4.Patient has the following medication side effects/concerns:   (Frequency/Alleviating factors/Precipitating factors, etc.)     Patient mentioned that she has not been waiting an hour or more after taking her BP medication before taking her BP reading. She is going to work on this as well.     Patient last A1C was 5% and her BS readings are looking very good.     Follow up with . Enriqueta Mora Fabio completed. No further  questions or concerns. Will continue to follow up to achieve health goals.

## 2018-11-27 ENCOUNTER — PATIENT MESSAGE (OUTPATIENT)
Dept: FAMILY MEDICINE | Facility: CLINIC | Age: 43
End: 2018-11-27

## 2018-11-28 ENCOUNTER — PATIENT MESSAGE (OUTPATIENT)
Dept: FAMILY MEDICINE | Facility: CLINIC | Age: 43
End: 2018-11-28

## 2018-11-29 ENCOUNTER — PATIENT MESSAGE (OUTPATIENT)
Dept: FAMILY MEDICINE | Facility: CLINIC | Age: 43
End: 2018-11-29

## 2018-11-30 ENCOUNTER — PATIENT MESSAGE (OUTPATIENT)
Dept: FAMILY MEDICINE | Facility: CLINIC | Age: 43
End: 2018-11-30

## 2018-11-30 DIAGNOSIS — I10 ESSENTIAL HYPERTENSION: Chronic | ICD-10-CM

## 2018-12-03 DIAGNOSIS — I10 ESSENTIAL HYPERTENSION: Chronic | ICD-10-CM

## 2018-12-03 RX ORDER — CARVEDILOL 25 MG/1
TABLET ORAL
Qty: 60 TABLET | Refills: 0 | Status: SHIPPED | OUTPATIENT
Start: 2018-12-03 | End: 2018-12-19 | Stop reason: SDUPTHER

## 2018-12-03 RX ORDER — VERAPAMIL HYDROCHLORIDE 180 MG/1
TABLET, FILM COATED, EXTENDED RELEASE ORAL
Qty: 60 TABLET | Refills: 2 | Status: SHIPPED | OUTPATIENT
Start: 2018-12-03 | End: 2018-12-19 | Stop reason: SDUPTHER

## 2018-12-03 RX ORDER — VALSARTAN 320 MG/1
320 TABLET ORAL DAILY
Qty: 30 TABLET | Refills: 2 | Status: SHIPPED | OUTPATIENT
Start: 2018-12-03 | End: 2018-12-19 | Stop reason: SDUPTHER

## 2018-12-03 RX ORDER — ICOSAPENT ETHYL 1000 MG/1
CAPSULE ORAL
Qty: 120 CAPSULE | Refills: 5 | Status: SHIPPED | OUTPATIENT
Start: 2018-12-03 | End: 2018-12-19 | Stop reason: SDUPTHER

## 2018-12-05 ENCOUNTER — PATIENT OUTREACH (OUTPATIENT)
Dept: OTHER | Facility: OTHER | Age: 43
End: 2018-12-05

## 2018-12-05 NOTE — PROGRESS NOTES
Last 5 Patient Entered Readings                                      Current 30 Day Average: 132/78     Recent Readings 12/2/2018 12/2/2018 11/23/2018 11/23/2018 11/19/2018    SBP (mmHg) - 138 143 - -    DBP (mmHg) - 86 75 - -    Pulse 69 69 62 62 64          Last 6 Patient Entered Readings                                          Most Recent A1c: 5% on 9/11/2018  (Goal: 7%)     Recent Readings 12/3/2018 12/3/2018 12/2/2018 11/27/2018 11/27/2018    Blood Glucose (mg/dL) 142 114 100 130 130          Digital Medicine: Health  Follow Up    Lifestyle Modifications:    1.Dietary Modifications (Sodium intake <2,000mg/day, food labels, dining out): Patient continues monitoring her sodium intake. She is reading food labels and she avoids highly salted foods. She is also doing well with maintaining a low carb diet. I will continue follow up with her.     2.Physical Activity: Deferred    3.Medication Therapy: Patient has been compliant with the medication regimen. Patient missed a few days of her carvedilol because she ran out and it took a few days for the pharmacy to fill it. She is now back on track and hopes to see her BP level out again.     4.Patient has the following medication side effects/concerns:   (Frequency/Alleviating factors/Precipitating factors, etc.)     Patient needed assistance with getting her diabetes supplies for her [employer] meter. After speaking with a rep from [facility], the patient was able to get the refills she needed. Patient now has the correct number for the [facility] department.     Follow up with Mrs. Enriqueta Mccarthy completed. No further questions or concerns. Will continue to follow up to achieve health goals.

## 2018-12-17 ENCOUNTER — LAB VISIT (OUTPATIENT)
Dept: LAB | Facility: HOSPITAL | Age: 43
End: 2018-12-17
Attending: FAMILY MEDICINE
Payer: COMMERCIAL

## 2018-12-17 DIAGNOSIS — Z00.00 ANNUAL PHYSICAL EXAM: ICD-10-CM

## 2018-12-17 DIAGNOSIS — E56.9 VITAMIN DEFICIENCY: ICD-10-CM

## 2018-12-17 LAB
25(OH)D3+25(OH)D2 SERPL-MCNC: 22 NG/ML
ALBUMIN SERPL BCP-MCNC: 4.1 G/DL
ALP SERPL-CCNC: 48 U/L
ALT SERPL W/O P-5'-P-CCNC: 20 U/L
ANION GAP SERPL CALC-SCNC: 8 MMOL/L
AST SERPL-CCNC: 18 U/L
BASOPHILS # BLD AUTO: 0.03 K/UL
BASOPHILS NFR BLD: 0.5 %
BILIRUB SERPL-MCNC: 0.8 MG/DL
BUN SERPL-MCNC: 13 MG/DL
CALCIUM SERPL-MCNC: 10 MG/DL
CHLORIDE SERPL-SCNC: 106 MMOL/L
CHOLEST SERPL-MCNC: 153 MG/DL
CHOLEST/HDLC SERPL: 4.5 {RATIO}
CO2 SERPL-SCNC: 27 MMOL/L
CREAT SERPL-MCNC: 1 MG/DL
DIFFERENTIAL METHOD: ABNORMAL
EOSINOPHIL # BLD AUTO: 0.2 K/UL
EOSINOPHIL NFR BLD: 2.4 %
ERYTHROCYTE [DISTWIDTH] IN BLOOD BY AUTOMATED COUNT: 14 %
EST. GFR  (AFRICAN AMERICAN): >60 ML/MIN/1.73 M^2
EST. GFR  (NON AFRICAN AMERICAN): >60 ML/MIN/1.73 M^2
GLUCOSE SERPL-MCNC: 103 MG/DL
HCT VFR BLD AUTO: 34.6 %
HDLC SERPL-MCNC: 34 MG/DL
HDLC SERPL: 22.2 %
HGB BLD-MCNC: 11.6 G/DL
LDLC SERPL CALC-MCNC: 76.2 MG/DL
LYMPHOCYTES # BLD AUTO: 2.3 K/UL
LYMPHOCYTES NFR BLD: 36.9 %
MCH RBC QN AUTO: 29 PG
MCHC RBC AUTO-ENTMCNC: 33.5 G/DL
MCV RBC AUTO: 87 FL
MONOCYTES # BLD AUTO: 0.3 K/UL
MONOCYTES NFR BLD: 5.1 %
NEUTROPHILS # BLD AUTO: 3.4 K/UL
NEUTROPHILS NFR BLD: 54.3 %
NONHDLC SERPL-MCNC: 119 MG/DL
PLATELET # BLD AUTO: 258 K/UL
PMV BLD AUTO: 9.6 FL
POTASSIUM SERPL-SCNC: 4.1 MMOL/L
PROT SERPL-MCNC: 7.4 G/DL
RBC # BLD AUTO: 4 M/UL
SODIUM SERPL-SCNC: 141 MMOL/L
TRIGL SERPL-MCNC: 214 MG/DL
TSH SERPL DL<=0.005 MIU/L-ACNC: 0.83 UIU/ML
VIT B12 SERPL-MCNC: 246 PG/ML
WBC # BLD AUTO: 6.24 K/UL

## 2018-12-17 PROCEDURE — 80053 COMPREHEN METABOLIC PANEL: CPT

## 2018-12-17 PROCEDURE — 82607 VITAMIN B-12: CPT

## 2018-12-17 PROCEDURE — 36415 COLL VENOUS BLD VENIPUNCTURE: CPT

## 2018-12-17 PROCEDURE — 84443 ASSAY THYROID STIM HORMONE: CPT

## 2018-12-17 PROCEDURE — 82306 VITAMIN D 25 HYDROXY: CPT

## 2018-12-17 PROCEDURE — 80061 LIPID PANEL: CPT

## 2018-12-17 PROCEDURE — 85025 COMPLETE CBC W/AUTO DIFF WBC: CPT

## 2018-12-18 ENCOUNTER — PATIENT MESSAGE (OUTPATIENT)
Dept: ADMINISTRATIVE | Facility: OTHER | Age: 43
End: 2018-12-18

## 2018-12-19 ENCOUNTER — OFFICE VISIT (OUTPATIENT)
Dept: FAMILY MEDICINE | Facility: CLINIC | Age: 43
End: 2018-12-19
Payer: COMMERCIAL

## 2018-12-19 VITALS
HEIGHT: 63 IN | BODY MASS INDEX: 39.77 KG/M2 | SYSTOLIC BLOOD PRESSURE: 110 MMHG | DIASTOLIC BLOOD PRESSURE: 60 MMHG | TEMPERATURE: 99 F | HEART RATE: 74 BPM | OXYGEN SATURATION: 98 % | WEIGHT: 224.44 LBS

## 2018-12-19 DIAGNOSIS — E11.65 UNCONTROLLED TYPE 2 DIABETES MELLITUS WITH HYPERGLYCEMIA, WITHOUT LONG-TERM CURRENT USE OF INSULIN: Chronic | ICD-10-CM

## 2018-12-19 DIAGNOSIS — I10 ESSENTIAL HYPERTENSION: Chronic | ICD-10-CM

## 2018-12-19 PROCEDURE — 3078F DIAST BP <80 MM HG: CPT | Mod: CPTII,S$GLB,, | Performed by: FAMILY MEDICINE

## 2018-12-19 PROCEDURE — 3044F HG A1C LEVEL LT 7.0%: CPT | Mod: CPTII,S$GLB,, | Performed by: FAMILY MEDICINE

## 2018-12-19 PROCEDURE — 99214 OFFICE O/P EST MOD 30 MIN: CPT | Mod: S$GLB,,, | Performed by: FAMILY MEDICINE

## 2018-12-19 PROCEDURE — 3074F SYST BP LT 130 MM HG: CPT | Mod: CPTII,S$GLB,, | Performed by: FAMILY MEDICINE

## 2018-12-19 PROCEDURE — 3008F BODY MASS INDEX DOCD: CPT | Mod: CPTII,S$GLB,, | Performed by: FAMILY MEDICINE

## 2018-12-19 PROCEDURE — 99999 PR PBB SHADOW E&M-EST. PATIENT-LVL III: CPT | Mod: PBBFAC,,, | Performed by: FAMILY MEDICINE

## 2018-12-19 RX ORDER — CARVEDILOL 25 MG/1
TABLET ORAL
Qty: 60 TABLET | Refills: 0 | Status: SHIPPED | OUTPATIENT
Start: 2018-12-19 | End: 2019-02-06 | Stop reason: SDUPTHER

## 2018-12-19 RX ORDER — METFORMIN HYDROCHLORIDE 500 MG/1
250 TABLET ORAL 2 TIMES DAILY WITH MEALS
Qty: 30 TABLET | Refills: 2 | Status: SHIPPED | OUTPATIENT
Start: 2018-12-19 | End: 2019-04-04 | Stop reason: SDUPTHER

## 2018-12-19 RX ORDER — VALSARTAN 320 MG/1
320 TABLET ORAL DAILY
Qty: 30 TABLET | Refills: 2 | Status: SHIPPED | OUTPATIENT
Start: 2018-12-19 | End: 2019-03-08 | Stop reason: SDUPTHER

## 2018-12-19 RX ORDER — VERAPAMIL HYDROCHLORIDE 180 MG/1
TABLET, FILM COATED, EXTENDED RELEASE ORAL
Qty: 60 TABLET | Refills: 2 | Status: SHIPPED | OUTPATIENT
Start: 2018-12-19 | End: 2019-04-04 | Stop reason: SDUPTHER

## 2018-12-19 RX ORDER — HYDRALAZINE HYDROCHLORIDE 100 MG/1
100 TABLET, FILM COATED ORAL EVERY 8 HOURS
Qty: 90 TABLET | Refills: 2 | Status: SHIPPED | OUTPATIENT
Start: 2018-12-19 | End: 2019-05-07 | Stop reason: SDUPTHER

## 2018-12-19 RX ORDER — ICOSAPENT ETHYL 1000 MG/1
CAPSULE ORAL
Qty: 120 CAPSULE | Refills: 5 | Status: SHIPPED | OUTPATIENT
Start: 2018-12-19 | End: 2019-11-13 | Stop reason: SDUPTHER

## 2018-12-21 ENCOUNTER — PATIENT OUTREACH (OUTPATIENT)
Dept: OTHER | Facility: OTHER | Age: 43
End: 2018-12-21

## 2018-12-21 NOTE — PROGRESS NOTES
Last 5 Patient Entered Readings                                      Current 30 Day Average: 134/81     Recent Readings 12/18/2018 12/18/2018 12/9/2018 12/9/2018 12/2/2018    SBP (mmHg) 124 - - 131 -    DBP (mmHg) 74 - - 87 -    Pulse 70 70 84 84 69          Last 6 Patient Entered Readings                                          Most Recent A1c: 5% on 9/11/2018  (Goal: 8%)     Recent Readings 12/18/2018 12/18/2018 12/14/2018 12/14/2018 12/10/2018    Blood Glucose (mg/dL) 100 47 110 139 115        LVM for recent BS reading of 47. No pattern/precipitating cause identified after review. Will continue to monitor and follow-up. Message left with instruction to call back with any concerns at earliest convenience.

## 2018-12-24 NOTE — PROGRESS NOTES
HPI:  Enriqueta Mccarthy is a 43 y.o. year old female that  presents for follow-up of lab results.  Chief Complaint   Patient presents with    Results     discuss labs   .           Past Medical History:   Diagnosis Date    Essential hypertension     Herpes simplex virus (HSV) infection     Hypertension     Hypertriglyceridemia 2016    Neuropathy     Obesity (BMI 30-39.9) 2016    Type 2 diabetes mellitus with diabetic polyneuropathy, without long-term current use of insulin 2016    Uterine fibroid      Social History     Socioeconomic History    Marital status:      Spouse name: Not on file    Number of children: Not on file    Years of education: Not on file    Highest education level: Not on file   Social Needs    Financial resource strain: Not on file    Food insecurity - worry: Not on file    Food insecurity - inability: Not on file    Transportation needs - medical: Not on file    Transportation needs - non-medical: Not on file   Occupational History    Not on file   Tobacco Use    Smoking status: Never Smoker    Smokeless tobacco: Never Used   Substance and Sexual Activity    Alcohol use: No    Drug use: No    Sexual activity: Not Currently     Partners: Male     Birth control/protection: Surgical   Other Topics Concern    Not on file   Social History Narrative    Not on file     Past Surgical History:   Procedure Laterality Date    ABLATION ENDOMETRIAL THERMAL - NOVASURE N/A 6/10/2016    Performed by [facility]dock [facility] at [facility] OR     SECTION      x 2    CHOLECYSTECTOMY-LAPAROSCOPIC  2017    Performed [facility]helan [facility] at [facility] OR    ENDOMETRIAL ABLATION  2016    ESOPHAGOGASTRODUODENOSCOPY (EGD) N/A 3/7/2017    Performed by Stef Saba MD at [facility] ENDO    GALLBLADDER SURGERY  2017    removed    [id number] N/A 6/10/2016    [facility] OR    LIPOMA RESECTION Left  "8/2013    SINUS SURGERY  2006    TONSILLECTOMY, ADENOIDECTOMY      TUBAL LIGATION       Family History   Problem Relation Age of Onset    Diabetes Father     Hypertension Father     Hyperlipidemia Father     Hypertension Mother     Anemia Daughter     Anemia Son     Diabetes Maternal Uncle     Diabetes Maternal Grandmother     Blindness Neg Hx     Glaucoma Neg Hx     Macular degeneration Neg Hx     Retinal detachment Neg Hx            Review of Systems  General ROS: negative for chills, fever or weight loss  ENT ROS: negative for epistaxis, sore throat or rhinorrhea  Respiratory ROS: no cough, shortness of breath, or wheezing  Cardiovascular ROS: no chest pain or dyspnea on exertion  Gastrointestinal ROS: no abdominal pain, change in bowel habits, or black/ bloody stools    Physical Exam:  /60 (BP Location: Left arm, Patient Position: Sitting, BP Method: Large (Manual))   Pulse 74   Temp 98.6 °F (37 °C) (Oral)   Ht 5' 3" (1.6 m)   Wt 101.8 kg (224 lb 6.9 oz)   SpO2 98%   BMI 39.76 kg/m²   General appearance: alert, cooperative, no distress  Constitutional:Oriented to person, place, and time.appears well-developed and well-nourished.  HEENT: Normocephalic, atraumatic, neck symmetrical, no nasal discharge, TM- clear bilaterally  Lungs: clear to auscultation bilaterally, no dullness to percussion bilaterally  Heart: regular rate and rhythm without rub; no displacement of the PMI , S1&S2 present  Abdomen: soft, non-tender; bowel sounds normoactive; no organomegaly  Physical Exam      LABS:    Complete Blood Count  Lab Results   Component Value Date    RBC 4.00 12/17/2018    HGB 11.6 (L) 12/17/2018    HCT 34.6 (L) 12/17/2018    MCV 87 12/17/2018    MCH 29.0 12/17/2018    MCHC 33.5 12/17/2018    RDW 14.0 12/17/2018     12/17/2018    MPV 9.6 12/17/2018    GRAN 3.4 12/17/2018    GRAN 54.3 12/17/2018    LYMPH 2.3 12/17/2018    LYMPH 36.9 12/17/2018    MONO 0.3 12/17/2018    MONO 5.1 " 12/17/2018    EOS 0.2 12/17/2018    BASO 0.03 12/17/2018    EOSINOPHIL 2.4 12/17/2018    BASOPHIL 0.5 12/17/2018    DIFFMETHOD Automated 12/17/2018       Comprehensive Metabolic Panel  Lab Results   Component Value Date     12/17/2018    BUN 13 12/17/2018    CREATININE 1.0 12/17/2018     12/17/2018    K 4.1 12/17/2018     12/17/2018    PROT 7.4 12/17/2018    ALBUMIN 4.1 12/17/2018    BILITOT 0.8 12/17/2018    AST 18 12/17/2018    ALKPHOS 48 (L) 12/17/2018    CO2 27 12/17/2018    ALT 20 12/17/2018    ANIONGAP 8 12/17/2018    EGFRNONAA >60 12/17/2018    ESTGFRAFRICA >60 12/17/2018       LIPID  Lab Results   Component Value Date    CHOL 153 12/17/2018    HDL 34 (L) 12/17/2018         TSH  Lab Results   Component Value Date    TSH 0.827 12/17/2018       Current Outpatient Medications   Medication Sig Dispense Refill    atorvastatin (LIPITOR) 80 MG tablet Take 1 tablet (80 mg total) by mouth every evening. 90 tablet 3    azelastine (ASTELIN) 137 mcg (0.1 %) nasal spray 2 sprays (274 mcg total) by Nasal route 2 (two) times daily. 30 mL 2    carvedilol (COREG) 25 MG tablet TAKE 1 TABLET BY MOUTH TWICE A DAY 60 tablet 0    dulaglutide (TRULICITY) 0.75 mg/0.5 mL PnIj Inject 0.5 mLs (0.75 mg total) into the skin every 7 days. 2 mL 1    gabapentin (NEURONTIN) 300 MG capsule Take 2 capsules (600 mg total) by mouth every evening. 60 capsule 3    hydrALAZINE (APRESOLINE) 100 MG tablet Take 1 tablet (100 mg total) by mouth every 8 (eight) hours. 90 tablet 2    icosapent ethyl (VASCEPA) 1 gram Cap TAKE TWO CAPSULES BY MOUTH TWICE A  capsule 5    metFORMIN (GLUCOPHAGE) 500 MG tablet Take 1/2 tablet (250 mg total) by mouth 2 (two) times daily with meals. 30 tablet 2    valsartan (DIOVAN) 320 MG tablet Take 1 tablet (320 mg total) by mouth once daily. 30 tablet 2    verapamil (CALAN-SR) 180 MG CR tablet TAKE 1 TABLET BY MOUTH TWO TIMES A DAY ( DISCONTINUE DILTIAZEM ) 60 tablet 2     No current  facility-administered medications for this visit.        Assessment:    ICD-10-CM ICD-9-CM    1. Essential hypertension I10 401.9 carvedilol (COREG) 25 MG tablet      verapamil (CALAN-SR) 180 MG CR tablet      hydrALAZINE (APRESOLINE) 100 MG tablet      valsartan (DIOVAN) 320 MG tablet   2. Uncontrolled type 2 diabetes mellitus with hyperglycemia, without long-term current use of insulin E11.65 250.02 dulaglutide (TRULICITY) 0.75 mg/0.5 mL PnIj      metFORMIN (GLUCOPHAGE) 500 MG tablet         Plan:  Pt advised to decrease intake of white bread, white rice, corn, potatoes, pasta and sugar. May have moderate intake of Complex Carbohydrates such as brown rice, whole grain bread, and other whole grains.Exercise counseling done. Pt advised to exercise 30-45 minutes 5-7 times a week.  Follow-up in 3 months (on 3/19/2019).          Lexus Cotton MD  Answers for HPI/ROS submitted by the patient on 12/13/2018   activity change: No  unexpected weight change: No  neck pain: No  hearing loss: No  rhinorrhea: No  trouble swallowing: No  eye discharge: No  visual disturbance: No  chest tightness: No  wheezing: No  chest pain: No  palpitations: No  blood in stool: No  constipation: No  vomiting: No  diarrhea: No  polydipsia: No  polyuria: No  difficulty urinating: No  hematuria: No  menstrual problem: No  dysuria: No  joint swelling: No  arthralgias: No  headaches: No  weakness: No  confusion: No  dysphoric mood: No

## 2019-01-18 ENCOUNTER — PATIENT OUTREACH (OUTPATIENT)
Dept: OTHER | Facility: OTHER | Age: 44
End: 2019-01-18

## 2019-01-18 NOTE — PROGRESS NOTES
Last 5 Patient Entered Readings                                      Current 30 Day Average: 142/84     Recent Readings 1/10/2019 1/10/2019 1/1/2019 1/1/2019 12/30/2018    SBP (mmHg) - 146 - 150 130    DBP (mmHg) - 87 - 83 82    Pulse 64 64 72 72 77          Last 6 Patient Entered Readings                                          Most Recent A1c: 5% on 9/11/2018  (Goal: 8%)     Recent Readings 1/16/2019 1/16/2019 1/13/2019 1/8/2019 1/8/2019    Blood Glucose (mg/dL) 171 114 119 124 87            Digital Medicine: Health  Follow Up    Left voicemail to follow up with . Enriqueta Mccarthy.  Current BP average 142/84 mmHg is not at goal, <130/80.    Want to discuss a few overdue tasks for her health maintenance reminders.

## 2019-01-23 ENCOUNTER — PATIENT MESSAGE (OUTPATIENT)
Dept: ADMINISTRATIVE | Facility: OTHER | Age: 44
End: 2019-01-23

## 2019-01-24 NOTE — PROGRESS NOTES
Last 5 Patient Entered Readings                                      Current 30 Day Average: 141/85     Recent Readings 1/18/2019 1/18/2019 1/18/2019 1/18/2019 1/16/2019    SBP (mmHg) - 140 - 149 141    DBP (mmHg) - 81 - 95 81    Pulse 70 70 63 63 62          Last 6 Patient Entered Readings                                          Most Recent A1c: 5% on 9/11/2018  (Goal: 8%)     Recent Readings 1/23/2019 1/23/2019 1/21/2019 1/21/2019 1/16/2019    Blood Glucose (mg/dL) 70 167 88 116 171          Digital Medicine: Health  Follow Up    Left voicemail to follow up with Mrs. Enriqueta Mccarthy.  Current BP average 141/85 mmHg is not at goal, <130/80.    Want to discuss a few overdue tasks for her health maintenance reminders.   Want to discuss low BS alert from 1/23 (70).

## 2019-01-24 NOTE — PROGRESS NOTES
Last 5 Patient Entered Readings                                      Current 30 Day Average: 141/85     Recent Readings 1/18/2019 1/18/2019 1/18/2019 1/18/2019 1/16/2019    SBP (mmHg) - 140 - 149 141    DBP (mmHg) - 81 - 95 81    Pulse 70 70 63 63 62          Last 6 Patient Entered Readings                                          Most Recent A1c: 5% on 9/11/2018  (Goal: 8%)     Recent Readings 1/23/2019 1/23/2019 1/21/2019 1/21/2019 1/16/2019    Blood Glucose (mg/dL) 70 167 88 116 171          Digital Medicine: Health  Follow Up    Lifestyle Modifications:    1.Dietary Modifications (Sodium intake <2,000mg/day, food labels, dining out): Patient continues monitoring her sodium/carb intake. She typically does well with this. She did not have any questions at this time.     2.Physical Activity: Patient stated that she is still walking from one campus to another at work and she is taking the stairs whenever she can but she is not walking as much due to the cold weather. She plans to increase this once the weather gets warmer. Patient is also busy with taking her granddaughter to dancing and play practice so that takes up some of her time.     3.Medication Therapy: Patient has been compliant with the medication regimen. Patient stated that she lost her Valsartan and Metformin recently (she thinks someone accidentally threw it away) so it took for her a few days to get a more (had to pay out of pocket).  She believes this is why her readings were off a bit for her BP and BS.     4.Patient has the following medication side effects/concerns:   (Frequency/Alleviating factors/Precipitating factors, etc.)     Patient attributes her low BS readings on 1/23 (70) to not eating enough for lunch. She denies symptoms.    Follow up with Henry Enriqueta Mora Fabio completed. No further questions or concerns. Will continue to follow up to achieve health goals.

## 2019-02-01 ENCOUNTER — PATIENT OUTREACH (OUTPATIENT)
Dept: OTHER | Facility: OTHER | Age: 44
End: 2019-02-01

## 2019-02-01 NOTE — PROGRESS NOTES
Last 5 Patient Entered Readings                                      Current 30 Day Average: 142/85     Recent Readings 1/27/2019 1/27/2019 1/18/2019 1/18/2019 1/18/2019    SBP (mmHg) - 140 - 140 -    DBP (mmHg) - 81 - 81 -    Pulse 71 71 70 70 63          Last 6 Patient Entered Readings                                          Most Recent A1c: 5% on 9/11/2018  (Goal: 8%)     Recent Readings 1/31/2019 1/31/2019 1/28/2019 1/28/2019 1/23/2019    Blood Glucose (mg/dL) 96 53 150 144 70          Spoke with patient about low BS reading on 1/31 (53). She stated that she does not believe it is accurate. She thinks that she did not let her hands dry all the way after using the alcohol swab. She denies symptoms and it did go back up after retaking it some time later. I will delete the 53 out of her chart. She will reach out if she starts experiencing any issues.

## 2019-02-06 ENCOUNTER — PATIENT MESSAGE (OUTPATIENT)
Dept: CARDIOLOGY | Facility: CLINIC | Age: 44
End: 2019-02-06

## 2019-02-06 DIAGNOSIS — I10 ESSENTIAL HYPERTENSION: Chronic | ICD-10-CM

## 2019-02-06 RX ORDER — CARVEDILOL 25 MG/1
TABLET ORAL
Qty: 60 TABLET | Refills: 11 | Status: SHIPPED | OUTPATIENT
Start: 2019-02-06 | End: 2020-02-24 | Stop reason: SDUPTHER

## 2019-02-08 NOTE — PROGRESS NOTES
Last 5 Patient Entered Readings                                      Current 30 Day Average: 113/74     Recent Readings 9/15/2018 9/15/2018 9/3/2018 9/3/2018 8/26/2018    SBP (mmHg) - 117 - 127 -    DBP (mmHg) - 67 - 86 -    Pulse 69 69 87 87 62          Last 6 Patient Entered Readings                                          Most Recent A1c: 5% on 9/11/2018  (Goal: 7%)     Recent Readings 9/17/2018 9/13/2018 9/13/2018 9/10/2018 9/10/2018    Blood Glucose (mg/dL) 112 138 111 172 138          Digital Medicine: Health  Follow Up    Lifestyle Modifications:    1.Dietary Modifications (Sodium intake <2,000mg/day, food labels, dining out): Patient continues working on her sodium/carb intake. She is monitoring it more closely then she was in the past. She has not eliminated carbs but has cut back and continues reading food labels, etc. Patient is more aware of what she needs to stay away from and what to incorporate in her diet more. She will reach out with specific questions.     2.Physical Activity: Patient has started walking more a few times per week. She plans to keep up with this and stick to it.     3.Medication Therapy: Patient has been compliant with the medication regimen. Patient is doing well on her current regimen. She denies symptoms/side effects.     4.Patient has the following medication side effects/concerns:   (Frequency/Alleviating factors/Precipitating factors, etc.)     Follow up with Mrs. Enriqueta Mora Fabio completed. No further questions or concerns. Will continue to follow up to achieve health goals.   no

## 2019-02-11 ENCOUNTER — PATIENT OUTREACH (OUTPATIENT)
Dept: OTHER | Facility: OTHER | Age: 44
End: 2019-02-11

## 2019-02-11 NOTE — PROGRESS NOTES
"Last 5 Patient Entered Readings                                      Current 30 Day Average: 139/86     Recent Readings 2/11/2019 2/11/2019 2/3/2019 2/3/2019 2/3/2019    SBP (mmHg) - 129 - 144 -    DBP (mmHg) - 88 - 85 -    Pulse 78 78 79 79 78          Last 6 Patient Entered Readings                                          Most Recent A1c: 5% on 9/11/2018  (Goal: 8%)     Recent Readings 2/10/2019 2/10/2019 2/8/2019 2/8/2019 2/6/2019    Blood Glucose (mg/dL) 155 122 165 56 149            Digital Medicine: Health  Follow Up    Sent [employer] message to follow up with . Enriqueta Mccarthy about her low BS reading on 2/8 (56).    Patient messaged the following:  "I think that reading was accurate.  I don't think I ate enough that day.  The reading was taken right before dinner. Baked chicken, green beans and french fries.  I must have eaten to many fries b/c as you can see the reading went up (to much)."    Provided feedback and will continue to follow up.           "

## 2019-02-20 DIAGNOSIS — E11.65 UNCONTROLLED TYPE 2 DIABETES MELLITUS WITH HYPERGLYCEMIA, WITHOUT LONG-TERM CURRENT USE OF INSULIN: Chronic | ICD-10-CM

## 2019-02-27 ENCOUNTER — PATIENT OUTREACH (OUTPATIENT)
Dept: OTHER | Facility: OTHER | Age: 44
End: 2019-02-27

## 2019-02-27 NOTE — PROGRESS NOTES
"Last 5 Patient Entered Readings                                      Current 30 Day Average: 137/85     Recent Readings 2/24/2019 2/24/2019 2/19/2019 2/19/2019 2/11/2019    SBP (mmHg) - 132 - 141 -    DBP (mmHg) - 80 - 80 -    Pulse 60 60 67 67 78          Last 6 Patient Entered Readings                                          Most Recent A1c: 5% on 9/11/2018  (Goal: 8%)     Recent Readings 2/25/2019 2/25/2019 2/22/2019 2/22/2019 2/18/2019    Blood Glucose (mg/dL) 169 116 113 144 191          Digital Medicine: Health  Follow Up    Lifestyle Modifications:    1.Dietary Modifications (Sodium intake <2,000mg/day, food labels, dining out): Patient stated that she has been eating more canned green beans and other things that her mother cooks for her (which are not always low in salt). We reviewed the sodium guidelines and the patient stated that she is going to work on alternatives (canned to frozen, etc). Patient realizes that she needs to get back on track in order to get her BP back in control. I will continue to work on this with her.     2.Physical Activity: Patient stated that she is not walking like she use to. She stated that she is just being "lazy" but plans to get back on track once the weather gets better.     3.Medication Therapy: Patient has been compliant with the medication regimen. Patient is doing well on her current regimen. She denies symptoms/side effects.     4.Patient has the following medication side effects/concerns:   (Frequency/Alleviating factors/Precipitating factors, etc.)     Follow up with Henry Enriqueta Abby Mccarthy completed. No further questions or concerns. Will continue to follow up to achieve health goals.  "

## 2019-02-27 NOTE — PROGRESS NOTES
Last 5 Patient Entered Readings                                      Current 30 Day Average: 137/85     Recent Readings 2/24/2019 2/24/2019 2/19/2019 2/19/2019 2/11/2019    SBP (mmHg) - 132 - 141 -    DBP (mmHg) - 80 - 80 -    Pulse 60 60 67 67 78          Last 6 Patient Entered Readings                                          Most Recent A1c: 5% on 9/11/2018  (Goal: 8%)     Recent Readings 2/25/2019 2/25/2019 2/22/2019 2/22/2019 2/18/2019    Blood Glucose (mg/dL) 169 116 113 144 191          Digital Medicine: Health  Follow Up    Left voicemail to follow up with Mrs. Enriqueta Mora Mccarthy.  Current BP average 137/85 mmHg is not at goal, <130/80.      A1C is meeting the goal (5%).

## 2019-03-03 ENCOUNTER — PATIENT MESSAGE (OUTPATIENT)
Dept: ADMINISTRATIVE | Facility: OTHER | Age: 44
End: 2019-03-03

## 2019-03-04 ENCOUNTER — LAB VISIT (OUTPATIENT)
Dept: LAB | Facility: HOSPITAL | Age: 44
End: 2019-03-04
Attending: FAMILY MEDICINE
Payer: COMMERCIAL

## 2019-03-04 DIAGNOSIS — E11.65 UNCONTROLLED TYPE 2 DIABETES MELLITUS WITH HYPERGLYCEMIA, WITHOUT LONG-TERM CURRENT USE OF INSULIN: Chronic | ICD-10-CM

## 2019-03-04 LAB
ESTIMATED AVG GLUCOSE: 103 MG/DL
HBA1C MFR BLD HPLC: 5.2 %

## 2019-03-04 PROCEDURE — 83036 HEMOGLOBIN GLYCOSYLATED A1C: CPT

## 2019-03-04 PROCEDURE — 36415 COLL VENOUS BLD VENIPUNCTURE: CPT

## 2019-03-08 ENCOUNTER — OFFICE VISIT (OUTPATIENT)
Dept: OPTOMETRY | Facility: CLINIC | Age: 44
End: 2019-03-08
Payer: COMMERCIAL

## 2019-03-08 DIAGNOSIS — H10.13 ALLERGIC CONJUNCTIVITIS, BILATERAL: ICD-10-CM

## 2019-03-08 DIAGNOSIS — E11.3292 TYPE 2 DIABETES MELLITUS WITH LEFT EYE AFFECTED BY MILD NONPROLIFERATIVE RETINOPATHY WITHOUT MACULAR EDEMA, WITHOUT LONG-TERM CURRENT USE OF INSULIN: Primary | ICD-10-CM

## 2019-03-08 DIAGNOSIS — Z79.84 LONG TERM CURRENT USE OF ORAL HYPOGLYCEMIC DRUG: ICD-10-CM

## 2019-03-08 DIAGNOSIS — I10 ESSENTIAL HYPERTENSION: Chronic | ICD-10-CM

## 2019-03-08 PROCEDURE — 99999 PR PBB SHADOW E&M-EST. PATIENT-LVL III: CPT | Mod: PBBFAC,,, | Performed by: OPTOMETRIST

## 2019-03-08 PROCEDURE — 99999 PR PBB SHADOW E&M-EST. PATIENT-LVL III: ICD-10-PCS | Mod: PBBFAC,,, | Performed by: OPTOMETRIST

## 2019-03-08 PROCEDURE — 92014 PR EYE EXAM, EST PATIENT,COMPREHESV: ICD-10-PCS | Mod: S$GLB,,, | Performed by: OPTOMETRIST

## 2019-03-08 PROCEDURE — 92014 COMPRE OPH EXAM EST PT 1/>: CPT | Mod: S$GLB,,, | Performed by: OPTOMETRIST

## 2019-03-08 RX ORDER — VALSARTAN 320 MG/1
320 TABLET ORAL DAILY
Qty: 30 TABLET | Refills: 2 | Status: SHIPPED | OUTPATIENT
Start: 2019-03-08 | End: 2019-05-31 | Stop reason: SDUPTHER

## 2019-03-08 NOTE — PATIENT INSTRUCTIONS
What Is Diabetic Retinopathy?  Diabetic retinopathy is the leading cause of blindness in adults. It occurs when diabetes harms blood vessels inside the eye. These weak vessels leak fluid into an area of the eye called the retina. Weak new vessels can break and bleed into the retina. Old vessels can leak and cause swelling. These vessels can damage areas of the retina, causing blurry, distorted vision.    What causes diabetic retinopathy?  Diabetes is the cause of this eye disease. Over time, diabetes makes blood vessels weaken all over the body, including in the eyes. Poor blood sugar control can make retinopathy worse. So can smoking, high cholesterol, or poorly controlled high blood pressure. Pregnancy can also cause retinopathy to worsen.  What are the symptoms?  You can have diabetic retinopathy without knowing it. Usually, there is no pain and no outward sign. Over time, you may notice gradual blurring or some vision loss. Symptoms may come and go. Early treatment and good control of risk factors may help prevent vision loss or blindness.  What you can do  Have your eyes examined regularly by an eye specialist. Your healthcare provider will tell you when and how often you need these exams. You can also help control your diabetes through exercise, diet, and medicine, as instructed by your healthcare provider. These same steps may also help control diabetic retinopathy.           Treating Diabetic Retinopathy  Treatment may help slow the progress of diabetic retinopathy. Your treatment plan depends on your condition. It may include frequent exams to monitor your condition, laser treatment, and other procedures.      Laser is one type of treatment that may help limit vision loss from diabetic retinopathy.      Monitoring Your Vision  At first, your doctor may simply want to monitor your vision. In some cases, you may also have an angiogram. This test uses a special dye to create detailed images of the retina.  These images help your doctor decide whether special treatments are needed. You may also have ocular coherence tomography (OCT) testing. This uses light waves to see if there is fluid leaking into certain parts of the eye. It can also measure the thickness of the retina.  Types of Treatment  Special treatments can help stop bleeding, slow or stop new vessel growth, and preserve vision. The type of treatment you get depends on your condition:  · Laser treatment can help stop leaks and limit abnormal vessel growth.  · Surgery can remove the vitreous or repair a retina that is damaged by scar tissue formation. This may help if the vitreous becomes filled with blood and obscures your vision.  · Cryotherapy shrinks blood vessels and repairs the retina.  · Medications injected in the eye can help decrease swelling of the retina or slow the abnormal growth of blood vessels.   ·   © 4086-1773 [employer]. [address] [place] [address]. All rights reserved. This information is not intended as a substitute for professional medical care. Always follow your healthcare professional's instructions.         Managing Type 2 Diabetes  Type 2 diabetes is a long-term (chronic) condition. Managing your diabetes means making some changes that may be hard. Your health care provider, nurse, diabetes educator, and others can help you.  Managing type 2 diabetes means balancing your medicine with diet and activity. Managing your diabetes may also include checking your blood sugar. And, working with your health care provider to prevent complications.  Take your medicine as prescribed  You may take pills (oral medicine) or give yourself shots (insulin injections) for diabetes. Or you may use both. Taking your medicines or giving yourself insulin at the right times will help you control your blood sugar. Think about ways that will help you remember to take your medicines the right way every day. Ask your health care  provider, nurse, or diabetes educator for ideas.  Although you may only take pills for your diabetes, this may change. Over time, most people with type 2 diabetes also use insulin.  Eat healthy  A healthy, well-planned diet helps control the amount of sugar in your blood. It also helps you stay at a healthy weight or lose weight, if you are overweight. Extra weight makes it harder to control diabetes.  Your health care provider, nurse, a dietitian, or diabetes educator will help you create a plan that works for you. You don't have to give up all the foods you like. Having meals and snacks with vegetables, fruits, lean meats, or other healthy proteins, whole grains, and low- or no-fat dairy products will help control your blood sugar.  Be physically active  Being active helps lower your blood sugar. It does this by helping your body use insulin to turn food into energy. Activity also helps you manage your weight:  Ask your health care provider to work with you to create an activity program that's right for you. Your activity programs is based on your age, general health, and types of activity you enjoy. You should start slowly, but aim for at least 30 minutes of exercise or activity on most days.  Check your blood sugar  Checking your own blood sugar may be a regular part of your care. Or you may only check your blood sugar from time to time. Your health care provider will give you instructions about checking your blood sugar at home. Checking it tells you if your blood sugar is in your target range. A target range means that you are managing your diabetes well.  If your blood sugar levels are too high or too low, your health care provider may suggest changes to your diet or activity level. He or she may also adjust your medication.  Your health care provider may also tell you to check your blood sugar more often when you are sick. At certain times, for example, when you have a cold or the flu, you may need to check  it more often.  Take care of yourself  When you have diabetes, you may be more likely to develop other health problems. They include foot, eye, heart, and kidney problems. By controlling your blood sugar, and taking good care of yourself, you can help prevent these problems. Your health care provider, nurse, diabetes educator, and others can assist you.  · Checkups. You should have regular checkups with your health care provider. At those visits, you will have a physical exam that includes checking your feet. Your health care provider will also check your blood pressure and weight.  · Other exams. You should also have complete eye, foot, and dental exams at least once every year.  · Lab tests. You will have blood and urine tests.  ¨ At least 2 times a year, your health care provider will check your hemoglobin A1C. This blood test shows how well you have been controlling your blood sugar over 2 to 3 months. The results help your health care provider manage your diabetes.    ¨ You will also have other lab tests. For example, to check for kidney problems and abnormal cholesterol levels.  · Smoking. If you smoke, you will need to quit. Smoking increases the chance that you will develop complications from diabetes. Ask your health care provider about ways to quit.  · Vaccines. Get a yearly flu shot. And, ask your health care provider about vaccines to prevent pneumonia and hepatitis B.  Stress and depression  Most people have challenges throughout their lives. Living with diabetes, or any serious condition, can increase your stress and make you feel a lot of different emotions. In diabetes, feeling stressed or depressed can actually affect your blood sugar levels.  If you are having trouble dealing with diabetes, tell your health care provider. He or she can help or refer you to other health care providers or programs.  Support and resources  Know where you can get help. You can try the following:  · Support. Ask family  and friends to support your effects to take care of yourself. Or, look for a diabetes support group locally or on the internet. (Check the Connect with Others link on www.diabetes.org)  · Counseling. Talk with a , psychologist, psychiatrist, or other counselor.  · Information. Contact the American Diabetes Association at 492-327-3754 or www.diabetes.org      © 2417-8260 [employer]. [address] [place] [address], [place] [address]. All rights reserved. This information is not intended as a substitute for professional medical care. Always follow your healthcare professional's instructions.

## 2019-03-08 NOTE — PROGRESS NOTES
HPI     Ms. Enriqueta Mccarthy is here for her annual diabetic eye exam.    Patient complains of OU still tearing and itching a lot, and pressure   sensation OD. Managed with Zaditor prn. No headache.    Clear vision all ranges without correction. She declines refraction.    (+)drops: Generic for Zaditor prn  (-)flashes  (-)floaters  (-)diplopia    (+)Diabetes x 2-3 yrs   LBS- uknown  Hemoglobin A1C       Date                     Value               Ref Range             Status           03/04/2019               5.2                 4.0 - 5.6 %         Final  09/11/2018               5.0                 4.0 - 5.6 %         Final  06/11/2018               4.9                 4.0 - 5.6 %         Final    OCULAR HISTORY  Last Eye Exam: 01/02/2018 with Dr. Lynn  (-)eye surgery   Mild NPDR OS (first noted in 2016)  Dry eyes    FAMILY HISTORY  (-)Glaucoma         Last edited by Adriana Lynn, OD on 3/8/2019  2:17 PM. (History)            Assessment /Plan     For exam results, see Encounter Report.    Type 2 diabetes mellitus with left eye affected by mild nonproliferative retinopathy without macular edema, without long-term current use of insulin  Long term current use of oral hypoglycemic drug   Mild NPDR OS as previously noted since 2016. DM well-controlled recently. Continue management as directed by PCP. Monitor with DFE in 1 year, or RTC sooner with any monocular vision changes.  -     Cancel: Diabetic Eye Screening Photo    Allergic conjunctivitis, both eyes   Continue Zaditor prn.      RTC 1 year

## 2019-04-01 ENCOUNTER — PATIENT MESSAGE (OUTPATIENT)
Dept: FAMILY MEDICINE | Facility: CLINIC | Age: 44
End: 2019-04-01

## 2019-04-02 ENCOUNTER — PATIENT OUTREACH (OUTPATIENT)
Dept: OTHER | Facility: OTHER | Age: 44
End: 2019-04-02

## 2019-04-02 NOTE — PROGRESS NOTES
Last 5 Patient Entered Readings                                      Current 30 Day Average: 133/77     Recent Readings 3/28/2019 3/28/2019 3/17/2019 3/17/2019 3/2/2019    SBP (mmHg) - 142 - 123 131    DBP (mmHg) - 81 - 72 81    Pulse 71 71 73 73 61          Last 6 Patient Entered Readings                                          Most Recent A1c: 5.2% on 3/4/2019  (Goal: 8%)     Recent Readings 3/28/2019 3/28/2019 3/25/2019 3/25/2019 3/21/2019    Blood Glucose (mg/dL) 100 152 106 96 101            Digital Medicine: Health  Follow Up    Left voicemail to follow up with  Enriqueta Mora Fabio.  Current BP average 133/77 mmHg is not at goal <130/80.

## 2019-04-04 DIAGNOSIS — I10 ESSENTIAL HYPERTENSION: Chronic | ICD-10-CM

## 2019-04-04 DIAGNOSIS — E11.65 UNCONTROLLED TYPE 2 DIABETES MELLITUS WITH HYPERGLYCEMIA, WITHOUT LONG-TERM CURRENT USE OF INSULIN: Chronic | ICD-10-CM

## 2019-04-04 NOTE — PROGRESS NOTES
Last 5 Patient Entered Readings                                      Current 30 Day Average: 136/82     Recent Readings 4/4/2019 4/4/2019 4/4/2019 4/4/2019 4/4/2019    SBP (mmHg) - 142 - 162 -    DBP (mmHg) - 83 - 83 -    Pulse 78 78 68 68 79          Last 6 Patient Entered Readings                                          Most Recent A1c: 5.2% on 3/4/2019  (Goal: 8%)     Recent Readings 3/28/2019 3/28/2019 3/25/2019 3/25/2019 3/21/2019    Blood Glucose (mg/dL) 100 152 106 96 101        Patient called requesting a new prescription for her test strips. I requested for her PharmD, YINKA Cotton, to place the request for new strips (and not lancets per patient request). Patient also informed me that she is starting to work on her diet and exercise again because she noticed that her BP and BS numbers have trended up. She will reach out to me with specific questions regarding this.

## 2019-04-05 RX ORDER — VERAPAMIL HYDROCHLORIDE 180 MG/1
TABLET, FILM COATED, EXTENDED RELEASE ORAL
Qty: 60 TABLET | Refills: 2 | Status: SHIPPED | OUTPATIENT
Start: 2019-04-05 | End: 2019-07-08 | Stop reason: SDUPTHER

## 2019-04-05 RX ORDER — METFORMIN HYDROCHLORIDE 500 MG/1
250 TABLET ORAL 2 TIMES DAILY WITH MEALS
Qty: 30 TABLET | Refills: 2 | Status: SHIPPED | OUTPATIENT
Start: 2019-04-05 | End: 2019-07-08 | Stop reason: SDUPTHER

## 2019-04-16 ENCOUNTER — PATIENT MESSAGE (OUTPATIENT)
Dept: FAMILY MEDICINE | Facility: CLINIC | Age: 44
End: 2019-04-16

## 2019-04-16 DIAGNOSIS — E11.65 UNCONTROLLED TYPE 2 DIABETES MELLITUS WITH HYPERGLYCEMIA, WITHOUT LONG-TERM CURRENT USE OF INSULIN: Chronic | ICD-10-CM

## 2019-04-25 ENCOUNTER — PATIENT OUTREACH (OUTPATIENT)
Dept: OTHER | Facility: OTHER | Age: 44
End: 2019-04-25

## 2019-04-25 ENCOUNTER — PATIENT MESSAGE (OUTPATIENT)
Dept: OTHER | Facility: OTHER | Age: 44
End: 2019-04-25

## 2019-04-25 NOTE — PROGRESS NOTES
Last 5 Patient Entered Readings                                      Current 30 Day Average: 138/83     Recent Readings 4/20/2019 4/15/2019 4/8/2019 4/8/2019 4/4/2019    SBP (mmHg) 128 130 - 147 -    DBP (mmHg) 68 75 - 95 -    Pulse 71 69 64 64 70          Last 6 Patient Entered Readings                                          Most Recent A1c: 5.2% on 3/4/2019  (Goal: 8%)     Recent Readings 4/23/2019 4/19/2019 4/16/2019 4/15/2019 3/28/2019    Blood Glucose (mg/dL) 151 160 176 168 100            Digital Medicine: Health  Follow Up    Left voicemail to follow up with Henry Enriqueta Mora Mccarthy.  Current BP average 138/83 mmHg is not at goal, <130/80.  A1C is controlled (5.2%).

## 2019-04-25 NOTE — PROGRESS NOTES
"Last 5 Patient Entered Readings                                      Current 30 Day Average: 138/83     Recent Readings 4/20/2019 4/15/2019 4/8/2019 4/8/2019 4/4/2019    SBP (mmHg) 128 130 - 147 -    DBP (mmHg) 68 75 - 95 -    Pulse 71 69 64 64 70          Last 6 Patient Entered Readings                                          Most Recent A1c: 5.2% on 3/4/2019  (Goal: 8%)     Recent Readings 4/23/2019 4/19/2019 4/16/2019 4/15/2019 3/28/2019    Blood Glucose (mg/dL) 151 160 176 168 100        Patient messaged the following in order to follow up:  "I'm just getting your message.  BP is good and I did get my test strips.  I have a name and # to someone I can call directly. Sugar has been a  little higher but it's on because its birthday month. I'm still watching the intake but eating just small portions of cake and no ice cream. "    Provided feedback and will continue to follow up.     "

## 2019-05-07 DIAGNOSIS — I10 ESSENTIAL HYPERTENSION: Chronic | ICD-10-CM

## 2019-05-07 RX ORDER — HYDRALAZINE HYDROCHLORIDE 100 MG/1
100 TABLET, FILM COATED ORAL EVERY 8 HOURS
Qty: 90 TABLET | Refills: 2 | Status: SHIPPED | OUTPATIENT
Start: 2019-05-07 | End: 2019-09-09 | Stop reason: SDUPTHER

## 2019-05-08 ENCOUNTER — PATIENT OUTREACH (OUTPATIENT)
Dept: OTHER | Facility: OTHER | Age: 44
End: 2019-05-08

## 2019-05-08 NOTE — PROGRESS NOTES
Last 5 Patient Entered Readings                                      Current 30 Day Average: 133/78     Recent Readings 5/6/2019 4/28/2019 4/20/2019 4/15/2019 4/8/2019    SBP (mmHg) 139 119 128 130 -    DBP (mmHg) 80 70 68 75 -    Pulse 67 72 71 69 64          Last 6 Patient Entered Readings                                          Most Recent A1c: 5.2% on 3/4/2019  (Goal: 8%)     Recent Readings 5/8/2019 5/8/2019 5/6/2019 5/6/2019 5/1/2019    Blood Glucose (mg/dL) 152 139 119 154 130          Spoke with patient about her concern for her higher BS readings in the morning the last few days. She stated that her meal times are pretty consistent every day and whenever she does not feel hungry enough to eat a full meal, she will eat a small serving of oatmeal or something like that in order to keep something in her system and her meal times consistent. She typically eats dinner around 6/7pm and she has not been snacking after dinner anymore. Last night she ate a small bowl of spaghetti and 2 small meatballs. She is unsure what the carbs intake was with that meal but she will start reading the food labels and measuring out her servings to make sure she is staying within range. Right now, her fasting BS is slightly above the 130 range so she is going to monitor this more closely and if they continue to rise, I will consult with her PharmD, YINKA Cotton, to see if she thinks any medication adjustments need to be made. Patient will keep us updated.

## 2019-05-08 NOTE — PROGRESS NOTES
Last 5 Patient Entered Readings                                      Current 30 Day Average: 133/78     Recent Readings 5/6/2019 4/28/2019 4/20/2019 4/15/2019 4/8/2019    SBP (mmHg) 139 119 128 130 -    DBP (mmHg) 80 70 68 75 -    Pulse 67 72 71 69 64          Last 6 Patient Entered Readings                                          Most Recent A1c: 5.2% on 3/4/2019  (Goal: 8%)     Recent Readings 5/8/2019 5/8/2019 5/6/2019 5/6/2019 5/1/2019    Blood Glucose (mg/dL) 152 139 119 154 130            Digital Medicine: Health  Follow Up    Left voicemail to follow up with . Enriqueta Mccarthy.  Current BP average 133/78 mmHg is not at goal, <130/80.    Attempting to return patients call. She has concerns about her fasting BS readings.

## 2019-05-09 ENCOUNTER — PATIENT MESSAGE (OUTPATIENT)
Dept: OTHER | Facility: OTHER | Age: 44
End: 2019-05-09

## 2019-05-13 ENCOUNTER — PATIENT MESSAGE (OUTPATIENT)
Dept: OTHER | Facility: OTHER | Age: 44
End: 2019-05-13

## 2019-05-31 DIAGNOSIS — I10 ESSENTIAL HYPERTENSION: Chronic | ICD-10-CM

## 2019-05-31 RX ORDER — VALSARTAN 320 MG/1
320 TABLET ORAL DAILY
Qty: 30 TABLET | Refills: 2 | Status: SHIPPED | OUTPATIENT
Start: 2019-05-31 | End: 2019-08-30 | Stop reason: SDUPTHER

## 2019-06-13 ENCOUNTER — PATIENT OUTREACH (OUTPATIENT)
Dept: OTHER | Facility: OTHER | Age: 44
End: 2019-06-13

## 2019-06-13 NOTE — PROGRESS NOTES
Last 5 Patient Entered Readings                                      Current 30 Day Average: 131/77     Recent Readings 6/12/2019 6/2/2019 5/19/2019 5/12/2019 5/6/2019    SBP (mmHg) 136 111 145 111 139    DBP (mmHg) 79 75 77 74 80    Pulse 64 67 68 69 67          Last 6 Patient Entered Readings                                          Most Recent A1c: 5.2% on 3/4/2019  (Goal: 8%)     Recent Readings 6/12/2019 6/12/2019 6/5/2019 6/5/2019 6/3/2019    Blood Glucose (mg/dL) 163 138 173 103 116          Digital Medicine: Health  Follow Up    Lifestyle Modifications:    1.Dietary Modifications (Sodium intake <2,000mg/day, food labels, dining out): Patient and I discussed her carb intake. She stated that she has been working on cutting back on the amount of carbs she is eating each day. She did mention that she eats oatmeal every day for lunch. I reviewed the recommended serving size for oatmeal and encouraged her to check the label and start measuring her food out. We also discussed about the possibility of her needing to add a protein to her lunch since her numbers are rising a bit after lunch. Patient is going to add a little peanut butter as a snack. Patient is also going to work on reading food labels more. She likes tuna fish so she knows she needs to read the label in order to find the lowest sodium option. I encouraged her to reach out to me with any specific questions about alternatives, etc.     2.Physical Activity: Deferred    3.Medication Therapy: Patient has been compliant with the medication regimen. Patient is doing well on her current BP/Diabetes medication regimen. She denies symptoms/side effects.     4.Patient has the following medication side effects/concerns: None  (Frequency/Alleviating factors/Precipitating factors, etc.)     Follow up with Mrs. Enriqueta Mora Fabio completed. No further questions or concerns. Will continue to follow up to achieve health goals.

## 2019-06-18 ENCOUNTER — PATIENT MESSAGE (OUTPATIENT)
Dept: FAMILY MEDICINE | Facility: CLINIC | Age: 44
End: 2019-06-18

## 2019-06-18 ENCOUNTER — TELEPHONE (OUTPATIENT)
Dept: FAMILY MEDICINE | Facility: CLINIC | Age: 44
End: 2019-06-18

## 2019-06-18 DIAGNOSIS — E78.5 DYSLIPIDEMIA: ICD-10-CM

## 2019-06-18 DIAGNOSIS — E11.42 TYPE 2 DIABETES MELLITUS WITH DIABETIC POLYNEUROPATHY, WITHOUT LONG-TERM CURRENT USE OF INSULIN: Primary | Chronic | ICD-10-CM

## 2019-06-18 DIAGNOSIS — D64.9 ANEMIA, UNSPECIFIED TYPE: ICD-10-CM

## 2019-06-18 DIAGNOSIS — E55.9 VITAMIN D DEFICIENCY: ICD-10-CM

## 2019-06-19 ENCOUNTER — TELEPHONE (OUTPATIENT)
Dept: FAMILY MEDICINE | Facility: CLINIC | Age: 44
End: 2019-06-19

## 2019-06-19 ENCOUNTER — PATIENT MESSAGE (OUTPATIENT)
Dept: FAMILY MEDICINE | Facility: CLINIC | Age: 44
End: 2019-06-19

## 2019-06-19 DIAGNOSIS — D64.9 ANEMIA, UNSPECIFIED TYPE: Primary | ICD-10-CM

## 2019-06-19 NOTE — TELEPHONE ENCOUNTER
Called pt and scheduled lab visit. Appt scheduled. Pt requested that an order for iron be added to her labs to check for anemia.     Pt stated that her Hemoglobin A1c levels are checked by diabetic digital medicine program and they stated that she is not due until September. Pt stated that she would rather wait to get A1c test done. Please advise.

## 2019-06-21 ENCOUNTER — TELEPHONE (OUTPATIENT)
Dept: FAMILY MEDICINE | Facility: CLINIC | Age: 44
End: 2019-06-21

## 2019-06-21 ENCOUNTER — PATIENT MESSAGE (OUTPATIENT)
Dept: FAMILY MEDICINE | Facility: CLINIC | Age: 44
End: 2019-06-21

## 2019-06-21 ENCOUNTER — PATIENT MESSAGE (OUTPATIENT)
Dept: OTHER | Facility: OTHER | Age: 44
End: 2019-06-21

## 2019-06-21 NOTE — TELEPHONE ENCOUNTER
Called pt to inform her that Meredith would still like for her to get her A1c checked at her lab visit. No answer/ left message. Sent pt a message on the portal.

## 2019-06-24 ENCOUNTER — PATIENT MESSAGE (OUTPATIENT)
Dept: FAMILY MEDICINE | Facility: CLINIC | Age: 44
End: 2019-06-24

## 2019-06-26 ENCOUNTER — PATIENT MESSAGE (OUTPATIENT)
Dept: FAMILY MEDICINE | Facility: CLINIC | Age: 44
End: 2019-06-26

## 2019-07-03 DIAGNOSIS — E11.65 UNCONTROLLED TYPE 2 DIABETES MELLITUS WITH HYPERGLYCEMIA, WITHOUT LONG-TERM CURRENT USE OF INSULIN: Chronic | ICD-10-CM

## 2019-07-03 DIAGNOSIS — I10 ESSENTIAL HYPERTENSION: Chronic | ICD-10-CM

## 2019-07-03 RX ORDER — METFORMIN HYDROCHLORIDE 500 MG/1
250 TABLET ORAL 2 TIMES DAILY WITH MEALS
Qty: 30 TABLET | Refills: 2 | OUTPATIENT
Start: 2019-07-03

## 2019-07-03 RX ORDER — VERAPAMIL HYDROCHLORIDE 180 MG/1
TABLET, FILM COATED, EXTENDED RELEASE ORAL
Qty: 60 TABLET | Refills: 2 | OUTPATIENT
Start: 2019-07-03

## 2019-07-05 DIAGNOSIS — E11.65 UNCONTROLLED TYPE 2 DIABETES MELLITUS WITH HYPERGLYCEMIA, WITHOUT LONG-TERM CURRENT USE OF INSULIN: Chronic | ICD-10-CM

## 2019-07-05 DIAGNOSIS — I10 ESSENTIAL HYPERTENSION: Chronic | ICD-10-CM

## 2019-07-05 RX ORDER — VERAPAMIL HYDROCHLORIDE 180 MG/1
TABLET, FILM COATED, EXTENDED RELEASE ORAL
Qty: 60 TABLET | Refills: 2 | OUTPATIENT
Start: 2019-07-05

## 2019-07-05 RX ORDER — METFORMIN HYDROCHLORIDE 500 MG/1
250 TABLET ORAL 2 TIMES DAILY WITH MEALS
Qty: 30 TABLET | Refills: 2 | OUTPATIENT
Start: 2019-07-05

## 2019-07-06 ENCOUNTER — PATIENT MESSAGE (OUTPATIENT)
Dept: FAMILY MEDICINE | Facility: CLINIC | Age: 44
End: 2019-07-06

## 2019-07-08 ENCOUNTER — PATIENT MESSAGE (OUTPATIENT)
Dept: ADMINISTRATIVE | Facility: OTHER | Age: 44
End: 2019-07-08

## 2019-07-08 ENCOUNTER — PATIENT MESSAGE (OUTPATIENT)
Dept: FAMILY MEDICINE | Facility: CLINIC | Age: 44
End: 2019-07-08

## 2019-07-08 DIAGNOSIS — I10 ESSENTIAL HYPERTENSION: Chronic | ICD-10-CM

## 2019-07-08 DIAGNOSIS — E11.65 UNCONTROLLED TYPE 2 DIABETES MELLITUS WITH HYPERGLYCEMIA, WITHOUT LONG-TERM CURRENT USE OF INSULIN: Chronic | ICD-10-CM

## 2019-07-08 RX ORDER — VERAPAMIL HYDROCHLORIDE 180 MG/1
TABLET, FILM COATED, EXTENDED RELEASE ORAL
Qty: 60 TABLET | Refills: 2 | Status: SHIPPED | OUTPATIENT
Start: 2019-07-08 | End: 2019-09-30 | Stop reason: SDUPTHER

## 2019-07-08 RX ORDER — METFORMIN HYDROCHLORIDE 500 MG/1
250 TABLET ORAL 2 TIMES DAILY WITH MEALS
Qty: 30 TABLET | Refills: 2 | Status: SHIPPED | OUTPATIENT
Start: 2019-07-08 | End: 2019-10-01 | Stop reason: SDUPTHER

## 2019-07-22 ENCOUNTER — PATIENT OUTREACH (OUTPATIENT)
Dept: OTHER | Facility: OTHER | Age: 44
End: 2019-07-22

## 2019-07-22 NOTE — PROGRESS NOTES
Last 5 Patient Entered Readings                                      Current 30 Day Average: 128/75     Recent Readings 7/21/2019 7/7/2019 7/1/2019 6/21/2019 6/12/2019    SBP (mmHg) 129 128 128 127 136    DBP (mmHg) 80 76 70 98 79    Pulse 61 67 63 72 64          Last 6 Patient Entered Readings                                          Most Recent A1c: 5.2% on 3/4/2019  (Goal: 8%)     Recent Readings 7/22/2019 7/17/2019 7/17/2019 7/14/2019 7/14/2019    Blood Glucose (mg/dL) 140 155 135 135 160          Digital Medicine: Health  Follow Up    Left voicemail to follow up with  Enriqueta Abby Fabio.  Current BP average 128/75 mmHg is at goal, <130/80.

## 2019-07-24 ENCOUNTER — LAB VISIT (OUTPATIENT)
Dept: LAB | Facility: HOSPITAL | Age: 44
End: 2019-07-24
Payer: COMMERCIAL

## 2019-07-24 DIAGNOSIS — D64.9 ANEMIA, UNSPECIFIED TYPE: ICD-10-CM

## 2019-07-24 DIAGNOSIS — E78.5 DYSLIPIDEMIA: ICD-10-CM

## 2019-07-24 DIAGNOSIS — E11.42 TYPE 2 DIABETES MELLITUS WITH DIABETIC POLYNEUROPATHY, WITHOUT LONG-TERM CURRENT USE OF INSULIN: Chronic | ICD-10-CM

## 2019-07-24 DIAGNOSIS — E55.9 VITAMIN D DEFICIENCY: ICD-10-CM

## 2019-07-24 LAB
25(OH)D3+25(OH)D2 SERPL-MCNC: 27 NG/ML (ref 30–96)
ALBUMIN SERPL BCP-MCNC: 3.7 G/DL (ref 3.5–5.2)
ALP SERPL-CCNC: 49 U/L (ref 55–135)
ALT SERPL W/O P-5'-P-CCNC: 19 U/L (ref 10–44)
ANION GAP SERPL CALC-SCNC: 9 MMOL/L (ref 8–16)
AST SERPL-CCNC: 19 U/L (ref 10–40)
BASOPHILS # BLD AUTO: 0.05 K/UL (ref 0–0.2)
BASOPHILS NFR BLD: 0.9 % (ref 0–1.9)
BILIRUB SERPL-MCNC: 0.8 MG/DL (ref 0.1–1)
BUN SERPL-MCNC: 11 MG/DL (ref 6–20)
CALCIUM SERPL-MCNC: 9.4 MG/DL (ref 8.7–10.5)
CHLORIDE SERPL-SCNC: 109 MMOL/L (ref 95–110)
CHOLEST SERPL-MCNC: 99 MG/DL (ref 120–199)
CHOLEST/HDLC SERPL: 3.3 {RATIO} (ref 2–5)
CO2 SERPL-SCNC: 26 MMOL/L (ref 23–29)
CREAT SERPL-MCNC: 0.9 MG/DL (ref 0.5–1.4)
DIFFERENTIAL METHOD: ABNORMAL
EOSINOPHIL # BLD AUTO: 0.1 K/UL (ref 0–0.5)
EOSINOPHIL NFR BLD: 2.4 % (ref 0–8)
ERYTHROCYTE [DISTWIDTH] IN BLOOD BY AUTOMATED COUNT: 13.7 % (ref 11.5–14.5)
EST. GFR  (AFRICAN AMERICAN): >60 ML/MIN/1.73 M^2
EST. GFR  (NON AFRICAN AMERICAN): >60 ML/MIN/1.73 M^2
ESTIMATED AVG GLUCOSE: 97 MG/DL (ref 68–131)
FERRITIN SERPL-MCNC: 166 NG/ML (ref 20–300)
GLUCOSE SERPL-MCNC: 107 MG/DL (ref 70–110)
HBA1C MFR BLD HPLC: 5 % (ref 4–5.6)
HCT VFR BLD AUTO: 31.3 % (ref 37–48.5)
HDLC SERPL-MCNC: 30 MG/DL (ref 40–75)
HDLC SERPL: 30.3 % (ref 20–50)
HGB BLD-MCNC: 10.8 G/DL (ref 12–16)
IMM GRANULOCYTES # BLD AUTO: 0.03 K/UL (ref 0–0.04)
IMM GRANULOCYTES NFR BLD AUTO: 0.5 % (ref 0–0.5)
IRON SERPL-MCNC: 44 UG/DL (ref 30–160)
LDLC SERPL CALC-MCNC: 41.6 MG/DL (ref 63–159)
LYMPHOCYTES # BLD AUTO: 2 K/UL (ref 1–4.8)
LYMPHOCYTES NFR BLD: 34.4 % (ref 18–48)
MCH RBC QN AUTO: 29.8 PG (ref 27–31)
MCHC RBC AUTO-ENTMCNC: 34.5 G/DL (ref 32–36)
MCV RBC AUTO: 87 FL (ref 82–98)
MONOCYTES # BLD AUTO: 0.4 K/UL (ref 0.3–1)
MONOCYTES NFR BLD: 6.2 % (ref 4–15)
NEUTROPHILS # BLD AUTO: 3.3 K/UL (ref 1.8–7.7)
NEUTROPHILS NFR BLD: 55.6 % (ref 38–73)
NONHDLC SERPL-MCNC: 69 MG/DL
NRBC BLD-RTO: 0 /100 WBC
PLATELET # BLD AUTO: 243 K/UL (ref 150–350)
PMV BLD AUTO: 10.1 FL (ref 9.2–12.9)
POTASSIUM SERPL-SCNC: 3.9 MMOL/L (ref 3.5–5.1)
PROT SERPL-MCNC: 7 G/DL (ref 6–8.4)
RBC # BLD AUTO: 3.62 M/UL (ref 4–5.4)
SATURATED IRON: 13 % (ref 20–50)
SODIUM SERPL-SCNC: 144 MMOL/L (ref 136–145)
TOTAL IRON BINDING CAPACITY: 329 UG/DL (ref 250–450)
TRANSFERRIN SERPL-MCNC: 222 MG/DL (ref 200–375)
TRIGL SERPL-MCNC: 137 MG/DL (ref 30–150)
WBC # BLD AUTO: 5.85 K/UL (ref 3.9–12.7)

## 2019-07-24 PROCEDURE — 82728 ASSAY OF FERRITIN: CPT

## 2019-07-24 PROCEDURE — 36415 COLL VENOUS BLD VENIPUNCTURE: CPT

## 2019-07-24 PROCEDURE — 82306 VITAMIN D 25 HYDROXY: CPT

## 2019-07-24 PROCEDURE — 80053 COMPREHEN METABOLIC PANEL: CPT

## 2019-07-24 PROCEDURE — 83540 ASSAY OF IRON: CPT

## 2019-07-24 PROCEDURE — 85025 COMPLETE CBC W/AUTO DIFF WBC: CPT

## 2019-07-24 PROCEDURE — 80061 LIPID PANEL: CPT

## 2019-07-24 PROCEDURE — 83036 HEMOGLOBIN GLYCOSYLATED A1C: CPT

## 2019-07-31 ENCOUNTER — PATIENT MESSAGE (OUTPATIENT)
Dept: CARDIOLOGY | Facility: CLINIC | Age: 44
End: 2019-07-31

## 2019-07-31 ENCOUNTER — OFFICE VISIT (OUTPATIENT)
Dept: FAMILY MEDICINE | Facility: CLINIC | Age: 44
End: 2019-07-31
Payer: COMMERCIAL

## 2019-07-31 VITALS
HEART RATE: 72 BPM | HEIGHT: 63 IN | SYSTOLIC BLOOD PRESSURE: 116 MMHG | DIASTOLIC BLOOD PRESSURE: 62 MMHG | RESPIRATION RATE: 19 BRPM | OXYGEN SATURATION: 98 % | TEMPERATURE: 99 F | WEIGHT: 224.19 LBS | BODY MASS INDEX: 39.72 KG/M2

## 2019-07-31 DIAGNOSIS — D64.9 ANEMIA, UNSPECIFIED TYPE: ICD-10-CM

## 2019-07-31 DIAGNOSIS — E78.5 DYSLIPIDEMIA ASSOCIATED WITH TYPE 2 DIABETES MELLITUS: ICD-10-CM

## 2019-07-31 DIAGNOSIS — E66.01 CLASS 2 SEVERE OBESITY WITH SERIOUS COMORBIDITY AND BODY MASS INDEX (BMI) OF 39.0 TO 39.9 IN ADULT, UNSPECIFIED OBESITY TYPE: ICD-10-CM

## 2019-07-31 DIAGNOSIS — I15.2 HYPERTENSION ASSOCIATED WITH DIABETES: Primary | ICD-10-CM

## 2019-07-31 DIAGNOSIS — E11.42 TYPE 2 DIABETES MELLITUS WITH DIABETIC POLYNEUROPATHY, WITHOUT LONG-TERM CURRENT USE OF INSULIN: Chronic | ICD-10-CM

## 2019-07-31 DIAGNOSIS — E55.9 VITAMIN D DEFICIENCY: ICD-10-CM

## 2019-07-31 DIAGNOSIS — E11.59 HYPERTENSION ASSOCIATED WITH DIABETES: Primary | ICD-10-CM

## 2019-07-31 DIAGNOSIS — E11.69 DYSLIPIDEMIA ASSOCIATED WITH TYPE 2 DIABETES MELLITUS: ICD-10-CM

## 2019-07-31 PROCEDURE — 99396 PREV VISIT EST AGE 40-64: CPT | Mod: S$GLB,,, | Performed by: NURSE PRACTITIONER

## 2019-07-31 PROCEDURE — 3044F PR MOST RECENT HEMOGLOBIN A1C LEVEL <7.0%: ICD-10-PCS | Mod: CPTII,S$GLB,, | Performed by: NURSE PRACTITIONER

## 2019-07-31 PROCEDURE — 3074F SYST BP LT 130 MM HG: CPT | Mod: CPTII,S$GLB,, | Performed by: NURSE PRACTITIONER

## 2019-07-31 PROCEDURE — 3078F DIAST BP <80 MM HG: CPT | Mod: CPTII,S$GLB,, | Performed by: NURSE PRACTITIONER

## 2019-07-31 PROCEDURE — 3074F PR MOST RECENT SYSTOLIC BLOOD PRESSURE < 130 MM HG: ICD-10-PCS | Mod: CPTII,S$GLB,, | Performed by: NURSE PRACTITIONER

## 2019-07-31 PROCEDURE — 99999 PR PBB SHADOW E&M-EST. PATIENT-LVL V: CPT | Mod: PBBFAC,,, | Performed by: NURSE PRACTITIONER

## 2019-07-31 PROCEDURE — 99999 PR PBB SHADOW E&M-EST. PATIENT-LVL V: ICD-10-PCS | Mod: PBBFAC,,, | Performed by: NURSE PRACTITIONER

## 2019-07-31 PROCEDURE — 3078F PR MOST RECENT DIASTOLIC BLOOD PRESSURE < 80 MM HG: ICD-10-PCS | Mod: CPTII,S$GLB,, | Performed by: NURSE PRACTITIONER

## 2019-07-31 PROCEDURE — 3044F HG A1C LEVEL LT 7.0%: CPT | Mod: CPTII,S$GLB,, | Performed by: NURSE PRACTITIONER

## 2019-07-31 PROCEDURE — 99396 PR PREVENTIVE VISIT,EST,40-64: ICD-10-PCS | Mod: S$GLB,,, | Performed by: NURSE PRACTITIONER

## 2019-07-31 RX ORDER — ERGOCALCIFEROL 1.25 MG/1
50000 CAPSULE ORAL
Qty: 12 CAPSULE | Refills: 2 | Status: SHIPPED | OUTPATIENT
Start: 2019-07-31 | End: 2020-04-22

## 2019-07-31 RX ORDER — GLUCOSAM/CHONDRO/HERB 149/HYAL 750-100 MG
TABLET ORAL
COMMUNITY
Start: 2019-04-01 | End: 2020-08-11

## 2019-07-31 RX ORDER — CHOLECALCIFEROL (VITAMIN D3) 50 MCG
TABLET ORAL
COMMUNITY
Start: 2019-04-01 | End: 2020-01-28

## 2019-07-31 NOTE — PATIENT INSTRUCTIONS
Lifestyle Changes to Control Cholesterol  You can control your cholesterol through diet, exercise, weight management, quitting smoking, stress management, and taking your medicines right. These things can also lower your risk for cardiovascular disease.    Eating healthy  Your healthcare provider will give you information on diet changes you may need to make. Your provider may recommend that you see a registered dietitian for help with diet changes. Changes may include:  · Cutting back on the amount of fat and cholesterol in your meals  · Eating less salt (sodium). This is especially important if you have high blood pressure.  · Eating more fresh vegetables and fruits  · Eating lean proteins such as fish, poultry, beans, and peas  · Eating less red meat and processed meats  · Using low-fat dairy products  · Using vegetable and nut oils in limited amounts  · Limiting how many sweets and processed foods like chips, cookies, and baked goods that you eat   · Limiting how many sugar-sweetened beverages you drink  · Limiting how often you eat out  Getting exercise  Regular exercise is a good way to help your body control cholesterol. Regular exercise can help in many ways. It can:  · Raise your good cholesterol  · Help lower your bad cholesterol  · Let blood flow better through your body  · Give more oxygen to your muscles and tissues  · Help you manage your weight  · Help your heart pump better  · Lower your blood pressure  Your healthcare provider may recommend that you get more physical activity if you haven't been active. Your provider may recommend that you get moderate to vigorous physical activity for at least 40 minutes each day. You should do this for at least 3 to 4 days each week. A few examples of moderate to vigorous activity are:  · Walking at a brisk pace. This is about 3 to 4 miles per hour.  · Jogging or running  · Swimming or water aerobics  · Hiking  · Dancing  · Martial arts  · Tennis  · Riding a  bicycle or stationary bike  · Dancing  Managing your weight  If you are overweight or obese, your healthcare provider will work with you to help you lose weight and lower your BMI (body mass index). Making diet changes and getting more physical activity can help. Changing your diet will help you lose weight more easily than adding exercise.  Quitting smoking  Smoking and other tobacco use can raise cholesterol and make it harder to control. Quitting is tough. But millions of people have given up tobacco for good. You can quit, too! Think about some of the reasons below to quit smoking. Do any of them make you think twice about your smoking habit?  Stop smoking because it:  · Keeps your cholesterol high, even if you make all the other changes youre supposed to  · Damages your body. It especially harms your heart, lungs, skin, and blood vessels.  · Makes you more likely to have a heart attack (acute myocardial infarction), stroke, or cancer  · Stains your teeth  · Makes your skin, clothes, and breath smell bad  · Costs a lot of money  Controlling stress   Learn ways to control stress. This will help you deal with stress in your home and work life. Controlling stress can greatly lower your risk of getting cardiovascular disease.  Making the most of medicines  Healthy eating and exercise are a good start to keeping your cholesterol down. But you may need some extra help from medicine. If your doctor prescribes medicine, be sure to take it exactly as directed. Remember:  · Tell your healthcare provider about all other medicines you take. This includes vitamins and herbs.  · Tell your healthcare provider if you have any side effects after starting to take a medicine. Examples of side effects to watch for include muscle aches, weakness, blurred vision, rust-colored urine, yellowing of eyes or skin (jaundice), and headache.  · Dont skip a dose or stop taking your medicine because you feel better or because  your cholesterol numbers go down. Never stop taking your medicine unless your healthcare provider has told you its OK.  · Ask your healthcare provider if you have any questions about your medicines.  High risk groups  Some people may need to take medicines called statins to control their cholesterol. This is in addition to eating a healthy diet and getting regular exercise.  Statins can help you stay healthy. They can also help prevent a heart attack or stroke. You may need to take a statin if you are in one of these groups:  · Adults who have had a heart attack or stroke. Or adults who have had peripheral vascular disease, a ministroke (transient ischemic attack), or stable or unstable angina. This group also includes people who have had a procedure to restore blood flow through a blocked artery. These procedures include percutaneous coronary intervention, angioplasty, stent, and open-heart bypass surgery.  · Adults who have diabetes. Or adults who are at higher risk of having a heart attack or stroke and have an LDL cholesterol level of 70 to 189 mg/dL  · Adults who are 21 years old or older and have an LDL cholesterol level of 190 mg/dL or higher.  If you are in a high-risk group, talk with your healthcare provider about your treatment goals. Make sure you understand why these goals are important, based on your own health history and your family history of heart disease or high cholesterol.  Make a plan to have regular cholesterol checks. You may need to fast before getting this test. Also ask your provider about any side effects your medicines may cause. Let your provider know about any side effects you have. You may need to take more than one medicine to reach the cholesterol goals that you and your provider decide on.  Date Last Reviewed: 10/1/2016  © 2912-4023 [employer]. [address] [place] [address], [place] [address]. All rights reserved. This information is not intended as a substitute for  professional medical care. Always follow your healthcare professional's instructions.

## 2019-07-31 NOTE — LETTER
August 1, 2019      Lexus Cotton MD  [address] [place] [address]  [address]  [facility] [address]           [facility]  [address] [place] [address], [address]  [facility] [address]  Phone: [phone]  Fax: [phone]          Patient: Enriqueta Mccarthy   MR Number: [id number]   YOB: 1975   Date of Visit: 7/31/2019       Dear Dr. Lexus Cotton:    Thank you for referring Enriqueta Mccarthy to me for evaluation. Attached you will find relevant portions of my assessment and plan of care.    If you have questions, please do not hesitate to call me. I look forward to following Enriqueta Mccarthy along with you.    Sincerely,    CROW Finn    Enclosure  CC:  No Recipients    If you would like to receive this communication electronically, please contact [email or web address] or [phone] to request more information on [employer] Link access.    For providers and/or their staff who would like to refer a patient to [facility], please contact us through our one-stop-shop provider referral line, [facility], at [phone].    If you feel you have received this communication in error or would no longer like to receive these types of communications, please e-mail [email or web address]

## 2019-07-31 NOTE — PROGRESS NOTES
"Subjective:       Patient ID: Enriqueta Mccarthy is a 43 y.o. female.    Chief Complaint: Annual Exam (anuual exam and annual foot exam )    44 y/o female with history of hypertension and type 2 diabetes presents to clinic for annual exam and lab results.    Patient has hypertension. Participant in digital medicine program for hypertension. Blood pressure is controlled. Blood pressure 116/62, pulse 72, temperature 98.9 °F (37.2 °C), temperature source Oral, resp. rate 19, height 5' 3" (1.6 m), weight 101.7 kg (224 lb 3.3 oz), SpO2 98 %. She is prescribed hydralazine, valsartan, and verapamil.    Patient has type 2 diabetes. Participant in digital medicine program for diabetes. Current hemoglobin A1c 5.0%. She is prescribed Trulicity and metformin.  Diabetes   She presents for her follow-up diabetic visit. She has type 2 diabetes mellitus. No MedicAlert identification noted. Her disease course has been stable. There are no hypoglycemic associated symptoms. Associated symptoms include foot paresthesias and polyphagia. Pertinent negatives for diabetes include no blurred vision, no fatigue, no foot ulcerations, no polydipsia, no polyuria, no visual change and no weight loss. There are no hypoglycemic complications. Symptoms are improving. Diabetic complications include peripheral neuropathy. Pertinent negatives for diabetic complications include no CVA or nephropathy. Risk factors for coronary artery disease include diabetes mellitus, dyslipidemia, family history, hypertension, obesity and sedentary lifestyle. Current diabetic treatment includes oral agent (monotherapy) and insulin injections. She is compliant with treatment most of the time. Her weight is fluctuating minimally. She is following a generally healthy diet. When asked about meal planning, she reported none. She has not had a previous visit with a dietitian. She participates in exercise intermittently. An ACE inhibitor/angiotensin II receptor blocker is being " taken. She does not see a podiatrist.Eye exam is current.     Results for BRIAN HATCH (MRN [id number]) as of 8/1/2019 13:34   Ref. Range 7/24/2019 07:30   WBC Latest Ref Range: 3.90 - 12.70 K/uL 5.85   RBC Latest Ref Range: 4.00 - 5.40 M/uL 3.62 (L)   Hemoglobin Latest Ref Range: 12.0 - 16.0 g/dL 10.8 (L)   Hematocrit Latest Ref Range: 37.0 - 48.5 % 31.3 (L)   MCV Latest Ref Range: 82 - 98 fL 87   MCH Latest Ref Range: 27.0 - 31.0 pg 29.8   MCHC Latest Ref Range: 32.0 - 36.0 g/dL 34.5   RDW Latest Ref Range: 11.5 - 14.5 % 13.7   Platelets Latest Ref Range: 150 - 350 K/uL 243   MPV Latest Ref Range: 9.2 - 12.9 fL 10.1   Gran% Latest Ref Range: 38.0 - 73.0 % 55.6   Gran # (ANC) Latest Ref Range: 1.8 - 7.7 K/uL 3.3   Lymph% Latest Ref Range: 18.0 - 48.0 % 34.4   Lymph # Latest Ref Range: 1.0 - 4.8 K/uL 2.0   Mono% Latest Ref Range: 4.0 - 15.0 % 6.2   Mono # Latest Ref Range: 0.3 - 1.0 K/uL 0.4   Eosinophil% Latest Ref Range: 0.0 - 8.0 % 2.4   Eos # Latest Ref Range: 0.0 - 0.5 K/uL 0.1   Basophil% Latest Ref Range: 0.0 - 1.9 % 0.9   Baso # Latest Ref Range: 0.00 - 0.20 K/uL 0.05   nRBC Latest Ref Range: 0 /100 WBC 0   Differential Method Unknown Automated   Immature Grans (Abs) Latest Ref Range: 0.00 - 0.04 K/uL 0.03   Immature Granulocytes Latest Ref Range: 0.0 - 0.5 % 0.5   Iron Latest Ref Range: 30 - 160 ug/dL 44   TIBC Latest Ref Range: 250 - 450 ug/dL 329   Saturated Iron Latest Ref Range: 20 - 50 % 13 (L)   Transferrin Latest Ref Range: 200 - 375 mg/dL 222   Ferritin Latest Ref Range: 20.0 - 300.0 ng/mL 166   Sodium Latest Ref Range: 136 - 145 mmol/L 144   Potassium Latest Ref Range: 3.5 - 5.1 mmol/L 3.9   Chloride Latest Ref Range: 95 - 110 mmol/L 109   CO2 Latest Ref Range: 23 - 29 mmol/L 26   Anion Gap Latest Ref Range: 8 - 16 mmol/L 9   BUN, Bld Latest Ref Range: 6 - 20 mg/dL 11   Creatinine Latest Ref Range: 0.5 - 1.4 mg/dL 0.9   eGFR if non African American Latest Ref Range: >60 mL/min/1.73 m^2 >60.0    eGFR if African American Latest Ref Range: >60 mL/min/1.73 m^2 >60.0   Glucose Latest Ref Range: 70 - 110 mg/dL 107   Calcium Latest Ref Range: 8.7 - 10.5 mg/dL 9.4   Alkaline Phosphatase Latest Ref Range: 55 - 135 U/L 49 (L)   PROTEIN TOTAL Latest Ref Range: 6.0 - 8.4 g/dL 7.0   Albumin Latest Ref Range: 3.5 - 5.2 g/dL 3.7   BILIRUBIN TOTAL Latest Ref Range: 0.1 - 1.0 mg/dL 0.8   AST Latest Ref Range: 10 - 40 U/L 19   ALT Latest Ref Range: 10 - 44 U/L 19   Triglycerides Latest Ref Range: 30 - 150 mg/dL 137   Cholesterol Latest Ref Range: 120 - 199 mg/dL 99 (L)   HDL Latest Ref Range: 40 - 75 mg/dL 30 (L)   Hdl/Cholesterol Ratio Latest Ref Range: 20.0 - 50.0 % 30.3   LDL Cholesterol External Latest Ref Range: 63.0 - 159.0 mg/dL 41.6 (L)   Non-HDL Cholesterol Latest Units: mg/dL 69   Total Cholesterol/HDL Ratio Latest Ref Range: 2.0 - 5.0  3.3   Vit D, 25-Hydroxy Latest Ref Range: 30 - 96 ng/mL 27 (L)   Hemoglobin A1C External Latest Ref Range: 4.0 - 5.6 % 5.0   Estimated Avg Glucose Latest Ref Range: 68 - 131 mg/dL 97     Current Outpatient Medications   Medication Sig Dispense Refill    atorvastatin (LIPITOR) 80 MG tablet Take 1 tablet (80 mg total) by mouth every evening. 90 tablet 3    azelastine (ASTELIN) 137 mcg (0.1 %) nasal spray 2 sprays (274 mcg total) by Nasal route 2 (two) times daily. 30 mL 2    carvedilol (COREG) 25 MG tablet TAKE 1 TABLET BY MOUTH TWICE A DAY 60 tablet 11    cholecalciferol, vitamin D3, (VITAMIN D3) 2,000 unit Tab       dulaglutide (TRULICITY) 0.75 mg/0.5 mL PnIj Inject 0.5 mLs (0.75 mg total) into the skin every 7 days. 2 mL 2    gabapentin (NEURONTIN) 300 MG capsule Take 2 capsules (600 mg total) by mouth every evening. 60 capsule 3    hydrALAZINE (APRESOLINE) 100 MG tablet Take 1 tablet (100 mg total) by mouth every 8 (eight) hours. 90 tablet 2    icosapent ethyl (VASCEPA) 1 gram Cap TAKE TWO CAPSULES BY MOUTH TWICE A  capsule 5    metFORMIN (GLUCOPHAGE) 500 MG  tablet Take 1/2 tablet (250 mg total) by mouth 2 (two) times daily with meals. 30 tablet 2    omega 3-dha-epa-fish oil (FISH OIL) 1,000 mg (120 mg-180 mg) Cap       valsartan (DIOVAN) 320 MG tablet Take 1 tablet (320 mg total) by mouth once daily. 30 tablet 2    verapamil (CALAN-SR) 180 MG CR tablet TAKE 1 TABLET BY MOUTH TWO TIMES A DAY ( DISCONTINUE DILTIAZEM ) 60 tablet 2    ergocalciferol (ERGOCALCIFEROL) 50,000 unit Cap Take 1 capsule (50,000 Units total) by mouth every 7 days. 12 capsule 2     No current facility-administered medications for this visit.        Past Medical History:   Diagnosis Date    Essential hypertension     Herpes simplex virus (HSV) infection     Hypertension     Hypertriglyceridemia 2016    Neuropathy     Obesity (BMI 30-39.9) 2016    Type 2 diabetes mellitus with diabetic polyneuropathy, without long-term current use of insulin 2016    Uterine fibroid        Past Surgical History:   Procedure Laterality Date    ABLATION ENDOMETRIAL THERMAL - NOVASURE N/A 6/10/2016    [facility]     SECTION      x 2    CHOLECYSTECTOMY-LAPAROSCOPIC  2017    [facility]    ENDOMETRIAL ABLATION  2016    ESOPHAGOGASTRODUODENOSCOPY (EGD) N/A 3/7/2017    [facility] at [facility]O    GALLBLADDER SURGERY  2017    removed    [id number] N/A 6/10/2016    [facility]    LIPOMA RESECTION Left 2013    SINUS SURGERY  2006    TONSILLECTOMY, ADENOIDECTOMY      TUBAL LIGATION         Family History   Problem Relation Age of Onset    Diabetes Father     Hypertension Father     Hyperlipidemia Father     Hypertension Mother     Anemia Daughter     Anemia Son     Diabetes Maternal Uncle     Diabetes Maternal Grandmother     Blindness Neg Hx     Glaucoma Neg Hx     Macular degeneration Neg Hx     Retinal  "detachment Neg Hx        Social History     Socioeconomic History    Marital status:      Spouse name: Not on file    Number of children: Not on file    Years of education: Not on file    Highest education level: Not on file   Occupational History    Not on file   Social Needs    Financial resource strain: Not hard at all    Food insecurity:     Worry: Never true     Inability: Never true    Transportation needs:     Medical: No     Non-medical: No   Tobacco Use    Smoking status: Never Smoker    Smokeless tobacco: Never Used   Substance and Sexual Activity    Alcohol use: No     Frequency: Never     Drinks per session: Patient refused     Binge frequency: Never    Drug use: No    Sexual activity: Not Currently     Partners: Male     Birth control/protection: Surgical   Lifestyle    Physical activity:     Days per week: 0 days     Minutes per session: 60 min    Stress: Not at all   Relationships    Social connections:     Talks on phone: More than three times a week     Gets together: Never     Attends Congregational service: Not on file     Active member of club or organization: No     Attends meetings of clubs or organizations: Never     Relationship status:    Other Topics Concern    Not on file   Social History Narrative    Not on file       Review of Systems   Constitutional: Negative for fatigue and weight loss.   Eyes: Negative for blurred vision.   Endocrine: Positive for polyphagia. Negative for polydipsia and polyuria.         Objective:     Vitals:    07/31/19 1521   BP: 116/62   Pulse: 72   Resp: 19   Temp: 98.9 °F (37.2 °C)   TempSrc: Oral   SpO2: 98%   Weight: 101.7 kg (224 lb 3.3 oz)   Height: 5' 3" (1.6 m)          Physical Exam   Constitutional: She is oriented to person, place, and time. She appears well-developed and well-nourished. No distress.   HENT:   Head: Normocephalic.   Right Ear: Tympanic membrane and ear canal normal.   Left Ear: Tympanic membrane and ear " canal normal.   Nose: Nose normal.   Mouth/Throat: Uvula is midline and oropharynx is clear and moist.   Eyes: Pupils are equal, round, and reactive to light. Conjunctivae and EOM are normal.   Neck: Normal range of motion. Neck supple. No thyromegaly present.   Cardiovascular: Normal rate, regular rhythm and intact distal pulses.   No murmur heard.  Pulses:       Dorsalis pedis pulses are 2+ on the right side, and 2+ on the left side.        Posterior tibial pulses are 1+ on the right side, and 1+ on the left side.   Pulmonary/Chest: Effort normal and breath sounds normal.   Abdominal: Soft. Bowel sounds are normal. There is no tenderness.   Musculoskeletal: Normal range of motion.        Right foot: There is normal range of motion and no deformity.        Left foot: There is normal range of motion and no deformity.   Feet:   Right Foot:   Protective Sensation: 7 sites tested. 7 sites sensed.   Skin Integrity: Negative for ulcer, skin breakdown or callus.   Left Foot:   Protective Sensation: 7 sites tested. 7 sites sensed.   Skin Integrity: Negative for ulcer, skin breakdown or callus.   Lymphadenopathy:     She has no cervical adenopathy.   Neurological: She is alert and oriented to person, place, and time.   Skin: Skin is warm and dry.   Psychiatric: She has a normal mood and affect. Her behavior is normal.         Assessment:         ICD-10-CM ICD-9-CM   1. Hypertension associated with diabetes E11.59 250.80    I10 401.9   2. Type 2 diabetes mellitus with diabetic polyneuropathy, without long-term current use of insulin E11.42 250.60     357.2   3. Dyslipidemia associated with type 2 diabetes mellitus E11.69 250.80    E78.5 272.4   4. Class 2 severe obesity with serious comorbidity and body mass index (BMI) of 39.0 to 39.9 in adult, unspecified obesity type E66.01 278.01    Z68.39 V85.39   5. Vitamin D deficiency E55.9 268.9   6. Anemia, unspecified type D64.9 285.9       Plan:       Hypertension associated with  diabetes  -  Chronic, stable, continue current medication therapy  -     Comprehensive metabolic panel; Future; Expected date: 01/30/2020    Type 2 diabetes mellitus with diabetic polyneuropathy, without long-term current use of insulin  -  Chronic, stable, continue current medication therapy  -     Comprehensive metabolic panel; Future; Expected date: 01/30/2020  -     Hemoglobin A1c; Future; Expected date: 01/30/2020    Dyslipidemia associated with type 2 diabetes mellitus  -  Chronic, stable, continue current medication therapy  -     Lipid panel; Future; Expected date: 01/30/2020    Class 2 severe obesity with serious comorbidity and body mass index (BMI) of 39.0 to 39.9 in adult, unspecified obesity type        - Weight loss advised. Dietary and exercise counseling done.    Vitamin D deficiency  -     ergocalciferol (ERGOCALCIFEROL) 50,000 unit Cap; Take 1 capsule (50,000 Units total) by mouth every 7 days.  Dispense: 12 capsule; Refill: 2  -     Vitamin D; Future; Expected date: 01/30/2020    Anemia, unspecified type  -     CBC auto differential; Future; Expected date: 01/30/2020  -     TSH; Future; Expected date: 01/30/2020  -     Iron and TIBC; Future; Expected date: 01/30/2020  -     Ferritin; Future; Expected date: 01/30/2020  -     HEMOGLOBIN ELECTROPHORESIS; Future; Expected date: 01/30/2020    Follow up in 6 months for chronic disease management and lab results.    Follow up in about 6 months (around 1/31/2020) for medication management, lab results.     Patient's Medications   New Prescriptions    ERGOCALCIFEROL (ERGOCALCIFEROL) 50,000 UNIT CAP    Take 1 capsule (50,000 Units total) by mouth every 7 days.   Previous Medications    ATORVASTATIN (LIPITOR) 80 MG TABLET    Take 1 tablet (80 mg total) by mouth every evening.    AZELASTINE (ASTELIN) 137 MCG (0.1 %) NASAL SPRAY    2 sprays (274 mcg total) by Nasal route 2 (two) times daily.    CARVEDILOL (COREG) 25 MG TABLET    TAKE 1 TABLET BY MOUTH TWICE A  DAY    CHOLECALCIFEROL, VITAMIN D3, (VITAMIN D3) 2,000 UNIT TAB        DULAGLUTIDE (TRULICITY) 0.75 MG/0.5 ML PNIJ    Inject 0.5 mLs (0.75 mg total) into the skin every 7 days.    GABAPENTIN (NEURONTIN) 300 MG CAPSULE    Take 2 capsules (600 mg total) by mouth every evening.    HYDRALAZINE (APRESOLINE) 100 MG TABLET    Take 1 tablet (100 mg total) by mouth every 8 (eight) hours.    ICOSAPENT ETHYL (VASCEPA) 1 GRAM CAP    TAKE TWO CAPSULES BY MOUTH TWICE A DAY    METFORMIN (GLUCOPHAGE) 500 MG TABLET    Take 1/2 tablet (250 mg total) by mouth 2 (two) times daily with meals.    OMEGA 3-DHA-EPA-FISH OIL (FISH OIL) 1,000 MG (120 MG-180 MG) CAP        VALSARTAN (DIOVAN) 320 MG TABLET    Take 1 tablet (320 mg total) by mouth once daily.    VERAPAMIL (CALAN-SR) 180 MG CR TABLET    TAKE 1 TABLET BY MOUTH TWO TIMES A DAY ( DISCONTINUE DILTIAZEM )   Modified Medications    No medications on file   Discontinued Medications    No medications on file

## 2019-08-01 ENCOUNTER — PATIENT MESSAGE (OUTPATIENT)
Dept: CARDIOLOGY | Facility: CLINIC | Age: 44
End: 2019-08-01

## 2019-08-01 PROBLEM — E66.812 CLASS 2 SEVERE OBESITY WITH SERIOUS COMORBIDITY AND BODY MASS INDEX (BMI) OF 39.0 TO 39.9 IN ADULT: Status: ACTIVE | Noted: 2019-08-01

## 2019-08-01 PROBLEM — E66.01 CLASS 2 SEVERE OBESITY WITH SERIOUS COMORBIDITY AND BODY MASS INDEX (BMI) OF 39.0 TO 39.9 IN ADULT: Status: ACTIVE | Noted: 2019-08-01

## 2019-08-04 ENCOUNTER — PATIENT MESSAGE (OUTPATIENT)
Dept: FAMILY MEDICINE | Facility: CLINIC | Age: 44
End: 2019-08-04

## 2019-08-04 DIAGNOSIS — E11.65 UNCONTROLLED TYPE 2 DIABETES MELLITUS WITH HYPERGLYCEMIA, WITHOUT LONG-TERM CURRENT USE OF INSULIN: Chronic | ICD-10-CM

## 2019-08-19 NOTE — PROGRESS NOTES
Last 5 Patient Entered Readings                                      Current 30 Day Average: 130/75     Recent Readings 8/14/2019 8/5/2019 7/21/2019 7/7/2019 7/1/2019    SBP (mmHg) 129 133 129 128 128    DBP (mmHg) 69 77 80 76 70    Pulse 74 69 61 67 63          Last 6 Patient Entered Readings                                          Most Recent A1c: 5% on 7/24/2019  (Goal: 8%)     Recent Readings 8/19/2019 8/19/2019 8/13/2019 8/13/2019 8/6/2019    Blood Glucose (mg/dL) 135 143 145 149 135          Digital Medicine: Health  Follow Up    Left voicemail to follow up with  Enriqueta Abby Fabio.  Current BP average 130/75 mmHg is at goal, <130/80.

## 2019-08-30 ENCOUNTER — PATIENT MESSAGE (OUTPATIENT)
Dept: ADMINISTRATIVE | Facility: OTHER | Age: 44
End: 2019-08-30

## 2019-08-30 ENCOUNTER — PATIENT MESSAGE (OUTPATIENT)
Dept: FAMILY MEDICINE | Facility: CLINIC | Age: 44
End: 2019-08-30

## 2019-08-30 DIAGNOSIS — I10 ESSENTIAL HYPERTENSION: Chronic | ICD-10-CM

## 2019-08-30 RX ORDER — VALSARTAN 320 MG/1
320 TABLET ORAL DAILY
Qty: 30 TABLET | Refills: 2 | Status: SHIPPED | OUTPATIENT
Start: 2019-08-30 | End: 2019-11-18 | Stop reason: SDUPTHER

## 2019-09-09 ENCOUNTER — PATIENT MESSAGE (OUTPATIENT)
Dept: FAMILY MEDICINE | Facility: CLINIC | Age: 44
End: 2019-09-09

## 2019-09-09 DIAGNOSIS — I10 ESSENTIAL HYPERTENSION: Chronic | ICD-10-CM

## 2019-09-09 RX ORDER — HYDRALAZINE HYDROCHLORIDE 100 MG/1
100 TABLET, FILM COATED ORAL EVERY 8 HOURS
Qty: 90 TABLET | Refills: 2 | Status: SHIPPED | OUTPATIENT
Start: 2019-09-09 | End: 2020-05-29 | Stop reason: SDUPTHER

## 2019-09-28 NOTE — PROGRESS NOTES
Subjective:   Patient ID:  Enriqueta Mccarthy is a 43 y.o. female who presents for follow-up of CAD Risk.    HPI: The patient is here for CAD risk factors.   The patient has no chest pain, SOB, TIA, palpitations, syncope or pre-syncope.Patient does not exercise.BPs 110-120/60-70 .        Review of Systems   Constitution: Negative for chills, decreased appetite, diaphoresis, fever, malaise/fatigue, night sweats, weight gain and weight loss.   HENT: Negative for congestion, hoarse voice, nosebleeds, sore throat and tinnitus.    Eyes: Negative for blurred vision, double vision, vision loss in left eye, vision loss in right eye, visual disturbance and visual halos.   Cardiovascular: Negative for chest pain, claudication, cyanosis, dyspnea on exertion, irregular heartbeat, leg swelling, near-syncope, orthopnea, palpitations, paroxysmal nocturnal dyspnea and syncope.   Respiratory: Negative for cough, hemoptysis, shortness of breath, sleep disturbances due to breathing, snoring, sputum production and wheezing.    Endocrine: Negative for cold intolerance, heat intolerance, polydipsia, polyphagia and polyuria.   Hematologic/Lymphatic: Negative for adenopathy and bleeding problem. Does not bruise/bleed easily.   Skin: Negative for color change, dry skin, flushing, itching, nail changes, poor wound healing, rash, skin cancer, suspicious lesions and unusual hair distribution.   Musculoskeletal: Negative for arthritis, back pain, falls, gout, joint pain, joint swelling, muscle cramps, muscle weakness, myalgias and stiffness.   Gastrointestinal: Negative for abdominal pain, anorexia, change in bowel habit, constipation, diarrhea, dysphagia, heartburn, hematemesis, hematochezia, melena and vomiting.   Genitourinary: Negative for decreased libido, dysuria, hematuria, hesitancy and urgency.   Neurological: Negative for excessive daytime sleepiness, dizziness, focal weakness, headaches, light-headedness, loss of balance, numbness,  "paresthesias, seizures, sensory change, tremors, vertigo and weakness.   Psychiatric/Behavioral: Negative for altered mental status, depression, hallucinations, memory loss, substance abuse and suicidal ideas. The patient does not have insomnia and is not nervous/anxious.    Allergic/Immunologic: Negative for environmental allergies and hives.       Objective: BP (!) 112/58 (BP Location: Left arm, Patient Position: Sitting, BP Method: Large (Automatic))   Pulse 71   Ht 5' 3.5" (1.613 m)   Wt 101.4 kg (223 lb 8.7 oz)   BMI 38.98 kg/m²      Physical Exam   Constitutional: She is oriented to person, place, and time. She appears well-developed and well-nourished.   HENT:   Head: Normocephalic.   Eyes: Pupils are equal, round, and reactive to light. EOM are normal.   Neck: Normal range of motion. Normal carotid pulses, no hepatojugular reflux and no JVD present. Carotid bruit is not present. No thyromegaly present.   Cardiovascular: Normal rate, regular rhythm, normal heart sounds and intact distal pulses. Exam reveals no gallop and no friction rub.   No murmur heard.  Pulmonary/Chest: Effort normal and breath sounds normal. No tachypnea. No respiratory distress. She has no wheezes. She has no rales. She exhibits no tenderness.   Abdominal: Soft. Bowel sounds are normal. She exhibits no distension and no mass. There is no tenderness. There is no rebound and no guarding.   Musculoskeletal: Normal range of motion. She exhibits no edema or tenderness.   Lymphadenopathy:     She has no cervical adenopathy.   Neurological: She is alert and oriented to person, place, and time. No cranial nerve deficit. Coordination normal.   Skin: Skin is warm. No rash noted. No erythema.   Psychiatric: She has a normal mood and affect. Her behavior is normal. Judgment and thought content normal.       Assessment:     1. Dyslipidemia    2. Type 2 diabetes mellitus with diabetic polyneuropathy, without long-term current use of insulin    3. " Uncontrolled type 2 diabetes mellitus with hyperglycemia, without long-term current use of insulin    4. Vitamin D deficiency    5. Class 2 severe obesity due to excess calories with serious comorbidity and body mass index (BMI) of 39.0 to 39.9 in adult        Plan:   Discussed diet , achieving and maintaining ideal body weight, and exercise.   We reviewed meds in detail.  Reassured-discussed goals, options, plan.  No need for OTC Omega-3 with Vascepa; can reduce Hydralazine in half and later 1/4 dose  Enriqueta was seen today for dyslipidemia.    Diagnoses and all orders for this visit:    Dyslipidemia    Type 2 diabetes mellitus with diabetic polyneuropathy, without long-term current use of insulin    Uncontrolled type 2 diabetes mellitus with hyperglycemia, without long-term current use of insulin    Vitamin D deficiency    Class 2 severe obesity due to excess calories with serious comorbidity and body mass index (BMI) of 39.0 to 39.9 in adult            Follow up in about 18 months (around 3/30/2021) for no labs.

## 2019-09-30 ENCOUNTER — PATIENT MESSAGE (OUTPATIENT)
Dept: CARDIOLOGY | Facility: CLINIC | Age: 44
End: 2019-09-30

## 2019-09-30 ENCOUNTER — OFFICE VISIT (OUTPATIENT)
Dept: CARDIOLOGY | Facility: CLINIC | Age: 44
End: 2019-09-30
Payer: COMMERCIAL

## 2019-09-30 VITALS
HEART RATE: 71 BPM | DIASTOLIC BLOOD PRESSURE: 58 MMHG | WEIGHT: 223.56 LBS | HEIGHT: 64 IN | BODY MASS INDEX: 38.17 KG/M2 | SYSTOLIC BLOOD PRESSURE: 112 MMHG

## 2019-09-30 DIAGNOSIS — E11.65 UNCONTROLLED TYPE 2 DIABETES MELLITUS WITH HYPERGLYCEMIA, WITHOUT LONG-TERM CURRENT USE OF INSULIN: Chronic | ICD-10-CM

## 2019-09-30 DIAGNOSIS — E11.42 TYPE 2 DIABETES MELLITUS WITH DIABETIC POLYNEUROPATHY, WITHOUT LONG-TERM CURRENT USE OF INSULIN: Chronic | ICD-10-CM

## 2019-09-30 DIAGNOSIS — E66.01 CLASS 2 SEVERE OBESITY DUE TO EXCESS CALORIES WITH SERIOUS COMORBIDITY AND BODY MASS INDEX (BMI) OF 39.0 TO 39.9 IN ADULT: ICD-10-CM

## 2019-09-30 DIAGNOSIS — E78.5 DYSLIPIDEMIA: Primary | ICD-10-CM

## 2019-09-30 DIAGNOSIS — I10 ESSENTIAL HYPERTENSION: Chronic | ICD-10-CM

## 2019-09-30 DIAGNOSIS — E55.9 VITAMIN D DEFICIENCY: ICD-10-CM

## 2019-09-30 PROCEDURE — 99214 OFFICE O/P EST MOD 30 MIN: CPT | Mod: S$GLB,,, | Performed by: INTERNAL MEDICINE

## 2019-09-30 PROCEDURE — 3078F DIAST BP <80 MM HG: CPT | Mod: CPTII,S$GLB,, | Performed by: INTERNAL MEDICINE

## 2019-09-30 PROCEDURE — 99214 PR OFFICE/OUTPT VISIT, EST, LEVL IV, 30-39 MIN: ICD-10-PCS | Mod: S$GLB,,, | Performed by: INTERNAL MEDICINE

## 2019-09-30 PROCEDURE — 3074F PR MOST RECENT SYSTOLIC BLOOD PRESSURE < 130 MM HG: ICD-10-PCS | Mod: CPTII,S$GLB,, | Performed by: INTERNAL MEDICINE

## 2019-09-30 PROCEDURE — 99999 PR PBB SHADOW E&M-EST. PATIENT-LVL III: CPT | Mod: PBBFAC,,, | Performed by: INTERNAL MEDICINE

## 2019-09-30 PROCEDURE — 3078F PR MOST RECENT DIASTOLIC BLOOD PRESSURE < 80 MM HG: ICD-10-PCS | Mod: CPTII,S$GLB,, | Performed by: INTERNAL MEDICINE

## 2019-09-30 PROCEDURE — 3008F BODY MASS INDEX DOCD: CPT | Mod: CPTII,S$GLB,, | Performed by: INTERNAL MEDICINE

## 2019-09-30 PROCEDURE — 3008F PR BODY MASS INDEX (BMI) DOCUMENTED: ICD-10-PCS | Mod: CPTII,S$GLB,, | Performed by: INTERNAL MEDICINE

## 2019-09-30 PROCEDURE — 3044F HG A1C LEVEL LT 7.0%: CPT | Mod: CPTII,S$GLB,, | Performed by: INTERNAL MEDICINE

## 2019-09-30 PROCEDURE — 99999 PR PBB SHADOW E&M-EST. PATIENT-LVL III: ICD-10-PCS | Mod: PBBFAC,,, | Performed by: INTERNAL MEDICINE

## 2019-09-30 PROCEDURE — 3044F PR MOST RECENT HEMOGLOBIN A1C LEVEL <7.0%: ICD-10-PCS | Mod: CPTII,S$GLB,, | Performed by: INTERNAL MEDICINE

## 2019-09-30 PROCEDURE — 3074F SYST BP LT 130 MM HG: CPT | Mod: CPTII,S$GLB,, | Performed by: INTERNAL MEDICINE

## 2019-09-30 RX ORDER — VERAPAMIL HYDROCHLORIDE 180 MG/1
TABLET, FILM COATED, EXTENDED RELEASE ORAL
Qty: 60 TABLET | Refills: 11 | Status: SHIPPED | OUTPATIENT
Start: 2019-09-30 | End: 2020-10-28 | Stop reason: SDUPTHER

## 2019-09-30 NOTE — PROGRESS NOTES
"Digital Medicine: Health  Follow-Up    The history is provided by the patient.     Follow Up  Follow-up reason(s): routine education    Patient and I's call was brief because she had a patient show up but she stated that she is doing good and feeling well. She is pleased with her BP and BG readings overall. She is meeting with her cardiologist today and is hoping her BP medication can be decreased.     I will speak more with her about her BG readings because her "fasting" readings are showing up as "off schedule" and a few have been above the 130 range. I also want to ensure she is choosing the correct time when she taking her BG reading.           Medication Adherence:   She misses doses: never      Patient identified the following reasons for non-compliance: none    Patient is doing well on her current BP/diabetes medication regimen. She thinks the metformin may be causing dry skin but she stated that this is something she can tolerate.       [facility]    Intervention/Plan    There are no preventive care reminders to display for this patient.    Last 5 Patient Entered Readings                                      Current 30 Day Average: 116/69     Recent Readings 9/28/2019 9/17/2019 9/8/2019 9/4/2019 8/26/2019    SBP (mmHg) 125 113 113 113 120    DBP (mmHg) 68 72 66 68 83    Pulse 68 71 72 71 67        Last 6 Patient Entered Readings                                          Most Recent A1c: 5% on 7/24/2019  (Goal: 8%)     Recent Readings 9/30/2019 9/26/2019 9/23/2019 9/18/2019 9/18/2019    Blood Glucose (mg/dL) 143 130 170 132 124              "

## 2019-09-30 NOTE — PATIENT INSTRUCTIONS
Discussed diet , achieving and maintaining ideal body weight, and exercise.   We reviewed meds in detail.  Reassured-discussed goals, options, plan.  No need for OTC Omega-3 with Vascepa; can reduce Hydralazine in half and later 1/4 dose

## 2019-10-01 ENCOUNTER — PATIENT MESSAGE (OUTPATIENT)
Dept: FAMILY MEDICINE | Facility: CLINIC | Age: 44
End: 2019-10-01

## 2019-10-01 DIAGNOSIS — E11.65 UNCONTROLLED TYPE 2 DIABETES MELLITUS WITH HYPERGLYCEMIA, WITHOUT LONG-TERM CURRENT USE OF INSULIN: Chronic | ICD-10-CM

## 2019-10-01 RX ORDER — METFORMIN HYDROCHLORIDE 500 MG/1
250 TABLET ORAL 2 TIMES DAILY WITH MEALS
Qty: 30 TABLET | Refills: 2 | Status: SHIPPED | OUTPATIENT
Start: 2019-10-01 | End: 2019-12-24 | Stop reason: SDUPTHER

## 2019-10-31 ENCOUNTER — PATIENT OUTREACH (OUTPATIENT)
Dept: OTHER | Facility: OTHER | Age: 44
End: 2019-10-31

## 2019-10-31 NOTE — PROGRESS NOTES
Digital Medicine: Health  Follow-Up    The history is provided by the patient.     Follow Up  Follow-up reason(s): routine education      Routine Education Topics: physical activity        Intervention/Plan    There are no preventive care reminders to display for this patient.    Last 5 Patient Entered Readings                                      Current 30 Day Average: 121/69     Recent Readings 10/27/2019 10/14/2019 10/6/2019 10/6/2019 9/28/2019    SBP (mmHg) 135 115 112 109 125    DBP (mmHg) 70 65 75 67 68    Pulse 60 65 73 71 68        Last 6 Patient Entered Readings                                          Most Recent A1c: 5% on 7/24/2019  (Goal: 8%)     Recent Readings 10/28/2019 10/28/2019 10/22/2019 10/22/2019 10/7/2019    Blood Glucose (mg/dL) 116 107 125 155 158                    Diet Screening   No change to diet.  Patient reports eating or drinking the following: water and Home cooked mealsShe has the following dietary restrictions: low sodium diet and low carbShe cooks for self.    Patient does the shopping for groceries.  She gets groceries from the grocery store.      Patient continues monitoring her low sodium/carb diet. She denies any major changes that would cause an increase in salt/carb intake.     Physical Activity Screening   When asked if exercising, patient responded: no    Patient has not been walking but she is open to trying to incorporate 15-20 minutes of walking 2-3 times per week.     Intervention(s): exercise ideas     Medication Adherence Screening   She misses doses: never      Patient identified the following reasons for non-compliance: none    Patient is doing well on her current BP/Diabetes medication regimen. She denies symptoms/side effects.       SDOH

## 2019-11-01 ENCOUNTER — PATIENT MESSAGE (OUTPATIENT)
Dept: FAMILY MEDICINE | Facility: CLINIC | Age: 44
End: 2019-11-01

## 2019-11-01 DIAGNOSIS — E11.65 UNCONTROLLED TYPE 2 DIABETES MELLITUS WITH HYPERGLYCEMIA, WITHOUT LONG-TERM CURRENT USE OF INSULIN: Chronic | ICD-10-CM

## 2019-11-05 ENCOUNTER — PATIENT MESSAGE (OUTPATIENT)
Dept: FAMILY MEDICINE | Facility: CLINIC | Age: 44
End: 2019-11-05

## 2019-11-05 DIAGNOSIS — E55.9 VITAMIN D DEFICIENCY: Primary | ICD-10-CM

## 2019-11-05 DIAGNOSIS — E53.8 B12 DEFICIENCY: ICD-10-CM

## 2019-11-14 RX ORDER — ICOSAPENT ETHYL 1000 MG/1
CAPSULE ORAL
Qty: 120 CAPSULE | Refills: 5 | Status: SHIPPED | OUTPATIENT
Start: 2019-11-14 | End: 2020-07-07 | Stop reason: SDUPTHER

## 2019-11-17 ENCOUNTER — PATIENT MESSAGE (OUTPATIENT)
Dept: CARDIOLOGY | Facility: CLINIC | Age: 44
End: 2019-11-17

## 2019-11-17 ENCOUNTER — PATIENT MESSAGE (OUTPATIENT)
Dept: FAMILY MEDICINE | Facility: CLINIC | Age: 44
End: 2019-11-17

## 2019-11-17 DIAGNOSIS — I10 ESSENTIAL HYPERTENSION: Chronic | ICD-10-CM

## 2019-11-18 ENCOUNTER — PATIENT MESSAGE (OUTPATIENT)
Dept: FAMILY MEDICINE | Facility: CLINIC | Age: 44
End: 2019-11-18

## 2019-11-18 DIAGNOSIS — I10 ESSENTIAL HYPERTENSION: Chronic | ICD-10-CM

## 2019-11-18 RX ORDER — VALSARTAN 320 MG/1
320 TABLET ORAL DAILY
Qty: 30 TABLET | Refills: 2 | Status: SHIPPED | OUTPATIENT
Start: 2019-11-18 | End: 2019-11-18 | Stop reason: SDUPTHER

## 2019-11-18 RX ORDER — VALSARTAN 320 MG/1
320 TABLET ORAL DAILY
Qty: 30 TABLET | Refills: 11 | Status: SHIPPED | OUTPATIENT
Start: 2019-11-18 | End: 2021-01-03

## 2019-12-02 ENCOUNTER — PATIENT MESSAGE (OUTPATIENT)
Dept: FAMILY MEDICINE | Facility: CLINIC | Age: 44
End: 2019-12-02

## 2019-12-02 DIAGNOSIS — E11.65 UNCONTROLLED TYPE 2 DIABETES MELLITUS WITH HYPERGLYCEMIA, WITHOUT LONG-TERM CURRENT USE OF INSULIN: Chronic | ICD-10-CM

## 2019-12-15 ENCOUNTER — PATIENT MESSAGE (OUTPATIENT)
Dept: CARDIOLOGY | Facility: CLINIC | Age: 44
End: 2019-12-15

## 2019-12-18 ENCOUNTER — PATIENT OUTREACH (OUTPATIENT)
Dept: OTHER | Facility: OTHER | Age: 44
End: 2019-12-18

## 2019-12-24 ENCOUNTER — PATIENT MESSAGE (OUTPATIENT)
Dept: FAMILY MEDICINE | Facility: CLINIC | Age: 44
End: 2019-12-24

## 2019-12-24 DIAGNOSIS — E11.65 UNCONTROLLED TYPE 2 DIABETES MELLITUS WITH HYPERGLYCEMIA, WITHOUT LONG-TERM CURRENT USE OF INSULIN: Chronic | ICD-10-CM

## 2019-12-24 RX ORDER — METFORMIN HYDROCHLORIDE 500 MG/1
250 TABLET ORAL 2 TIMES DAILY WITH MEALS
Qty: 30 TABLET | Refills: 2 | Status: SHIPPED | OUTPATIENT
Start: 2019-12-24 | End: 2020-03-19 | Stop reason: SDUPTHER

## 2020-01-01 DIAGNOSIS — E11.9 TYPE 2 DIABETES MELLITUS: ICD-10-CM

## 2020-01-14 NOTE — PROGRESS NOTES
Digital Medicine: Health  Follow-Up    The history is provided by the patient.     Follow Up  Follow-up reason(s): reading review      Alert received.   Care Team received low BG alert.  Patient is not experiencing symptoms.Patient believes the reading on 1/13 (12) was inaccurate. She denies symptoms (except for feeling tired which she blames on the weather - dark, gloomy day). I will delete the reading of 12 out of her chart.       Intervention/Plan    There are no preventive care reminders to display for this patient.    Last 5 Patient Entered Readings                                      Current 30 Day Average: 127/81     Recent Readings 1/12/2020 12/27/2019 12/27/2019 12/15/2019 12/2/2019    SBP (mmHg) 125 136 155 120 115    DBP (mmHg) 75 86 88 75 80    Pulse 68 67 62 72 67        Last 6 Patient Entered Readings                                          Most Recent A1c: 5% on 7/24/2019  (Goal: 8%)     Recent Readings 1/13/2020 1/13/2020 1/10/2020 1/7/2020 12/26/2019    Blood Glucose (mg/dL) 75 12 122 129 164                Screenings    SDOH

## 2020-01-16 ENCOUNTER — PATIENT MESSAGE (OUTPATIENT)
Dept: OTHER | Facility: OTHER | Age: 45
End: 2020-01-16

## 2020-01-16 ENCOUNTER — PATIENT OUTREACH (OUTPATIENT)
Dept: OTHER | Facility: OTHER | Age: 45
End: 2020-01-16

## 2020-01-16 NOTE — PROGRESS NOTES
Digital Medicine: Health  Follow-Up    The history is provided by the patient.     Follow Up  Follow-up reason(s): reading review      Alert received.   Care Team received low BG alert.  Patient is not experiencing symptoms.  Reading was invalid because  test stripsPatient messaged me to let me know that she realized her test strips are  (almost by 1 year). She believes this is why her BG readings have been inaccurate. She is going to call [facility] to order new tests strips. I will delete the low BG readings from 1/15 (37, 57) since she does not believe they are accurate.       Intervention/Plan    There are no preventive care reminders to display for this patient.    Last 5 Patient Entered Readings                                      Current 30 Day Average: 131/83     Recent Readings 2020 2019 2019 12/15/2019 2019    SBP (mmHg) 125 136 155 120 115    DBP (mmHg) 75 86 88 75 80    Pulse 68 67 62 72 67        Last 6 Patient Entered Readings                                          Most Recent A1c: 5% on 2019  (Goal: 8%)     Recent Readings 1/15/2020 1/15/2020 2020 2020 1/10/2020    Blood Glucose (mg/dL) 37 57 75 (No Data)  122                Screenings    SDOH  
bilateral TM's clear

## 2020-01-21 ENCOUNTER — LAB VISIT (OUTPATIENT)
Dept: LAB | Facility: HOSPITAL | Age: 45
End: 2020-01-21
Payer: COMMERCIAL

## 2020-01-21 DIAGNOSIS — E11.42 TYPE 2 DIABETES MELLITUS WITH DIABETIC POLYNEUROPATHY, WITHOUT LONG-TERM CURRENT USE OF INSULIN: Chronic | ICD-10-CM

## 2020-01-21 DIAGNOSIS — D64.9 ANEMIA, UNSPECIFIED TYPE: ICD-10-CM

## 2020-01-21 DIAGNOSIS — E11.59 HYPERTENSION ASSOCIATED WITH DIABETES: ICD-10-CM

## 2020-01-21 DIAGNOSIS — E53.8 B12 DEFICIENCY: ICD-10-CM

## 2020-01-21 DIAGNOSIS — E55.9 VITAMIN D DEFICIENCY: ICD-10-CM

## 2020-01-21 DIAGNOSIS — E11.69 DYSLIPIDEMIA ASSOCIATED WITH TYPE 2 DIABETES MELLITUS: ICD-10-CM

## 2020-01-21 DIAGNOSIS — E78.5 DYSLIPIDEMIA ASSOCIATED WITH TYPE 2 DIABETES MELLITUS: ICD-10-CM

## 2020-01-21 DIAGNOSIS — I15.2 HYPERTENSION ASSOCIATED WITH DIABETES: ICD-10-CM

## 2020-01-21 LAB
25(OH)D3+25(OH)D2 SERPL-MCNC: 32 NG/ML (ref 30–96)
ALBUMIN SERPL BCP-MCNC: 3.9 G/DL (ref 3.5–5.2)
ALP SERPL-CCNC: 54 U/L (ref 55–135)
ALT SERPL W/O P-5'-P-CCNC: 18 U/L (ref 10–44)
ANION GAP SERPL CALC-SCNC: 10 MMOL/L (ref 8–16)
AST SERPL-CCNC: 14 U/L (ref 10–40)
BASOPHILS # BLD AUTO: 0.05 K/UL (ref 0–0.2)
BASOPHILS NFR BLD: 0.8 % (ref 0–1.9)
BILIRUB SERPL-MCNC: 0.5 MG/DL (ref 0.1–1)
BUN SERPL-MCNC: 11 MG/DL (ref 6–20)
CALCIUM SERPL-MCNC: 9.4 MG/DL (ref 8.7–10.5)
CHLORIDE SERPL-SCNC: 105 MMOL/L (ref 95–110)
CHOLEST SERPL-MCNC: 197 MG/DL (ref 120–199)
CHOLEST/HDLC SERPL: 6.2 {RATIO} (ref 2–5)
CO2 SERPL-SCNC: 26 MMOL/L (ref 23–29)
CREAT SERPL-MCNC: 0.9 MG/DL (ref 0.5–1.4)
DIFFERENTIAL METHOD: ABNORMAL
EOSINOPHIL # BLD AUTO: 0.2 K/UL (ref 0–0.5)
EOSINOPHIL NFR BLD: 2.5 % (ref 0–8)
ERYTHROCYTE [DISTWIDTH] IN BLOOD BY AUTOMATED COUNT: 13.2 % (ref 11.5–14.5)
EST. GFR  (AFRICAN AMERICAN): >60 ML/MIN/1.73 M^2
EST. GFR  (NON AFRICAN AMERICAN): >60 ML/MIN/1.73 M^2
ESTIMATED AVG GLUCOSE: 103 MG/DL (ref 68–131)
FERRITIN SERPL-MCNC: 141 NG/ML (ref 20–300)
FOLATE SERPL-MCNC: 6.8 NG/ML (ref 4–24)
GLUCOSE SERPL-MCNC: 129 MG/DL (ref 70–110)
HBA1C MFR BLD HPLC: 5.2 % (ref 4–5.6)
HCT VFR BLD AUTO: 35.6 % (ref 37–48.5)
HDLC SERPL-MCNC: 32 MG/DL (ref 40–75)
HDLC SERPL: 16.2 % (ref 20–50)
HGB BLD-MCNC: 11.8 G/DL (ref 12–16)
IMM GRANULOCYTES # BLD AUTO: 0.05 K/UL (ref 0–0.04)
IMM GRANULOCYTES NFR BLD AUTO: 0.8 % (ref 0–0.5)
IRON SERPL-MCNC: 63 UG/DL (ref 30–160)
LDLC SERPL CALC-MCNC: ABNORMAL MG/DL (ref 63–159)
LYMPHOCYTES # BLD AUTO: 1.8 K/UL (ref 1–4.8)
LYMPHOCYTES NFR BLD: 28.4 % (ref 18–48)
MCH RBC QN AUTO: 29.4 PG (ref 27–31)
MCHC RBC AUTO-ENTMCNC: 33.1 G/DL (ref 32–36)
MCV RBC AUTO: 89 FL (ref 82–98)
MONOCYTES # BLD AUTO: 0.4 K/UL (ref 0.3–1)
MONOCYTES NFR BLD: 5.7 % (ref 4–15)
NEUTROPHILS # BLD AUTO: 4 K/UL (ref 1.8–7.7)
NEUTROPHILS NFR BLD: 61.8 % (ref 38–73)
NONHDLC SERPL-MCNC: 165 MG/DL
NRBC BLD-RTO: 0 /100 WBC
PLATELET # BLD AUTO: 247 K/UL (ref 150–350)
PMV BLD AUTO: 9.6 FL (ref 9.2–12.9)
POTASSIUM SERPL-SCNC: 3.8 MMOL/L (ref 3.5–5.1)
PROT SERPL-MCNC: 7.4 G/DL (ref 6–8.4)
RBC # BLD AUTO: 4.02 M/UL (ref 4–5.4)
SATURATED IRON: 18 % (ref 20–50)
SODIUM SERPL-SCNC: 141 MMOL/L (ref 136–145)
TOTAL IRON BINDING CAPACITY: 349 UG/DL (ref 250–450)
TRANSFERRIN SERPL-MCNC: 236 MG/DL (ref 200–375)
TRIGL SERPL-MCNC: 485 MG/DL (ref 30–150)
TSH SERPL DL<=0.005 MIU/L-ACNC: 1.48 UIU/ML (ref 0.4–4)
VIT B12 SERPL-MCNC: 242 PG/ML (ref 210–950)
WBC # BLD AUTO: 6.45 K/UL (ref 3.9–12.7)

## 2020-01-21 PROCEDURE — 82306 VITAMIN D 25 HYDROXY: CPT

## 2020-01-21 PROCEDURE — 82728 ASSAY OF FERRITIN: CPT

## 2020-01-21 PROCEDURE — 84443 ASSAY THYROID STIM HORMONE: CPT

## 2020-01-21 PROCEDURE — 80061 LIPID PANEL: CPT

## 2020-01-21 PROCEDURE — 82607 VITAMIN B-12: CPT

## 2020-01-21 PROCEDURE — 36415 COLL VENOUS BLD VENIPUNCTURE: CPT

## 2020-01-21 PROCEDURE — 82746 ASSAY OF FOLIC ACID SERUM: CPT

## 2020-01-21 PROCEDURE — 85025 COMPLETE CBC W/AUTO DIFF WBC: CPT

## 2020-01-21 PROCEDURE — 83020 HEMOGLOBIN ELECTROPHORESIS: CPT

## 2020-01-21 PROCEDURE — 83036 HEMOGLOBIN GLYCOSYLATED A1C: CPT

## 2020-01-21 PROCEDURE — 83540 ASSAY OF IRON: CPT

## 2020-01-21 PROCEDURE — 80053 COMPREHEN METABOLIC PANEL: CPT

## 2020-01-22 LAB
HGB A2 MFR BLD HPLC: 2.6 % (ref 2.2–3.2)
HGB FRACT BLD ELPH-IMP: NORMAL
HGB FRACT BLD ELPH-IMP: NORMAL

## 2020-01-22 RX ORDER — ATORVASTATIN CALCIUM 80 MG/1
80 TABLET, FILM COATED ORAL NIGHTLY
Qty: 90 TABLET | Refills: 3 | Status: SHIPPED | OUTPATIENT
Start: 2020-01-22 | End: 2020-08-11 | Stop reason: SDUPTHER

## 2020-01-23 ENCOUNTER — PATIENT MESSAGE (OUTPATIENT)
Dept: OTHER | Facility: OTHER | Age: 45
End: 2020-01-23

## 2020-01-28 ENCOUNTER — OFFICE VISIT (OUTPATIENT)
Dept: FAMILY MEDICINE | Facility: CLINIC | Age: 45
End: 2020-01-28
Payer: COMMERCIAL

## 2020-01-28 VITALS
BODY MASS INDEX: 38.59 KG/M2 | OXYGEN SATURATION: 98 % | WEIGHT: 226.06 LBS | HEIGHT: 64 IN | HEART RATE: 74 BPM | SYSTOLIC BLOOD PRESSURE: 116 MMHG | TEMPERATURE: 97 F | DIASTOLIC BLOOD PRESSURE: 84 MMHG

## 2020-01-28 DIAGNOSIS — E66.01 CLASS 2 SEVERE OBESITY DUE TO EXCESS CALORIES WITH SERIOUS COMORBIDITY AND BODY MASS INDEX (BMI) OF 39.0 TO 39.9 IN ADULT: ICD-10-CM

## 2020-01-28 DIAGNOSIS — E78.5 HYPERLIPIDEMIA ASSOCIATED WITH TYPE 2 DIABETES MELLITUS: ICD-10-CM

## 2020-01-28 DIAGNOSIS — E11.69 HYPERLIPIDEMIA ASSOCIATED WITH TYPE 2 DIABETES MELLITUS: ICD-10-CM

## 2020-01-28 DIAGNOSIS — E78.1 HYPERTRIGLYCERIDEMIA: Chronic | ICD-10-CM

## 2020-01-28 DIAGNOSIS — E11.59 HYPERTENSION ASSOCIATED WITH DIABETES: Primary | ICD-10-CM

## 2020-01-28 DIAGNOSIS — E11.65 CONTROLLED TYPE 2 DIABETES MELLITUS WITH HYPERGLYCEMIA, WITHOUT LONG-TERM CURRENT USE OF INSULIN: ICD-10-CM

## 2020-01-28 DIAGNOSIS — I15.2 HYPERTENSION ASSOCIATED WITH DIABETES: Primary | ICD-10-CM

## 2020-01-28 DIAGNOSIS — E55.9 VITAMIN D DEFICIENCY: ICD-10-CM

## 2020-01-28 PROCEDURE — 99214 OFFICE O/P EST MOD 30 MIN: CPT | Mod: S$GLB,,, | Performed by: NURSE PRACTITIONER

## 2020-01-28 PROCEDURE — 3074F SYST BP LT 130 MM HG: CPT | Mod: CPTII,S$GLB,, | Performed by: NURSE PRACTITIONER

## 2020-01-28 PROCEDURE — 99999 PR PBB SHADOW E&M-EST. PATIENT-LVL V: ICD-10-PCS | Mod: PBBFAC,,, | Performed by: NURSE PRACTITIONER

## 2020-01-28 PROCEDURE — 99214 PR OFFICE/OUTPT VISIT, EST, LEVL IV, 30-39 MIN: ICD-10-PCS | Mod: S$GLB,,, | Performed by: NURSE PRACTITIONER

## 2020-01-28 PROCEDURE — 3008F PR BODY MASS INDEX (BMI) DOCUMENTED: ICD-10-PCS | Mod: CPTII,S$GLB,, | Performed by: NURSE PRACTITIONER

## 2020-01-28 PROCEDURE — 3044F HG A1C LEVEL LT 7.0%: CPT | Mod: CPTII,S$GLB,, | Performed by: NURSE PRACTITIONER

## 2020-01-28 PROCEDURE — 3074F PR MOST RECENT SYSTOLIC BLOOD PRESSURE < 130 MM HG: ICD-10-PCS | Mod: CPTII,S$GLB,, | Performed by: NURSE PRACTITIONER

## 2020-01-28 PROCEDURE — 3008F BODY MASS INDEX DOCD: CPT | Mod: CPTII,S$GLB,, | Performed by: NURSE PRACTITIONER

## 2020-01-28 PROCEDURE — 3079F DIAST BP 80-89 MM HG: CPT | Mod: CPTII,S$GLB,, | Performed by: NURSE PRACTITIONER

## 2020-01-28 PROCEDURE — 3044F PR MOST RECENT HEMOGLOBIN A1C LEVEL <7.0%: ICD-10-PCS | Mod: CPTII,S$GLB,, | Performed by: NURSE PRACTITIONER

## 2020-01-28 PROCEDURE — 3079F PR MOST RECENT DIASTOLIC BLOOD PRESSURE 80-89 MM HG: ICD-10-PCS | Mod: CPTII,S$GLB,, | Performed by: NURSE PRACTITIONER

## 2020-01-28 PROCEDURE — 99999 PR PBB SHADOW E&M-EST. PATIENT-LVL V: CPT | Mod: PBBFAC,,, | Performed by: NURSE PRACTITIONER

## 2020-01-28 NOTE — PROGRESS NOTES
"Subjective:       Patient ID: Enriqueta Mccarthy is a 44 y.o. female.    Chief Complaint: Follow-up (6 month follow up)    43 y/o female with hypertension, type 2 diabetes, dyslipidemia presents to clinic for follow up and lab results.     Patient has hypertension. Participant in digital medicine program for hypertension. Blood pressure is controlled with current medication. Blood pressure 116/84, pulse 74, temperature 97.4 °F (36.3 °C), temperature source Oral, height 5' 3.5" (1.613 m), weight 102.6 kg (226 lb 1.3 oz), SpO2 98 %. No chest pain, headache, blurry vision, headache, or edema.     Patient has type 2 diabetes-controlled. Participant in digital medicine program for diabetes. Current hemoglobin A1c 5.2% with . She is prescribed Trulicity and metformin. Patient states numbness and tingling have resolved and she is no longer taking gabapentin.     Patient has dyslipidemia. Prescribed vascepa and atorvastatin daily. Patient states she stopped atorvastatin at least 2 months ago because she felt medication no longer needed. Now triglycerides 485, total cholesterol 197. Patient advised to resume atorvastatin daily. Will repeat lipid panel with next lab draw.      Component      Latest Ref Rng & Units 1/21/2020 7/24/2019   WBC      3.90 - 12.70 K/uL 6.45 5.85   RBC      4.00 - 5.40 M/uL 4.02 3.62 (L)   Hemoglobin      12.0 - 16.0 g/dL 11.8 (L) 10.8 (L)   Hematocrit      37.0 - 48.5 % 35.6 (L) 31.3 (L)   MCV      82 - 98 fL 89 87   MCH      27.0 - 31.0 pg 29.4 29.8   MCHC      32.0 - 36.0 g/dL 33.1 34.5   RDW      11.5 - 14.5 % 13.2 13.7   Platelets      150 - 350 K/uL 247 243   MPV      9.2 - 12.9 fL 9.6 10.1   Immature Granulocytes      0.0 - 0.5 % 0.8 (H) 0.5   Gran # (ANC)      1.8 - 7.7 K/uL 4.0 3.3   Immature Grans (Abs)      0.00 - 0.04 K/uL 0.05 (H) 0.03   Lymph #      1.0 - 4.8 K/uL 1.8 2.0   Mono #      0.3 - 1.0 K/uL 0.4 0.4   Eos #      0.0 - 0.5 K/uL 0.2 0.1   Baso #      0.00 - 0.20 K/uL 0.05 " 0.05   nRBC      0 /100 WBC 0 0   Gran%      38.0 - 73.0 % 61.8 55.6   Lymph%      18.0 - 48.0 % 28.4 34.4   Mono%      4.0 - 15.0 % 5.7 6.2   Eosinophil%      0.0 - 8.0 % 2.5 2.4   Basophil%      0.0 - 1.9 % 0.8 0.9   Differential Method       Automated Automated   Sodium      136 - 145 mmol/L 141 144   Potassium      3.5 - 5.1 mmol/L 3.8 3.9   Chloride      95 - 110 mmol/L 105 109   CO2      23 - 29 mmol/L 26 26   Glucose      70 - 110 mg/dL 129 (H) 107   BUN, Bld      6 - 20 mg/dL 11 11   Creatinine      0.5 - 1.4 mg/dL 0.9 0.9   Calcium      8.7 - 10.5 mg/dL 9.4 9.4   PROTEIN TOTAL      6.0 - 8.4 g/dL 7.4 7.0   Albumin      3.5 - 5.2 g/dL 3.9 3.7   BILIRUBIN TOTAL      0.1 - 1.0 mg/dL 0.5 0.8   Alkaline Phosphatase      55 - 135 U/L 54 (L) 49 (L)   AST      10 - 40 U/L 14 19   ALT      10 - 44 U/L 18 19   Anion Gap      8 - 16 mmol/L 10 9   eGFR if African American      >60 mL/min/1.73 m:2 >60.0 >60.0   eGFR if non African American      >60 mL/min/1.73 m:2 >60.0 >60.0   Cholesterol      120 - 199 mg/dL 197 99 (L)   Triglycerides      30 - 150 mg/dL 485 (H) 137   HDL      40 - 75 mg/dL 32 (L) 30 (L)   LDL Cholesterol External      63.0 - 159.0 mg/dL Invalid, Trig>400.0 41.6 (L)   Hdl/Cholesterol Ratio      20.0 - 50.0 % 16.2 (L) 30.3   Total Cholesterol/HDL Ratio      2.0 - 5.0 6.2 (H) 3.3   Non-HDL Cholesterol      mg/dL 165 69   Iron      30 - 160 ug/dL 63 44   Transferrin      200 - 375 mg/dL 236 222   TIBC      250 - 450 ug/dL 349 329   Saturated Iron      20 - 50 % 18 (L) 13 (L)   Hgb A2 Quant      2.2 - 3.2 % 2.6    Hemoglobin Bands       Hb A and Hb A2    Hemoglobin Electrophoresis Interp       Normal    Hemoglobin A1C External      4.0 - 5.6 % 5.2 5.0   Estimated Avg Glucose      68 - 131 mg/dL 103 97   Vit D, 25-Hydroxy      30 - 96 ng/mL 32 27 (L)   Ferritin      20.0 - 300.0 ng/mL 141 166   TSH      0.400 - 4.000 uIU/mL 1.483    Vitamin B-12      210 - 950 pg/mL 242    Folate      4.0 - 24.0 ng/mL 6.8       Current Outpatient Medications   Medication Sig Dispense Refill    atorvastatin (LIPITOR) 80 MG tablet Take 1 tablet (80 mg total) by mouth every evening. 90 tablet 3    azelastine (ASTELIN) 137 mcg (0.1 %) nasal spray 2 sprays (274 mcg total) by Nasal route 2 (two) times daily. 30 mL 2    carvedilol (COREG) 25 MG tablet TAKE 1 TABLET BY MOUTH TWICE A DAY 60 tablet 11    dulaglutide (TRULICITY) 0.75 mg/0.5 mL PnIj Inject 0.5 mLs (0.75 mg total) into the skin every 7 days. 2 mL 2    ergocalciferol (ERGOCALCIFEROL) 50,000 unit Cap Take 1 capsule (50,000 Units total) by mouth every 7 days. 12 capsule 2    hydrALAZINE (APRESOLINE) 100 MG tablet Take 1 tablet (100 mg total) by mouth every 8 (eight) hours. 90 tablet 2    icosapent ethyl (VASCEPA) 1 gram Cap TAKE TWO CAPSULES BY MOUTH TWICE A  capsule 5    metFORMIN (GLUCOPHAGE) 500 MG tablet Take 1/2 tablet (250 mg total) by mouth 2 (two) times daily with meals. 30 tablet 2    valsartan (DIOVAN) 320 MG tablet Take 1 tablet (320 mg total) by mouth once daily. 30 tablet 11    verapamil (CALAN-SR) 180 MG CR tablet TAKE 1 TABLET BY MOUTH TWO TIMES A DAY ( DISCONTINUE DILTIAZEM ) 60 tablet 11    gabapentin (NEURONTIN) 300 MG capsule Take 2 capsules (600 mg total) by mouth every evening. (Patient not taking: Reported on 2020) 60 capsule 3    omega 3-dha-epa-fish oil (FISH OIL) 1,000 mg (120 mg-180 mg) Cap        No current facility-administered medications for this visit.        Past Medical History:   Diagnosis Date    Essential hypertension     Herpes simplex virus (HSV) infection     Hypertension     Hypertriglyceridemia 2016    Neuropathy     Obesity (BMI 30-39.9) 2016    Type 2 diabetes mellitus with diabetic polyneuropathy, without long-term current use of insulin 2016    Uterine fibroid        Past Surgical History:   Procedure Laterality Date     SECTION      x 2    ENDOMETRIAL ABLATION  2016     GALLBLADDER SURGERY  04/2017    removed    LIPOMA RESECTION Left 8/2013    SINUS SURGERY  2006    TONSILLECTOMY, ADENOIDECTOMY      TUBAL LIGATION         Family History   Problem Relation Age of Onset    Diabetes Father     Hypertension Father     Hyperlipidemia Father     Hypertension Mother     Anemia Daughter     Anemia Son     Diabetes Maternal Uncle     Diabetes Maternal Grandmother     Blindness Neg Hx     Glaucoma Neg Hx     Macular degeneration Neg Hx     Retinal detachment Neg Hx        Social History     Socioeconomic History    Marital status:      Spouse name: Not on file    Number of children: Not on file    Years of education: Not on file    Highest education level: Not on file   Occupational History    Not on file   Social Needs    Financial resource strain: Not hard at all    Food insecurity:     Worry: Never true     Inability: Never true    Transportation needs:     Medical: No     Non-medical: No   Tobacco Use    Smoking status: Never Smoker    Smokeless tobacco: Never Used   Substance and Sexual Activity    Alcohol use: No     Frequency: Never     Drinks per session: Patient refused     Binge frequency: Never    Drug use: No    Sexual activity: Not Currently     Partners: Male     Birth control/protection: Surgical   Lifestyle    Physical activity:     Days per week: 0 days     Minutes per session: 60 min    Stress: Not at all   Relationships    Social connections:     Talks on phone: More than three times a week     Gets together: Never     Attends [employer] service: Not on file     Active member of club or organization: No     Attends meetings of clubs or organizations: Never     Relationship status:    Other Topics Concern    Not on file   Social History Narrative    Not on file       Review of Systems   Constitutional: Negative for activity change, fatigue, fever and unexpected weight change.   HENT: Negative for congestion, hearing loss,  "rhinorrhea and trouble swallowing.    Eyes: Negative for discharge and visual disturbance.   Respiratory: Negative for cough, chest tightness, shortness of breath and wheezing.    Cardiovascular: Negative for chest pain and palpitations.   Gastrointestinal: Negative for blood in stool, constipation, diarrhea and vomiting.   Endocrine: Negative for polydipsia and polyuria.   Genitourinary: Negative for difficulty urinating, dysuria, hematuria and menstrual problem.   Musculoskeletal: Negative for arthralgias, joint swelling and neck pain.   Allergic/Immunologic: Negative for environmental allergies and immunocompromised state.   Neurological: Negative for dizziness, weakness and headaches.   Hematological: Negative for adenopathy. Does not bruise/bleed easily.   Psychiatric/Behavioral: Negative for confusion and dysphoric mood.         Objective:     Vitals:    01/28/20 0941   BP: 116/84   Pulse: 74   Temp: 97.4 °F (36.3 °C)   TempSrc: Oral   SpO2: 98%   Weight: 102.6 kg (226 lb 1.3 oz)   Height: 5' 3.5" (1.613 m)          Physical Exam   Constitutional: She is oriented to person, place, and time. She appears well-developed and well-nourished. No distress.   HENT:   Head: Normocephalic.   Right Ear: Tympanic membrane and ear canal normal.   Left Ear: Tympanic membrane and ear canal normal.   Nose: Nose normal.   Mouth/Throat: Uvula is midline and oropharynx is clear and moist.   Eyes: Pupils are equal, round, and reactive to light. Conjunctivae and EOM are normal.   Neck: Normal range of motion. Neck supple. No thyromegaly present.   Cardiovascular: Normal rate, regular rhythm and intact distal pulses.   No murmur heard.  Pulmonary/Chest: Effort normal and breath sounds normal.   Abdominal: Soft. Bowel sounds are normal. There is no tenderness.   Musculoskeletal: Normal range of motion.   Lymphadenopathy:     She has no cervical adenopathy.   Neurological: She is alert and oriented to person, place, and time.   Skin: " Skin is warm and dry.   Psychiatric: She has a normal mood and affect. Her behavior is normal.         Assessment:         ICD-10-CM ICD-9-CM   1. Hypertension associated with diabetes E11.59 250.80    I10 401.9   2. Controlled type 2 diabetes mellitus with hyperglycemia, without long-term current use of insulin E11.65 250.80     790.29   3. Hyperlipidemia associated with type 2 diabetes mellitus E11.69 250.80    E78.5 272.4   4. Hypertriglyceridemia E78.1 272.1   5. Class 2 severe obesity due to excess calories with serious comorbidity and body mass index (BMI) of 39.0 to 39.9 in adult E66.01 278.01    Z68.39 V85.39   6. Vitamin D deficiency E55.9 268.9       Plan:       Hypertension associated with diabetes        -      Chronic, stable, continue current medication therapy    Controlled type 2 diabetes mellitus with hyperglycemia, without long-term current use of insulin  -     Chronic, stable, continue current medication therapy  -     Hemoglobin A1c; Future; Expected date: 07/28/2020  -     Comprehensive metabolic panel; Future; Expected date: 07/28/2020    Hyperlipidemia associated with type 2 diabetes mellitus        -     Resume atorvastatin. Continue Vascepa. Repeat lipid panel    Hypertriglyceridemia  -     Lipid panel; Future; Expected date: 07/28/2020    Class 2 severe obesity due to excess calories with serious comorbidity and body mass index (BMI) of 39.0 to 39.9 in adult        -     Weight loss advised. Dietary and exercise counseling done.    Vitamin D deficiency        -      Current Vitamin D level normal. Recommend daily OTC vitamin D level (600-800 units daily).    Follow up in about 6 months (around 7/28/2020) for medication management, lab results.     Patient's Medications   New Prescriptions    No medications on file   Previous Medications    ATORVASTATIN (LIPITOR) 80 MG TABLET    Take 1 tablet (80 mg total) by mouth every evening.    AZELASTINE (ASTELIN) 137 MCG (0.1 %) NASAL SPRAY    2  sprays (274 mcg total) by Nasal route 2 (two) times daily.    CARVEDILOL (COREG) 25 MG TABLET    TAKE 1 TABLET BY MOUTH TWICE A DAY    DULAGLUTIDE (TRULICITY) 0.75 MG/0.5 ML PNIJ    Inject 0.5 mLs (0.75 mg total) into the skin every 7 days.    ERGOCALCIFEROL (ERGOCALCIFEROL) 50,000 UNIT CAP    Take 1 capsule (50,000 Units total) by mouth every 7 days.    GABAPENTIN (NEURONTIN) 300 MG CAPSULE    Take 2 capsules (600 mg total) by mouth every evening.    HYDRALAZINE (APRESOLINE) 100 MG TABLET    Take 1 tablet (100 mg total) by mouth every 8 (eight) hours.    ICOSAPENT ETHYL (VASCEPA) 1 GRAM CAP    TAKE TWO CAPSULES BY MOUTH TWICE A DAY    METFORMIN (GLUCOPHAGE) 500 MG TABLET    Take 1/2 tablet (250 mg total) by mouth 2 (two) times daily with meals.    OMEGA 3-DHA-EPA-FISH OIL (FISH OIL) 1,000 MG (120 MG-180 MG) CAP        VALSARTAN (DIOVAN) 320 MG TABLET    Take 1 tablet (320 mg total) by mouth once daily.    VERAPAMIL (CALAN-SR) 180 MG CR TABLET    TAKE 1 TABLET BY MOUTH TWO TIMES A DAY ( DISCONTINUE DILTIAZEM )   Modified Medications    No medications on file   Discontinued Medications    CHOLECALCIFEROL, VITAMIN D3, (VITAMIN D3) 2,000 UNIT TAB

## 2020-02-10 ENCOUNTER — PATIENT MESSAGE (OUTPATIENT)
Dept: FAMILY MEDICINE | Facility: CLINIC | Age: 45
End: 2020-02-10

## 2020-02-11 ENCOUNTER — PATIENT MESSAGE (OUTPATIENT)
Dept: FAMILY MEDICINE | Facility: CLINIC | Age: 45
End: 2020-02-11

## 2020-02-24 DIAGNOSIS — I10 ESSENTIAL HYPERTENSION: Chronic | ICD-10-CM

## 2020-02-24 RX ORDER — CARVEDILOL 25 MG/1
TABLET ORAL
Qty: 60 TABLET | Refills: 11 | Status: SHIPPED | OUTPATIENT
Start: 2020-02-24 | End: 2021-02-11 | Stop reason: SDUPTHER

## 2020-02-27 ENCOUNTER — OFFICE VISIT (OUTPATIENT)
Dept: OPTOMETRY | Facility: CLINIC | Age: 45
End: 2020-02-27
Payer: COMMERCIAL

## 2020-02-27 DIAGNOSIS — E11.9 TYPE 2 DIABETES MELLITUS WITHOUT RETINOPATHY: Primary | ICD-10-CM

## 2020-02-27 PROCEDURE — 99999 PR PBB SHADOW E&M-EST. PATIENT-LVL III: CPT | Mod: PBBFAC,,, | Performed by: OPTOMETRIST

## 2020-02-27 PROCEDURE — 92014 COMPRE OPH EXAM EST PT 1/>: CPT | Mod: S$GLB,,, | Performed by: OPTOMETRIST

## 2020-02-27 PROCEDURE — 99999 PR PBB SHADOW E&M-EST. PATIENT-LVL III: ICD-10-PCS | Mod: PBBFAC,,, | Performed by: OPTOMETRIST

## 2020-02-27 PROCEDURE — 92014 PR EYE EXAM, EST PATIENT,COMPREHESV: ICD-10-PCS | Mod: S$GLB,,, | Performed by: OPTOMETRIST

## 2020-02-27 NOTE — PROGRESS NOTES
HPI     Last eye exam was 3/8/19 with Dr. Lynn.  Patient states no vision changes since last exam. Due for diabetic eye   exam.   Patient denies diplopia, headaches, flashes/floaters, and pain.    Zaditor (Equate brand) BID PRN OU    Hemoglobin A1C       Date                     Value               Ref Range             Status                01/21/2020               5.2                 4.0 - 5.6 %           Final                 Last edited by Betsy Jones on 2/27/2020  1:08 PM. (History)            Assessment /Plan     For exam results, see Encounter Report.    Type 2 diabetes mellitus without retinopathy  -No retinopathy noted today.  Continued control with primary care physician and annual comprehensive eye exam.        RTC 1 yr

## 2020-03-19 ENCOUNTER — PATIENT MESSAGE (OUTPATIENT)
Dept: FAMILY MEDICINE | Facility: CLINIC | Age: 45
End: 2020-03-19

## 2020-03-19 DIAGNOSIS — E11.65 UNCONTROLLED TYPE 2 DIABETES MELLITUS WITH HYPERGLYCEMIA, WITHOUT LONG-TERM CURRENT USE OF INSULIN: Chronic | ICD-10-CM

## 2020-03-19 RX ORDER — METFORMIN HYDROCHLORIDE 500 MG/1
250 TABLET ORAL 2 TIMES DAILY WITH MEALS
Qty: 30 TABLET | Refills: 2 | Status: SHIPPED | OUTPATIENT
Start: 2020-03-19 | End: 2020-06-29 | Stop reason: SDUPTHER

## 2020-03-23 ENCOUNTER — PATIENT MESSAGE (OUTPATIENT)
Dept: FAMILY MEDICINE | Facility: CLINIC | Age: 45
End: 2020-03-23

## 2020-04-07 ENCOUNTER — TELEPHONE (OUTPATIENT)
Dept: FAMILY MEDICINE | Facility: CLINIC | Age: 45
End: 2020-04-07

## 2020-04-07 ENCOUNTER — PATIENT MESSAGE (OUTPATIENT)
Dept: FAMILY MEDICINE | Facility: CLINIC | Age: 45
End: 2020-04-07

## 2020-04-07 NOTE — TELEPHONE ENCOUNTER
Spoke with patient on phone. Advised patient of Dr. Buenrostro message:  we are not authorized to medically release patients from work because they are considered higher risk but currently not showing any symptoms.  I would recommend pt contact employee health and HR.    Patient verbalizes understanding.

## 2020-04-07 NOTE — TELEPHONE ENCOUNTER
As I understand it, we are not authorized to medically release patients from work because they are considered higher risk but currently not showing any symptoms.  I would recommend pt contact employee health and .

## 2020-04-07 NOTE — TELEPHONE ENCOUNTER
Spoke with patient. Informed patient that we are awaiting a response from the physician. And we would give her a call back as soon as we can. Pt verbalizes understanding.

## 2020-04-07 NOTE — TELEPHONE ENCOUNTER
----- Message from Noah Nevarez sent at 4/7/2020  9:59 AM CDT -----  Contact: [phone]/ self   Patient called in returning your call. Please advise.

## 2020-04-21 DIAGNOSIS — Z01.84 ANTIBODY RESPONSE EXAMINATION: ICD-10-CM

## 2020-04-22 DIAGNOSIS — E55.9 VITAMIN D DEFICIENCY: ICD-10-CM

## 2020-04-22 RX ORDER — ERGOCALCIFEROL 1.25 MG/1
50000 CAPSULE ORAL
Qty: 12 CAPSULE | Refills: 2 | Status: SHIPPED | OUTPATIENT
Start: 2020-04-22 | End: 2021-01-11 | Stop reason: SDUPTHER

## 2020-05-21 DIAGNOSIS — Z01.84 ANTIBODY RESPONSE EXAMINATION: ICD-10-CM

## 2020-05-29 ENCOUNTER — TELEPHONE (OUTPATIENT)
Dept: PHARMACY | Facility: CLINIC | Age: 45
End: 2020-05-29

## 2020-05-29 ENCOUNTER — PATIENT MESSAGE (OUTPATIENT)
Dept: CARDIOLOGY | Facility: CLINIC | Age: 45
End: 2020-05-29

## 2020-05-29 ENCOUNTER — TELEPHONE (OUTPATIENT)
Dept: CARDIOLOGY | Facility: CLINIC | Age: 45
End: 2020-05-29

## 2020-05-29 DIAGNOSIS — I10 ESSENTIAL HYPERTENSION: Chronic | ICD-10-CM

## 2020-05-29 RX ORDER — HYDRALAZINE HYDROCHLORIDE 100 MG/1
100 TABLET, FILM COATED ORAL EVERY 8 HOURS
Qty: 90 TABLET | Refills: 2 | Status: SHIPPED | OUTPATIENT
Start: 2020-05-29 | End: 2021-01-26 | Stop reason: SDUPTHER

## 2020-05-29 NOTE — TELEPHONE ENCOUNTER
----- Message from Jose Luis Parekh MD sent at 5/29/2020  1:10 PM CDT -----  Contact: Otoniel Melo  First choice is Ibresartan  300 ; second choice losartan 100 mg,CJL  ----- Message -----  From: Benita Ritchie RN  Sent: 5/29/2020  10:46 AM CDT  To: Jose Luis Parekh MD        ----- Message -----  From: Mera Fallon  Sent: 5/29/2020  10:31 AM CDT  To: Benita Ritchie RN    Pharmacy need an alternative medicine for the pt Valsartan 320 mg because it's on back order.  LOV 9/30/19 Dr. Lavie [facility] [phone] (Phone)  [phone] (Fax)    Thanks

## 2020-05-29 NOTE — TELEPHONE ENCOUNTER
Call placed to patient. Per patient called around and states was able to find Valsartan available in [facility]. Rx called in as per Dr. Parekh.

## 2020-06-20 DIAGNOSIS — Z01.84 ANTIBODY RESPONSE EXAMINATION: ICD-10-CM

## 2020-06-29 ENCOUNTER — PATIENT MESSAGE (OUTPATIENT)
Dept: FAMILY MEDICINE | Facility: CLINIC | Age: 45
End: 2020-06-29

## 2020-06-29 DIAGNOSIS — E11.65 UNCONTROLLED TYPE 2 DIABETES MELLITUS WITH HYPERGLYCEMIA, WITHOUT LONG-TERM CURRENT USE OF INSULIN: Chronic | ICD-10-CM

## 2020-06-29 RX ORDER — METFORMIN HYDROCHLORIDE 500 MG/1
250 TABLET ORAL 2 TIMES DAILY WITH MEALS
Qty: 30 TABLET | Refills: 2 | Status: SHIPPED | OUTPATIENT
Start: 2020-06-29 | End: 2020-09-04 | Stop reason: SDUPTHER

## 2020-07-06 DIAGNOSIS — E11.65 UNCONTROLLED TYPE 2 DIABETES MELLITUS WITH HYPERGLYCEMIA, WITHOUT LONG-TERM CURRENT USE OF INSULIN: Chronic | ICD-10-CM

## 2020-07-07 RX ORDER — ICOSAPENT ETHYL 1000 MG/1
CAPSULE ORAL
Qty: 120 CAPSULE | Refills: 5 | Status: SHIPPED | OUTPATIENT
Start: 2020-07-07 | End: 2020-10-28 | Stop reason: SDUPTHER

## 2020-07-20 DIAGNOSIS — Z01.84 ANTIBODY RESPONSE EXAMINATION: ICD-10-CM

## 2020-07-31 ENCOUNTER — OFFICE VISIT (OUTPATIENT)
Dept: OBSTETRICS AND GYNECOLOGY | Facility: CLINIC | Age: 45
End: 2020-07-31
Payer: COMMERCIAL

## 2020-07-31 VITALS
SYSTOLIC BLOOD PRESSURE: 128 MMHG | BODY MASS INDEX: 40.29 KG/M2 | WEIGHT: 231.06 LBS | DIASTOLIC BLOOD PRESSURE: 74 MMHG

## 2020-07-31 DIAGNOSIS — Z01.419 WELL WOMAN EXAM WITH ROUTINE GYNECOLOGICAL EXAM: Primary | ICD-10-CM

## 2020-07-31 DIAGNOSIS — Z12.4 SCREENING FOR CERVICAL CANCER: ICD-10-CM

## 2020-07-31 DIAGNOSIS — Z12.39 SCREENING FOR BREAST CANCER: ICD-10-CM

## 2020-07-31 PROCEDURE — 3008F BODY MASS INDEX DOCD: CPT | Mod: CPTII,S$GLB,, | Performed by: OBSTETRICS & GYNECOLOGY

## 2020-07-31 PROCEDURE — 3078F DIAST BP <80 MM HG: CPT | Mod: CPTII,S$GLB,, | Performed by: OBSTETRICS & GYNECOLOGY

## 2020-07-31 PROCEDURE — 88175 CYTOPATH C/V AUTO FLUID REDO: CPT

## 2020-07-31 PROCEDURE — 3074F SYST BP LT 130 MM HG: CPT | Mod: CPTII,S$GLB,, | Performed by: OBSTETRICS & GYNECOLOGY

## 2020-07-31 PROCEDURE — 3078F PR MOST RECENT DIASTOLIC BLOOD PRESSURE < 80 MM HG: ICD-10-PCS | Mod: CPTII,S$GLB,, | Performed by: OBSTETRICS & GYNECOLOGY

## 2020-07-31 PROCEDURE — 99999 PR PBB SHADOW E&M-EST. PATIENT-LVL III: ICD-10-PCS | Mod: PBBFAC,,, | Performed by: OBSTETRICS & GYNECOLOGY

## 2020-07-31 PROCEDURE — 99999 PR PBB SHADOW E&M-EST. PATIENT-LVL III: CPT | Mod: PBBFAC,,, | Performed by: OBSTETRICS & GYNECOLOGY

## 2020-07-31 PROCEDURE — 3074F PR MOST RECENT SYSTOLIC BLOOD PRESSURE < 130 MM HG: ICD-10-PCS | Mod: CPTII,S$GLB,, | Performed by: OBSTETRICS & GYNECOLOGY

## 2020-07-31 PROCEDURE — 3008F PR BODY MASS INDEX (BMI) DOCUMENTED: ICD-10-PCS | Mod: CPTII,S$GLB,, | Performed by: OBSTETRICS & GYNECOLOGY

## 2020-07-31 PROCEDURE — 99396 PR PREVENTIVE VISIT,EST,40-64: ICD-10-PCS | Mod: S$GLB,,, | Performed by: OBSTETRICS & GYNECOLOGY

## 2020-07-31 PROCEDURE — 99396 PREV VISIT EST AGE 40-64: CPT | Mod: S$GLB,,, | Performed by: OBSTETRICS & GYNECOLOGY

## 2020-07-31 NOTE — PROGRESS NOTES
GYNECOLOGY OFFICE NOTE    Reason for visit: annual    HPI: Pt is a 44 y.o.  female  who presents for annual. Cycle: menarche- 10. Hx of endometrial ablation in 2016- occasional spotting. She is not sexually active.  She uses bilateral tubal ligation for contraception.  She does not desire STI screening. She denies vaginal discharge.  Last pap: 2017- negative pap/hpv, denies hx of abnormal. Last MMG 2017- negative.     Past Medical History:   Diagnosis Date    Allergic conjunctivitis of both eyes     Diabetic retinopathy     Essential hypertension     Herpes simplex virus (HSV) infection     Hypertension     Hypertriglyceridemia 2016    Neuropathy     Obesity (BMI 30-39.9) 2016    Type 2 diabetes mellitus with diabetic polyneuropathy, without long-term current use of insulin 2016    Uterine fibroid        Past Surgical History:   Procedure Laterality Date     SECTION      x 2    ENDOMETRIAL ABLATION  2016    GALLBLADDER SURGERY  2017    removed    LIPOMA RESECTION Left 2013    SINUS SURGERY  2006    TONSILLECTOMY, ADENOIDECTOMY      TUBAL LIGATION         Family History   Problem Relation Age of Onset    Diabetes Father     Hypertension Father     Hyperlipidemia Father     Hypertension Mother     Anemia Daughter     Anemia Son     Diabetes Maternal Uncle     Diabetes Maternal Grandmother     Blindness Neg Hx     Glaucoma Neg Hx     Macular degeneration Neg Hx     Retinal detachment Neg Hx     Breast cancer Neg Hx     Colon cancer Neg Hx     Ovarian cancer Neg Hx        Social History     Tobacco Use    Smoking status: Never Smoker    Smokeless tobacco: Never Used   Substance Use Topics    Alcohol use: No     Frequency: Never     Drinks per session: Patient refused     Binge frequency: Never    Drug use: No       OB History    Para Term  AB Living   2 2 1 1   2   SAB TAB Ectopic Multiple Live Births           2      #  Outcome Date GA Lbr Tu/2nd Weight Sex Delivery Anes PTL Lv   2  11   2.948 kg (6 lb 8 oz) F CS-LTranv   STEPHANIE   1 Term 10/14/03   4.366 kg (9 lb 10 oz) M CS-LTranv   STEPHANIE       Current Outpatient Medications   Medication Sig    atorvastatin (LIPITOR) 80 MG tablet Take 1 tablet (80 mg total) by mouth every evening.    azelastine (ASTELIN) 137 mcg (0.1 %) nasal spray 2 sprays (274 mcg total) by Nasal route 2 (two) times daily.    carvediloL (COREG) 25 MG tablet TAKE 1 TABLET BY MOUTH TWICE A DAY    dulaglutide (TRULICITY) 0.75 mg/0.5 mL pen injector Inject 0.5 mLs (0.75 mg total) into the skin every 7 days.    ergocalciferol (ERGOCALCIFEROL) 50,000 unit Cap Take 1 capsule (50,000 Units total) by mouth every 7 days.    hydrALAZINE (APRESOLINE) 100 MG tablet Take 1 tablet (100 mg total) by mouth every 8 (eight) hours.    icosapent ethyL (VASCEPA) 1 gram Cap TAKE TWO CAPSULES BY MOUTH TWICE A DAY    metFORMIN (GLUCOPHAGE) 500 MG tablet Take 1/2 tablet (250 mg total) by mouth 2 (two) times daily with meals.    omega 3-dha-epa-fish oil (FISH OIL) 1,000 mg (120 mg-180 mg) Cap     valsartan (DIOVAN) 320 MG tablet Take 1 tablet (320 mg total) by mouth once daily.    verapamiL (CALAN-SR) 180 MG CR tablet TAKE 1 TABLET BY MOUTH TWO TIMES A DAY ( DISCONTINUE DILTIAZEM )     No current facility-administered medications for this visit.        Allergies: No known allergies     /74   Wt 104.8 kg (231 lb 0.7 oz)   LMP  (LMP Unknown)   BMI 40.29 kg/m²     ROS:  GENERAL: Denies fever or chills.   SKIN: Denies rash or lesions.   HEAD: Denies head injury or headache.   CHEST: Denies chest pain or shortness of breath.   CARDIOVASCULAR: Denies palpitations or chest pain.   ABDOMEN: No constipation, diarrhea, nausea, vomiting or rectal bleeding.   URINARY: No dysuria, hematuria, or burning on urination.  REPRODUCTIVE: See HPI.   BREASTS: see HPI  NEUROLOGIC: Denies syncope or weakness.     Physical  Exam:  GENERAL: alert, appears stated age and cooperative  NEUROLOGIC: orientated to person, place and time, normal mood and affect   CHEST: Normal respiratory effort  NECK: normal appearance  SKIN: no acne, hirsutism  BREAST EXAM: breasts appear normal, no suspicious masses, no skin or nipple changes or axillary nodes  ABDOMEN: abdomen is soft without significant tenderness, masses  EXTERNAL GENITALIA:  normal general appearance  URETHRA: normal urethra, normal urethral meatus  VAGINA:  normal mucosa, no  lesions  CERVIX:  Normal  UTERUS:  mobile  ADNEXA: no masses palpable and nontender    Diagnosis:  1. Well woman exam with routine gynecological exam    2. Screening for cervical cancer    3. Screening for breast cancer        Plan:   1. Annual  2. Pap today  3. MMG ordered    Orders Placed This Encounter    Mammo Digital Screening Bilat w/ Elver    Liquid-Based Pap Smear, Screening         RTC in 1 year for annual      Patient was counseled today on the new ACS guidelines for cervical cytology screening as well as the current recommendations for breast cancer screening. She was counseled to follow up with her PCP for other routine health maintenance.       Erlinda Murdock MD  OB/GYN

## 2020-08-07 ENCOUNTER — PATIENT MESSAGE (OUTPATIENT)
Dept: OTOLARYNGOLOGY | Facility: CLINIC | Age: 45
End: 2020-08-07

## 2020-08-07 ENCOUNTER — TELEPHONE (OUTPATIENT)
Dept: OTOLARYNGOLOGY | Facility: CLINIC | Age: 45
End: 2020-08-07

## 2020-08-07 ENCOUNTER — LAB VISIT (OUTPATIENT)
Dept: SURGERY | Facility: CLINIC | Age: 45
End: 2020-08-07
Payer: COMMERCIAL

## 2020-08-07 DIAGNOSIS — Z20.822 CLOSE EXPOSURE TO COVID-19 VIRUS: Primary | ICD-10-CM

## 2020-08-07 DIAGNOSIS — Z20.822 EXPOSURE TO COVID-19 VIRUS: ICD-10-CM

## 2020-08-07 DIAGNOSIS — Z20.822 CLOSE EXPOSURE TO COVID-19 VIRUS: ICD-10-CM

## 2020-08-07 LAB — SARS-COV-2 RDRP RESP QL NAA+PROBE: NEGATIVE

## 2020-08-07 PROCEDURE — U0002 COVID-19 LAB TEST NON-CDC: HCPCS

## 2020-08-10 ENCOUNTER — TELEPHONE (OUTPATIENT)
Dept: FAMILY MEDICINE | Facility: CLINIC | Age: 45
End: 2020-08-10

## 2020-08-10 NOTE — TELEPHONE ENCOUNTER
----- Message from Tai Fallon sent at 8/10/2020  1:29 PM CDT -----  Contact: [phone] / self  Patient would like a call back from your office regarding clarity on billing for her upcoming appointment. Please Advise.

## 2020-08-10 NOTE — TELEPHONE ENCOUNTER
----- Message from Laina Kaiser sent at 8/10/2020  2:48 PM CDT -----  Type:  Patient Returning Call    Who Called: Patient  Who Left Message for Patient:   Does the patient know what this is regarding?: shows copay but it's for her annual  Would the patient rather a call back or a response via [employer]? Call back  Best Call Back Number: ext [phone] or IM her  Additional Information: n/a

## 2020-08-11 ENCOUNTER — OFFICE VISIT (OUTPATIENT)
Dept: FAMILY MEDICINE | Facility: CLINIC | Age: 45
End: 2020-08-11
Payer: COMMERCIAL

## 2020-08-11 VITALS
RESPIRATION RATE: 16 BRPM | TEMPERATURE: 98 F | HEART RATE: 69 BPM | DIASTOLIC BLOOD PRESSURE: 68 MMHG | SYSTOLIC BLOOD PRESSURE: 110 MMHG | BODY MASS INDEX: 39.13 KG/M2 | OXYGEN SATURATION: 97 % | WEIGHT: 229.19 LBS | HEIGHT: 64 IN

## 2020-08-11 DIAGNOSIS — L65.9 HAIR LOSS: ICD-10-CM

## 2020-08-11 DIAGNOSIS — E55.9 VITAMIN D DEFICIENCY: ICD-10-CM

## 2020-08-11 DIAGNOSIS — Z00.00 WELLNESS EXAMINATION: ICD-10-CM

## 2020-08-11 DIAGNOSIS — E78.5 DYSLIPIDEMIA: ICD-10-CM

## 2020-08-11 DIAGNOSIS — E11.42 TYPE 2 DIABETES MELLITUS WITH DIABETIC POLYNEUROPATHY, WITHOUT LONG-TERM CURRENT USE OF INSULIN: Chronic | ICD-10-CM

## 2020-08-11 DIAGNOSIS — R35.0 FREQUENT URINATION: ICD-10-CM

## 2020-08-11 DIAGNOSIS — Z11.59 ENCOUNTER FOR HEPATITIS C SCREENING TEST FOR LOW RISK PATIENT: Primary | ICD-10-CM

## 2020-08-11 PROCEDURE — 3074F PR MOST RECENT SYSTOLIC BLOOD PRESSURE < 130 MM HG: ICD-10-PCS | Mod: CPTII,S$GLB,, | Performed by: INTERNAL MEDICINE

## 2020-08-11 PROCEDURE — 3044F HG A1C LEVEL LT 7.0%: CPT | Mod: CPTII,S$GLB,, | Performed by: INTERNAL MEDICINE

## 2020-08-11 PROCEDURE — 99999 PR PBB SHADOW E&M-EST. PATIENT-LVL IV: CPT | Mod: PBBFAC,,, | Performed by: INTERNAL MEDICINE

## 2020-08-11 PROCEDURE — 3078F DIAST BP <80 MM HG: CPT | Mod: CPTII,S$GLB,, | Performed by: INTERNAL MEDICINE

## 2020-08-11 PROCEDURE — 3008F BODY MASS INDEX DOCD: CPT | Mod: CPTII,S$GLB,, | Performed by: INTERNAL MEDICINE

## 2020-08-11 PROCEDURE — 3044F PR MOST RECENT HEMOGLOBIN A1C LEVEL <7.0%: ICD-10-PCS | Mod: CPTII,S$GLB,, | Performed by: INTERNAL MEDICINE

## 2020-08-11 PROCEDURE — 3008F PR BODY MASS INDEX (BMI) DOCUMENTED: ICD-10-PCS | Mod: CPTII,S$GLB,, | Performed by: INTERNAL MEDICINE

## 2020-08-11 PROCEDURE — 99396 PREV VISIT EST AGE 40-64: CPT | Mod: S$GLB,,, | Performed by: INTERNAL MEDICINE

## 2020-08-11 PROCEDURE — 3078F PR MOST RECENT DIASTOLIC BLOOD PRESSURE < 80 MM HG: ICD-10-PCS | Mod: CPTII,S$GLB,, | Performed by: INTERNAL MEDICINE

## 2020-08-11 PROCEDURE — 99396 PR PREVENTIVE VISIT,EST,40-64: ICD-10-PCS | Mod: S$GLB,,, | Performed by: INTERNAL MEDICINE

## 2020-08-11 PROCEDURE — 99999 PR PBB SHADOW E&M-EST. PATIENT-LVL IV: ICD-10-PCS | Mod: PBBFAC,,, | Performed by: INTERNAL MEDICINE

## 2020-08-11 PROCEDURE — 3074F SYST BP LT 130 MM HG: CPT | Mod: CPTII,S$GLB,, | Performed by: INTERNAL MEDICINE

## 2020-08-11 RX ORDER — ASPIRIN 81 MG/1
81 TABLET ORAL DAILY
Refills: 0
Start: 2020-08-11 | End: 2023-03-30

## 2020-08-11 RX ORDER — ATORVASTATIN CALCIUM 40 MG/1
80 TABLET, FILM COATED ORAL NIGHTLY
Start: 2020-08-11 | End: 2020-11-29 | Stop reason: SDUPTHER

## 2020-08-11 NOTE — PROGRESS NOTES
[facility]    Chief Complaint      Chief Complaint   Patient presents with    Annual Exam     History of Present Illness      Enriqueta Mccarthy is a 44 y.o. female who presents today for chief complaint follow up chronic issues htn hld dm. Patient is new to me.    PCP: Edilma Marquis MD  Patient comes to appointment with he rmother.     Answers for HPI/ROS submitted by the patient on 8/9/2020   activity change: No  unexpected weight change: No  neck pain: No  hearing loss: No  rhinorrhea: No  trouble swallowing: No  eye discharge: No  visual disturbance: No  chest tightness: No  wheezing: No  chest pain: No  palpitations: No  blood in stool: No  constipation: No  vomiting: No  diarrhea: No  polydipsia: No  polyuria: No  difficulty urinating: No  hematuria: No  menstrual problem: No  dysuria: No  joint swelling: No  arthralgias: No  headaches: No  weakness: No  confusion: No  dysphoric mood: No      Frequent urination: without dysuria, 2 prior pregnancies, declines uro or tx now     HTN at goal, sees dr roche in cardiology is on coreg hydral, diovan, verapimil, is enrolled in digital hyn, checking occasionally at home at goal today     DM at goal , on metformin andtrulicity no hypoglycemia, not checking sugars  Lab Results   Component Value Date    HGBA1C 5.4 07/25/2020     Notes hair falling out, no other hypothyroid type symptoms     mmg pending   Pap utd      Active Problem List with Overview Notes    Diagnosis Date Noted    Frequent urination 08/11/2020    Hair loss 08/11/2020    Wellness examination 08/11/2020     Feels well today no complaints  Works in ENT at main campus      Pap: utd  Td: utdFlu:   Shingles:utd  Tobacco: never          Class 2 severe obesity with serious comorbidity and body mass index (BMI) of 39.0 to 39.9 in adult 08/01/2019    Type 2 diabetes mellitus with diabetic polyneuropathy, without long-term current use of insulin 11/17/2016     Hypertriglyceridemia 2016    Dyslipidemia 2016    Vitamin D deficiency 2014       Health Maintenance   Topic Date Due    Hepatitis C Screening  1975    Foot Exam  2020    Pap Smear  2020    Mammogram  2021 (Originally 2019)    TETANUS VACCINE  2020    Hemoglobin A1c  2021    Eye Exam  2021    Lipid Panel  2021    Low Dose Statin  2021    Pneumococcal Vaccine (Medium Risk)  Completed       Past Medical History:   Diagnosis Date    Allergic conjunctivitis of both eyes     Diabetic retinopathy     Essential hypertension     Herpes simplex virus (HSV) infection     Hypertension     Hypertriglyceridemia 2016    Neuropathy     Obesity (BMI 30-39.9) 2016    Type 2 diabetes mellitus with diabetic polyneuropathy, without long-term current use of insulin 2016    Uterine fibroid        Past Surgical History:   Procedure Laterality Date     SECTION      x 2    ENDOMETRIAL ABLATION  2016    GALLBLADDER SURGERY  2017    removed    LIPOMA RESECTION Left 2013    SINUS SURGERY  2006    TONSILLECTOMY, ADENOIDECTOMY      TUBAL LIGATION         family history includes Anemia in her daughter and son; Diabetes in her father, maternal grandmother, and maternal uncle; Hyperlipidemia in her father; Hypertension in her father and mother.    Social History     Tobacco Use    Smoking status: Never Smoker    Smokeless tobacco: Never Used   Substance Use Topics    Alcohol use: No     Frequency: Never     Drinks per session: Patient refused     Binge frequency: Never    Drug use: No       Review of Systems   Constitutional: Negative for chills and fever.   HENT: Negative for congestion, hearing loss and sore throat.    Eyes: Negative for blurred vision and discharge.   Respiratory: Negative for cough, shortness of breath and wheezing.    Cardiovascular: Negative for chest pain and palpitations.    Gastrointestinal: Negative for blood in stool, constipation, diarrhea, nausea and vomiting.   Genitourinary: Negative for dysuria and hematuria.   Musculoskeletal: Negative for falls, myalgias and neck pain.   Skin: Negative for itching and rash.   Neurological: Negative for dizziness, weakness and headaches.   Endo/Heme/Allergies: Negative for polydipsia.        Outpatient Encounter Medications as of 8/11/2020   Medication Sig Note Dispense Refill    atorvastatin (LIPITOR) 40 MG tablet Take 2 tablets (80 mg total) by mouth every evening.       azelastine (ASTELIN) 137 mcg (0.1 %) nasal spray 2 sprays (274 mcg total) by Nasal route 2 (two) times daily. 12/19/2018: Take as needed 30 mL 2    carvediloL (COREG) 25 MG tablet TAKE 1 TABLET BY MOUTH TWICE A DAY  60 tablet 11    dulaglutide (TRULICITY) 0.75 mg/0.5 mL pen injector Inject 0.5 mLs (0.75 mg total) into the skin every 7 days.  2 mL 2    ergocalciferol (ERGOCALCIFEROL) 50,000 unit Cap Take 1 capsule (50,000 Units total) by mouth every 7 days.  12 capsule 2    hydrALAZINE (APRESOLINE) 100 MG tablet Take 1 tablet (100 mg total) by mouth every 8 (eight) hours.  90 tablet 2    icosapent ethyL (VASCEPA) 1 gram Cap TAKE TWO CAPSULES BY MOUTH TWICE A DAY  120 capsule 5    metFORMIN (GLUCOPHAGE) 500 MG tablet Take 1/2 tablet (250 mg total) by mouth 2 (two) times daily with meals.  30 tablet 2    valsartan (DIOVAN) 320 MG tablet Take 1 tablet (320 mg total) by mouth once daily.  30 tablet 11    verapamiL (CALAN-SR) 180 MG CR tablet TAKE 1 TABLET BY MOUTH TWO TIMES A DAY ( DISCONTINUE DILTIAZEM )  60 tablet 11    [DISCONTINUED] atorvastatin (LIPITOR) 80 MG tablet Take 1 tablet (80 mg total) by mouth every evening.  90 tablet 3    aspirin (ECOTRIN) 81 MG EC tablet Take 1 tablet (81 mg total) by mouth once daily.   0    [DISCONTINUED] diltiaZEM (CARDIZEM CD) 180 MG 24 hr capsule Take 2 capsules (360 mg total) by mouth once daily.  180 capsule 3     "[DISCONTINUED] omega 3-dha-epa-fish oil (FISH OIL) 1,000 mg (120 mg-180 mg) Cap         No facility-administered encounter medications on file as of 8/11/2020.         Review of patient's allergies indicates:   Allergen Reactions    No known allergies            Physical Exam      Vital Signs  Temp: 98.3 °F (36.8 °C)  Temp src: Temporal  Pulse: 69  Resp: 16  SpO2: 97 %  BP: 110/68  BP Location: Right arm  Patient Position: Sitting  Height and Weight  Height: 5' 3.5" (161.3 cm)  Weight: 103.9 kg (229 lb 2.7 oz)  BSA (Calculated - sq m): 2.16 sq meters  BMI (Calculated): 40  Weight in (lb) to have BMI = 25: 143.1]    Physical Exam  Vitals signs reviewed.   Constitutional:       Appearance: She is well-developed.   HENT:      Head: Normocephalic and atraumatic.      Right Ear: External ear normal.      Left Ear: External ear normal.   Eyes:      General:         Right eye: No discharge.         Left eye: No discharge.   Neck:      Musculoskeletal: Normal range of motion.      Thyroid: No thyromegaly.   Cardiovascular:      Rate and Rhythm: Normal rate and regular rhythm.      Heart sounds: Normal heart sounds. No murmur.   Pulmonary:      Effort: Pulmonary effort is normal. No respiratory distress.      Breath sounds: Normal breath sounds.   Abdominal:      General: Bowel sounds are normal. There is no distension.      Palpations: Abdomen is soft.      Tenderness: There is no abdominal tenderness.   Musculoskeletal: Normal range of motion.         General: No deformity.   Skin:     General: Skin is warm and dry.      Findings: No rash.   Neurological:      Mental Status: She is alert and oriented to person, place, and time.   Psychiatric:         Behavior: Behavior normal.          Laboratory:  CBC:  No results for input(s): WBC, RBC, HGB, HCT, PLT, MCV, MCH, MCHC in the last 2160 hours.  CMP:  Recent Labs   Lab Result Units 07/25/20  0904   Glucose mg/dL 117*   Calcium mg/dL 9.2   Albumin g/dL 4.3   Total Protein " g/dL 7.1   Sodium mmol/L 144   Potassium mmol/L 4.2   CO2 mmol/L 28   Chloride mmol/L 106   BUN, Bld mg/dL 13   Alkaline Phosphatase U/L 55   ALT U/L 23   AST U/L 23   Total Bilirubin mg/dL 0.8     URINALYSIS:  No results for input(s): COLORU, CLARITYU, SPECGRAV, PHUR, PROTEINUA, GLUCOSEU, BILIRUBINCON, BLOODU, WBCU, RBCU, BACTERIA, MUCUS, NITRITE, LEUKOCYTESUR, UROBILINOGEN, HYALINECASTS in the last 2160 hours.   LIPIDS:  Recent Labs   Lab Result Units 07/25/20  0903 08/11/20  1151   TSH uIU/mL  --  1.150   HDL mg/dL 33*  --    Cholesterol mg/dL 110*  --    Triglycerides mg/dL 134  --    LDL Cholesterol mg/dL 50.2*  --    Hdl/Cholesterol Ratio % 30.0  --    Non-HDL Cholesterol mg/dL 77  --    Total Cholesterol/HDL Ratio  3.3  --      TSH:  Recent Labs   Lab Result Units 08/11/20  1151   TSH uIU/mL 1.150     A1C:  Recent Labs   Lab Result Units 07/25/20  0903   Hemoglobin A1C % 5.4       Radiology:      Assessment/Plan     Enriqueta Mccarthy is a 44 y.o.female with:    Wellness examination  Labs, screenings and age related vaccinations utd     Vitamin D deficiency  Recheck levels    Type 2 diabetes mellitus with diabetic polyneuropathy, without long-term current use of insulin  No change to medicatoins, foot exam today  Needs microalb at follow up    Frequent urination  Declines tx or referral to urology at this time  Trial of timed voiding     Dyslipidemia  Reduce lipitor to 40 torie FLP in 6 months     Hair loss  Ck Vit D and TFTs, trial of prenatal MVI, Biotin, Viviscal, can consider eval by derm if pt wishes      Orders Placed This Encounter   Procedures    Hepatitis C Antibody     Standing Status:   Future     Number of Occurrences:   1     Standing Expiration Date:   10/10/2021    T4, free     Standing Status:   Future     Number of Occurrences:   1     Standing Expiration Date:   10/11/2021    TSH     Standing Status:   Future     Number of Occurrences:   1     Standing Expiration Date:   10/11/2021    Vitamin D      Standing Status:   Future     Number of Occurrences:   1     Standing Expiration Date:   10/10/2021       Use of the [employer] Patient Portal discussed and encouraged during today's visit  -Continue current medications and maintain follow up with specialists.  Return to clinic in 6 months.  Future Appointments   Date Time Provider Department Center   9/16/2020  1:30 PM [id number] [place]   2/10/2021 10:30 AM Edilma Marquis MD [facility] [place]       Edilma Marquis MD  8/11/2020 10:36 AM    Primary Care Internal [facility] - [facility]      Hair loss labs pmvi biotin viviscal thyroid vit d  Ref to derm if no work up   lipitor 40 and asa 81

## 2020-08-12 NOTE — ASSESSMENT & PLAN NOTE
Ck Vit D and TFTs, trial of prenatal MVI, Biotin, Viviscal, can consider eval by derm if pt wishes

## 2020-08-16 LAB
FINAL PATHOLOGIC DIAGNOSIS: NORMAL
Lab: NORMAL

## 2020-08-19 DIAGNOSIS — Z01.84 ANTIBODY RESPONSE EXAMINATION: ICD-10-CM

## 2020-09-04 DIAGNOSIS — E11.65 UNCONTROLLED TYPE 2 DIABETES MELLITUS WITH HYPERGLYCEMIA, WITHOUT LONG-TERM CURRENT USE OF INSULIN: Chronic | ICD-10-CM

## 2020-09-07 RX ORDER — METFORMIN HYDROCHLORIDE 500 MG/1
250 TABLET ORAL 2 TIMES DAILY WITH MEALS
Qty: 90 TABLET | Refills: 3 | Status: SHIPPED | OUTPATIENT
Start: 2020-09-07 | End: 2021-02-11 | Stop reason: SDUPTHER

## 2020-09-15 ENCOUNTER — PATIENT MESSAGE (OUTPATIENT)
Dept: FAMILY MEDICINE | Facility: CLINIC | Age: 45
End: 2020-09-15

## 2020-09-15 DIAGNOSIS — E78.1 HYPERTRIGLYCERIDEMIA: Primary | Chronic | ICD-10-CM

## 2020-09-15 DIAGNOSIS — E11.42 TYPE 2 DIABETES MELLITUS WITH DIABETIC POLYNEUROPATHY, WITHOUT LONG-TERM CURRENT USE OF INSULIN: Chronic | ICD-10-CM

## 2020-09-17 ENCOUNTER — PATIENT MESSAGE (OUTPATIENT)
Dept: FAMILY MEDICINE | Facility: CLINIC | Age: 45
End: 2020-09-17

## 2020-09-17 DIAGNOSIS — E11.42 TYPE 2 DIABETES MELLITUS WITH DIABETIC POLYNEUROPATHY, WITHOUT LONG-TERM CURRENT USE OF INSULIN: Primary | ICD-10-CM

## 2020-09-18 DIAGNOSIS — Z01.84 ANTIBODY RESPONSE EXAMINATION: ICD-10-CM

## 2020-10-15 ENCOUNTER — PATIENT MESSAGE (OUTPATIENT)
Dept: OBSTETRICS AND GYNECOLOGY | Facility: CLINIC | Age: 45
End: 2020-10-15

## 2020-10-16 ENCOUNTER — TELEPHONE (OUTPATIENT)
Dept: OBSTETRICS AND GYNECOLOGY | Facility: CLINIC | Age: 45
End: 2020-10-16

## 2020-10-16 DIAGNOSIS — R92.8 ABNORMAL MAMMOGRAM OF RIGHT BREAST: Primary | ICD-10-CM

## 2020-10-16 NOTE — TELEPHONE ENCOUNTER
Response sent through "[employer]. Pt does need repeat imaging in April 2021    Erlinda Murdock MD, FACOG  OB/GYN

## 2020-10-16 NOTE — TELEPHONE ENCOUNTER
----- Message from Erlinda Murdock MD sent at 10/16/2020 10:21 AM CDT -----  Repeat imaging in 6 months-->4/2021

## 2020-10-18 DIAGNOSIS — Z01.84 ANTIBODY RESPONSE EXAMINATION: ICD-10-CM

## 2020-10-28 ENCOUNTER — PATIENT MESSAGE (OUTPATIENT)
Dept: CARDIOLOGY | Facility: CLINIC | Age: 45
End: 2020-10-28

## 2020-10-28 DIAGNOSIS — I10 ESSENTIAL HYPERTENSION: Chronic | ICD-10-CM

## 2020-10-28 RX ORDER — ICOSAPENT ETHYL 1000 MG/1
CAPSULE ORAL
Qty: 120 CAPSULE | Refills: 5 | Status: SHIPPED | OUTPATIENT
Start: 2020-10-28 | End: 2021-02-11 | Stop reason: SDUPTHER

## 2020-10-28 RX ORDER — VERAPAMIL HYDROCHLORIDE 180 MG/1
TABLET, FILM COATED, EXTENDED RELEASE ORAL
Qty: 60 TABLET | Refills: 11 | Status: SHIPPED | OUTPATIENT
Start: 2020-10-28 | End: 2021-02-11 | Stop reason: SDUPTHER

## 2020-11-16 PROBLEM — Z00.00 WELLNESS EXAMINATION: Status: RESOLVED | Noted: 2020-08-11 | Resolved: 2020-11-16

## 2020-11-17 DIAGNOSIS — Z01.84 ANTIBODY RESPONSE EXAMINATION: ICD-10-CM

## 2020-11-23 ENCOUNTER — PATIENT MESSAGE (OUTPATIENT)
Dept: OBSTETRICS AND GYNECOLOGY | Facility: CLINIC | Age: 45
End: 2020-11-23

## 2020-11-24 ENCOUNTER — PATIENT MESSAGE (OUTPATIENT)
Dept: OBSTETRICS AND GYNECOLOGY | Facility: CLINIC | Age: 45
End: 2020-11-24

## 2020-11-24 NOTE — TELEPHONE ENCOUNTER
Pt had right breast diagnostic mammo and right breast ultrasound 10/2020, was advised to f/u in 6 months in 4/2021.

## 2020-11-29 DIAGNOSIS — E78.5 DYSLIPIDEMIA: ICD-10-CM

## 2020-11-29 RX ORDER — ATORVASTATIN CALCIUM 40 MG/1
80 TABLET, FILM COATED ORAL DAILY
Qty: 90 TABLET | Refills: 1
Start: 2020-11-29 | End: 2020-12-02 | Stop reason: SDUPTHER

## 2020-12-02 ENCOUNTER — PATIENT MESSAGE (OUTPATIENT)
Dept: FAMILY MEDICINE | Facility: CLINIC | Age: 45
End: 2020-12-02

## 2020-12-02 DIAGNOSIS — E78.5 DYSLIPIDEMIA: ICD-10-CM

## 2020-12-02 RX ORDER — ATORVASTATIN CALCIUM 80 MG/1
80 TABLET, FILM COATED ORAL NIGHTLY
Qty: 90 TABLET | Refills: 3 | Status: SHIPPED | OUTPATIENT
Start: 2020-12-02 | End: 2021-02-11

## 2020-12-02 RX ORDER — ATORVASTATIN CALCIUM 80 MG/1
80 TABLET, FILM COATED ORAL DAILY
Qty: 90 TABLET | Refills: 1 | Status: SHIPPED | OUTPATIENT
Start: 2020-12-02 | End: 2021-02-11 | Stop reason: SDUPTHER

## 2020-12-15 ENCOUNTER — PATIENT MESSAGE (OUTPATIENT)
Dept: FAMILY MEDICINE | Facility: CLINIC | Age: 45
End: 2020-12-15

## 2020-12-15 DIAGNOSIS — E11.65 UNCONTROLLED TYPE 2 DIABETES MELLITUS WITH HYPERGLYCEMIA, WITHOUT LONG-TERM CURRENT USE OF INSULIN: Chronic | ICD-10-CM

## 2020-12-16 RX ORDER — DULAGLUTIDE 0.75 MG/.5ML
INJECTION, SOLUTION SUBCUTANEOUS
Qty: 2 ML | Refills: 2 | Status: SHIPPED | OUTPATIENT
Start: 2020-12-16 | End: 2021-05-30 | Stop reason: SDUPTHER

## 2020-12-22 ENCOUNTER — IMMUNIZATION (OUTPATIENT)
Dept: INTERNAL MEDICINE | Facility: CLINIC | Age: 45
End: 2020-12-22
Payer: COMMERCIAL

## 2020-12-22 DIAGNOSIS — Z23 NEED FOR VACCINATION: ICD-10-CM

## 2020-12-22 PROCEDURE — 91300 COVID-19, MRNA, LNP-S, PF, 30 MCG/0.3 ML DOSE VACCINE: CPT | Mod: ,,, | Performed by: INTERNAL MEDICINE

## 2020-12-22 PROCEDURE — 0001A COVID-19, MRNA, LNP-S, PF, 30 MCG/0.3 ML DOSE VACCINE: CPT | Mod: CV19,,, | Performed by: INTERNAL MEDICINE

## 2020-12-22 PROCEDURE — 0001A COVID-19, MRNA, LNP-S, PF, 30 MCG/0.3 ML DOSE VACCINE: ICD-10-PCS | Mod: CV19,,, | Performed by: INTERNAL MEDICINE

## 2020-12-22 PROCEDURE — 91300 COVID-19, MRNA, LNP-S, PF, 30 MCG/0.3 ML DOSE VACCINE: ICD-10-PCS | Mod: ,,, | Performed by: INTERNAL MEDICINE

## 2021-01-07 ENCOUNTER — PATIENT MESSAGE (OUTPATIENT)
Dept: FAMILY MEDICINE | Facility: CLINIC | Age: 46
End: 2021-01-07

## 2021-01-11 DIAGNOSIS — E55.9 VITAMIN D DEFICIENCY: ICD-10-CM

## 2021-01-12 ENCOUNTER — PATIENT MESSAGE (OUTPATIENT)
Dept: FAMILY MEDICINE | Facility: CLINIC | Age: 46
End: 2021-01-12

## 2021-01-12 ENCOUNTER — IMMUNIZATION (OUTPATIENT)
Dept: INTERNAL MEDICINE | Facility: CLINIC | Age: 46
End: 2021-01-12
Payer: COMMERCIAL

## 2021-01-12 DIAGNOSIS — Z23 NEED FOR VACCINATION: ICD-10-CM

## 2021-01-12 PROCEDURE — 0002A COVID-19, MRNA, LNP-S, PF, 30 MCG/0.3 ML DOSE VACCINE: ICD-10-PCS | Mod: CV19,,, | Performed by: INTERNAL MEDICINE

## 2021-01-12 PROCEDURE — 91300 COVID-19, MRNA, LNP-S, PF, 30 MCG/0.3 ML DOSE VACCINE: ICD-10-PCS | Mod: ,,, | Performed by: INTERNAL MEDICINE

## 2021-01-12 PROCEDURE — 91300 COVID-19, MRNA, LNP-S, PF, 30 MCG/0.3 ML DOSE VACCINE: CPT | Mod: ,,, | Performed by: INTERNAL MEDICINE

## 2021-01-12 PROCEDURE — 0002A COVID-19, MRNA, LNP-S, PF, 30 MCG/0.3 ML DOSE VACCINE: CPT | Mod: CV19,,, | Performed by: INTERNAL MEDICINE

## 2021-01-12 RX ORDER — ERGOCALCIFEROL 1.25 MG/1
50000 CAPSULE ORAL
Qty: 12 CAPSULE | Refills: 2 | Status: SHIPPED | OUTPATIENT
Start: 2021-01-12 | End: 2021-08-26 | Stop reason: SDUPTHER

## 2021-01-26 ENCOUNTER — PATIENT MESSAGE (OUTPATIENT)
Dept: CARDIOLOGY | Facility: CLINIC | Age: 46
End: 2021-01-26

## 2021-01-26 DIAGNOSIS — I10 ESSENTIAL HYPERTENSION: Chronic | ICD-10-CM

## 2021-01-26 RX ORDER — HYDRALAZINE HYDROCHLORIDE 100 MG/1
100 TABLET, FILM COATED ORAL EVERY 8 HOURS
Qty: 90 TABLET | Refills: 2 | Status: SHIPPED | OUTPATIENT
Start: 2021-01-26 | End: 2021-02-11 | Stop reason: SDUPTHER

## 2021-02-08 ENCOUNTER — LAB VISIT (OUTPATIENT)
Dept: LAB | Facility: HOSPITAL | Age: 46
End: 2021-02-08
Attending: INTERNAL MEDICINE
Payer: COMMERCIAL

## 2021-02-08 DIAGNOSIS — E78.1 HYPERTRIGLYCERIDEMIA: Chronic | ICD-10-CM

## 2021-02-08 DIAGNOSIS — E11.42 TYPE 2 DIABETES MELLITUS WITH DIABETIC POLYNEUROPATHY, WITHOUT LONG-TERM CURRENT USE OF INSULIN: ICD-10-CM

## 2021-02-08 LAB
CHOLEST SERPL-MCNC: 117 MG/DL (ref 120–199)
CHOLEST/HDLC SERPL: 3.3 {RATIO} (ref 2–5)
ESTIMATED AVG GLUCOSE: 123 MG/DL (ref 68–131)
HBA1C MFR BLD: 5.9 % (ref 4–5.6)
HDLC SERPL-MCNC: 36 MG/DL (ref 40–75)
HDLC SERPL: 30.8 % (ref 20–50)
LDLC SERPL CALC-MCNC: 48.8 MG/DL (ref 63–159)
NONHDLC SERPL-MCNC: 81 MG/DL
TRIGL SERPL-MCNC: 161 MG/DL (ref 30–150)

## 2021-02-08 PROCEDURE — 80061 LIPID PANEL: CPT

## 2021-02-08 PROCEDURE — 36415 COLL VENOUS BLD VENIPUNCTURE: CPT

## 2021-02-08 PROCEDURE — 83036 HEMOGLOBIN GLYCOSYLATED A1C: CPT

## 2021-02-11 ENCOUNTER — PATIENT MESSAGE (OUTPATIENT)
Dept: FAMILY MEDICINE | Facility: CLINIC | Age: 46
End: 2021-02-11

## 2021-02-11 ENCOUNTER — OFFICE VISIT (OUTPATIENT)
Dept: FAMILY MEDICINE | Facility: CLINIC | Age: 46
End: 2021-02-11
Payer: COMMERCIAL

## 2021-02-11 VITALS
WEIGHT: 234.88 LBS | SYSTOLIC BLOOD PRESSURE: 112 MMHG | OXYGEN SATURATION: 98 % | HEIGHT: 64 IN | TEMPERATURE: 98 F | HEART RATE: 92 BPM | DIASTOLIC BLOOD PRESSURE: 84 MMHG | BODY MASS INDEX: 40.1 KG/M2

## 2021-02-11 DIAGNOSIS — E11.42 TYPE 2 DIABETES MELLITUS WITH DIABETIC POLYNEUROPATHY, WITHOUT LONG-TERM CURRENT USE OF INSULIN: Primary | ICD-10-CM

## 2021-02-11 DIAGNOSIS — E55.9 VITAMIN D DEFICIENCY: ICD-10-CM

## 2021-02-11 DIAGNOSIS — U07.1 COVID-19: ICD-10-CM

## 2021-02-11 DIAGNOSIS — L21.0 PITYRIASIS: ICD-10-CM

## 2021-02-11 DIAGNOSIS — I10 ESSENTIAL HYPERTENSION: Chronic | ICD-10-CM

## 2021-02-11 DIAGNOSIS — Z23 NEED FOR PROPHYLACTIC VACCINATION WITH TETANUS-DIPHTHERIA (TD): ICD-10-CM

## 2021-02-11 DIAGNOSIS — E78.5 DYSLIPIDEMIA: ICD-10-CM

## 2021-02-11 DIAGNOSIS — E11.65 UNCONTROLLED TYPE 2 DIABETES MELLITUS WITH HYPERGLYCEMIA, WITHOUT LONG-TERM CURRENT USE OF INSULIN: Chronic | ICD-10-CM

## 2021-02-11 PROCEDURE — 3008F BODY MASS INDEX DOCD: CPT | Mod: CPTII,S$GLB,, | Performed by: INTERNAL MEDICINE

## 2021-02-11 PROCEDURE — 1126F PR PAIN SEVERITY QUANTIFIED, NO PAIN PRESENT: ICD-10-PCS | Mod: S$GLB,,, | Performed by: INTERNAL MEDICINE

## 2021-02-11 PROCEDURE — 3044F PR MOST RECENT HEMOGLOBIN A1C LEVEL <7.0%: ICD-10-PCS | Mod: CPTII,S$GLB,, | Performed by: INTERNAL MEDICINE

## 2021-02-11 PROCEDURE — 90471 TD VACCINE GREATER THAN OR EQUAL TO 7YO PRESERVATIVE FREE IM: ICD-10-PCS | Mod: S$GLB,,, | Performed by: INTERNAL MEDICINE

## 2021-02-11 PROCEDURE — 99999 PR PBB SHADOW E&M-EST. PATIENT-LVL III: ICD-10-PCS | Mod: PBBFAC,,, | Performed by: INTERNAL MEDICINE

## 2021-02-11 PROCEDURE — 3079F PR MOST RECENT DIASTOLIC BLOOD PRESSURE 80-89 MM HG: ICD-10-PCS | Mod: CPTII,S$GLB,, | Performed by: INTERNAL MEDICINE

## 2021-02-11 PROCEDURE — 90714 TD VACCINE GREATER THAN OR EQUAL TO 7YO PRESERVATIVE FREE IM: ICD-10-PCS | Mod: S$GLB,,, | Performed by: INTERNAL MEDICINE

## 2021-02-11 PROCEDURE — 3074F PR MOST RECENT SYSTOLIC BLOOD PRESSURE < 130 MM HG: ICD-10-PCS | Mod: CPTII,S$GLB,, | Performed by: INTERNAL MEDICINE

## 2021-02-11 PROCEDURE — 99999 PR PBB SHADOW E&M-EST. PATIENT-LVL III: CPT | Mod: PBBFAC,,, | Performed by: INTERNAL MEDICINE

## 2021-02-11 PROCEDURE — 3072F LOW RISK FOR RETINOPATHY: CPT | Mod: S$GLB,,, | Performed by: INTERNAL MEDICINE

## 2021-02-11 PROCEDURE — 90471 IMMUNIZATION ADMIN: CPT | Mod: S$GLB,,, | Performed by: INTERNAL MEDICINE

## 2021-02-11 PROCEDURE — 99214 OFFICE O/P EST MOD 30 MIN: CPT | Mod: 25,S$GLB,, | Performed by: INTERNAL MEDICINE

## 2021-02-11 PROCEDURE — 3074F SYST BP LT 130 MM HG: CPT | Mod: CPTII,S$GLB,, | Performed by: INTERNAL MEDICINE

## 2021-02-11 PROCEDURE — 99214 PR OFFICE/OUTPT VISIT, EST, LEVL IV, 30-39 MIN: ICD-10-PCS | Mod: 25,S$GLB,, | Performed by: INTERNAL MEDICINE

## 2021-02-11 PROCEDURE — 90714 TD VACC NO PRESV 7 YRS+ IM: CPT | Mod: S$GLB,,, | Performed by: INTERNAL MEDICINE

## 2021-02-11 PROCEDURE — 3008F PR BODY MASS INDEX (BMI) DOCUMENTED: ICD-10-PCS | Mod: CPTII,S$GLB,, | Performed by: INTERNAL MEDICINE

## 2021-02-11 PROCEDURE — 3072F PR LOW RISK FOR RETINOPATHY: ICD-10-PCS | Mod: S$GLB,,, | Performed by: INTERNAL MEDICINE

## 2021-02-11 PROCEDURE — 1126F AMNT PAIN NOTED NONE PRSNT: CPT | Mod: S$GLB,,, | Performed by: INTERNAL MEDICINE

## 2021-02-11 PROCEDURE — 3079F DIAST BP 80-89 MM HG: CPT | Mod: CPTII,S$GLB,, | Performed by: INTERNAL MEDICINE

## 2021-02-11 PROCEDURE — 3044F HG A1C LEVEL LT 7.0%: CPT | Mod: CPTII,S$GLB,, | Performed by: INTERNAL MEDICINE

## 2021-02-11 RX ORDER — CARVEDILOL 25 MG/1
TABLET ORAL
Qty: 180 TABLET | Refills: 1 | Status: SHIPPED | OUTPATIENT
Start: 2021-02-11 | End: 2021-08-26 | Stop reason: SDUPTHER

## 2021-02-11 RX ORDER — VALSARTAN 320 MG/1
TABLET ORAL
Qty: 90 TABLET | Refills: 1 | Status: SHIPPED | OUTPATIENT
Start: 2021-02-11 | End: 2021-08-26 | Stop reason: SDUPTHER

## 2021-02-11 RX ORDER — HYDRALAZINE HYDROCHLORIDE 100 MG/1
50 TABLET, FILM COATED ORAL 2 TIMES DAILY
Qty: 90 TABLET | Refills: 2
Start: 2021-02-11 | End: 2021-02-11

## 2021-02-11 RX ORDER — VERAPAMIL HYDROCHLORIDE 180 MG/1
TABLET, FILM COATED, EXTENDED RELEASE ORAL
Qty: 180 TABLET | Refills: 1 | Status: SHIPPED | OUTPATIENT
Start: 2021-02-11 | End: 2021-08-26 | Stop reason: SDUPTHER

## 2021-02-11 RX ORDER — TRIAMCINOLONE ACETONIDE 1 MG/G
CREAM TOPICAL 2 TIMES DAILY
Qty: 45 G | Refills: 0 | Status: SHIPPED | OUTPATIENT
Start: 2021-02-11

## 2021-02-11 RX ORDER — METFORMIN HYDROCHLORIDE 500 MG/1
250 TABLET ORAL 2 TIMES DAILY WITH MEALS
Qty: 90 TABLET | Refills: 3 | Status: SHIPPED | OUTPATIENT
Start: 2021-02-11 | End: 2021-08-26

## 2021-02-11 RX ORDER — ICOSAPENT ETHYL 1000 MG/1
CAPSULE ORAL
Qty: 360 CAPSULE | Refills: 1 | Status: SHIPPED | OUTPATIENT
Start: 2021-02-11 | End: 2021-08-26 | Stop reason: SDUPTHER

## 2021-02-11 RX ORDER — ATORVASTATIN CALCIUM 80 MG/1
80 TABLET, FILM COATED ORAL DAILY
Qty: 90 TABLET | Refills: 1 | Status: SHIPPED | OUTPATIENT
Start: 2021-02-11 | End: 2021-11-24 | Stop reason: SDUPTHER

## 2021-02-11 RX ORDER — HYDRALAZINE HYDROCHLORIDE 25 MG/1
25 TABLET, FILM COATED ORAL 2 TIMES DAILY
Start: 2021-02-11 | End: 2021-08-26 | Stop reason: SDUPTHER

## 2021-03-05 ENCOUNTER — PATIENT MESSAGE (OUTPATIENT)
Dept: FAMILY MEDICINE | Facility: CLINIC | Age: 46
End: 2021-03-05

## 2021-03-05 DIAGNOSIS — E11.65 UNCONTROLLED TYPE 2 DIABETES MELLITUS WITH HYPERGLYCEMIA, WITHOUT LONG-TERM CURRENT USE OF INSULIN: Chronic | ICD-10-CM

## 2021-03-05 RX ORDER — DULAGLUTIDE 0.75 MG/.5ML
INJECTION, SOLUTION SUBCUTANEOUS
Qty: 2 ML | Refills: 2 | Status: SHIPPED | OUTPATIENT
Start: 2021-03-05 | End: 2021-08-26

## 2021-03-30 ENCOUNTER — PATIENT MESSAGE (OUTPATIENT)
Dept: FAMILY MEDICINE | Facility: CLINIC | Age: 46
End: 2021-03-30

## 2021-03-31 ENCOUNTER — PATIENT MESSAGE (OUTPATIENT)
Dept: OBSTETRICS AND GYNECOLOGY | Facility: CLINIC | Age: 46
End: 2021-03-31

## 2021-04-06 ENCOUNTER — OFFICE VISIT (OUTPATIENT)
Dept: FAMILY MEDICINE | Facility: CLINIC | Age: 46
End: 2021-04-06
Payer: COMMERCIAL

## 2021-04-06 ENCOUNTER — PATIENT MESSAGE (OUTPATIENT)
Dept: FAMILY MEDICINE | Facility: CLINIC | Age: 46
End: 2021-04-06

## 2021-04-06 VITALS
WEIGHT: 235.44 LBS | DIASTOLIC BLOOD PRESSURE: 82 MMHG | HEIGHT: 64 IN | BODY MASS INDEX: 40.19 KG/M2 | OXYGEN SATURATION: 98 % | HEART RATE: 70 BPM | TEMPERATURE: 98 F | SYSTOLIC BLOOD PRESSURE: 134 MMHG

## 2021-04-06 DIAGNOSIS — M25.551 RIGHT HIP PAIN: ICD-10-CM

## 2021-04-06 DIAGNOSIS — M25.569 KNEE PAIN, UNSPECIFIED CHRONICITY, UNSPECIFIED LATERALITY: Primary | ICD-10-CM

## 2021-04-06 PROCEDURE — 3008F BODY MASS INDEX DOCD: CPT | Mod: CPTII,S$GLB,, | Performed by: INTERNAL MEDICINE

## 2021-04-06 PROCEDURE — 3072F LOW RISK FOR RETINOPATHY: CPT | Mod: S$GLB,,, | Performed by: INTERNAL MEDICINE

## 2021-04-06 PROCEDURE — 1125F AMNT PAIN NOTED PAIN PRSNT: CPT | Mod: S$GLB,,, | Performed by: INTERNAL MEDICINE

## 2021-04-06 PROCEDURE — 3079F DIAST BP 80-89 MM HG: CPT | Mod: CPTII,S$GLB,, | Performed by: INTERNAL MEDICINE

## 2021-04-06 PROCEDURE — 3075F SYST BP GE 130 - 139MM HG: CPT | Mod: CPTII,S$GLB,, | Performed by: INTERNAL MEDICINE

## 2021-04-06 PROCEDURE — 99999 PR PBB SHADOW E&M-EST. PATIENT-LVL V: CPT | Mod: PBBFAC,,, | Performed by: INTERNAL MEDICINE

## 2021-04-06 PROCEDURE — 3072F PR LOW RISK FOR RETINOPATHY: ICD-10-PCS | Mod: S$GLB,,, | Performed by: INTERNAL MEDICINE

## 2021-04-06 PROCEDURE — 99999 PR PBB SHADOW E&M-EST. PATIENT-LVL V: ICD-10-PCS | Mod: PBBFAC,,, | Performed by: INTERNAL MEDICINE

## 2021-04-06 PROCEDURE — 3008F PR BODY MASS INDEX (BMI) DOCUMENTED: ICD-10-PCS | Mod: CPTII,S$GLB,, | Performed by: INTERNAL MEDICINE

## 2021-04-06 PROCEDURE — 99214 PR OFFICE/OUTPT VISIT, EST, LEVL IV, 30-39 MIN: ICD-10-PCS | Mod: S$GLB,,, | Performed by: INTERNAL MEDICINE

## 2021-04-06 PROCEDURE — 3075F PR MOST RECENT SYSTOLIC BLOOD PRESS GE 130-139MM HG: ICD-10-PCS | Mod: CPTII,S$GLB,, | Performed by: INTERNAL MEDICINE

## 2021-04-06 PROCEDURE — 3079F PR MOST RECENT DIASTOLIC BLOOD PRESSURE 80-89 MM HG: ICD-10-PCS | Mod: CPTII,S$GLB,, | Performed by: INTERNAL MEDICINE

## 2021-04-06 PROCEDURE — 1125F PR PAIN SEVERITY QUANTIFIED, PAIN PRESENT: ICD-10-PCS | Mod: S$GLB,,, | Performed by: INTERNAL MEDICINE

## 2021-04-06 PROCEDURE — 99214 OFFICE O/P EST MOD 30 MIN: CPT | Mod: S$GLB,,, | Performed by: INTERNAL MEDICINE

## 2021-04-07 ENCOUNTER — PATIENT MESSAGE (OUTPATIENT)
Dept: FAMILY MEDICINE | Facility: CLINIC | Age: 46
End: 2021-04-07

## 2021-04-08 ENCOUNTER — TELEPHONE (OUTPATIENT)
Dept: OBSTETRICS AND GYNECOLOGY | Facility: CLINIC | Age: 46
End: 2021-04-08

## 2021-04-08 ENCOUNTER — PATIENT MESSAGE (OUTPATIENT)
Dept: FAMILY MEDICINE | Facility: CLINIC | Age: 46
End: 2021-04-08

## 2021-04-12 ENCOUNTER — PATIENT MESSAGE (OUTPATIENT)
Dept: FAMILY MEDICINE | Facility: CLINIC | Age: 46
End: 2021-04-12

## 2021-04-12 ENCOUNTER — TELEPHONE (OUTPATIENT)
Dept: FAMILY MEDICINE | Facility: CLINIC | Age: 46
End: 2021-04-12

## 2021-04-12 ENCOUNTER — PATIENT MESSAGE (OUTPATIENT)
Dept: ORTHOPEDICS | Facility: CLINIC | Age: 46
End: 2021-04-12

## 2021-04-12 DIAGNOSIS — L65.9 HAIR LOSS: Primary | ICD-10-CM

## 2021-04-13 ENCOUNTER — PATIENT MESSAGE (OUTPATIENT)
Dept: ORTHOPEDICS | Facility: CLINIC | Age: 46
End: 2021-04-13

## 2021-04-13 ENCOUNTER — PATIENT MESSAGE (OUTPATIENT)
Dept: FAMILY MEDICINE | Facility: CLINIC | Age: 46
End: 2021-04-13

## 2021-04-13 ENCOUNTER — OFFICE VISIT (OUTPATIENT)
Dept: DERMATOLOGY | Facility: CLINIC | Age: 46
End: 2021-04-13
Payer: COMMERCIAL

## 2021-04-13 ENCOUNTER — OFFICE VISIT (OUTPATIENT)
Dept: ORTHOPEDICS | Facility: CLINIC | Age: 46
End: 2021-04-13
Payer: COMMERCIAL

## 2021-04-13 VITALS
BODY MASS INDEX: 43.49 KG/M2 | DIASTOLIC BLOOD PRESSURE: 92 MMHG | HEART RATE: 71 BPM | WEIGHT: 236.31 LBS | HEIGHT: 62 IN | SYSTOLIC BLOOD PRESSURE: 131 MMHG

## 2021-04-13 DIAGNOSIS — L65.9 HAIR LOSS DISORDER: ICD-10-CM

## 2021-04-13 DIAGNOSIS — M17.12 PRIMARY OSTEOARTHRITIS OF LEFT KNEE: ICD-10-CM

## 2021-04-13 DIAGNOSIS — M25.551 RIGHT HIP PAIN: ICD-10-CM

## 2021-04-13 DIAGNOSIS — R76.8 POSITIVE ANA (ANTINUCLEAR ANTIBODY): Primary | ICD-10-CM

## 2021-04-13 DIAGNOSIS — M17.11 PRIMARY OSTEOARTHRITIS OF RIGHT KNEE: Primary | ICD-10-CM

## 2021-04-13 DIAGNOSIS — M62.81 QUADRICEPS WEAKNESS: ICD-10-CM

## 2021-04-13 PROCEDURE — 3008F PR BODY MASS INDEX (BMI) DOCUMENTED: ICD-10-PCS | Mod: CPTII,S$GLB,, | Performed by: PHYSICIAN ASSISTANT

## 2021-04-13 PROCEDURE — 1125F AMNT PAIN NOTED PAIN PRSNT: CPT | Mod: S$GLB,,, | Performed by: PHYSICIAN ASSISTANT

## 2021-04-13 PROCEDURE — 99204 PR OFFICE/OUTPT VISIT, NEW, LEVL IV, 45-59 MIN: ICD-10-PCS | Mod: S$GLB,,, | Performed by: PHYSICIAN ASSISTANT

## 2021-04-13 PROCEDURE — 99999 PR PBB SHADOW E&M-EST. PATIENT-LVL IV: CPT | Mod: PBBFAC,,, | Performed by: PHYSICIAN ASSISTANT

## 2021-04-13 PROCEDURE — 99999 PR PBB SHADOW E&M-EST. PATIENT-LVL III: ICD-10-PCS | Mod: PBBFAC,,, | Performed by: PHYSICIAN ASSISTANT

## 2021-04-13 PROCEDURE — 3072F PR LOW RISK FOR RETINOPATHY: ICD-10-PCS | Mod: S$GLB,,, | Performed by: PHYSICIAN ASSISTANT

## 2021-04-13 PROCEDURE — 1125F PR PAIN SEVERITY QUANTIFIED, PAIN PRESENT: ICD-10-PCS | Mod: S$GLB,,, | Performed by: PHYSICIAN ASSISTANT

## 2021-04-13 PROCEDURE — 3008F BODY MASS INDEX DOCD: CPT | Mod: CPTII,S$GLB,, | Performed by: PHYSICIAN ASSISTANT

## 2021-04-13 PROCEDURE — 3072F LOW RISK FOR RETINOPATHY: CPT | Mod: S$GLB,,, | Performed by: PHYSICIAN ASSISTANT

## 2021-04-13 PROCEDURE — 99204 OFFICE O/P NEW MOD 45 MIN: CPT | Mod: S$GLB,,, | Performed by: PHYSICIAN ASSISTANT

## 2021-04-13 PROCEDURE — 99999 PR PBB SHADOW E&M-EST. PATIENT-LVL III: CPT | Mod: PBBFAC,,, | Performed by: PHYSICIAN ASSISTANT

## 2021-04-13 PROCEDURE — 99999 PR PBB SHADOW E&M-EST. PATIENT-LVL IV: ICD-10-PCS | Mod: PBBFAC,,, | Performed by: PHYSICIAN ASSISTANT

## 2021-04-13 RX ORDER — DICLOFENAC SODIUM 50 MG/1
50 TABLET, DELAYED RELEASE ORAL 2 TIMES DAILY
Qty: 60 TABLET | Refills: 2 | Status: SHIPPED | OUTPATIENT
Start: 2021-04-13 | End: 2021-04-19 | Stop reason: SINTOL

## 2021-04-13 RX ORDER — KETOCONAZOLE 20 MG/ML
SHAMPOO, SUSPENSION TOPICAL
Qty: 120 ML | Refills: 5 | Status: SHIPPED | OUTPATIENT
Start: 2021-04-13

## 2021-04-15 ENCOUNTER — HOSPITAL ENCOUNTER (OUTPATIENT)
Dept: RADIOLOGY | Facility: HOSPITAL | Age: 46
Discharge: HOME | End: 2021-04-15
Attending: INTERNAL MEDICINE
Payer: COMMERCIAL

## 2021-04-15 ENCOUNTER — OFFICE VISIT (OUTPATIENT)
Dept: RHEUMATOLOGY | Facility: CLINIC | Age: 46
End: 2021-04-15
Payer: COMMERCIAL

## 2021-04-15 ENCOUNTER — PATIENT MESSAGE (OUTPATIENT)
Dept: ORTHOPEDICS | Facility: CLINIC | Age: 46
End: 2021-04-15

## 2021-04-15 VITALS
WEIGHT: 242.5 LBS | HEIGHT: 62 IN | DIASTOLIC BLOOD PRESSURE: 83 MMHG | SYSTOLIC BLOOD PRESSURE: 121 MMHG | HEART RATE: 67 BPM | BODY MASS INDEX: 44.63 KG/M2

## 2021-04-15 DIAGNOSIS — M25.572 PAIN OF JOINT OF LEFT ANKLE AND FOOT: ICD-10-CM

## 2021-04-15 DIAGNOSIS — M25.551 RIGHT HIP PAIN: ICD-10-CM

## 2021-04-15 DIAGNOSIS — M25.50 POLYARTHRALGIA: ICD-10-CM

## 2021-04-15 DIAGNOSIS — M25.559 PAIN IN UNSPECIFIED HIP: ICD-10-CM

## 2021-04-15 DIAGNOSIS — M25.50 POLYARTHRALGIA: Primary | ICD-10-CM

## 2021-04-15 DIAGNOSIS — R76.8 POSITIVE ANA (ANTINUCLEAR ANTIBODY): ICD-10-CM

## 2021-04-15 DIAGNOSIS — M25.572 CHRONIC PAIN OF LEFT ANKLE: ICD-10-CM

## 2021-04-15 DIAGNOSIS — G89.29 CHRONIC FOOT PAIN, LEFT: ICD-10-CM

## 2021-04-15 DIAGNOSIS — M25.472 LEFT ANKLE SWELLING: ICD-10-CM

## 2021-04-15 DIAGNOSIS — M79.89 SWELLING OF LEFT FOOT: ICD-10-CM

## 2021-04-15 DIAGNOSIS — M79.672 CHRONIC FOOT PAIN, LEFT: ICD-10-CM

## 2021-04-15 DIAGNOSIS — G89.29 CHRONIC PAIN OF LEFT ANKLE: ICD-10-CM

## 2021-04-15 PROCEDURE — 99999 PR PBB SHADOW E&M-EST. PATIENT-LVL IV: ICD-10-PCS | Mod: PBBFAC,,, | Performed by: INTERNAL MEDICINE

## 2021-04-15 PROCEDURE — 3072F PR LOW RISK FOR RETINOPATHY: ICD-10-PCS | Mod: S$GLB,,, | Performed by: INTERNAL MEDICINE

## 2021-04-15 PROCEDURE — 99999 PR PBB SHADOW E&M-EST. PATIENT-LVL IV: CPT | Mod: PBBFAC,,, | Performed by: INTERNAL MEDICINE

## 2021-04-15 PROCEDURE — 1125F AMNT PAIN NOTED PAIN PRSNT: CPT | Mod: S$GLB,,, | Performed by: INTERNAL MEDICINE

## 2021-04-15 PROCEDURE — 77077 JOINT SURVEY SINGLE VIEW: CPT | Mod: TC

## 2021-04-15 PROCEDURE — 1125F PR PAIN SEVERITY QUANTIFIED, PAIN PRESENT: ICD-10-PCS | Mod: S$GLB,,, | Performed by: INTERNAL MEDICINE

## 2021-04-15 PROCEDURE — 3072F LOW RISK FOR RETINOPATHY: CPT | Mod: S$GLB,,, | Performed by: INTERNAL MEDICINE

## 2021-04-15 PROCEDURE — 77077 JOINT SURVEY SINGLE VIEW: CPT | Mod: 26,,, | Performed by: RADIOLOGY

## 2021-04-15 PROCEDURE — 3008F BODY MASS INDEX DOCD: CPT | Mod: CPTII,S$GLB,, | Performed by: INTERNAL MEDICINE

## 2021-04-15 PROCEDURE — 77077 XR ARTHRITIS SURVEY: ICD-10-PCS | Mod: 26,,, | Performed by: RADIOLOGY

## 2021-04-15 PROCEDURE — 3008F PR BODY MASS INDEX (BMI) DOCUMENTED: ICD-10-PCS | Mod: CPTII,S$GLB,, | Performed by: INTERNAL MEDICINE

## 2021-04-15 PROCEDURE — 99205 OFFICE O/P NEW HI 60 MIN: CPT | Mod: S$GLB,,, | Performed by: INTERNAL MEDICINE

## 2021-04-15 PROCEDURE — 99205 PR OFFICE/OUTPT VISIT, NEW, LEVL V, 60-74 MIN: ICD-10-PCS | Mod: S$GLB,,, | Performed by: INTERNAL MEDICINE

## 2021-04-15 RX ORDER — BACLOFEN 10 MG/1
TABLET ORAL
Qty: 60 TABLET | Refills: 11 | Status: SHIPPED | OUTPATIENT
Start: 2021-04-15 | End: 2022-09-22

## 2021-04-16 ENCOUNTER — PATIENT MESSAGE (OUTPATIENT)
Dept: ORTHOPEDICS | Facility: CLINIC | Age: 46
End: 2021-04-16

## 2021-04-16 ENCOUNTER — PATIENT MESSAGE (OUTPATIENT)
Dept: RHEUMATOLOGY | Facility: CLINIC | Age: 46
End: 2021-04-16

## 2021-04-16 DIAGNOSIS — M17.12 PRIMARY OSTEOARTHRITIS OF LEFT KNEE: ICD-10-CM

## 2021-04-16 DIAGNOSIS — M17.11 PRIMARY OSTEOARTHRITIS OF RIGHT KNEE: Primary | ICD-10-CM

## 2021-04-19 RX ORDER — MELOXICAM 7.5 MG/1
7.5 TABLET ORAL DAILY
Qty: 30 TABLET | Refills: 2 | Status: SHIPPED | OUTPATIENT
Start: 2021-04-19 | End: 2021-08-26

## 2021-04-20 ENCOUNTER — PATIENT MESSAGE (OUTPATIENT)
Dept: RHEUMATOLOGY | Facility: CLINIC | Age: 46
End: 2021-04-20

## 2021-04-20 ENCOUNTER — TELEPHONE (OUTPATIENT)
Dept: RHEUMATOLOGY | Facility: CLINIC | Age: 46
End: 2021-04-20

## 2021-04-22 ENCOUNTER — OFFICE VISIT (OUTPATIENT)
Dept: ORTHOPEDICS | Facility: CLINIC | Age: 46
End: 2021-04-22
Payer: COMMERCIAL

## 2021-04-22 VITALS
HEIGHT: 63 IN | BODY MASS INDEX: 41.56 KG/M2 | HEART RATE: 73 BPM | SYSTOLIC BLOOD PRESSURE: 131 MMHG | DIASTOLIC BLOOD PRESSURE: 85 MMHG | WEIGHT: 234.56 LBS

## 2021-04-22 DIAGNOSIS — M17.12 PRIMARY OSTEOARTHRITIS OF LEFT KNEE: ICD-10-CM

## 2021-04-22 DIAGNOSIS — M17.11 PRIMARY OSTEOARTHRITIS OF RIGHT KNEE: Primary | ICD-10-CM

## 2021-04-22 PROCEDURE — 99499 NO LOS: ICD-10-PCS | Mod: S$GLB,,, | Performed by: PHYSICIAN ASSISTANT

## 2021-04-22 PROCEDURE — 3072F PR LOW RISK FOR RETINOPATHY: ICD-10-PCS | Mod: S$GLB,,, | Performed by: PHYSICIAN ASSISTANT

## 2021-04-22 PROCEDURE — 3008F BODY MASS INDEX DOCD: CPT | Mod: CPTII,S$GLB,, | Performed by: PHYSICIAN ASSISTANT

## 2021-04-22 PROCEDURE — 1126F PR PAIN SEVERITY QUANTIFIED, NO PAIN PRESENT: ICD-10-PCS | Mod: S$GLB,,, | Performed by: PHYSICIAN ASSISTANT

## 2021-04-22 PROCEDURE — 20610 LARGE JOINT ASPIRATION/INJECTION: BILATERAL KNEE: ICD-10-PCS | Mod: 50,S$GLB,, | Performed by: PHYSICIAN ASSISTANT

## 2021-04-22 PROCEDURE — 99999 PR PBB SHADOW E&M-EST. PATIENT-LVL IV: CPT | Mod: PBBFAC,,, | Performed by: PHYSICIAN ASSISTANT

## 2021-04-22 PROCEDURE — 3072F LOW RISK FOR RETINOPATHY: CPT | Mod: S$GLB,,, | Performed by: PHYSICIAN ASSISTANT

## 2021-04-22 PROCEDURE — 3008F PR BODY MASS INDEX (BMI) DOCUMENTED: ICD-10-PCS | Mod: CPTII,S$GLB,, | Performed by: PHYSICIAN ASSISTANT

## 2021-04-22 PROCEDURE — 99999 PR PBB SHADOW E&M-EST. PATIENT-LVL IV: ICD-10-PCS | Mod: PBBFAC,,, | Performed by: PHYSICIAN ASSISTANT

## 2021-04-22 PROCEDURE — 20610 DRAIN/INJ JOINT/BURSA W/O US: CPT | Mod: 50,S$GLB,, | Performed by: PHYSICIAN ASSISTANT

## 2021-04-22 PROCEDURE — 99499 UNLISTED E&M SERVICE: CPT | Mod: S$GLB,,, | Performed by: PHYSICIAN ASSISTANT

## 2021-04-22 PROCEDURE — 1126F AMNT PAIN NOTED NONE PRSNT: CPT | Mod: S$GLB,,, | Performed by: PHYSICIAN ASSISTANT

## 2021-04-29 ENCOUNTER — OFFICE VISIT (OUTPATIENT)
Dept: ORTHOPEDICS | Facility: CLINIC | Age: 46
End: 2021-04-29
Payer: COMMERCIAL

## 2021-04-29 VITALS
HEIGHT: 62 IN | WEIGHT: 238.88 LBS | HEART RATE: 79 BPM | DIASTOLIC BLOOD PRESSURE: 91 MMHG | BODY MASS INDEX: 43.96 KG/M2 | SYSTOLIC BLOOD PRESSURE: 145 MMHG

## 2021-04-29 DIAGNOSIS — M17.11 PRIMARY OSTEOARTHRITIS OF RIGHT KNEE: Primary | ICD-10-CM

## 2021-04-29 DIAGNOSIS — M17.12 PRIMARY OSTEOARTHRITIS OF LEFT KNEE: ICD-10-CM

## 2021-04-29 PROCEDURE — 3008F BODY MASS INDEX DOCD: CPT | Mod: CPTII,S$GLB,, | Performed by: PHYSICIAN ASSISTANT

## 2021-04-29 PROCEDURE — 20610 DRAIN/INJ JOINT/BURSA W/O US: CPT | Mod: 50,S$GLB,, | Performed by: PHYSICIAN ASSISTANT

## 2021-04-29 PROCEDURE — 1126F AMNT PAIN NOTED NONE PRSNT: CPT | Mod: S$GLB,,, | Performed by: PHYSICIAN ASSISTANT

## 2021-04-29 PROCEDURE — 20610 LARGE JOINT ASPIRATION/INJECTION: BILATERAL KNEE: ICD-10-PCS | Mod: 50,S$GLB,, | Performed by: PHYSICIAN ASSISTANT

## 2021-04-29 PROCEDURE — 3072F LOW RISK FOR RETINOPATHY: CPT | Mod: S$GLB,,, | Performed by: PHYSICIAN ASSISTANT

## 2021-04-29 PROCEDURE — 99499 UNLISTED E&M SERVICE: CPT | Mod: S$GLB,,, | Performed by: PHYSICIAN ASSISTANT

## 2021-04-29 PROCEDURE — 1126F PR PAIN SEVERITY QUANTIFIED, NO PAIN PRESENT: ICD-10-PCS | Mod: S$GLB,,, | Performed by: PHYSICIAN ASSISTANT

## 2021-04-29 PROCEDURE — 3072F PR LOW RISK FOR RETINOPATHY: ICD-10-PCS | Mod: S$GLB,,, | Performed by: PHYSICIAN ASSISTANT

## 2021-04-29 PROCEDURE — 99999 PR PBB SHADOW E&M-EST. PATIENT-LVL IV: CPT | Mod: PBBFAC,,, | Performed by: PHYSICIAN ASSISTANT

## 2021-04-29 PROCEDURE — 99499 NO LOS: ICD-10-PCS | Mod: S$GLB,,, | Performed by: PHYSICIAN ASSISTANT

## 2021-04-29 PROCEDURE — 3008F PR BODY MASS INDEX (BMI) DOCUMENTED: ICD-10-PCS | Mod: CPTII,S$GLB,, | Performed by: PHYSICIAN ASSISTANT

## 2021-04-29 PROCEDURE — 99999 PR PBB SHADOW E&M-EST. PATIENT-LVL IV: ICD-10-PCS | Mod: PBBFAC,,, | Performed by: PHYSICIAN ASSISTANT

## 2021-05-01 ENCOUNTER — HOSPITAL ENCOUNTER (OUTPATIENT)
Dept: RADIOLOGY | Facility: HOSPITAL | Age: 46
Discharge: HOME | End: 2021-05-01
Attending: INTERNAL MEDICINE
Payer: COMMERCIAL

## 2021-05-01 DIAGNOSIS — M79.672 CHRONIC FOOT PAIN, LEFT: ICD-10-CM

## 2021-05-01 DIAGNOSIS — G89.29 CHRONIC FOOT PAIN, LEFT: ICD-10-CM

## 2021-05-01 DIAGNOSIS — M25.551 RIGHT HIP PAIN: ICD-10-CM

## 2021-05-01 DIAGNOSIS — M79.89 SWELLING OF LEFT FOOT: ICD-10-CM

## 2021-05-01 PROCEDURE — 73718 MRI LOWER EXTREMITY W/O DYE: CPT | Mod: TC,LT

## 2021-05-01 PROCEDURE — 73718 MRI LOWER EXTREMITY W/O DYE: CPT | Mod: 26,LT,, | Performed by: RADIOLOGY

## 2021-05-01 PROCEDURE — 73721 MRI JNT OF LWR EXTRE W/O DYE: CPT | Mod: 26,RT,, | Performed by: RADIOLOGY

## 2021-05-01 PROCEDURE — 73721 MRI JNT OF LWR EXTRE W/O DYE: CPT | Mod: TC,RT

## 2021-05-01 PROCEDURE — 73721 MRI HIP WITHOUT CONTRAST RIGHT: ICD-10-PCS | Mod: 26,RT,, | Performed by: RADIOLOGY

## 2021-05-01 PROCEDURE — 73718 MRI FOOT (HINDFOOT) LEFT WITHOUT CONTRAST: ICD-10-PCS | Mod: 26,LT,, | Performed by: RADIOLOGY

## 2021-05-03 ENCOUNTER — PATIENT MESSAGE (OUTPATIENT)
Dept: RHEUMATOLOGY | Facility: CLINIC | Age: 46
End: 2021-05-03

## 2021-05-04 ENCOUNTER — PATIENT MESSAGE (OUTPATIENT)
Dept: FAMILY MEDICINE | Facility: CLINIC | Age: 46
End: 2021-05-04

## 2021-05-05 ENCOUNTER — PATIENT MESSAGE (OUTPATIENT)
Dept: RHEUMATOLOGY | Facility: CLINIC | Age: 46
End: 2021-05-05

## 2021-05-05 RX ORDER — PREDNISONE 20 MG/1
20 TABLET ORAL DAILY
Qty: 10 TABLET | Refills: 0 | Status: SHIPPED | OUTPATIENT
Start: 2021-05-05 | End: 2021-08-26

## 2021-05-06 ENCOUNTER — OFFICE VISIT (OUTPATIENT)
Dept: ORTHOPEDICS | Facility: CLINIC | Age: 46
End: 2021-05-06
Payer: COMMERCIAL

## 2021-05-06 VITALS
WEIGHT: 239 LBS | HEIGHT: 62 IN | HEART RATE: 85 BPM | DIASTOLIC BLOOD PRESSURE: 87 MMHG | SYSTOLIC BLOOD PRESSURE: 151 MMHG | BODY MASS INDEX: 43.98 KG/M2

## 2021-05-06 DIAGNOSIS — M17.11 PRIMARY OSTEOARTHRITIS OF RIGHT KNEE: ICD-10-CM

## 2021-05-06 DIAGNOSIS — M17.12 PRIMARY OSTEOARTHRITIS OF LEFT KNEE: Primary | ICD-10-CM

## 2021-05-06 PROCEDURE — 99999 PR PBB SHADOW E&M-EST. PATIENT-LVL IV: CPT | Mod: PBBFAC,,, | Performed by: PHYSICIAN ASSISTANT

## 2021-05-06 PROCEDURE — 20610 LARGE JOINT ASPIRATION/INJECTION: BILATERAL KNEE: ICD-10-PCS | Mod: 50,S$GLB,, | Performed by: PHYSICIAN ASSISTANT

## 2021-05-06 PROCEDURE — 3072F PR LOW RISK FOR RETINOPATHY: ICD-10-PCS | Mod: S$GLB,,, | Performed by: PHYSICIAN ASSISTANT

## 2021-05-06 PROCEDURE — 99499 NO LOS: ICD-10-PCS | Mod: S$GLB,,, | Performed by: PHYSICIAN ASSISTANT

## 2021-05-06 PROCEDURE — 3072F LOW RISK FOR RETINOPATHY: CPT | Mod: S$GLB,,, | Performed by: PHYSICIAN ASSISTANT

## 2021-05-06 PROCEDURE — 20610 DRAIN/INJ JOINT/BURSA W/O US: CPT | Mod: 50,S$GLB,, | Performed by: PHYSICIAN ASSISTANT

## 2021-05-06 PROCEDURE — 99499 UNLISTED E&M SERVICE: CPT | Mod: S$GLB,,, | Performed by: PHYSICIAN ASSISTANT

## 2021-05-06 PROCEDURE — 1126F PR PAIN SEVERITY QUANTIFIED, NO PAIN PRESENT: ICD-10-PCS | Mod: S$GLB,,, | Performed by: PHYSICIAN ASSISTANT

## 2021-05-06 PROCEDURE — 99999 PR PBB SHADOW E&M-EST. PATIENT-LVL IV: ICD-10-PCS | Mod: PBBFAC,,, | Performed by: PHYSICIAN ASSISTANT

## 2021-05-06 PROCEDURE — 3008F BODY MASS INDEX DOCD: CPT | Mod: CPTII,S$GLB,, | Performed by: PHYSICIAN ASSISTANT

## 2021-05-06 PROCEDURE — 1126F AMNT PAIN NOTED NONE PRSNT: CPT | Mod: S$GLB,,, | Performed by: PHYSICIAN ASSISTANT

## 2021-05-06 PROCEDURE — 3008F PR BODY MASS INDEX (BMI) DOCUMENTED: ICD-10-PCS | Mod: CPTII,S$GLB,, | Performed by: PHYSICIAN ASSISTANT

## 2021-05-12 ENCOUNTER — PATIENT MESSAGE (OUTPATIENT)
Dept: RHEUMATOLOGY | Facility: CLINIC | Age: 46
End: 2021-05-12

## 2021-05-30 ENCOUNTER — PATIENT MESSAGE (OUTPATIENT)
Dept: FAMILY MEDICINE | Facility: CLINIC | Age: 46
End: 2021-05-30

## 2021-05-30 DIAGNOSIS — E11.65 UNCONTROLLED TYPE 2 DIABETES MELLITUS WITH HYPERGLYCEMIA, WITHOUT LONG-TERM CURRENT USE OF INSULIN: Chronic | ICD-10-CM

## 2021-05-31 RX ORDER — DULAGLUTIDE 0.75 MG/.5ML
INJECTION, SOLUTION SUBCUTANEOUS
Qty: 4 PEN | Refills: 11 | Status: SHIPPED | OUTPATIENT
Start: 2021-05-31 | End: 2021-09-16

## 2021-06-13 ENCOUNTER — PATIENT MESSAGE (OUTPATIENT)
Dept: FAMILY MEDICINE | Facility: CLINIC | Age: 46
End: 2021-06-13

## 2021-06-24 ENCOUNTER — PATIENT MESSAGE (OUTPATIENT)
Dept: DERMATOLOGY | Facility: CLINIC | Age: 46
End: 2021-06-24

## 2021-08-12 ENCOUNTER — PATIENT MESSAGE (OUTPATIENT)
Dept: ADMINISTRATIVE | Facility: OTHER | Age: 46
End: 2021-08-12

## 2021-08-12 ENCOUNTER — PATIENT OUTREACH (OUTPATIENT)
Dept: ADMINISTRATIVE | Facility: HOSPITAL | Age: 46
End: 2021-08-12

## 2021-08-14 ENCOUNTER — PATIENT MESSAGE (OUTPATIENT)
Dept: FAMILY MEDICINE | Facility: CLINIC | Age: 46
End: 2021-08-14

## 2021-08-26 ENCOUNTER — CLINICAL SUPPORT (OUTPATIENT)
Dept: FAMILY MEDICINE | Facility: CLINIC | Age: 46
End: 2021-08-26
Attending: INTERNAL MEDICINE
Payer: COMMERCIAL

## 2021-08-26 ENCOUNTER — OFFICE VISIT (OUTPATIENT)
Dept: FAMILY MEDICINE | Facility: CLINIC | Age: 46
End: 2021-08-26
Payer: COMMERCIAL

## 2021-08-26 VITALS
OXYGEN SATURATION: 98 % | DIASTOLIC BLOOD PRESSURE: 75 MMHG | WEIGHT: 237.56 LBS | SYSTOLIC BLOOD PRESSURE: 138 MMHG | TEMPERATURE: 98 F | HEART RATE: 75 BPM | BODY MASS INDEX: 43.72 KG/M2 | RESPIRATION RATE: 18 BRPM | HEIGHT: 62 IN

## 2021-08-26 DIAGNOSIS — E11.65 UNCONTROLLED TYPE 2 DIABETES MELLITUS WITH HYPERGLYCEMIA, WITHOUT LONG-TERM CURRENT USE OF INSULIN: Chronic | ICD-10-CM

## 2021-08-26 DIAGNOSIS — E11.42 TYPE 2 DIABETES MELLITUS WITH DIABETIC POLYNEUROPATHY, WITHOUT LONG-TERM CURRENT USE OF INSULIN: Primary | ICD-10-CM

## 2021-08-26 DIAGNOSIS — I10 ESSENTIAL HYPERTENSION: Chronic | ICD-10-CM

## 2021-08-26 DIAGNOSIS — E66.01 CLASS 2 SEVERE OBESITY DUE TO EXCESS CALORIES WITH SERIOUS COMORBIDITY AND BODY MASS INDEX (BMI) OF 39.0 TO 39.9 IN ADULT: ICD-10-CM

## 2021-08-26 DIAGNOSIS — Z00.00 WELLNESS EXAMINATION: ICD-10-CM

## 2021-08-26 DIAGNOSIS — E78.5 DYSLIPIDEMIA: ICD-10-CM

## 2021-08-26 DIAGNOSIS — E55.9 VITAMIN D DEFICIENCY: ICD-10-CM

## 2021-08-26 DIAGNOSIS — E11.42 TYPE 2 DIABETES MELLITUS WITH DIABETIC POLYNEUROPATHY, WITHOUT LONG-TERM CURRENT USE OF INSULIN: ICD-10-CM

## 2021-08-26 DIAGNOSIS — M25.551 RIGHT HIP PAIN: ICD-10-CM

## 2021-08-26 PROCEDURE — 1126F PR PAIN SEVERITY QUANTIFIED, NO PAIN PRESENT: ICD-10-PCS | Mod: CPTII,S$GLB,, | Performed by: INTERNAL MEDICINE

## 2021-08-26 PROCEDURE — 1126F AMNT PAIN NOTED NONE PRSNT: CPT | Mod: CPTII,S$GLB,, | Performed by: INTERNAL MEDICINE

## 2021-08-26 PROCEDURE — 99999 PR PBB SHADOW E&M-EST. PATIENT-LVL IV: ICD-10-PCS | Mod: PBBFAC,,, | Performed by: INTERNAL MEDICINE

## 2021-08-26 PROCEDURE — 1159F MED LIST DOCD IN RCRD: CPT | Mod: CPTII,S$GLB,, | Performed by: INTERNAL MEDICINE

## 2021-08-26 PROCEDURE — 1159F PR MEDICATION LIST DOCUMENTED IN MEDICAL RECORD: ICD-10-PCS | Mod: CPTII,S$GLB,, | Performed by: INTERNAL MEDICINE

## 2021-08-26 PROCEDURE — 92228 IMG RTA DETC/MNTR DS PHY/QHP: CPT | Mod: 26,S$GLB,, | Performed by: OPHTHALMOLOGY

## 2021-08-26 PROCEDURE — 1160F PR REVIEW ALL MEDS BY PRESCRIBER/CLIN PHARMACIST DOCUMENTED: ICD-10-PCS | Mod: CPTII,S$GLB,, | Performed by: INTERNAL MEDICINE

## 2021-08-26 PROCEDURE — 3044F PR MOST RECENT HEMOGLOBIN A1C LEVEL <7.0%: ICD-10-PCS | Mod: CPTII,S$GLB,, | Performed by: INTERNAL MEDICINE

## 2021-08-26 PROCEDURE — 3008F BODY MASS INDEX DOCD: CPT | Mod: CPTII,S$GLB,, | Performed by: INTERNAL MEDICINE

## 2021-08-26 PROCEDURE — 92228 IMG RTA DETC/MNTR DS PHY/QHP: CPT | Mod: TC,S$GLB,, | Performed by: INTERNAL MEDICINE

## 2021-08-26 PROCEDURE — 3072F LOW RISK FOR RETINOPATHY: CPT | Mod: CPTII,S$GLB,, | Performed by: INTERNAL MEDICINE

## 2021-08-26 PROCEDURE — 3008F PR BODY MASS INDEX (BMI) DOCUMENTED: ICD-10-PCS | Mod: CPTII,S$GLB,, | Performed by: INTERNAL MEDICINE

## 2021-08-26 PROCEDURE — 3044F HG A1C LEVEL LT 7.0%: CPT | Mod: CPTII,S$GLB,, | Performed by: INTERNAL MEDICINE

## 2021-08-26 PROCEDURE — 3075F PR MOST RECENT SYSTOLIC BLOOD PRESS GE 130-139MM HG: ICD-10-PCS | Mod: CPTII,S$GLB,, | Performed by: INTERNAL MEDICINE

## 2021-08-26 PROCEDURE — 3078F PR MOST RECENT DIASTOLIC BLOOD PRESSURE < 80 MM HG: ICD-10-PCS | Mod: CPTII,S$GLB,, | Performed by: INTERNAL MEDICINE

## 2021-08-26 PROCEDURE — 3075F SYST BP GE 130 - 139MM HG: CPT | Mod: CPTII,S$GLB,, | Performed by: INTERNAL MEDICINE

## 2021-08-26 PROCEDURE — 99396 PR PREVENTIVE VISIT,EST,40-64: ICD-10-PCS | Mod: S$GLB,,, | Performed by: INTERNAL MEDICINE

## 2021-08-26 PROCEDURE — 3078F DIAST BP <80 MM HG: CPT | Mod: CPTII,S$GLB,, | Performed by: INTERNAL MEDICINE

## 2021-08-26 PROCEDURE — 1160F RVW MEDS BY RX/DR IN RCRD: CPT | Mod: CPTII,S$GLB,, | Performed by: INTERNAL MEDICINE

## 2021-08-26 PROCEDURE — 92228 DIABETIC EYE SCREENING PHOTO: ICD-10-PCS | Mod: TC,S$GLB,, | Performed by: INTERNAL MEDICINE

## 2021-08-26 PROCEDURE — 92228 DIABETIC EYE SCREENING PHOTO: ICD-10-PCS | Mod: 26,S$GLB,, | Performed by: OPHTHALMOLOGY

## 2021-08-26 PROCEDURE — 3072F PR LOW RISK FOR RETINOPATHY: ICD-10-PCS | Mod: CPTII,S$GLB,, | Performed by: INTERNAL MEDICINE

## 2021-08-26 PROCEDURE — 99999 PR PBB SHADOW E&M-EST. PATIENT-LVL IV: CPT | Mod: PBBFAC,,, | Performed by: INTERNAL MEDICINE

## 2021-08-26 PROCEDURE — 99396 PREV VISIT EST AGE 40-64: CPT | Mod: S$GLB,,, | Performed by: INTERNAL MEDICINE

## 2021-08-26 RX ORDER — ICOSAPENT ETHYL 1000 MG/1
CAPSULE ORAL
Qty: 360 CAPSULE | Refills: 1 | Status: SHIPPED | OUTPATIENT
Start: 2021-08-26 | End: 2022-03-14 | Stop reason: SDUPTHER

## 2021-08-26 RX ORDER — VALSARTAN 320 MG/1
TABLET ORAL
Qty: 90 TABLET | Refills: 1 | Status: SHIPPED | OUTPATIENT
Start: 2021-08-26 | End: 2021-09-30 | Stop reason: SDUPTHER

## 2021-08-26 RX ORDER — METFORMIN HYDROCHLORIDE 500 MG/1
250 TABLET ORAL 2 TIMES DAILY WITH MEALS
Qty: 90 TABLET | Refills: 3 | Status: SHIPPED | OUTPATIENT
Start: 2021-08-26 | End: 2021-11-24 | Stop reason: SDUPTHER

## 2021-08-26 RX ORDER — ERGOCALCIFEROL 1.25 MG/1
50000 CAPSULE ORAL
Qty: 12 CAPSULE | Refills: 2 | Status: SHIPPED | OUTPATIENT
Start: 2021-08-26 | End: 2022-05-15 | Stop reason: SDUPTHER

## 2021-08-26 RX ORDER — CARVEDILOL 25 MG/1
TABLET ORAL
Qty: 180 TABLET | Refills: 1 | Status: SHIPPED | OUTPATIENT
Start: 2021-08-26 | End: 2021-11-24 | Stop reason: SDUPTHER

## 2021-08-26 RX ORDER — VERAPAMIL HYDROCHLORIDE 180 MG/1
TABLET, FILM COATED, EXTENDED RELEASE ORAL
Qty: 180 TABLET | Refills: 1 | Status: SHIPPED | OUTPATIENT
Start: 2021-08-26 | End: 2021-09-30 | Stop reason: SDUPTHER

## 2021-08-26 RX ORDER — HYDRALAZINE HYDROCHLORIDE 25 MG/1
25 TABLET, FILM COATED ORAL 2 TIMES DAILY
Qty: 60 TABLET | Refills: 0
Start: 2021-08-26 | End: 2022-01-03 | Stop reason: SDUPTHER

## 2021-08-27 ENCOUNTER — PATIENT MESSAGE (OUTPATIENT)
Dept: FAMILY MEDICINE | Facility: CLINIC | Age: 46
End: 2021-08-27

## 2021-09-09 ENCOUNTER — PATIENT MESSAGE (OUTPATIENT)
Dept: RHEUMATOLOGY | Facility: CLINIC | Age: 46
End: 2021-09-09

## 2021-09-10 ENCOUNTER — PATIENT MESSAGE (OUTPATIENT)
Dept: RHEUMATOLOGY | Facility: CLINIC | Age: 46
End: 2021-09-10

## 2021-09-16 ENCOUNTER — PATIENT MESSAGE (OUTPATIENT)
Dept: FAMILY MEDICINE | Facility: CLINIC | Age: 46
End: 2021-09-16

## 2021-09-16 DIAGNOSIS — E11.65 UNCONTROLLED TYPE 2 DIABETES MELLITUS WITH HYPERGLYCEMIA, WITHOUT LONG-TERM CURRENT USE OF INSULIN: Chronic | ICD-10-CM

## 2021-09-16 RX ORDER — DULAGLUTIDE 0.75 MG/.5ML
INJECTION, SOLUTION SUBCUTANEOUS
Qty: 4 PEN | Refills: 11 | Status: CANCELLED | OUTPATIENT
Start: 2021-09-16

## 2021-09-27 ENCOUNTER — PATIENT MESSAGE (OUTPATIENT)
Dept: RHEUMATOLOGY | Facility: CLINIC | Age: 46
End: 2021-09-27

## 2021-09-29 ENCOUNTER — PATIENT OUTREACH (OUTPATIENT)
Dept: ADMINISTRATIVE | Facility: OTHER | Age: 46
End: 2021-09-29

## 2021-09-30 ENCOUNTER — OFFICE VISIT (OUTPATIENT)
Dept: RHEUMATOLOGY | Facility: CLINIC | Age: 46
End: 2021-09-30
Payer: COMMERCIAL

## 2021-09-30 VITALS
SYSTOLIC BLOOD PRESSURE: 144 MMHG | HEART RATE: 64 BPM | DIASTOLIC BLOOD PRESSURE: 68 MMHG | WEIGHT: 237.19 LBS | HEIGHT: 62 IN | BODY MASS INDEX: 43.65 KG/M2

## 2021-09-30 DIAGNOSIS — M25.551 RIGHT HIP PAIN: ICD-10-CM

## 2021-09-30 PROCEDURE — 3078F DIAST BP <80 MM HG: CPT | Mod: CPTII,S$GLB,, | Performed by: INTERNAL MEDICINE

## 2021-09-30 PROCEDURE — 4010F ACE/ARB THERAPY RXD/TAKEN: CPT | Mod: CPTII,S$GLB,, | Performed by: INTERNAL MEDICINE

## 2021-09-30 PROCEDURE — 3077F SYST BP >= 140 MM HG: CPT | Mod: CPTII,S$GLB,, | Performed by: INTERNAL MEDICINE

## 2021-09-30 PROCEDURE — 3044F HG A1C LEVEL LT 7.0%: CPT | Mod: CPTII,S$GLB,, | Performed by: INTERNAL MEDICINE

## 2021-09-30 PROCEDURE — 3060F PR POS MICROALBUMINURIA RESULT DOCUMENTED/REVIEW: ICD-10-PCS | Mod: CPTII,S$GLB,, | Performed by: INTERNAL MEDICINE

## 2021-09-30 PROCEDURE — 3060F POS MICROALBUMINURIA REV: CPT | Mod: CPTII,S$GLB,, | Performed by: INTERNAL MEDICINE

## 2021-09-30 PROCEDURE — 99215 OFFICE O/P EST HI 40 MIN: CPT | Mod: S$GLB,,, | Performed by: INTERNAL MEDICINE

## 2021-09-30 PROCEDURE — 3008F BODY MASS INDEX DOCD: CPT | Mod: CPTII,S$GLB,, | Performed by: INTERNAL MEDICINE

## 2021-09-30 PROCEDURE — 1159F PR MEDICATION LIST DOCUMENTED IN MEDICAL RECORD: ICD-10-PCS | Mod: CPTII,S$GLB,, | Performed by: INTERNAL MEDICINE

## 2021-09-30 PROCEDURE — 3078F PR MOST RECENT DIASTOLIC BLOOD PRESSURE < 80 MM HG: ICD-10-PCS | Mod: CPTII,S$GLB,, | Performed by: INTERNAL MEDICINE

## 2021-09-30 PROCEDURE — 99215 PR OFFICE/OUTPT VISIT, EST, LEVL V, 40-54 MIN: ICD-10-PCS | Mod: S$GLB,,, | Performed by: INTERNAL MEDICINE

## 2021-09-30 PROCEDURE — 99999 PR PBB SHADOW E&M-EST. PATIENT-LVL IV: ICD-10-PCS | Mod: PBBFAC,,, | Performed by: INTERNAL MEDICINE

## 2021-09-30 PROCEDURE — 3008F PR BODY MASS INDEX (BMI) DOCUMENTED: ICD-10-PCS | Mod: CPTII,S$GLB,, | Performed by: INTERNAL MEDICINE

## 2021-09-30 PROCEDURE — 4010F PR ACE/ARB THEARPY RXD/TAKEN: ICD-10-PCS | Mod: CPTII,S$GLB,, | Performed by: INTERNAL MEDICINE

## 2021-09-30 PROCEDURE — 3072F LOW RISK FOR RETINOPATHY: CPT | Mod: CPTII,S$GLB,, | Performed by: INTERNAL MEDICINE

## 2021-09-30 PROCEDURE — 3066F NEPHROPATHY DOC TX: CPT | Mod: CPTII,S$GLB,, | Performed by: INTERNAL MEDICINE

## 2021-09-30 PROCEDURE — 99999 PR PBB SHADOW E&M-EST. PATIENT-LVL IV: CPT | Mod: PBBFAC,,, | Performed by: INTERNAL MEDICINE

## 2021-09-30 PROCEDURE — 3044F PR MOST RECENT HEMOGLOBIN A1C LEVEL <7.0%: ICD-10-PCS | Mod: CPTII,S$GLB,, | Performed by: INTERNAL MEDICINE

## 2021-09-30 PROCEDURE — 3077F PR MOST RECENT SYSTOLIC BLOOD PRESSURE >= 140 MM HG: ICD-10-PCS | Mod: CPTII,S$GLB,, | Performed by: INTERNAL MEDICINE

## 2021-09-30 PROCEDURE — 3066F PR DOCUMENTATION OF TREATMENT FOR NEPHROPATHY: ICD-10-PCS | Mod: CPTII,S$GLB,, | Performed by: INTERNAL MEDICINE

## 2021-09-30 PROCEDURE — 1159F MED LIST DOCD IN RCRD: CPT | Mod: CPTII,S$GLB,, | Performed by: INTERNAL MEDICINE

## 2021-09-30 PROCEDURE — 3072F PR LOW RISK FOR RETINOPATHY: ICD-10-PCS | Mod: CPTII,S$GLB,, | Performed by: INTERNAL MEDICINE

## 2021-09-30 RX ORDER — GABAPENTIN 300 MG/1
300 CAPSULE ORAL NIGHTLY
Qty: 30 CAPSULE | Refills: 11 | Status: SHIPPED | OUTPATIENT
Start: 2021-09-30 | End: 2022-03-14 | Stop reason: SDUPTHER

## 2021-09-30 RX ORDER — METHYLPREDNISOLONE 8 MG/1
8 TABLET ORAL DAILY
Qty: 10 TABLET | Refills: 0 | Status: SHIPPED | OUTPATIENT
Start: 2021-09-30 | End: 2021-10-11

## 2021-10-09 ENCOUNTER — IMMUNIZATION (OUTPATIENT)
Dept: INTERNAL MEDICINE | Facility: CLINIC | Age: 46
End: 2021-10-09
Payer: COMMERCIAL

## 2021-10-09 DIAGNOSIS — Z23 NEED FOR VACCINATION: Primary | ICD-10-CM

## 2021-10-09 PROCEDURE — 91300 COVID-19, MRNA, LNP-S, PF, 30 MCG/0.3 ML DOSE VACCINE: CPT | Mod: PBBFAC | Performed by: INTERNAL MEDICINE

## 2021-10-09 PROCEDURE — 0003A COVID-19, MRNA, LNP-S, PF, 30 MCG/0.3 ML DOSE VACCINE: CPT | Mod: CV19,PBBFAC | Performed by: INTERNAL MEDICINE

## 2021-10-14 ENCOUNTER — HOSPITAL ENCOUNTER (OUTPATIENT)
Dept: RADIOLOGY | Facility: HOSPITAL | Age: 46
Discharge: HOME | End: 2021-10-14
Attending: OBSTETRICS & GYNECOLOGY
Payer: COMMERCIAL

## 2021-10-14 ENCOUNTER — PATIENT MESSAGE (OUTPATIENT)
Dept: RHEUMATOLOGY | Facility: CLINIC | Age: 46
End: 2021-10-14
Payer: COMMERCIAL

## 2021-10-14 ENCOUNTER — OFFICE VISIT (OUTPATIENT)
Dept: DERMATOLOGY | Facility: CLINIC | Age: 46
End: 2021-10-14
Payer: COMMERCIAL

## 2021-10-14 VITALS — HEIGHT: 62 IN | BODY MASS INDEX: 41.96 KG/M2 | WEIGHT: 228 LBS

## 2021-10-14 DIAGNOSIS — R92.8 ABNORMAL MAMMOGRAM: ICD-10-CM

## 2021-10-14 DIAGNOSIS — Z09 FOLLOW-UP EXAM, 3-6 MONTHS SINCE PREVIOUS EXAM: ICD-10-CM

## 2021-10-14 DIAGNOSIS — D48.5 NEOPLASM OF UNCERTAIN BEHAVIOR OF SKIN: ICD-10-CM

## 2021-10-14 DIAGNOSIS — D17.0 LIPOMA OF FOREHEAD: Primary | ICD-10-CM

## 2021-10-14 PROCEDURE — 1159F PR MEDICATION LIST DOCUMENTED IN MEDICAL RECORD: ICD-10-PCS | Mod: CPTII,S$GLB,, | Performed by: DERMATOLOGY

## 2021-10-14 PROCEDURE — 99999 PR PBB SHADOW E&M-EST. PATIENT-LVL III: CPT | Mod: PBBFAC,,, | Performed by: DERMATOLOGY

## 2021-10-14 PROCEDURE — 99213 OFFICE O/P EST LOW 20 MIN: CPT | Mod: S$GLB,,, | Performed by: DERMATOLOGY

## 2021-10-14 PROCEDURE — 3066F PR DOCUMENTATION OF TREATMENT FOR NEPHROPATHY: ICD-10-PCS | Mod: CPTII,S$GLB,, | Performed by: DERMATOLOGY

## 2021-10-14 PROCEDURE — 77066 DX MAMMO INCL CAD BI: CPT | Mod: 26,,, | Performed by: RADIOLOGY

## 2021-10-14 PROCEDURE — 76642 US BREAST RIGHT LIMITED: ICD-10-PCS | Mod: 26,RT,, | Performed by: RADIOLOGY

## 2021-10-14 PROCEDURE — 99999 PR PBB SHADOW E&M-EST. PATIENT-LVL III: ICD-10-PCS | Mod: PBBFAC,,, | Performed by: DERMATOLOGY

## 2021-10-14 PROCEDURE — 77066 MAMMO DIGITAL DIAGNOSTIC BILAT WITH TOMO: ICD-10-PCS | Mod: 26,,, | Performed by: RADIOLOGY

## 2021-10-14 PROCEDURE — 4010F PR ACE/ARB THEARPY RXD/TAKEN: ICD-10-PCS | Mod: CPTII,S$GLB,, | Performed by: DERMATOLOGY

## 2021-10-14 PROCEDURE — 4010F ACE/ARB THERAPY RXD/TAKEN: CPT | Mod: CPTII,S$GLB,, | Performed by: DERMATOLOGY

## 2021-10-14 PROCEDURE — 3044F HG A1C LEVEL LT 7.0%: CPT | Mod: CPTII,S$GLB,, | Performed by: DERMATOLOGY

## 2021-10-14 PROCEDURE — 1160F PR REVIEW ALL MEDS BY PRESCRIBER/CLIN PHARMACIST DOCUMENTED: ICD-10-PCS | Mod: CPTII,S$GLB,, | Performed by: DERMATOLOGY

## 2021-10-14 PROCEDURE — 3044F PR MOST RECENT HEMOGLOBIN A1C LEVEL <7.0%: ICD-10-PCS | Mod: CPTII,S$GLB,, | Performed by: DERMATOLOGY

## 2021-10-14 PROCEDURE — 3072F PR LOW RISK FOR RETINOPATHY: ICD-10-PCS | Mod: CPTII,S$GLB,, | Performed by: DERMATOLOGY

## 2021-10-14 PROCEDURE — 76642 ULTRASOUND BREAST LIMITED: CPT | Mod: 26,RT,, | Performed by: RADIOLOGY

## 2021-10-14 PROCEDURE — G0279 TOMOSYNTHESIS, MAMMO: HCPCS | Mod: 26,,, | Performed by: RADIOLOGY

## 2021-10-14 PROCEDURE — 3060F POS MICROALBUMINURIA REV: CPT | Mod: CPTII,S$GLB,, | Performed by: DERMATOLOGY

## 2021-10-14 PROCEDURE — 77066 DX MAMMO INCL CAD BI: CPT | Mod: TC

## 2021-10-14 PROCEDURE — 3072F LOW RISK FOR RETINOPATHY: CPT | Mod: CPTII,S$GLB,, | Performed by: DERMATOLOGY

## 2021-10-14 PROCEDURE — 99213 PR OFFICE/OUTPT VISIT, EST, LEVL III, 20-29 MIN: ICD-10-PCS | Mod: S$GLB,,, | Performed by: DERMATOLOGY

## 2021-10-14 PROCEDURE — G0279 MAMMO DIGITAL DIAGNOSTIC BILAT WITH TOMO: ICD-10-PCS | Mod: 26,,, | Performed by: RADIOLOGY

## 2021-10-14 PROCEDURE — 76642 ULTRASOUND BREAST LIMITED: CPT | Mod: TC,RT

## 2021-10-14 PROCEDURE — 1159F MED LIST DOCD IN RCRD: CPT | Mod: CPTII,S$GLB,, | Performed by: DERMATOLOGY

## 2021-10-14 PROCEDURE — 1160F RVW MEDS BY RX/DR IN RCRD: CPT | Mod: CPTII,S$GLB,, | Performed by: DERMATOLOGY

## 2021-10-14 PROCEDURE — 3060F PR POS MICROALBUMINURIA RESULT DOCUMENTED/REVIEW: ICD-10-PCS | Mod: CPTII,S$GLB,, | Performed by: DERMATOLOGY

## 2021-10-14 PROCEDURE — 3066F NEPHROPATHY DOC TX: CPT | Mod: CPTII,S$GLB,, | Performed by: DERMATOLOGY

## 2021-10-19 DIAGNOSIS — M25.551 RIGHT HIP PAIN: Primary | ICD-10-CM

## 2021-11-09 ENCOUNTER — PATIENT MESSAGE (OUTPATIENT)
Dept: OTOLARYNGOLOGY | Facility: CLINIC | Age: 46
End: 2021-11-09

## 2021-11-09 ENCOUNTER — OFFICE VISIT (OUTPATIENT)
Dept: OTOLARYNGOLOGY | Facility: CLINIC | Age: 46
End: 2021-11-09
Payer: COMMERCIAL

## 2021-11-09 VITALS
HEART RATE: 72 BPM | BODY MASS INDEX: 42.67 KG/M2 | WEIGHT: 231.88 LBS | DIASTOLIC BLOOD PRESSURE: 78 MMHG | SYSTOLIC BLOOD PRESSURE: 129 MMHG | TEMPERATURE: 98 F | HEIGHT: 62 IN

## 2021-11-09 DIAGNOSIS — L72.3 SEBACEOUS CYST: Primary | ICD-10-CM

## 2021-11-09 PROCEDURE — 99203 OFFICE O/P NEW LOW 30 MIN: CPT | Mod: S$GLB,,, | Performed by: OTOLARYNGOLOGY

## 2021-11-09 PROCEDURE — 3008F PR BODY MASS INDEX (BMI) DOCUMENTED: ICD-10-PCS | Mod: CPTII,S$GLB,, | Performed by: OTOLARYNGOLOGY

## 2021-11-09 PROCEDURE — 4010F PR ACE/ARB THEARPY RXD/TAKEN: ICD-10-PCS | Mod: CPTII,S$GLB,, | Performed by: OTOLARYNGOLOGY

## 2021-11-09 PROCEDURE — 3008F BODY MASS INDEX DOCD: CPT | Mod: CPTII,S$GLB,, | Performed by: OTOLARYNGOLOGY

## 2021-11-09 PROCEDURE — 3060F POS MICROALBUMINURIA REV: CPT | Mod: CPTII,S$GLB,, | Performed by: OTOLARYNGOLOGY

## 2021-11-09 PROCEDURE — 3078F PR MOST RECENT DIASTOLIC BLOOD PRESSURE < 80 MM HG: ICD-10-PCS | Mod: CPTII,S$GLB,, | Performed by: OTOLARYNGOLOGY

## 2021-11-09 PROCEDURE — 3072F LOW RISK FOR RETINOPATHY: CPT | Mod: CPTII,S$GLB,, | Performed by: OTOLARYNGOLOGY

## 2021-11-09 PROCEDURE — 3044F PR MOST RECENT HEMOGLOBIN A1C LEVEL <7.0%: ICD-10-PCS | Mod: CPTII,S$GLB,, | Performed by: OTOLARYNGOLOGY

## 2021-11-09 PROCEDURE — 3072F PR LOW RISK FOR RETINOPATHY: ICD-10-PCS | Mod: CPTII,S$GLB,, | Performed by: OTOLARYNGOLOGY

## 2021-11-09 PROCEDURE — 3066F NEPHROPATHY DOC TX: CPT | Mod: CPTII,S$GLB,, | Performed by: OTOLARYNGOLOGY

## 2021-11-09 PROCEDURE — 1159F PR MEDICATION LIST DOCUMENTED IN MEDICAL RECORD: ICD-10-PCS | Mod: CPTII,S$GLB,, | Performed by: OTOLARYNGOLOGY

## 2021-11-09 PROCEDURE — 1160F PR REVIEW ALL MEDS BY PRESCRIBER/CLIN PHARMACIST DOCUMENTED: ICD-10-PCS | Mod: CPTII,S$GLB,, | Performed by: OTOLARYNGOLOGY

## 2021-11-09 PROCEDURE — 3044F HG A1C LEVEL LT 7.0%: CPT | Mod: CPTII,S$GLB,, | Performed by: OTOLARYNGOLOGY

## 2021-11-09 PROCEDURE — 99203 PR OFFICE/OUTPT VISIT, NEW, LEVL III, 30-44 MIN: ICD-10-PCS | Mod: S$GLB,,, | Performed by: OTOLARYNGOLOGY

## 2021-11-09 PROCEDURE — 1159F MED LIST DOCD IN RCRD: CPT | Mod: CPTII,S$GLB,, | Performed by: OTOLARYNGOLOGY

## 2021-11-09 PROCEDURE — 3074F PR MOST RECENT SYSTOLIC BLOOD PRESSURE < 130 MM HG: ICD-10-PCS | Mod: CPTII,S$GLB,, | Performed by: OTOLARYNGOLOGY

## 2021-11-09 PROCEDURE — 3066F PR DOCUMENTATION OF TREATMENT FOR NEPHROPATHY: ICD-10-PCS | Mod: CPTII,S$GLB,, | Performed by: OTOLARYNGOLOGY

## 2021-11-09 PROCEDURE — 3060F PR POS MICROALBUMINURIA RESULT DOCUMENTED/REVIEW: ICD-10-PCS | Mod: CPTII,S$GLB,, | Performed by: OTOLARYNGOLOGY

## 2021-11-09 PROCEDURE — 4010F ACE/ARB THERAPY RXD/TAKEN: CPT | Mod: CPTII,S$GLB,, | Performed by: OTOLARYNGOLOGY

## 2021-11-09 PROCEDURE — 3074F SYST BP LT 130 MM HG: CPT | Mod: CPTII,S$GLB,, | Performed by: OTOLARYNGOLOGY

## 2021-11-09 PROCEDURE — 3078F DIAST BP <80 MM HG: CPT | Mod: CPTII,S$GLB,, | Performed by: OTOLARYNGOLOGY

## 2021-11-09 PROCEDURE — 1160F RVW MEDS BY RX/DR IN RCRD: CPT | Mod: CPTII,S$GLB,, | Performed by: OTOLARYNGOLOGY

## 2021-11-24 DIAGNOSIS — E78.5 DYSLIPIDEMIA: ICD-10-CM

## 2021-11-24 RX ORDER — ATORVASTATIN CALCIUM 80 MG/1
80 TABLET, FILM COATED ORAL DAILY
Qty: 90 TABLET | Refills: 1 | Status: SHIPPED | OUTPATIENT
Start: 2021-11-24 | End: 2022-03-14 | Stop reason: SDUPTHER

## 2021-11-29 PROBLEM — Z00.00 WELLNESS EXAMINATION: Status: RESOLVED | Noted: 2021-08-26 | Resolved: 2021-11-29

## 2021-12-01 ENCOUNTER — PATIENT MESSAGE (OUTPATIENT)
Dept: OTOLARYNGOLOGY | Facility: CLINIC | Age: 46
End: 2021-12-01
Payer: COMMERCIAL

## 2021-12-03 ENCOUNTER — PATIENT MESSAGE (OUTPATIENT)
Dept: FAMILY MEDICINE | Facility: CLINIC | Age: 46
End: 2021-12-03
Payer: COMMERCIAL

## 2021-12-13 ENCOUNTER — TELEPHONE (OUTPATIENT)
Dept: OTOLARYNGOLOGY | Facility: CLINIC | Age: 46
End: 2021-12-13

## 2021-12-13 ENCOUNTER — PROCEDURE VISIT (OUTPATIENT)
Dept: OTOLARYNGOLOGY | Facility: CLINIC | Age: 46
End: 2021-12-13
Payer: COMMERCIAL

## 2021-12-13 VITALS — HEART RATE: 73 BPM | SYSTOLIC BLOOD PRESSURE: 152 MMHG | DIASTOLIC BLOOD PRESSURE: 84 MMHG

## 2021-12-13 DIAGNOSIS — Z91.89 AT HIGH RISK FOR PAIN FROM PROCEDURE: ICD-10-CM

## 2021-12-13 DIAGNOSIS — L72.3 SEBACEOUS CYST: Primary | ICD-10-CM

## 2021-12-13 PROCEDURE — 11440 EXC FACE-MM B9+MARG 0.5 CM/<: CPT | Mod: 51,S$GLB,, | Performed by: OTOLARYNGOLOGY

## 2021-12-13 PROCEDURE — 11440 PR EXC SKIN BENIG <0.5 CM FACE,FACIAL: ICD-10-PCS | Mod: 51,S$GLB,, | Performed by: OTOLARYNGOLOGY

## 2021-12-13 PROCEDURE — 88304 PR  SURG PATH,LEVEL III: ICD-10-PCS | Mod: 26,,, | Performed by: PATHOLOGY

## 2021-12-13 PROCEDURE — 12051 PR INTERMED WOUND REPAIR FACE/EAR/EYELID/NOSE/LIP/MUC MEBR, 2.5CM OR LESS: ICD-10-PCS | Mod: 59,S$GLB,, | Performed by: OTOLARYNGOLOGY

## 2021-12-13 PROCEDURE — 88304 TISSUE EXAM BY PATHOLOGIST: CPT | Mod: 26,,, | Performed by: PATHOLOGY

## 2021-12-13 PROCEDURE — 88304 TISSUE EXAM BY PATHOLOGIST: CPT | Performed by: PATHOLOGY

## 2021-12-13 PROCEDURE — 12051 INTMD RPR FACE/MM 2.5 CM/<: CPT | Mod: 59,S$GLB,, | Performed by: OTOLARYNGOLOGY

## 2021-12-13 RX ORDER — LIDOCAINE HYDROCHLORIDE 10 MG/ML
1 INJECTION INFILTRATION; PERINEURAL
Status: DISCONTINUED | OUTPATIENT
Start: 2021-12-13 | End: 2022-03-10

## 2021-12-13 RX ORDER — LIDOCAINE HYDROCHLORIDE AND EPINEPHRINE 10; 10 MG/ML; UG/ML
2 INJECTION, SOLUTION INFILTRATION; PERINEURAL
Status: DISCONTINUED | OUTPATIENT
Start: 2021-12-13 | End: 2022-03-10

## 2021-12-20 ENCOUNTER — PATIENT MESSAGE (OUTPATIENT)
Dept: OTHER | Facility: OTHER | Age: 46
End: 2021-12-20
Payer: COMMERCIAL

## 2021-12-21 LAB
FINAL PATHOLOGIC DIAGNOSIS: NORMAL
GROSS: NORMAL
Lab: NORMAL

## 2022-01-03 ENCOUNTER — PATIENT MESSAGE (OUTPATIENT)
Dept: INTERNAL MEDICINE | Facility: CLINIC | Age: 47
End: 2022-01-03
Payer: COMMERCIAL

## 2022-01-03 DIAGNOSIS — I10 ESSENTIAL HYPERTENSION: Chronic | ICD-10-CM

## 2022-01-04 ENCOUNTER — PATIENT MESSAGE (OUTPATIENT)
Dept: INTERNAL MEDICINE | Facility: CLINIC | Age: 47
End: 2022-01-04
Payer: COMMERCIAL

## 2022-01-04 DIAGNOSIS — I10 ESSENTIAL HYPERTENSION: Chronic | ICD-10-CM

## 2022-01-04 RX ORDER — HYDRALAZINE HYDROCHLORIDE 25 MG/1
25 TABLET, FILM COATED ORAL 2 TIMES DAILY
Qty: 60 TABLET | Refills: 0
Start: 2022-01-04

## 2022-01-04 RX ORDER — HYDRALAZINE HYDROCHLORIDE 25 MG/1
25 TABLET, FILM COATED ORAL 2 TIMES DAILY
Qty: 60 TABLET | Refills: 0
Start: 2022-01-04 | End: 2022-02-10 | Stop reason: SDUPTHER

## 2022-01-06 ENCOUNTER — PATIENT MESSAGE (OUTPATIENT)
Dept: INTERNAL MEDICINE | Facility: CLINIC | Age: 47
End: 2022-01-06
Payer: COMMERCIAL

## 2022-02-10 ENCOUNTER — PATIENT MESSAGE (OUTPATIENT)
Dept: INTERNAL MEDICINE | Facility: CLINIC | Age: 47
End: 2022-02-10
Payer: COMMERCIAL

## 2022-02-10 DIAGNOSIS — I10 ESSENTIAL HYPERTENSION: Chronic | ICD-10-CM

## 2022-02-10 RX ORDER — HYDRALAZINE HYDROCHLORIDE 25 MG/1
25 TABLET, FILM COATED ORAL 2 TIMES DAILY
Qty: 60 TABLET | Refills: 0
Start: 2022-02-10 | End: 2022-02-11 | Stop reason: SDUPTHER

## 2022-02-10 RX ORDER — HYDRALAZINE HYDROCHLORIDE 25 MG/1
25 TABLET, FILM COATED ORAL 2 TIMES DAILY
Qty: 60 TABLET | Refills: 0 | OUTPATIENT
Start: 2022-02-10

## 2022-02-10 RX ORDER — HYDRALAZINE HYDROCHLORIDE 25 MG/1
25 TABLET, FILM COATED ORAL 2 TIMES DAILY
Qty: 60 TABLET | Refills: 0 | Status: CANCELLED | OUTPATIENT
Start: 2022-02-10

## 2022-02-11 DIAGNOSIS — I10 ESSENTIAL HYPERTENSION: Chronic | ICD-10-CM

## 2022-02-11 RX ORDER — HYDRALAZINE HYDROCHLORIDE 25 MG/1
25 TABLET, FILM COATED ORAL 2 TIMES DAILY
Qty: 60 TABLET | Refills: 0 | Status: CANCELLED | OUTPATIENT
Start: 2022-02-11

## 2022-02-11 RX ORDER — HYDRALAZINE HYDROCHLORIDE 25 MG/1
25 TABLET, FILM COATED ORAL 2 TIMES DAILY
Qty: 60 TABLET | Refills: 0 | Status: SHIPPED | OUTPATIENT
Start: 2022-02-11 | End: 2022-03-14 | Stop reason: SDUPTHER

## 2022-02-11 RX ORDER — HYDRALAZINE HYDROCHLORIDE 25 MG/1
25 TABLET, FILM COATED ORAL 2 TIMES DAILY
Qty: 60 TABLET | Refills: 0 | Status: SHIPPED | OUTPATIENT
Start: 2022-02-11 | End: 2022-02-11

## 2022-03-04 ENCOUNTER — LAB VISIT (OUTPATIENT)
Dept: LAB | Facility: HOSPITAL | Age: 47
End: 2022-03-04
Payer: COMMERCIAL

## 2022-03-04 DIAGNOSIS — E11.42 TYPE 2 DIABETES MELLITUS WITH DIABETIC POLYNEUROPATHY, WITHOUT LONG-TERM CURRENT USE OF INSULIN: ICD-10-CM

## 2022-03-04 LAB
ESTIMATED AVG GLUCOSE: 134 MG/DL (ref 68–131)
HBA1C MFR BLD: 6.3 % (ref 4–5.6)

## 2022-03-04 PROCEDURE — 36415 COLL VENOUS BLD VENIPUNCTURE: CPT | Performed by: INTERNAL MEDICINE

## 2022-03-04 PROCEDURE — 83036 HEMOGLOBIN GLYCOSYLATED A1C: CPT | Performed by: INTERNAL MEDICINE

## 2022-03-10 ENCOUNTER — TELEPHONE (OUTPATIENT)
Dept: INTERNAL MEDICINE | Facility: CLINIC | Age: 47
End: 2022-03-10
Payer: COMMERCIAL

## 2022-03-10 ENCOUNTER — OFFICE VISIT (OUTPATIENT)
Dept: INTERNAL MEDICINE | Facility: CLINIC | Age: 47
End: 2022-03-10
Payer: COMMERCIAL

## 2022-03-10 VITALS
HEART RATE: 69 BPM | HEIGHT: 62 IN | WEIGHT: 232.81 LBS | TEMPERATURE: 99 F | BODY MASS INDEX: 42.84 KG/M2 | SYSTOLIC BLOOD PRESSURE: 112 MMHG | OXYGEN SATURATION: 98 % | DIASTOLIC BLOOD PRESSURE: 68 MMHG

## 2022-03-10 DIAGNOSIS — E11.42 TYPE 2 DIABETES MELLITUS WITH DIABETIC POLYNEUROPATHY, WITHOUT LONG-TERM CURRENT USE OF INSULIN: Primary | ICD-10-CM

## 2022-03-10 DIAGNOSIS — Z12.31 ENCOUNTER FOR SCREENING MAMMOGRAM FOR MALIGNANT NEOPLASM OF BREAST: ICD-10-CM

## 2022-03-10 DIAGNOSIS — Z12.11 SCREENING FOR COLORECTAL CANCER: ICD-10-CM

## 2022-03-10 DIAGNOSIS — M25.551 RIGHT HIP PAIN: ICD-10-CM

## 2022-03-10 DIAGNOSIS — I10 ESSENTIAL HYPERTENSION: Chronic | ICD-10-CM

## 2022-03-10 DIAGNOSIS — Z12.12 SCREENING FOR COLORECTAL CANCER: ICD-10-CM

## 2022-03-10 DIAGNOSIS — E78.5 DYSLIPIDEMIA: ICD-10-CM

## 2022-03-10 PROCEDURE — 3044F PR MOST RECENT HEMOGLOBIN A1C LEVEL <7.0%: ICD-10-PCS | Mod: CPTII,S$GLB,, | Performed by: INTERNAL MEDICINE

## 2022-03-10 PROCEDURE — 99214 PR OFFICE/OUTPT VISIT, EST, LEVL IV, 30-39 MIN: ICD-10-PCS | Mod: S$GLB,,, | Performed by: INTERNAL MEDICINE

## 2022-03-10 PROCEDURE — 99999 PR PBB SHADOW E&M-EST. PATIENT-LVL IV: ICD-10-PCS | Mod: PBBFAC,,, | Performed by: INTERNAL MEDICINE

## 2022-03-10 PROCEDURE — 3044F HG A1C LEVEL LT 7.0%: CPT | Mod: CPTII,S$GLB,, | Performed by: INTERNAL MEDICINE

## 2022-03-10 PROCEDURE — 3078F PR MOST RECENT DIASTOLIC BLOOD PRESSURE < 80 MM HG: ICD-10-PCS | Mod: CPTII,S$GLB,, | Performed by: INTERNAL MEDICINE

## 2022-03-10 PROCEDURE — 4010F ACE/ARB THERAPY RXD/TAKEN: CPT | Mod: CPTII,S$GLB,, | Performed by: INTERNAL MEDICINE

## 2022-03-10 PROCEDURE — 1160F RVW MEDS BY RX/DR IN RCRD: CPT | Mod: CPTII,S$GLB,, | Performed by: INTERNAL MEDICINE

## 2022-03-10 PROCEDURE — 4010F PR ACE/ARB THEARPY RXD/TAKEN: ICD-10-PCS | Mod: CPTII,S$GLB,, | Performed by: INTERNAL MEDICINE

## 2022-03-10 PROCEDURE — 3074F PR MOST RECENT SYSTOLIC BLOOD PRESSURE < 130 MM HG: ICD-10-PCS | Mod: CPTII,S$GLB,, | Performed by: INTERNAL MEDICINE

## 2022-03-10 PROCEDURE — 1159F PR MEDICATION LIST DOCUMENTED IN MEDICAL RECORD: ICD-10-PCS | Mod: CPTII,S$GLB,, | Performed by: INTERNAL MEDICINE

## 2022-03-10 PROCEDURE — 99214 OFFICE O/P EST MOD 30 MIN: CPT | Mod: S$GLB,,, | Performed by: INTERNAL MEDICINE

## 2022-03-10 PROCEDURE — 3074F SYST BP LT 130 MM HG: CPT | Mod: CPTII,S$GLB,, | Performed by: INTERNAL MEDICINE

## 2022-03-10 PROCEDURE — 1159F MED LIST DOCD IN RCRD: CPT | Mod: CPTII,S$GLB,, | Performed by: INTERNAL MEDICINE

## 2022-03-10 PROCEDURE — 99999 PR PBB SHADOW E&M-EST. PATIENT-LVL IV: CPT | Mod: PBBFAC,,, | Performed by: INTERNAL MEDICINE

## 2022-03-10 PROCEDURE — 3008F BODY MASS INDEX DOCD: CPT | Mod: CPTII,S$GLB,, | Performed by: INTERNAL MEDICINE

## 2022-03-10 PROCEDURE — 3078F DIAST BP <80 MM HG: CPT | Mod: CPTII,S$GLB,, | Performed by: INTERNAL MEDICINE

## 2022-03-10 PROCEDURE — 3008F PR BODY MASS INDEX (BMI) DOCUMENTED: ICD-10-PCS | Mod: CPTII,S$GLB,, | Performed by: INTERNAL MEDICINE

## 2022-03-10 PROCEDURE — 1160F PR REVIEW ALL MEDS BY PRESCRIBER/CLIN PHARMACIST DOCUMENTED: ICD-10-PCS | Mod: CPTII,S$GLB,, | Performed by: INTERNAL MEDICINE

## 2022-03-10 NOTE — PROGRESS NOTES
[facility] Note    Chief Complaint      Chief Complaint   Patient presents with    Follow-up     6 mth     History of Present Illness      Enriqueta Mccarthy is a 46 y.o. female who presents today for chief complaint follow up chronic issues HTN HLD DM. Patient comes to appointment with her mother.     HPI   Last seen by me 8.26.21 for annual w/ labs at that time w/o FLP    DM A1c trende dup last yeaLabs 2 weeks ago unremarkable A1c 5.9-> 6.4 now 6.3, mild microalbuminuria, had not been watching diet, we agreed on diet control over med changes last visit, foot exams with annual, ophtho=none  HTN at goal today in fact low normal   LDL at goal for DM   H/o Vit D def corrected, unable to check this level at present   Saw arcenio de guzman who has her on baclofen for hip pain     Pap: 7.20 Collette  MMG: 3.30.21-- Dr Song   Tobacco: never smoker     aubrey discuss dc hydral pending home monitoring   Active Problem List with Overview Notes    Diagnosis Date Noted    Primary osteoarthritis of left knee 04/13/2021    Primary osteoarthritis of right knee 04/13/2021    Quadriceps weakness 04/13/2021    Right hip pain 04/06/2021    Knee pain 04/06/2021    Pityriasis 02/11/2021    Frequent urination 08/11/2020    Hair loss 08/11/2020    Class 2 severe obesity with serious comorbidity and body mass index (BMI) of 39.0 to 39.9 in adult 08/01/2019    Type 2 diabetes mellitus with diabetic polyneuropathy, without long-term current use of insulin 11/17/2016    Hypertriglyceridemia 11/17/2016    Dyslipidemia 04/05/2016    Vitamin D deficiency 09/22/2014    Essential hypertension        Health Maintenance   Topic Date Due    Lipid Panel  02/08/2022    Foot Exam  08/26/2022    Eye Exam  08/27/2022    Hemoglobin A1c  09/04/2022    Mammogram  10/14/2022    Low Dose Statin  03/10/2023    TETANUS VACCINE  02/11/2031    Hepatitis C Screening  Completed       Past Medical History:   Diagnosis Date     Allergic conjunctivitis of both eyes     Anemia     Arthritis     Diabetic retinopathy     Essential hypertension     Herpes simplex virus (HSV) infection     Hypertension     Hypertriglyceridemia 2016    Joint pain     Neuropathy     Obesity (BMI 30-39.9) 2016    Type 2 diabetes mellitus with diabetic polyneuropathy, without long-term current use of insulin 2016    Uterine fibroid        Past Surgical History:   Procedure Laterality Date     SECTION      x 2    ENDOMETRIAL ABLATION  2016    GALLBLADDER SURGERY  2017    removed    LIPOMA RESECTION Left 2013    SINUS SURGERY      TONSILLECTOMY, ADENOIDECTOMY      TUBAL LIGATION         family history includes Anemia in her daughter and son; Diabetes in her father, maternal grandmother, and maternal uncle; Hyperlipidemia in her father; Hypertension in her father and mother.    Social History     Tobacco Use    Smoking status: Never Smoker    Smokeless tobacco: Never Used   Substance Use Topics    Alcohol use: No    Drug use: No       Review of Systems   Constitutional: Negative for chills, fever, malaise/fatigue and weight loss.   Respiratory: Negative for cough, sputum production, shortness of breath and wheezing.    Cardiovascular: Negative for chest pain, palpitations, orthopnea and leg swelling.   Gastrointestinal: Negative for constipation, diarrhea, nausea and vomiting.   Genitourinary: Negative for dysuria, frequency, hematuria and urgency.        Outpatient Encounter Medications as of 3/10/2022   Medication Sig Note Dispense Refill    aspirin (ECOTRIN) 81 MG EC tablet Take 1 tablet (81 mg total) by mouth once daily.   0    atorvastatin (LIPITOR) 80 MG tablet Take 1 tablet (80 mg total) by mouth once daily.  90 tablet 1    baclofen (LIORESAL) 10 MG tablet Take one to two tablets daily 2021: Take as needed 60 tablet 11    carvediloL (COREG) 25 MG tablet TAKE 1 TABLET BY MOUTH TWICE A DAY  "(Patient taking differently: TAKE 1 TABLET BY MOUTH TWICE A DAY)  180 tablet 1    dulaglutide (TRULICITY) 1.5 mg/0.5 mL pen injector Inject 1.5 mg into the skin every 7 days.  4 pen 3    ergocalciferol (ERGOCALCIFEROL) 50,000 unit Cap Take 1 capsule (50,000 Units total) by mouth every 7 days.  12 capsule 2    gabapentin (NEURONTIN) 300 MG capsule Take 1 capsule (300 mg total) by mouth every evening.  30 capsule 11    hydrALAZINE (APRESOLINE) 25 MG tablet Take 1 tablet (25 mg total) by mouth 2 (two) times a day.  60 tablet 0    icosapent ethyL (VASCEPA) 1 gram Cap TAKE TWO CAPSULES BY MOUTH TWICE A DAY  360 capsule 1    ketoconazole (NIZORAL) 2 % shampoo Wash hair with medicated shampoo at least 2x/week - let sit on scalp at least 5 minutes prior to rinsing  120 mL 5    metFORMIN (GLUCOPHAGE) 500 MG tablet Take 1/2 tablet (250 mg total) by mouth 2 (two) times daily with meals.  90 tablet 3    triamcinolone acetonide 0.1% (KENALOG) 0.1 % cream Apply topically 2 (two) times daily. 8/26/2021: Use as needed 45 g 0    valsartan (DIOVAN) 320 MG tablet TAKE 1 TABLET BY MOUTH EVERY DAY  90 tablet 1    verapamiL (CALAN-SR) 180 MG CR tablet TAKE 1 TABLET BY MOUTH TWO TIMES A DAY ( DISCONTINUE DILTIAZEM )  180 tablet 1    [DISCONTINUED] diltiaZEM (CARDIZEM CD) 180 MG 24 hr capsule Take 2 capsules (360 mg total) by mouth once daily.  180 capsule 3    [DISCONTINUED] LIDOcaine HCL 10 mg/ml (1%) injection 1 mL        [DISCONTINUED] LIDOcaine-EPINEPHrine 1%-1:100,000 injection 2 mL         No facility-administered encounter medications on file as of 3/10/2022.        Review of patient's allergies indicates:   Allergen Reactions    No known allergies            Physical Exam      Vital Signs  Temp: 98.5 °F (36.9 °C)  Temp src: Oral  Pulse: 69  SpO2: 98 %  BP: 112/68  BP Location: Right arm  Patient Position: Sitting  Pain Score: 0-No pain  Height and Weight  Height: 5' 2" (157.5 cm)  Weight: 105.6 kg (232 lb 12.9 " oz)  BSA (Calculated - sq m): 2.15 sq meters  BMI (Calculated): 42.6  Weight in (lb) to have BMI = 25: 136.4]    Physical Exam  Vitals reviewed.   Constitutional:       General: She is not in acute distress.     Appearance: She is well-developed. She is not diaphoretic.   HENT:      Head: Normocephalic and atraumatic.      Right Ear: External ear normal.      Left Ear: External ear normal.      Nose: Nose normal.   Eyes:      General:         Right eye: No discharge.         Left eye: No discharge.      Conjunctiva/sclera: Conjunctivae normal.   Cardiovascular:      Rate and Rhythm: Normal rate and regular rhythm.      Heart sounds: Normal heart sounds.   Pulmonary:      Effort: Pulmonary effort is normal. No respiratory distress.      Breath sounds: Normal breath sounds. No wheezing or rales.   Musculoskeletal:         General: Normal range of motion.      Cervical back: Normal range of motion.   Skin:     Coloration: Skin is not pale.      Findings: No rash.   Neurological:      Mental Status: She is alert and oriented to person, place, and time.   Psychiatric:         Behavior: Behavior normal.         Thought Content: Thought content normal.         Judgment: Judgment normal.          Laboratory:  A1C:  Recent Labs   Lab Result Units 03/04/22  0740   Hemoglobin A1C % 6.3*       Assessment/Plan     Enriqueta Mccarthy is a 46 y.o.female with:    Dyslipidemia  At goal for dm     Essential hypertension  At goal, will do home monitoring and if persistent low nl will dc hydralazine    Right hip pain  Seeing heum       Orders Placed This Encounter   Procedures    Cologuard Screening (Multitarget Stool DNA)     Standing Status:   Future     Number of Occurrences:   1     Standing Expiration Date:   5/9/2023    Mammo Digital Screening Bilat w/ Elver     Standing Status:   Future     Standing Expiration Date:   5/10/2023     Order Specific Question:   May the Radiologist modify the order per protocol to meet the clinical needs  of the patient?     Answer:   Yes    CBC Auto Differential     Standing Status:   Future     Standing Expiration Date:   5/9/2023    Comprehensive Metabolic Panel     Standing Status:   Future     Standing Expiration Date:   5/9/2023    Lipid Panel     Standing Status:   Future     Standing Expiration Date:   5/9/2023    Microalbumin/Creatinine Ratio, Urine     Standing Status:   Future     Standing Expiration Date:   3/10/2023     Order Specific Question:   Specimen Source     Answer:   Urine    Hemoglobin A1C     Standing Status:   Future     Standing Expiration Date:   5/9/2023    CBC Without Differential     Standing Status:   Future     Standing Expiration Date:   5/9/2023       Use of the [employer] Patient Portal discussed and encouraged during today's visit  -Continue current medications and maintain follow up with specialists.  Return to clinic in 6 months for annual with labs prior   Future Appointments   Date Time Provider Department Center   9/12/2022  7:15 AM LAB, SAME DAY [facility]   9/12/2022  7:20 AM LAB, SAME DAY [facility]   9/22/2022  1:15 PM Edilma Marquis MD [facility]       Edilma Marquis MD  3/14/2022 1:09 PM    Primary Care Internal Medicine

## 2022-03-14 DIAGNOSIS — E11.65 UNCONTROLLED TYPE 2 DIABETES MELLITUS WITH HYPERGLYCEMIA, WITHOUT LONG-TERM CURRENT USE OF INSULIN: Chronic | ICD-10-CM

## 2022-03-14 DIAGNOSIS — I10 ESSENTIAL HYPERTENSION: Chronic | ICD-10-CM

## 2022-03-14 DIAGNOSIS — E78.5 DYSLIPIDEMIA: ICD-10-CM

## 2022-03-14 RX ORDER — GABAPENTIN 300 MG/1
300 CAPSULE ORAL NIGHTLY
Qty: 90 CAPSULE | Refills: 1 | Status: SHIPPED | OUTPATIENT
Start: 2022-03-14 | End: 2023-03-30

## 2022-03-14 RX ORDER — ICOSAPENT ETHYL 1000 MG/1
CAPSULE ORAL
Qty: 360 CAPSULE | Refills: 1 | Status: SHIPPED | OUTPATIENT
Start: 2022-03-14 | End: 2022-11-20 | Stop reason: SDUPTHER

## 2022-03-14 RX ORDER — HYDRALAZINE HYDROCHLORIDE 25 MG/1
25 TABLET, FILM COATED ORAL 2 TIMES DAILY
Qty: 60 TABLET | Refills: 0 | Status: SHIPPED | OUTPATIENT
Start: 2022-03-14 | End: 2022-05-15 | Stop reason: SDUPTHER

## 2022-03-14 RX ORDER — ATORVASTATIN CALCIUM 80 MG/1
80 TABLET, FILM COATED ORAL DAILY
Qty: 90 TABLET | Refills: 1 | Status: SHIPPED | OUTPATIENT
Start: 2022-03-14 | End: 2022-10-29 | Stop reason: SDUPTHER

## 2022-03-14 RX ORDER — CARVEDILOL 25 MG/1
TABLET ORAL
Qty: 180 TABLET | Refills: 1 | Status: SHIPPED | OUTPATIENT
Start: 2022-03-14 | End: 2023-01-31 | Stop reason: SDUPTHER

## 2022-03-14 RX ORDER — VALSARTAN 320 MG/1
TABLET ORAL
Qty: 90 TABLET | Refills: 1 | Status: SHIPPED | OUTPATIENT
Start: 2022-03-14 | End: 2022-09-25 | Stop reason: SDUPTHER

## 2022-03-14 RX ORDER — METFORMIN HYDROCHLORIDE 500 MG/1
250 TABLET ORAL 2 TIMES DAILY WITH MEALS
Qty: 90 TABLET | Refills: 1 | Status: SHIPPED | OUTPATIENT
Start: 2022-03-14 | End: 2022-09-22

## 2022-03-14 RX ORDER — VERAPAMIL HYDROCHLORIDE 180 MG/1
TABLET, FILM COATED, EXTENDED RELEASE ORAL
Qty: 180 TABLET | Refills: 1 | Status: SHIPPED | OUTPATIENT
Start: 2022-03-14 | End: 2022-06-02 | Stop reason: SDUPTHER

## 2022-03-27 LAB — NONINV COLON CA DNA+OCC BLD SCRN STL QL: NEGATIVE

## 2022-03-28 ENCOUNTER — PATIENT MESSAGE (OUTPATIENT)
Dept: ADMINISTRATIVE | Facility: HOSPITAL | Age: 47
End: 2022-03-28
Payer: COMMERCIAL

## 2022-03-31 DIAGNOSIS — Z12.11 SCREENING FOR COLON CANCER: ICD-10-CM

## 2022-04-05 ENCOUNTER — PATIENT MESSAGE (OUTPATIENT)
Dept: ADMINISTRATIVE | Facility: OTHER | Age: 47
End: 2022-04-05
Payer: COMMERCIAL

## 2022-04-07 ENCOUNTER — PATIENT MESSAGE (OUTPATIENT)
Dept: INTERNAL MEDICINE | Facility: CLINIC | Age: 47
End: 2022-04-07
Payer: COMMERCIAL

## 2022-05-15 ENCOUNTER — PATIENT MESSAGE (OUTPATIENT)
Dept: INTERNAL MEDICINE | Facility: CLINIC | Age: 47
End: 2022-05-15
Payer: COMMERCIAL

## 2022-05-15 DIAGNOSIS — I10 ESSENTIAL HYPERTENSION: Chronic | ICD-10-CM

## 2022-05-15 DIAGNOSIS — E55.9 VITAMIN D DEFICIENCY: ICD-10-CM

## 2022-05-15 RX ORDER — HYDRALAZINE HYDROCHLORIDE 25 MG/1
25 TABLET, FILM COATED ORAL 2 TIMES DAILY
Qty: 60 TABLET | Refills: 0 | Status: CANCELLED | OUTPATIENT
Start: 2022-05-15

## 2022-05-15 RX ORDER — HYDRALAZINE HYDROCHLORIDE 25 MG/1
25 TABLET, FILM COATED ORAL 2 TIMES DAILY
Qty: 180 TABLET | Refills: 1 | Status: SHIPPED | OUTPATIENT
Start: 2022-05-15 | End: 2022-12-18 | Stop reason: SDUPTHER

## 2022-05-15 RX ORDER — ERGOCALCIFEROL 1.25 MG/1
50000 CAPSULE ORAL
Qty: 12 CAPSULE | Refills: 2 | Status: SHIPPED | OUTPATIENT
Start: 2022-05-15 | End: 2023-03-30 | Stop reason: SDUPTHER

## 2022-05-20 ENCOUNTER — PATIENT MESSAGE (OUTPATIENT)
Dept: PODIATRY | Facility: CLINIC | Age: 47
End: 2022-05-20
Payer: COMMERCIAL

## 2022-05-31 ENCOUNTER — TELEPHONE (OUTPATIENT)
Dept: ORTHOPEDICS | Facility: CLINIC | Age: 47
End: 2022-05-31
Payer: COMMERCIAL

## 2022-05-31 ENCOUNTER — PATIENT MESSAGE (OUTPATIENT)
Dept: ORTHOPEDICS | Facility: CLINIC | Age: 47
End: 2022-05-31
Payer: COMMERCIAL

## 2022-05-31 DIAGNOSIS — M25.529 ELBOW PAIN, UNSPECIFIED LATERALITY: Primary | ICD-10-CM

## 2022-06-02 ENCOUNTER — PATIENT MESSAGE (OUTPATIENT)
Dept: INTERNAL MEDICINE | Facility: CLINIC | Age: 47
End: 2022-06-02
Payer: COMMERCIAL

## 2022-06-02 DIAGNOSIS — I10 ESSENTIAL HYPERTENSION: Chronic | ICD-10-CM

## 2022-06-02 RX ORDER — VERAPAMIL HYDROCHLORIDE 180 MG/1
TABLET, FILM COATED, EXTENDED RELEASE ORAL
Qty: 180 TABLET | Refills: 1 | Status: SHIPPED | OUTPATIENT
Start: 2022-06-02 | End: 2022-12-11 | Stop reason: SDUPTHER

## 2022-06-02 NOTE — TELEPHONE ENCOUNTER
Care Due:                  Date            Visit Type   Department     Provider  --------------------------------------------------------------------------------                                EP -                              PRIMARY      [facility] PRIMARY  Last Visit: 03-      CARE ([facility])   HANNAH Marquis                               -                              [facility] PRIMARY  Next Visit: 09-      CARE ([facility])   HANNAH Marquis                                                            Last  Test          Frequency    Reason                     Performed    Due Date  --------------------------------------------------------------------------------    CMP.........  12 months..  atorvastatin, metFORMIN,   08- 08-                             valsartan................    Lipid Panel.  12 months..  atorvastatin.............  02- 02-    [facility] Embedded Care Gaps. Reference number: [id number]. 6/02/2022   7:46:57 AM CDT

## 2022-06-07 ENCOUNTER — PATIENT MESSAGE (OUTPATIENT)
Dept: ORTHOPEDICS | Facility: CLINIC | Age: 47
End: 2022-06-07
Payer: COMMERCIAL

## 2022-06-29 ENCOUNTER — PATIENT MESSAGE (OUTPATIENT)
Dept: ORTHOPEDICS | Facility: CLINIC | Age: 47
End: 2022-06-29
Payer: COMMERCIAL

## 2022-07-18 ENCOUNTER — OFFICE VISIT (OUTPATIENT)
Dept: OPTOMETRY | Facility: CLINIC | Age: 47
End: 2022-07-18
Payer: COMMERCIAL

## 2022-07-18 DIAGNOSIS — E11.42 TYPE 2 DIABETES MELLITUS WITH DIABETIC POLYNEUROPATHY, WITHOUT LONG-TERM CURRENT USE OF INSULIN: ICD-10-CM

## 2022-07-18 DIAGNOSIS — H53.10 SUBJECTIVE VISUAL DISTURBANCE: ICD-10-CM

## 2022-07-18 DIAGNOSIS — H02.886 MEIBOMIAN GLAND DYSFUNCTION (MGD) OF BOTH EYES: ICD-10-CM

## 2022-07-18 DIAGNOSIS — E11.9 TYPE 2 DIABETES MELLITUS WITHOUT RETINOPATHY: ICD-10-CM

## 2022-07-18 DIAGNOSIS — H02.883 MEIBOMIAN GLAND DYSFUNCTION (MGD) OF BOTH EYES: ICD-10-CM

## 2022-07-18 DIAGNOSIS — H52.223 MYOPIA OF BOTH EYES WITH REGULAR ASTIGMATISM AND PRESBYOPIA: ICD-10-CM

## 2022-07-18 DIAGNOSIS — H52.13 MYOPIA OF BOTH EYES WITH REGULAR ASTIGMATISM AND PRESBYOPIA: ICD-10-CM

## 2022-07-18 DIAGNOSIS — Z01.00 COMPLETE EYE EXAM, ENCOUNTER FOR: Primary | ICD-10-CM

## 2022-07-18 DIAGNOSIS — H52.4 MYOPIA OF BOTH EYES WITH REGULAR ASTIGMATISM AND PRESBYOPIA: ICD-10-CM

## 2022-07-18 PROCEDURE — 1159F PR MEDICATION LIST DOCUMENTED IN MEDICAL RECORD: ICD-10-PCS | Mod: CPTII,S$GLB,, | Performed by: OPTOMETRIST

## 2022-07-18 PROCEDURE — 1159F MED LIST DOCD IN RCRD: CPT | Mod: CPTII,S$GLB,, | Performed by: OPTOMETRIST

## 2022-07-18 PROCEDURE — 2023F PR DILATED RETINAL EXAM W/O EVID OF RETINOPATHY: ICD-10-PCS | Mod: CPTII,S$GLB,, | Performed by: OPTOMETRIST

## 2022-07-18 PROCEDURE — 92015 PR REFRACTION: ICD-10-PCS | Mod: S$GLB,,, | Performed by: OPTOMETRIST

## 2022-07-18 PROCEDURE — 92015 DETERMINE REFRACTIVE STATE: CPT | Mod: S$GLB,,, | Performed by: OPTOMETRIST

## 2022-07-18 PROCEDURE — 92014 PR EYE EXAM, EST PATIENT,COMPREHESV: ICD-10-PCS | Mod: S$GLB,,, | Performed by: OPTOMETRIST

## 2022-07-18 PROCEDURE — 99999 PR PBB SHADOW E&M-EST. PATIENT-LVL III: CPT | Mod: PBBFAC,,, | Performed by: OPTOMETRIST

## 2022-07-18 PROCEDURE — 92014 COMPRE OPH EXAM EST PT 1/>: CPT | Mod: S$GLB,,, | Performed by: OPTOMETRIST

## 2022-07-18 PROCEDURE — 3044F PR MOST RECENT HEMOGLOBIN A1C LEVEL <7.0%: ICD-10-PCS | Mod: CPTII,S$GLB,, | Performed by: OPTOMETRIST

## 2022-07-18 PROCEDURE — 3044F HG A1C LEVEL LT 7.0%: CPT | Mod: CPTII,S$GLB,, | Performed by: OPTOMETRIST

## 2022-07-18 PROCEDURE — 4010F ACE/ARB THERAPY RXD/TAKEN: CPT | Mod: CPTII,S$GLB,, | Performed by: OPTOMETRIST

## 2022-07-18 PROCEDURE — 4010F PR ACE/ARB THEARPY RXD/TAKEN: ICD-10-PCS | Mod: CPTII,S$GLB,, | Performed by: OPTOMETRIST

## 2022-07-18 PROCEDURE — 99999 PR PBB SHADOW E&M-EST. PATIENT-LVL III: ICD-10-PCS | Mod: PBBFAC,,, | Performed by: OPTOMETRIST

## 2022-07-18 PROCEDURE — 2023F DILAT RTA XM W/O RTNOPTHY: CPT | Mod: CPTII,S$GLB,, | Performed by: OPTOMETRIST

## 2022-07-18 NOTE — PROGRESS NOTES
HPI     CC: Pt is here today for a Diabetic eye exam. She states that she is   seeing well. She is not currently using any vision correction.  ROBERTO: 2020    (-) Changes in vision   (-) Pain  (-) Irritation   (+) Itching   (-) Flashes  (-) Floaters  (-) Glasses wearer  (-) CL wearer  (+) Uses eye gtts, Zaditor prn OU    Does patient want a refraction today? yes    (-) Eye injury  (-) Eye surgery   (-)POHx  (+)FOHx, Glaucoma?    (+)DM  Hemoglobin A1C       Date                     Value               Ref Range             Status                03/04/2022               6.3 (H)             4.0 - 5.6 %           Final                   08/14/2021               6.4 (H)             4.0 - 5.6 %           Final                   02/08/2021               5.9 (H)             4.0 - 5.6 %           Final                     Last edited by Sean Le on 7/18/2022 10:36 AM. (History)        ROS     Positive for: Endocrine, Cardiovascular    Negative for: Constitutional, Gastrointestinal, Neurological, Skin,   Genitourinary, Musculoskeletal, HENT, Eyes, Respiratory, Psychiatric,   Allergic/Imm, Heme/Lymph    Last edited by Julieth Jarrett, OD on 7/18/2022 11:03 AM. (History)        Assessment /Plan     For exam results, see Encounter Report.    Complete eye exam, encounter for    Myopia of both eyes with regular astigmatism and presbyopia    Subjective visual disturbance    Meibomian gland dysfunction (MGD) of both eyes    Type 2 diabetes mellitus with diabetic polyneuropathy, without long-term current use of insulin    Type 2 diabetes mellitus without retinopathy      MONITOR. ED PT ON ALL EXAM FINDINGS  RX FINAL SPECS PRN   TYPE 2 DM W/O RETINOPATHY OU; CONTINUE WITH PCP FOR GLYCEMIC CONTROL   RTC 1 YR///PRN FOR REE/DFE

## 2022-07-19 ENCOUNTER — PATIENT MESSAGE (OUTPATIENT)
Dept: OPTOMETRY | Facility: CLINIC | Age: 47
End: 2022-07-19
Payer: COMMERCIAL

## 2022-07-27 ENCOUNTER — OFFICE VISIT (OUTPATIENT)
Dept: ORTHOPEDICS | Facility: CLINIC | Age: 47
End: 2022-07-27
Payer: COMMERCIAL

## 2022-07-27 VITALS — HEIGHT: 62 IN | BODY MASS INDEX: 42.69 KG/M2 | WEIGHT: 232 LBS

## 2022-07-27 DIAGNOSIS — M77.8 TENDONITIS OF ELBOW, RIGHT: ICD-10-CM

## 2022-07-27 PROCEDURE — 3044F PR MOST RECENT HEMOGLOBIN A1C LEVEL <7.0%: ICD-10-PCS | Mod: CPTII,S$GLB,, | Performed by: ORTHOPAEDIC SURGERY

## 2022-07-27 PROCEDURE — 4010F ACE/ARB THERAPY RXD/TAKEN: CPT | Mod: CPTII,S$GLB,, | Performed by: ORTHOPAEDIC SURGERY

## 2022-07-27 PROCEDURE — 4010F PR ACE/ARB THEARPY RXD/TAKEN: ICD-10-PCS | Mod: CPTII,S$GLB,, | Performed by: ORTHOPAEDIC SURGERY

## 2022-07-27 PROCEDURE — 3008F BODY MASS INDEX DOCD: CPT | Mod: CPTII,S$GLB,, | Performed by: ORTHOPAEDIC SURGERY

## 2022-07-27 PROCEDURE — 99999 PR PBB SHADOW E&M-EST. PATIENT-LVL III: ICD-10-PCS | Mod: PBBFAC,,, | Performed by: ORTHOPAEDIC SURGERY

## 2022-07-27 PROCEDURE — 20551 PR INJECT TENDON ORIGIN/INSERT: ICD-10-PCS | Mod: RT,S$GLB,, | Performed by: ORTHOPAEDIC SURGERY

## 2022-07-27 PROCEDURE — 99203 PR OFFICE/OUTPT VISIT, NEW, LEVL III, 30-44 MIN: ICD-10-PCS | Mod: 25,S$GLB,, | Performed by: ORTHOPAEDIC SURGERY

## 2022-07-27 PROCEDURE — 1159F MED LIST DOCD IN RCRD: CPT | Mod: CPTII,S$GLB,, | Performed by: ORTHOPAEDIC SURGERY

## 2022-07-27 PROCEDURE — 20551 NJX 1 TENDON ORIGIN/INSJ: CPT | Mod: RT,S$GLB,, | Performed by: ORTHOPAEDIC SURGERY

## 2022-07-27 PROCEDURE — 3008F PR BODY MASS INDEX (BMI) DOCUMENTED: ICD-10-PCS | Mod: CPTII,S$GLB,, | Performed by: ORTHOPAEDIC SURGERY

## 2022-07-27 PROCEDURE — 1159F PR MEDICATION LIST DOCUMENTED IN MEDICAL RECORD: ICD-10-PCS | Mod: CPTII,S$GLB,, | Performed by: ORTHOPAEDIC SURGERY

## 2022-07-27 PROCEDURE — 99999 PR PBB SHADOW E&M-EST. PATIENT-LVL III: CPT | Mod: PBBFAC,,, | Performed by: ORTHOPAEDIC SURGERY

## 2022-07-27 PROCEDURE — 3044F HG A1C LEVEL LT 7.0%: CPT | Mod: CPTII,S$GLB,, | Performed by: ORTHOPAEDIC SURGERY

## 2022-07-27 PROCEDURE — 99203 OFFICE O/P NEW LOW 30 MIN: CPT | Mod: 25,S$GLB,, | Performed by: ORTHOPAEDIC SURGERY

## 2022-07-27 RX ORDER — TRIAMCINOLONE ACETONIDE 40 MG/ML
20 INJECTION, SUSPENSION INTRA-ARTICULAR; INTRAMUSCULAR
Status: COMPLETED | OUTPATIENT
Start: 2022-07-27 | End: 2022-07-27

## 2022-07-27 RX ADMIN — TRIAMCINOLONE ACETONIDE 20 MG: 40 INJECTION, SUSPENSION INTRA-ARTICULAR; INTRAMUSCULAR at 02:07

## 2022-07-27 NOTE — PROGRESS NOTES
Subjective:      Patient ID: Enriqueta Mccarthy is a 46 y.o. female.    Chief Complaint: Consult (R Elbow )      HPI  Enriqueta Mccarthy is a  46 y.o. female presenting today for right elbow pain.  There was not a history of trauma.  Onset of symptoms began several months ago  Her symptoms are brought on by repetitive lifting  No numbness or tingling reported  Most of the pain is lateral around the right elbow  No numbness or tingling reported  Symptoms worse with use  .      Review of patient's allergies indicates:   Allergen Reactions    No known allergies          Current Outpatient Medications   Medication Sig Dispense Refill    atorvastatin (LIPITOR) 80 MG tablet Take 1 tablet (80 mg total) by mouth once daily. 90 tablet 1    baclofen (LIORESAL) 10 MG tablet Take one to two tablets daily 60 tablet 11    carvediloL (COREG) 25 MG tablet TAKE 1 TABLET BY MOUTH TWICE A  tablet 1    dulaglutide (TRULICITY) 1.5 mg/0.5 mL pen injector Inject 1.5 mg into the skin every 7 days. 12 pen 1    ergocalciferol (VITAMIN D2) 50,000 unit Cap Take 1 capsule (50,000 Units total) by mouth every 7 days. 12 capsule 2    gabapentin (NEURONTIN) 300 MG capsule Take 1 capsule (300 mg total) by mouth every evening. 90 capsule 1    hydrALAZINE (APRESOLINE) 25 MG tablet Take 1 tablet (25 mg total) by mouth 2 (two) times a day. 180 tablet 1    icosapent ethyL (VASCEPA) 1 gram Cap TAKE TWO CAPSULES BY MOUTH TWICE A  capsule 1    ketoconazole (NIZORAL) 2 % shampoo Wash hair with medicated shampoo at least 2x/week - let sit on scalp at least 5 minutes prior to rinsing 120 mL 5    metFORMIN (GLUCOPHAGE) 500 MG tablet Take 1/2 tablet (250 mg total) by mouth 2 (two) times daily with meals. 90 tablet 1    triamcinolone acetonide 0.1% (KENALOG) 0.1 % cream Apply topically 2 (two) times daily. 45 g 0    valsartan (DIOVAN) 320 MG tablet TAKE 1 TABLET BY MOUTH EVERY DAY 90 tablet 1    verapamiL (CALAN-SR) 180 MG CR tablet TAKE 1 TABLET  "BY MOUTH TWO TIMES A DAY ( DISCONTINUE DILTIAZEM ) 180 tablet 1    aspirin (ECOTRIN) 81 MG EC tablet Take 1 tablet (81 mg total) by mouth once daily.  0     No current facility-administered medications for this visit.       Past Medical History:   Diagnosis Date    Allergic conjunctivitis of both eyes     Anemia     Arthritis     Diabetic retinopathy     Essential hypertension     Herpes simplex virus (HSV) infection     Hypertension     Hypertriglyceridemia 2016    Joint pain     Neuropathy     Obesity (BMI 30-39.9) 2016    Type 2 diabetes mellitus with diabetic polyneuropathy, without long-term current use of insulin 2016    Uterine fibroid        Past Surgical History:   Procedure Laterality Date     SECTION      x 2    ENDOMETRIAL ABLATION  2016    GALLBLADDER SURGERY  2017    removed    LIPOMA RESECTION Left 2013    SINUS SURGERY  2006    TONSILLECTOMY, ADENOIDECTOMY      TUBAL LIGATION         Review of Systems:  ROS    OBJECTIVE:     PHYSICAL EXAM:  Height: 5' 2" (157.5 cm) Weight: 105.2 kg (232 lb)  Vitals:    22 1354   Weight: 105.2 kg (232 lb)   Height: 5' 2" (1.575 m)   PainSc:   6     Well developed, well nourished female in no acute distress  Alert and oriented x 3  HEENT- Normal exam  Lungs- Clear to auscultation  Heart- Regular rate and rhythm  Abdomen- Soft nontender  Extremity exam- examination right elbow there is no swelling or bruising  There is tenderness laterally over the lateral epicondylar area  Range of motion elbow full no instability  No clicking  Mild pain with resisted wrist extension and resisted elbow flexion      RADIOGRAPHS:  None  Comments: I have personally reviewed the imaging and I agree with the above radiologist's report.    ASSESSMENT/PLAN:     IMPRESSION:  Lateral epicondylitis right elbow    PLAN:  I explained the nature of the problem to the patient  Recommended stretching strengthening exercises and use of a " tennis elbow strap  She also had good results with injection in the past she would like injection today  After pause for time-out identified the right elbow injected laterally with combination Kenalog 20 mg 0.5 cc xylocaine sterile technique  She tolerated the procedure well without complication  Advil or Motrin by mouth  Follow-up 6 weeks or as needed       - We talked at length about the anatomy and pathophysiology of   Encounter Diagnosis   Name Primary?    Tendonitis of elbow, right            Disclaimer: This note has been generated using voice-recognition software. There may be typographical errors that have been missed during proof-reading.

## 2022-08-18 ENCOUNTER — PATIENT OUTREACH (OUTPATIENT)
Dept: ADMINISTRATIVE | Facility: HOSPITAL | Age: 47
End: 2022-08-18
Payer: COMMERCIAL

## 2022-08-18 NOTE — PROGRESS NOTES
Health Maintenance Due   Topic Date Due    Pneumococcal Vaccines (Age 0-64) (2 - PCV) 03/07/2014    Lipid Panel  02/08/2022    Diabetes Urine Screening  08/14/2022    Foot Exam  08/26/2022    Mammogram  10/14/2022

## 2022-08-22 ENCOUNTER — HOSPITAL ENCOUNTER (OUTPATIENT)
Dept: RADIOLOGY | Facility: HOSPITAL | Age: 47
Discharge: HOME | End: 2022-08-22
Attending: ORTHOPAEDIC SURGERY
Payer: COMMERCIAL

## 2022-08-22 ENCOUNTER — OFFICE VISIT (OUTPATIENT)
Dept: ORTHOPEDICS | Facility: CLINIC | Age: 47
End: 2022-08-22
Payer: COMMERCIAL

## 2022-08-22 VITALS — BODY MASS INDEX: 42.68 KG/M2 | WEIGHT: 231.94 LBS | HEIGHT: 62 IN

## 2022-08-22 DIAGNOSIS — M25.572 BILATERAL ANKLE PAIN, UNSPECIFIED CHRONICITY: ICD-10-CM

## 2022-08-22 DIAGNOSIS — D17.20 LIPOMA OF LOWER EXTREMITY, UNSPECIFIED LATERALITY: Primary | ICD-10-CM

## 2022-08-22 DIAGNOSIS — M25.571 BILATERAL ANKLE PAIN, UNSPECIFIED CHRONICITY: ICD-10-CM

## 2022-08-22 PROCEDURE — 3044F HG A1C LEVEL LT 7.0%: CPT | Mod: CPTII,S$GLB,, | Performed by: ORTHOPAEDIC SURGERY

## 2022-08-22 PROCEDURE — 3044F PR MOST RECENT HEMOGLOBIN A1C LEVEL <7.0%: ICD-10-PCS | Mod: CPTII,S$GLB,, | Performed by: ORTHOPAEDIC SURGERY

## 2022-08-22 PROCEDURE — 99213 PR OFFICE/OUTPT VISIT, EST, LEVL III, 20-29 MIN: ICD-10-PCS | Mod: S$GLB,,, | Performed by: ORTHOPAEDIC SURGERY

## 2022-08-22 PROCEDURE — 1159F PR MEDICATION LIST DOCUMENTED IN MEDICAL RECORD: ICD-10-PCS | Mod: CPTII,S$GLB,, | Performed by: ORTHOPAEDIC SURGERY

## 2022-08-22 PROCEDURE — 4010F ACE/ARB THERAPY RXD/TAKEN: CPT | Mod: CPTII,S$GLB,, | Performed by: ORTHOPAEDIC SURGERY

## 2022-08-22 PROCEDURE — 99999 PR PBB SHADOW E&M-EST. PATIENT-LVL III: ICD-10-PCS | Mod: PBBFAC,,, | Performed by: ORTHOPAEDIC SURGERY

## 2022-08-22 PROCEDURE — 99999 PR PBB SHADOW E&M-EST. PATIENT-LVL III: CPT | Mod: PBBFAC,,, | Performed by: ORTHOPAEDIC SURGERY

## 2022-08-22 PROCEDURE — 3008F BODY MASS INDEX DOCD: CPT | Mod: CPTII,S$GLB,, | Performed by: ORTHOPAEDIC SURGERY

## 2022-08-22 PROCEDURE — 4010F PR ACE/ARB THEARPY RXD/TAKEN: ICD-10-PCS | Mod: CPTII,S$GLB,, | Performed by: ORTHOPAEDIC SURGERY

## 2022-08-22 PROCEDURE — 3008F PR BODY MASS INDEX (BMI) DOCUMENTED: ICD-10-PCS | Mod: CPTII,S$GLB,, | Performed by: ORTHOPAEDIC SURGERY

## 2022-08-22 PROCEDURE — 1159F MED LIST DOCD IN RCRD: CPT | Mod: CPTII,S$GLB,, | Performed by: ORTHOPAEDIC SURGERY

## 2022-08-22 PROCEDURE — 99213 OFFICE O/P EST LOW 20 MIN: CPT | Mod: S$GLB,,, | Performed by: ORTHOPAEDIC SURGERY

## 2022-08-22 NOTE — PROGRESS NOTES
Subjective:      Patient ID: Enriqueta Mccarthy is a 46 y.o. female.    Chief Complaint:  Lipomas ankles    HPI:  This is a 46-year-old female who works at [employer] and presents with diagnoses of lipomas of both ankles.  She had an MRI done of the left ankle was suggested a 2.5 x 1.5 cm lipoma.  She reports that currently she is not having any pain related to her lipomas but occasionally does get some discomfort.  She does not report any increasing size of the lipomas.  She states she does not like the appearance of the lipomas    Past Medical History:   Diagnosis Date    Allergic conjunctivitis of both eyes     Anemia     Arthritis     Diabetic retinopathy     Essential hypertension     Herpes simplex virus (HSV) infection     Hypertension     Hypertriglyceridemia 11/17/2016    Joint pain     Neuropathy     Obesity (BMI 30-39.9) 4/5/2016    Type 2 diabetes mellitus with diabetic polyneuropathy, without long-term current use of insulin 11/17/2016    Uterine fibroid        Current Outpatient Medications:     aspirin (ECOTRIN) 81 MG EC tablet, Take 1 tablet (81 mg total) by mouth once daily., Disp: , Rfl: 0    atorvastatin (LIPITOR) 80 MG tablet, Take 1 tablet (80 mg total) by mouth once daily., Disp: 90 tablet, Rfl: 1    baclofen (LIORESAL) 10 MG tablet, Take one to two tablets daily, Disp: 60 tablet, Rfl: 11    carvediloL (COREG) 25 MG tablet, TAKE 1 TABLET BY MOUTH TWICE A DAY, Disp: 180 tablet, Rfl: 1    dulaglutide (TRULICITY) 1.5 mg/0.5 mL pen injector, Inject 1.5 mg into the skin every 7 days., Disp: 12 pen, Rfl: 1    ergocalciferol (VITAMIN D2) 50,000 unit Cap, Take 1 capsule (50,000 Units total) by mouth every 7 days., Disp: 12 capsule, Rfl: 2    gabapentin (NEURONTIN) 300 MG capsule, Take 1 capsule (300 mg total) by mouth every evening., Disp: 90 capsule, Rfl: 1    hydrALAZINE (APRESOLINE) 25 MG tablet, Take 1 tablet (25 mg total) by mouth 2 (two) times a day., Disp: 180 tablet, Rfl: 1     "icosapent ethyL (VASCEPA) 1 gram Cap, TAKE TWO CAPSULES BY MOUTH TWICE A DAY, Disp: 360 capsule, Rfl: 1    ketoconazole (NIZORAL) 2 % shampoo, Wash hair with medicated shampoo at least 2x/week - let sit on scalp at least 5 minutes prior to rinsing, Disp: 120 mL, Rfl: 5    metFORMIN (GLUCOPHAGE) 500 MG tablet, Take 1/2 tablet (250 mg total) by mouth 2 (two) times daily with meals., Disp: 90 tablet, Rfl: 1    triamcinolone acetonide 0.1% (KENALOG) 0.1 % cream, Apply topically 2 (two) times daily., Disp: 45 g, Rfl: 0    valsartan (DIOVAN) 320 MG tablet, TAKE 1 TABLET BY MOUTH EVERY DAY, Disp: 90 tablet, Rfl: 1    verapamiL (CALAN-SR) 180 MG CR tablet, TAKE 1 TABLET BY MOUTH TWO TIMES A DAY ( DISCONTINUE DILTIAZEM ), Disp: 180 tablet, Rfl: 1  Review of patient's allergies indicates:   Allergen Reactions    No known allergies        Ht 5' 2" (1.575 m)   Wt 105.2 kg (231 lb 14.8 oz)   BMI 42.42 kg/m²     ROS:  Negative for chest pain, shortness of breath, fevers, or unexplained weight loss      Objective:    Ortho Exam        This is a well-developed well-nourished female who walks in with a normal gait.  On standing inspection she has plantigrade alignment of her feet.  She has some mild prominence noted in the sinus tarsi areas of both hind feet.  On sitting exam she has full motion of her ankle and subtalar joints bilaterally without any pain.  She has normal function and strength of all the tendons about her ankle.  She has good distal pulses and normal sensation.  The lipomas in question appear to be in the area of the sinus tarsi and subtalar joints bilaterally.  They are nontender.      Assessment:     Imaging:  I reviewed the MRI of the left hindfoot that was performed in May 2021.  The lipoma noted on the MRI is actually on the medial aspect of the heel and is not clinically significant        1. Lipoma of lower extremity, unspecified laterality  CANCELED: X-Ray Ankle Complete Bilateral         Plan:     "   Recommendation:  The patient's complaints are actually related to the normal sinus tarsi fat pads of the subtalar joints.  The lipoma noted on the MRI is a small lipoma on the medial aspect of the heel which is not clinically significant.  Regardless, I would not recommend surgical intervention as I do not believe the lipomas are causing significant symptoms.  She thought that these could possibly be removed in the office but I explained to her that this would require anesthesia and would need to be done in an outpatient surgery center.  She agrees with observant care.  Of if the symptoms become more constant or severe then consideration could be given.

## 2022-09-12 ENCOUNTER — PATIENT MESSAGE (OUTPATIENT)
Dept: INTERNAL MEDICINE | Facility: CLINIC | Age: 47
End: 2022-09-12
Payer: COMMERCIAL

## 2022-09-12 ENCOUNTER — LAB VISIT (OUTPATIENT)
Dept: LAB | Facility: HOSPITAL | Age: 47
End: 2022-09-12
Attending: INTERNAL MEDICINE
Payer: COMMERCIAL

## 2022-09-12 DIAGNOSIS — E11.42 TYPE 2 DIABETES MELLITUS WITH DIABETIC POLYNEUROPATHY, WITHOUT LONG-TERM CURRENT USE OF INSULIN: ICD-10-CM

## 2022-09-12 LAB
ALBUMIN SERPL BCP-MCNC: 4 G/DL (ref 3.5–5.2)
ALP SERPL-CCNC: 64 U/L (ref 55–135)
ALT SERPL W/O P-5'-P-CCNC: 26 U/L (ref 10–44)
ANION GAP SERPL CALC-SCNC: 9 MMOL/L (ref 8–16)
AST SERPL-CCNC: 29 U/L (ref 10–40)
BASOPHILS # BLD AUTO: 0.06 K/UL (ref 0–0.2)
BASOPHILS NFR BLD: 0.9 % (ref 0–1.9)
BILIRUB SERPL-MCNC: 0.8 MG/DL (ref 0.1–1)
BUN SERPL-MCNC: 9 MG/DL (ref 6–20)
CALCIUM SERPL-MCNC: 9 MG/DL (ref 8.7–10.5)
CHLORIDE SERPL-SCNC: 105 MMOL/L (ref 95–110)
CHOLEST SERPL-MCNC: 122 MG/DL (ref 120–199)
CHOLEST/HDLC SERPL: 3.3 {RATIO} (ref 2–5)
CO2 SERPL-SCNC: 25 MMOL/L (ref 23–29)
CREAT SERPL-MCNC: 0.8 MG/DL (ref 0.5–1.4)
DIFFERENTIAL METHOD: ABNORMAL
EOSINOPHIL # BLD AUTO: 0.1 K/UL (ref 0–0.5)
EOSINOPHIL NFR BLD: 1.9 % (ref 0–8)
ERYTHROCYTE [DISTWIDTH] IN BLOOD BY AUTOMATED COUNT: 14.1 % (ref 11.5–14.5)
ERYTHROCYTE [DISTWIDTH] IN BLOOD BY AUTOMATED COUNT: 14.1 % (ref 11.5–14.5)
EST. GFR  (NO RACE VARIABLE): >60 ML/MIN/1.73 M^2
ESTIMATED AVG GLUCOSE: 174 MG/DL (ref 68–131)
GLUCOSE SERPL-MCNC: 248 MG/DL (ref 70–110)
HBA1C MFR BLD: 7.7 % (ref 4–5.6)
HCT VFR BLD AUTO: 36.6 % (ref 37–48.5)
HCT VFR BLD AUTO: 36.6 % (ref 37–48.5)
HDLC SERPL-MCNC: 37 MG/DL (ref 40–75)
HDLC SERPL: 30.3 % (ref 20–50)
HGB BLD-MCNC: 13.2 G/DL (ref 12–16)
HGB BLD-MCNC: 13.2 G/DL (ref 12–16)
IMM GRANULOCYTES # BLD AUTO: 0.08 K/UL (ref 0–0.04)
IMM GRANULOCYTES NFR BLD AUTO: 1.2 % (ref 0–0.5)
LDLC SERPL CALC-MCNC: 47.6 MG/DL (ref 63–159)
LYMPHOCYTES # BLD AUTO: 2.1 K/UL (ref 1–4.8)
LYMPHOCYTES NFR BLD: 30.6 % (ref 18–48)
MCH RBC QN AUTO: 31.3 PG (ref 27–31)
MCH RBC QN AUTO: 31.3 PG (ref 27–31)
MCHC RBC AUTO-ENTMCNC: 36.1 G/DL (ref 32–36)
MCHC RBC AUTO-ENTMCNC: 36.1 G/DL (ref 32–36)
MCV RBC AUTO: 87 FL (ref 82–98)
MCV RBC AUTO: 87 FL (ref 82–98)
MONOCYTES # BLD AUTO: 0.4 K/UL (ref 0.3–1)
MONOCYTES NFR BLD: 6.3 % (ref 4–15)
NEUTROPHILS # BLD AUTO: 4 K/UL (ref 1.8–7.7)
NEUTROPHILS NFR BLD: 59.1 % (ref 38–73)
NONHDLC SERPL-MCNC: 85 MG/DL
NRBC BLD-RTO: 0 /100 WBC
PLATELET # BLD AUTO: 211 K/UL (ref 150–450)
PLATELET # BLD AUTO: 211 K/UL (ref 150–450)
PLATELET BLD QL SMEAR: ABNORMAL
PMV BLD AUTO: ABNORMAL FL (ref 9.2–12.9)
PMV BLD AUTO: ABNORMAL FL (ref 9.2–12.9)
POTASSIUM SERPL-SCNC: 3.7 MMOL/L (ref 3.5–5.1)
PROT SERPL-MCNC: 7.1 G/DL (ref 6–8.4)
RBC # BLD AUTO: 4.22 M/UL (ref 4–5.4)
RBC # BLD AUTO: 4.22 M/UL (ref 4–5.4)
SODIUM SERPL-SCNC: 139 MMOL/L (ref 136–145)
TRIGL SERPL-MCNC: 187 MG/DL (ref 30–150)
WBC # BLD AUTO: 6.82 K/UL (ref 3.9–12.7)
WBC # BLD AUTO: 6.82 K/UL (ref 3.9–12.7)

## 2022-09-12 PROCEDURE — 85025 COMPLETE CBC W/AUTO DIFF WBC: CPT | Performed by: INTERNAL MEDICINE

## 2022-09-12 PROCEDURE — 36415 COLL VENOUS BLD VENIPUNCTURE: CPT | Performed by: INTERNAL MEDICINE

## 2022-09-12 PROCEDURE — 80061 LIPID PANEL: CPT | Performed by: INTERNAL MEDICINE

## 2022-09-12 PROCEDURE — 80053 COMPREHEN METABOLIC PANEL: CPT | Performed by: INTERNAL MEDICINE

## 2022-09-12 PROCEDURE — 83036 HEMOGLOBIN GLYCOSYLATED A1C: CPT | Performed by: INTERNAL MEDICINE

## 2022-09-20 NOTE — PROGRESS NOTES
Subjective:      Patient ID: Enriqueta Mccarthy is a 46 y.o. female.    Chief Complaint: Pain of the Right Elbow (Patient presents today for a follow up for right elbow injection. )      SUKI  (Kobi)    Last seen by Dr. Peace on 7/27/22 for right tennis elbow with normal XRs.     She had right tennis elbow injection by Dr. Peace on 7/27/22. She was given exercises and a tennis elbow strap. She was to take prn motrin.     She is here for follow up.     She is doing better after injection, she has no pain in lateral elbow. She notes some intermittent pain over anterior elbow that is worse with reaching out and lifting. She rates her pain as a 2 on a scale of 1-10. She is taking aleve rarely.     She did OT for elbow prior to injection. Still does stretches.       Past Medical History:   Diagnosis Date    Allergic conjunctivitis of both eyes     Anemia     Arthritis     Diabetic retinopathy     Essential hypertension     Herpes simplex virus (HSV) infection     Hypertension     Hypertriglyceridemia 11/17/2016    Joint pain     Neuropathy     Obesity (BMI 30-39.9) 4/5/2016    Type 2 diabetes mellitus with diabetic polyneuropathy, without long-term current use of insulin 11/17/2016    Uterine fibroid          Current Outpatient Medications:     atorvastatin (LIPITOR) 80 MG tablet, Take 1 tablet (80 mg total) by mouth once daily., Disp: 90 tablet, Rfl: 1    carvediloL (COREG) 25 MG tablet, TAKE 1 TABLET BY MOUTH TWICE A DAY, Disp: 180 tablet, Rfl: 1    dulaglutide (TRULICITY) 1.5 mg/0.5 mL pen injector, Inject 1.5 mg into the skin every 7 days., Disp: 12 pen, Rfl: 1    ergocalciferol (VITAMIN D2) 50,000 unit Cap, Take 1 capsule (50,000 Units total) by mouth every 7 days., Disp: 12 capsule, Rfl: 2    gabapentin (NEURONTIN) 300 MG capsule, Take 1 capsule (300 mg total) by mouth every evening., Disp: 90 capsule, Rfl: 1    hydrALAZINE (APRESOLINE) 25 MG tablet, Take 1 tablet (25 mg total) by mouth 2 (two)  "times a day., Disp: 180 tablet, Rfl: 1    icosapent ethyL (VASCEPA) 1 gram Cap, TAKE TWO CAPSULES BY MOUTH TWICE A DAY, Disp: 360 capsule, Rfl: 1    ketoconazole (NIZORAL) 2 % shampoo, Wash hair with medicated shampoo at least 2x/week - let sit on scalp at least 5 minutes prior to rinsing, Disp: 120 mL, Rfl: 5    metFORMIN (GLUCOPHAGE) 500 MG tablet, Take 1/2 tablet (250 mg total) by mouth 2 (two) times daily with meals., Disp: 90 tablet, Rfl: 1    triamcinolone acetonide 0.1% (KENALOG) 0.1 % cream, Apply topically 2 (two) times daily., Disp: 45 g, Rfl: 0    valsartan (DIOVAN) 320 MG tablet, TAKE 1 TABLET BY MOUTH EVERY DAY, Disp: 90 tablet, Rfl: 1    verapamiL (CALAN-SR) 180 MG CR tablet, TAKE 1 TABLET BY MOUTH TWO TIMES A DAY ( DISCONTINUE DILTIAZEM ), Disp: 180 tablet, Rfl: 1    aspirin (ECOTRIN) 81 MG EC tablet, Take 1 tablet (81 mg total) by mouth once daily., Disp: , Rfl: 0    diclofenac sodium (VOLTAREN) 1 % Gel, Apply 2 g topically 4 (four) times daily., Disp: 3 each, Rfl: 2    Review of patient's allergies indicates:   Allergen Reactions    No known allergies        Review of Systems   Constitutional: Negative for chills, fever, night sweats and weight gain.   Gastrointestinal:  Negative for bowel incontinence, nausea and vomiting.   Genitourinary:  Negative for bladder incontinence.   Neurological:  Negative for disturbances in coordination and loss of balance.         Objective:        Ht 5' 2" (1.575 m)   Wt 105.1 kg (231 lb 12.8 oz)   BMI 42.40 kg/m²     Ortho/SPM Exam    Right elbow pain:   Good ROM with no pain. No instability or clicking.     No tenderness laterally over the lateral epicondylar area  No medial tenderness.     No pain with resisted wrist extension and resisted elbow flexion    She is NVI with good capillary refill.         Assessment:       Encounter Diagnosis   Name Primary?    Right elbow pain Yes          Plan:       Enriqueta was seen today for pain.    Diagnoses and all " orders for this visit:    Right elbow pain  -     diclofenac sodium (VOLTAREN) 1 % Gel; Apply 2 g topically 4 (four) times daily.      She is doing better after injection at last visit, she has no pain in lateral elbow. She notes some intermittent pain over anterior elbow that is worse with reaching out and lifting.     Previous elbow XRs showed no fracture or dislocation.     Treatment options reviewed with patient and following plan made:     - Stop aleve. Trial of voltaren gel. Reviewed dosing and application.   - Continue with HEP from OT.   - Pain should continue to improve.     No follow-ups on file.

## 2022-09-22 ENCOUNTER — OFFICE VISIT (OUTPATIENT)
Dept: ORTHOPEDICS | Facility: CLINIC | Age: 47
End: 2022-09-22
Payer: COMMERCIAL

## 2022-09-22 ENCOUNTER — OFFICE VISIT (OUTPATIENT)
Dept: INTERNAL MEDICINE | Facility: CLINIC | Age: 47
End: 2022-09-22
Payer: COMMERCIAL

## 2022-09-22 ENCOUNTER — PATIENT MESSAGE (OUTPATIENT)
Dept: ORTHOPEDICS | Facility: CLINIC | Age: 47
End: 2022-09-22

## 2022-09-22 VITALS — WEIGHT: 231.81 LBS | BODY MASS INDEX: 42.66 KG/M2 | HEIGHT: 62 IN

## 2022-09-22 VITALS
HEART RATE: 68 BPM | BODY MASS INDEX: 42.84 KG/M2 | SYSTOLIC BLOOD PRESSURE: 126 MMHG | HEIGHT: 62 IN | OXYGEN SATURATION: 98 % | WEIGHT: 232.81 LBS | TEMPERATURE: 99 F | DIASTOLIC BLOOD PRESSURE: 72 MMHG

## 2022-09-22 DIAGNOSIS — R35.0 FREQUENT URINATION: ICD-10-CM

## 2022-09-22 DIAGNOSIS — E78.5 DYSLIPIDEMIA: ICD-10-CM

## 2022-09-22 DIAGNOSIS — E11.42 TYPE 2 DIABETES MELLITUS WITH DIABETIC POLYNEUROPATHY, WITHOUT LONG-TERM CURRENT USE OF INSULIN: Chronic | ICD-10-CM

## 2022-09-22 DIAGNOSIS — E11.65 UNCONTROLLED TYPE 2 DIABETES MELLITUS WITH HYPERGLYCEMIA, WITHOUT LONG-TERM CURRENT USE OF INSULIN: Chronic | ICD-10-CM

## 2022-09-22 DIAGNOSIS — M77.8 TENDONITIS OF ELBOW, RIGHT: ICD-10-CM

## 2022-09-22 DIAGNOSIS — Z00.00 WELLNESS EXAMINATION: Primary | ICD-10-CM

## 2022-09-22 DIAGNOSIS — M25.521 RIGHT ELBOW PAIN: Primary | ICD-10-CM

## 2022-09-22 DIAGNOSIS — I10 ESSENTIAL HYPERTENSION: Chronic | ICD-10-CM

## 2022-09-22 PROCEDURE — 3060F POS MICROALBUMINURIA REV: CPT | Mod: CPTII,S$GLB,, | Performed by: PHYSICIAN ASSISTANT

## 2022-09-22 PROCEDURE — 3060F PR POS MICROALBUMINURIA RESULT DOCUMENTED/REVIEW: ICD-10-PCS | Mod: CPTII,S$GLB,, | Performed by: PHYSICIAN ASSISTANT

## 2022-09-22 PROCEDURE — 3051F HG A1C>EQUAL 7.0%<8.0%: CPT | Mod: CPTII,S$GLB,, | Performed by: INTERNAL MEDICINE

## 2022-09-22 PROCEDURE — 3066F PR DOCUMENTATION OF TREATMENT FOR NEPHROPATHY: ICD-10-PCS | Mod: CPTII,S$GLB,, | Performed by: INTERNAL MEDICINE

## 2022-09-22 PROCEDURE — 99213 OFFICE O/P EST LOW 20 MIN: CPT | Mod: S$GLB,,, | Performed by: PHYSICIAN ASSISTANT

## 2022-09-22 PROCEDURE — 3074F SYST BP LT 130 MM HG: CPT | Mod: CPTII,S$GLB,, | Performed by: INTERNAL MEDICINE

## 2022-09-22 PROCEDURE — 3060F POS MICROALBUMINURIA REV: CPT | Mod: CPTII,S$GLB,, | Performed by: INTERNAL MEDICINE

## 2022-09-22 PROCEDURE — 99999 PR PBB SHADOW E&M-EST. PATIENT-LVL IV: CPT | Mod: PBBFAC,,, | Performed by: INTERNAL MEDICINE

## 2022-09-22 PROCEDURE — 3066F PR DOCUMENTATION OF TREATMENT FOR NEPHROPATHY: ICD-10-PCS | Mod: CPTII,S$GLB,, | Performed by: PHYSICIAN ASSISTANT

## 2022-09-22 PROCEDURE — 4010F ACE/ARB THERAPY RXD/TAKEN: CPT | Mod: CPTII,S$GLB,, | Performed by: PHYSICIAN ASSISTANT

## 2022-09-22 PROCEDURE — 4010F PR ACE/ARB THEARPY RXD/TAKEN: ICD-10-PCS | Mod: CPTII,S$GLB,, | Performed by: INTERNAL MEDICINE

## 2022-09-22 PROCEDURE — 3008F PR BODY MASS INDEX (BMI) DOCUMENTED: ICD-10-PCS | Mod: CPTII,S$GLB,, | Performed by: PHYSICIAN ASSISTANT

## 2022-09-22 PROCEDURE — 4010F ACE/ARB THERAPY RXD/TAKEN: CPT | Mod: CPTII,S$GLB,, | Performed by: INTERNAL MEDICINE

## 2022-09-22 PROCEDURE — 1159F PR MEDICATION LIST DOCUMENTED IN MEDICAL RECORD: ICD-10-PCS | Mod: CPTII,S$GLB,, | Performed by: PHYSICIAN ASSISTANT

## 2022-09-22 PROCEDURE — 3066F NEPHROPATHY DOC TX: CPT | Mod: CPTII,S$GLB,, | Performed by: PHYSICIAN ASSISTANT

## 2022-09-22 PROCEDURE — 99999 PR PBB SHADOW E&M-EST. PATIENT-LVL III: CPT | Mod: PBBFAC,,, | Performed by: PHYSICIAN ASSISTANT

## 2022-09-22 PROCEDURE — 1160F RVW MEDS BY RX/DR IN RCRD: CPT | Mod: CPTII,S$GLB,, | Performed by: INTERNAL MEDICINE

## 2022-09-22 PROCEDURE — 1159F MED LIST DOCD IN RCRD: CPT | Mod: CPTII,S$GLB,, | Performed by: PHYSICIAN ASSISTANT

## 2022-09-22 PROCEDURE — 3051F HG A1C>EQUAL 7.0%<8.0%: CPT | Mod: CPTII,S$GLB,, | Performed by: PHYSICIAN ASSISTANT

## 2022-09-22 PROCEDURE — 1160F PR REVIEW ALL MEDS BY PRESCRIBER/CLIN PHARMACIST DOCUMENTED: ICD-10-PCS | Mod: CPTII,S$GLB,, | Performed by: PHYSICIAN ASSISTANT

## 2022-09-22 PROCEDURE — 3008F BODY MASS INDEX DOCD: CPT | Mod: CPTII,S$GLB,, | Performed by: PHYSICIAN ASSISTANT

## 2022-09-22 PROCEDURE — 99396 PREV VISIT EST AGE 40-64: CPT | Mod: S$GLB,,, | Performed by: INTERNAL MEDICINE

## 2022-09-22 PROCEDURE — 1160F PR REVIEW ALL MEDS BY PRESCRIBER/CLIN PHARMACIST DOCUMENTED: ICD-10-PCS | Mod: CPTII,S$GLB,, | Performed by: INTERNAL MEDICINE

## 2022-09-22 PROCEDURE — 99999 PR PBB SHADOW E&M-EST. PATIENT-LVL IV: ICD-10-PCS | Mod: PBBFAC,,, | Performed by: INTERNAL MEDICINE

## 2022-09-22 PROCEDURE — 3078F PR MOST RECENT DIASTOLIC BLOOD PRESSURE < 80 MM HG: ICD-10-PCS | Mod: CPTII,S$GLB,, | Performed by: INTERNAL MEDICINE

## 2022-09-22 PROCEDURE — 1160F RVW MEDS BY RX/DR IN RCRD: CPT | Mod: CPTII,S$GLB,, | Performed by: PHYSICIAN ASSISTANT

## 2022-09-22 PROCEDURE — 1159F MED LIST DOCD IN RCRD: CPT | Mod: CPTII,S$GLB,, | Performed by: INTERNAL MEDICINE

## 2022-09-22 PROCEDURE — 4010F PR ACE/ARB THEARPY RXD/TAKEN: ICD-10-PCS | Mod: CPTII,S$GLB,, | Performed by: PHYSICIAN ASSISTANT

## 2022-09-22 PROCEDURE — 3008F PR BODY MASS INDEX (BMI) DOCUMENTED: ICD-10-PCS | Mod: CPTII,S$GLB,, | Performed by: INTERNAL MEDICINE

## 2022-09-22 PROCEDURE — 3074F PR MOST RECENT SYSTOLIC BLOOD PRESSURE < 130 MM HG: ICD-10-PCS | Mod: CPTII,S$GLB,, | Performed by: INTERNAL MEDICINE

## 2022-09-22 PROCEDURE — 3060F PR POS MICROALBUMINURIA RESULT DOCUMENTED/REVIEW: ICD-10-PCS | Mod: CPTII,S$GLB,, | Performed by: INTERNAL MEDICINE

## 2022-09-22 PROCEDURE — 3008F BODY MASS INDEX DOCD: CPT | Mod: CPTII,S$GLB,, | Performed by: INTERNAL MEDICINE

## 2022-09-22 PROCEDURE — 3066F NEPHROPATHY DOC TX: CPT | Mod: CPTII,S$GLB,, | Performed by: INTERNAL MEDICINE

## 2022-09-22 PROCEDURE — 99213 PR OFFICE/OUTPT VISIT, EST, LEVL III, 20-29 MIN: ICD-10-PCS | Mod: S$GLB,,, | Performed by: PHYSICIAN ASSISTANT

## 2022-09-22 PROCEDURE — 99396 PR PREVENTIVE VISIT,EST,40-64: ICD-10-PCS | Mod: S$GLB,,, | Performed by: INTERNAL MEDICINE

## 2022-09-22 PROCEDURE — 99999 PR PBB SHADOW E&M-EST. PATIENT-LVL III: ICD-10-PCS | Mod: PBBFAC,,, | Performed by: PHYSICIAN ASSISTANT

## 2022-09-22 PROCEDURE — 1159F PR MEDICATION LIST DOCUMENTED IN MEDICAL RECORD: ICD-10-PCS | Mod: CPTII,S$GLB,, | Performed by: INTERNAL MEDICINE

## 2022-09-22 PROCEDURE — 3051F PR MOST RECENT HEMOGLOBIN A1C LEVEL 7.0 - < 8.0%: ICD-10-PCS | Mod: CPTII,S$GLB,, | Performed by: PHYSICIAN ASSISTANT

## 2022-09-22 PROCEDURE — 3078F DIAST BP <80 MM HG: CPT | Mod: CPTII,S$GLB,, | Performed by: INTERNAL MEDICINE

## 2022-09-22 PROCEDURE — 3051F PR MOST RECENT HEMOGLOBIN A1C LEVEL 7.0 - < 8.0%: ICD-10-PCS | Mod: CPTII,S$GLB,, | Performed by: INTERNAL MEDICINE

## 2022-09-22 RX ORDER — METFORMIN HYDROCHLORIDE 500 MG/1
500 TABLET ORAL 2 TIMES DAILY WITH MEALS
Qty: 180 TABLET | Refills: 3 | Status: SHIPPED | OUTPATIENT
Start: 2022-09-22 | End: 2023-10-05

## 2022-09-22 RX ORDER — DICLOFENAC SODIUM 10 MG/G
2 GEL TOPICAL 4 TIMES DAILY
Qty: 3 EACH | Refills: 2 | Status: SHIPPED | OUTPATIENT
Start: 2022-09-22 | End: 2023-03-30

## 2022-09-22 RX ORDER — DULAGLUTIDE 3 MG/.5ML
3 INJECTION, SOLUTION SUBCUTANEOUS
Qty: 12 PEN | Refills: 1 | Status: SHIPPED | OUTPATIENT
Start: 2022-09-22 | End: 2023-03-30 | Stop reason: SDUPTHER

## 2022-09-22 NOTE — PROGRESS NOTES
[facility] Note    Chief Complaint      Chief Complaint   Patient presents with    Annual Exam     History of Present Illness      Enriqueta Mccarthy is a 46 y.o. female who presents today for chief complaint annual wellness .     HPI   Urinary frequency urianting 4-5 x a night since her glucose has been high   Saw Dr Peace for tennis elbow   A1c is up over the last 18 months 5.4->6.4 ->7.7 attributes to dietary non compliance  Lab Results   Component Value Date    HGBA1C 7.7 (H) 09/12/2022   HLD is at goal   HTN at goal   Lab Results   Component Value Date    LDLCALC 47.6 (L) 09/12/2022       Mmg scheduled   Last pap 2020     Feels well no complaints  Mood is good   Not sleeping not exercising is not watching diet   Active Problem List with Overview Notes    Diagnosis Date Noted    Tendonitis of elbow, right 07/27/2022    Primary osteoarthritis of left knee 04/13/2021    Primary osteoarthritis of right knee 04/13/2021    Quadriceps weakness 04/13/2021    Right hip pain 04/06/2021    Knee pain 04/06/2021    Pityriasis 02/11/2021    Frequent urination 08/11/2020    Hair loss 08/11/2020    Class 2 severe obesity with serious comorbidity and body mass index (BMI) of 39.0 to 39.9 in adult 08/01/2019    Type 2 diabetes mellitus with diabetic polyneuropathy, without long-term current use of insulin 11/17/2016    Hypertriglyceridemia 11/17/2016    Dyslipidemia 04/05/2016    Vitamin D deficiency 09/22/2014    Essential hypertension        Health Maintenance   Topic Date Due    Foot Exam  08/26/2022    Mammogram  10/14/2022    Hemoglobin A1c  03/12/2023    Eye Exam  07/18/2023    Lipid Panel  09/12/2023    Low Dose Statin  09/22/2023    TETANUS VACCINE  02/11/2031    Hepatitis C Screening  Completed       Past Medical History:   Diagnosis Date    Allergic conjunctivitis of both eyes     Anemia     Arthritis     Diabetic retinopathy     Essential hypertension     Herpes simplex virus (HSV) infection      Hypertension     Hypertriglyceridemia 2016    Joint pain     Neuropathy     Obesity (BMI 30-39.9) 2016    Type 2 diabetes mellitus with diabetic polyneuropathy, without long-term current use of insulin 2016    Uterine fibroid        Past Surgical History:   Procedure Laterality Date     SECTION      x 2    CHOLECYSTECTOMY  2016 ?    ENDOMETRIAL ABLATION  2016    GALLBLADDER SURGERY  2017    removed    LIPOMA RESECTION Left 2013    SINUS SURGERY  2006    TONSILLECTOMY, ADENOIDECTOMY      TUBAL LIGATION         family history includes Anemia in her daughter and son; Diabetes in her father, maternal grandmother, and maternal uncle; Hyperlipidemia in her father; Hypertension in her father, maternal grandmother, and mother; Kidney disease in her daughter; Stroke in her maternal grandmother.    Social History     Tobacco Use    Smoking status: Never    Smokeless tobacco: Never   Substance Use Topics    Alcohol use: No    Drug use: No       Review of Systems   Constitutional:  Negative for chills, fever, malaise/fatigue and weight loss.   Respiratory:  Negative for cough, sputum production, shortness of breath and wheezing.    Cardiovascular:  Negative for chest pain, palpitations, orthopnea and leg swelling.   Gastrointestinal:  Negative for constipation, diarrhea, nausea and vomiting.   Genitourinary:  Negative for dysuria, frequency, hematuria and urgency.   Musculoskeletal:  Positive for joint pain.      Outpatient Encounter Medications as of 2022   Medication Sig Note Dispense Refill    aspirin (ECOTRIN) 81 MG EC tablet Take 1 tablet (81 mg total) by mouth once daily.   0    atorvastatin (LIPITOR) 80 MG tablet Take 1 tablet (80 mg total) by mouth once daily.  90 tablet 1    carvediloL (COREG) 25 MG tablet TAKE 1 TABLET BY MOUTH TWICE A DAY  180 tablet 1    diclofenac sodium (VOLTAREN) 1 % Gel Apply 2 g topically 4 (four) times daily.  3 each 2    ergocalciferol (VITAMIN D2)  "50,000 unit Cap Take 1 capsule (50,000 Units total) by mouth every 7 days.  12 capsule 2    gabapentin (NEURONTIN) 300 MG capsule Take 1 capsule (300 mg total) by mouth every evening.  90 capsule 1    hydrALAZINE (APRESOLINE) 25 MG tablet Take 1 tablet (25 mg total) by mouth 2 (two) times a day.  180 tablet 1    icosapent ethyL (VASCEPA) 1 gram Cap TAKE TWO CAPSULES BY MOUTH TWICE A DAY  360 capsule 1    ketoconazole (NIZORAL) 2 % shampoo Wash hair with medicated shampoo at least 2x/week - let sit on scalp at least 5 minutes prior to rinsing  120 mL 5    triamcinolone acetonide 0.1% (KENALOG) 0.1 % cream Apply topically 2 (two) times daily. 8/26/2021: Use as needed 45 g 0    verapamiL (CALAN-SR) 180 MG CR tablet TAKE 1 TABLET BY MOUTH TWO TIMES A DAY ( DISCONTINUE DILTIAZEM )  180 tablet 1    [DISCONTINUED] dulaglutide (TRULICITY) 1.5 mg/0.5 mL pen injector Inject 1.5 mg into the skin every 7 days.  12 pen 1    [DISCONTINUED] metFORMIN (GLUCOPHAGE) 500 MG tablet Take 1/2 tablet (250 mg total) by mouth 2 (two) times daily with meals.  90 tablet 1    [DISCONTINUED] valsartan (DIOVAN) 320 MG tablet TAKE 1 TABLET BY MOUTH EVERY DAY  90 tablet 1    dulaglutide (TRULICITY) 3 mg/0.5 mL pen injector Inject 3 mg into the skin every 7 days.  12 pen 1    metFORMIN (GLUCOPHAGE) 500 MG tablet Take 1 tablet (500 mg total) by mouth 2 (two) times daily with meals.  180 tablet 3    [DISCONTINUED] baclofen (LIORESAL) 10 MG tablet Take one to two tablets daily 8/26/2021: Take as needed 60 tablet 11    [DISCONTINUED] diltiaZEM (CARDIZEM CD) 180 MG 24 hr capsule Take 2 capsules (360 mg total) by mouth once daily.  180 capsule 3     No facility-administered encounter medications on file as of 9/22/2022.        Review of patient's allergies indicates:   Allergen Reactions    No known allergies            Physical Exam      Vital Signs  Temp: 98.8 °F (37.1 °C)  Pulse: 68  SpO2: 98 %  BP: 126/72  Height and Weight  Height: 5' 2" (157.5 " cm)  Weight: 105.6 kg (232 lb 12.9 oz)  BSA (Calculated - sq m): 2.15 sq meters  BMI (Calculated): 42.6  Weight in (lb) to have BMI = 25: 136.4]    Physical Exam  Vitals reviewed.   Constitutional:       General: She is not in acute distress.     Appearance: She is well-developed. She is not diaphoretic.   HENT:      Head: Normocephalic and atraumatic.      Right Ear: External ear normal.      Left Ear: External ear normal.      Nose: Nose normal.   Eyes:      General:         Right eye: No discharge.         Left eye: No discharge.      Conjunctiva/sclera: Conjunctivae normal.   Cardiovascular:      Rate and Rhythm: Normal rate and regular rhythm.   Pulmonary:      Effort: Pulmonary effort is normal. No respiratory distress.      Breath sounds: Normal breath sounds.   Musculoskeletal:         General: Normal range of motion.      Cervical back: Normal range of motion.   Skin:     Coloration: Skin is not pale.      Findings: No rash.   Neurological:      Mental Status: She is alert and oriented to person, place, and time.   Psychiatric:         Behavior: Behavior normal.         Thought Content: Thought content normal.        Laboratory:  CBC:  Recent Labs   Lab Result Units 09/12/22  0748   WBC K/uL 6.82  6.82   RBC M/uL 4.22  4.22   Hemoglobin g/dL 13.2  13.2   Hematocrit % 36.6*  36.6*   Platelets K/uL 211  211   MCV fL 87  87   MCH pg 31.3*  31.3*   MCHC g/dL 36.1*  36.1*     CMP:  Recent Labs   Lab Result Units 09/12/22  0748   Glucose mg/dL 248*   Calcium mg/dL 9.0   Albumin g/dL 4.0   Total Protein g/dL 7.1   Sodium mmol/L 139   Potassium mmol/L 3.7   CO2 mmol/L 25   Chloride mmol/L 105   BUN mg/dL 9   Alkaline Phosphatase U/L 64   ALT U/L 26   AST U/L 29   Total Bilirubin mg/dL 0.8     URINALYSIS:  No results for input(s): COLORU, CLARITYU, SPECGRAV, PHUR, PROTEINUA, GLUCOSEU, BILIRUBINCON, BLOODU, WBCU, RBCU, BACTERIA, MUCUS, NITRITE, LEUKOCYTESUR, UROBILINOGEN, HYALINECASTS in the last 2160 hours.    LIPIDS:  Recent Labs   Lab Result Units 09/12/22  0748   HDL mg/dL 37*   Cholesterol mg/dL 122   Triglycerides mg/dL 187*   LDL Cholesterol mg/dL 47.6*   HDL/Cholesterol Ratio % 30.3   Non-HDL Cholesterol mg/dL 85   Total Cholesterol/HDL Ratio  3.3     TSH:  No results for input(s): TSH in the last 2160 hours.  A1C:  Recent Labs   Lab Result Units 09/12/22  0748   Hemoglobin A1C % 7.7*       Radiology:      Assessment/Plan     Enriqueta Mccarthy is a 46 y.o.female with:    1. Wellness examination  Flu shot at work     2. Uncontrolled type 2 diabetes mellitus with hyperglycemia, without long-term current use of insulin  -     dulaglutide (TRULICITY) 3 mg/0.5 mL pen injector; Inject 3 mg into the skin every 7 days.  Dispense: 12 pen; Refill: 1  -     metFORMIN (GLUCOPHAGE) 500 MG tablet; Take 1 tablet (500 mg total) by mouth 2 (two) times daily with meals.  Dispense: 180 tablet; Refill: 3  -     Hemoglobin A1C; Future; Expected date: 09/22/2022    3. Type 2 diabetes mellitus with diabetic polyneuropathy, without long-term current use of insulin    4. Frequent urination  Assessment & Plan:  BG reduction       5. Essential hypertension  Assessment & Plan:  At goal       6. Dyslipidemia  Assessment & Plan:  TG will improve with improved bg       7. Tendonitis of elbow, right  Assessment & Plan:  Per ortho          Use of the [facility] Portal discussed and encouraged during today's visit  -Continue current medications and maintain follow up with specialists.  Return to clinic in 6 months.  Future Appointments   Date Time Provider Department Center   10/22/2022  8:00 AM [facility] MAMMO1 [facility] MAMMO [facility]   3/22/2023  7:10 AM LAB, APPOINTMENT [facility] LAB [facility]   3/30/2023  8:30 AM Edilma Marquis MD [facility]       Edilma Marquis MD  10/11/2022 1:37 PM    Primary Care Internal Medicine

## 2022-09-25 DIAGNOSIS — I10 ESSENTIAL HYPERTENSION: Chronic | ICD-10-CM

## 2022-09-25 RX ORDER — VALSARTAN 320 MG/1
320 TABLET ORAL DAILY
Qty: 90 TABLET | Refills: 3 | Status: SHIPPED | OUTPATIENT
Start: 2022-09-25 | End: 2023-01-31 | Stop reason: ALTCHOICE

## 2022-09-25 NOTE — TELEPHONE ENCOUNTER
Refill Decision Note   Enriqueta Mccarthy  is requesting a refill authorization.  Brief Assessment and Rationale for Refill:  Approve     Medication Therapy Plan:       Medication Reconciliation Completed: No   Comments:     No Care Gaps recommended.     Note composed:6:51 PM 09/25/2022

## 2022-09-25 NOTE — TELEPHONE ENCOUNTER
No new care gaps identified.  [facility] Embedded Care Gaps. Reference number: [id number]. 9/25/2022   6:46:55 PM CDT

## 2022-09-30 ENCOUNTER — PATIENT MESSAGE (OUTPATIENT)
Dept: INTERNAL MEDICINE | Facility: CLINIC | Age: 47
End: 2022-09-30
Payer: COMMERCIAL

## 2022-10-06 ENCOUNTER — PATIENT MESSAGE (OUTPATIENT)
Dept: INTERNAL MEDICINE | Facility: CLINIC | Age: 47
End: 2022-10-06
Payer: COMMERCIAL

## 2022-10-29 DIAGNOSIS — E78.5 DYSLIPIDEMIA: ICD-10-CM

## 2022-10-29 NOTE — TELEPHONE ENCOUNTER
No new care gaps identified.  [facility] Embedded Care Gaps. Reference number: [id number]. 10/29/2022   9:53:05 AM STEFFANIET

## 2022-10-31 RX ORDER — ATORVASTATIN CALCIUM 80 MG/1
80 TABLET, FILM COATED ORAL DAILY
Qty: 90 TABLET | Refills: 0 | Status: SHIPPED | OUTPATIENT
Start: 2022-10-31 | End: 2023-02-01 | Stop reason: SDUPTHER

## 2022-10-31 NOTE — TELEPHONE ENCOUNTER
Refill Decision Note   Enriqueta Mccarthy  is requesting a refill authorization.  Brief Assessment and Rationale for Refill:  Approve     Medication Therapy Plan:       Medication Reconciliation Completed: No   Comments:     No Care Gaps recommended.     Note composed:6:58 AM 10/31/2022

## 2022-11-21 RX ORDER — ICOSAPENT ETHYL 1000 MG/1
CAPSULE ORAL
Qty: 360 CAPSULE | Refills: 1 | Status: SHIPPED | OUTPATIENT
Start: 2022-11-21 | End: 2023-06-27

## 2022-11-21 NOTE — TELEPHONE ENCOUNTER
Refill Decision Note   Enriqueta Mccarthy  is requesting a refill authorization.  Brief Assessment and Rationale for Refill:  Approve     Medication Therapy Plan:       Medication Reconciliation Completed: No   Comments:     No Care Gaps recommended.     Note composed:9:29 AM 11/21/2022

## 2022-11-21 NOTE — TELEPHONE ENCOUNTER
No new care gaps identified.  [facility] Embedded Care Gaps. Reference number: [id number]. 11/20/2022   6:15:16 PM CST

## 2022-11-30 ENCOUNTER — PATIENT MESSAGE (OUTPATIENT)
Dept: INTERNAL MEDICINE | Facility: CLINIC | Age: 47
End: 2022-11-30
Payer: COMMERCIAL

## 2022-12-02 RX ORDER — CHOLESTYRAMINE 4 G/4.8G
4 POWDER, FOR SUSPENSION ORAL 2 TIMES DAILY
Qty: 180 PACKET | Refills: 0 | Status: SHIPPED | OUTPATIENT
Start: 2022-12-02 | End: 2023-12-02

## 2022-12-11 DIAGNOSIS — I10 ESSENTIAL HYPERTENSION: Chronic | ICD-10-CM

## 2022-12-12 RX ORDER — VERAPAMIL HYDROCHLORIDE 180 MG/1
TABLET, FILM COATED, EXTENDED RELEASE ORAL
Qty: 180 TABLET | Refills: 3 | Status: SHIPPED | OUTPATIENT
Start: 2022-12-12 | End: 2023-02-28 | Stop reason: ALTCHOICE

## 2022-12-12 NOTE — TELEPHONE ENCOUNTER
Care Due:                  Date            Visit Type   Department     Provider  --------------------------------------------------------------------------------                                EP -                              PRIMARY      [facility] PRIMARY  Last Visit: 09-      CARE ([facility])   HANNAH Marquis                              [facility] PRIMARY  Next Visit: 03-      CARE ([facility])   HANNAH Marquis                                                            Last  Test          Frequency    Reason                     Performed    Due Date  --------------------------------------------------------------------------------    HBA1C.......  6 months...  dulaglutide, metFORMIN...  09- 03-    [facility] Embedded Care Gaps. Reference number: [id number]. 12/11/2022   7:57:37 PM CST

## 2022-12-12 NOTE — TELEPHONE ENCOUNTER
Refill Decision Note   Enriqueta Mccarthy  is requesting a refill authorization.  Brief Assessment and Rationale for Refill:  Approve    -Medication-Related Problems Identified: Requires labs  Medication Therapy Plan:       Medication Reconciliation Completed: No   Comments:     Provider Staff:     Action is required for this patient.   Please see care gap opportunities below in Care Due Message.     Thanks!  [facility]     Appointments      Date Provider   Last Visit   9/22/2022 Edilma Marquis MD   Next Visit   3/30/2023 Edilma Marquis MD     Note composed:9:49 AM 12/12/2022           Note composed:9:49 AM 12/12/2022

## 2022-12-14 ENCOUNTER — PATIENT MESSAGE (OUTPATIENT)
Dept: INTERNAL MEDICINE | Facility: CLINIC | Age: 47
End: 2022-12-14
Payer: COMMERCIAL

## 2022-12-14 ENCOUNTER — PATIENT MESSAGE (OUTPATIENT)
Dept: ADMINISTRATIVE | Facility: OTHER | Age: 47
End: 2022-12-14
Payer: COMMERCIAL

## 2022-12-15 ENCOUNTER — PATIENT MESSAGE (OUTPATIENT)
Dept: ADMINISTRATIVE | Facility: OTHER | Age: 47
End: 2022-12-15
Payer: COMMERCIAL

## 2022-12-15 ENCOUNTER — PATIENT MESSAGE (OUTPATIENT)
Dept: INTERNAL MEDICINE | Facility: CLINIC | Age: 47
End: 2022-12-15
Payer: COMMERCIAL

## 2022-12-18 DIAGNOSIS — I10 ESSENTIAL HYPERTENSION: Chronic | ICD-10-CM

## 2022-12-19 ENCOUNTER — PATIENT MESSAGE (OUTPATIENT)
Dept: INTERNAL MEDICINE | Facility: CLINIC | Age: 47
End: 2022-12-19
Payer: COMMERCIAL

## 2022-12-19 RX ORDER — HYDRALAZINE HYDROCHLORIDE 25 MG/1
25 TABLET, FILM COATED ORAL 2 TIMES DAILY
Qty: 180 TABLET | Refills: 1 | Status: SHIPPED | OUTPATIENT
Start: 2022-12-19 | End: 2023-01-31

## 2022-12-21 ENCOUNTER — PATIENT MESSAGE (OUTPATIENT)
Dept: ADMINISTRATIVE | Facility: OTHER | Age: 47
End: 2022-12-21
Payer: COMMERCIAL

## 2023-01-04 ENCOUNTER — PATIENT MESSAGE (OUTPATIENT)
Dept: PRIMARY CARE CLINIC | Facility: CLINIC | Age: 48
End: 2023-01-04
Payer: COMMERCIAL

## 2023-01-31 ENCOUNTER — PATIENT MESSAGE (OUTPATIENT)
Dept: PRIMARY CARE CLINIC | Facility: CLINIC | Age: 48
End: 2023-01-31
Payer: COMMERCIAL

## 2023-01-31 DIAGNOSIS — I10 ESSENTIAL HYPERTENSION: Chronic | ICD-10-CM

## 2023-01-31 RX ORDER — CARVEDILOL 25 MG/1
TABLET ORAL
Qty: 180 TABLET | Refills: 2 | Status: SHIPPED | OUTPATIENT
Start: 2023-01-31 | End: 2023-10-22 | Stop reason: SDUPTHER

## 2023-01-31 NOTE — TELEPHONE ENCOUNTER
Refill Decision Note   Enriqueta Mccarthy  is requesting a refill authorization.  Brief Assessment and Rationale for Refill:  Approve     Medication Therapy Plan:       Medication Reconciliation Completed: No   Comments:     No Care Gaps recommended.     Note composed:5:04 PM 01/31/2023

## 2023-01-31 NOTE — TELEPHONE ENCOUNTER
No new care gaps identified.  [facility] Embedded Care Gaps. Reference number: [id number]. 1/31/2023   4:17:07 PM CST

## 2023-02-01 ENCOUNTER — PATIENT MESSAGE (OUTPATIENT)
Dept: PRIMARY CARE CLINIC | Facility: CLINIC | Age: 48
End: 2023-02-01
Payer: COMMERCIAL

## 2023-02-01 DIAGNOSIS — E78.5 DYSLIPIDEMIA: ICD-10-CM

## 2023-02-01 NOTE — TELEPHONE ENCOUNTER
No new care gaps identified.  [facility] Embedded Care Gaps. Reference number: [id number]. 2/01/2023   1:49:11 PM CST

## 2023-02-02 RX ORDER — ATORVASTATIN CALCIUM 80 MG/1
80 TABLET, FILM COATED ORAL DAILY
Qty: 90 TABLET | Refills: 0 | Status: SHIPPED | OUTPATIENT
Start: 2023-02-02 | End: 2023-03-30 | Stop reason: SDUPTHER

## 2023-02-08 ENCOUNTER — PATIENT MESSAGE (OUTPATIENT)
Dept: OTHER | Facility: OTHER | Age: 48
End: 2023-02-08
Payer: COMMERCIAL

## 2023-03-07 ENCOUNTER — PATIENT MESSAGE (OUTPATIENT)
Dept: PRIMARY CARE CLINIC | Facility: CLINIC | Age: 48
End: 2023-03-07
Payer: COMMERCIAL

## 2023-03-08 ENCOUNTER — PATIENT MESSAGE (OUTPATIENT)
Dept: PRIMARY CARE CLINIC | Facility: CLINIC | Age: 48
End: 2023-03-08
Payer: COMMERCIAL

## 2023-03-29 ENCOUNTER — PATIENT MESSAGE (OUTPATIENT)
Dept: ADMINISTRATIVE | Facility: OTHER | Age: 48
End: 2023-03-29
Payer: COMMERCIAL

## 2023-03-30 ENCOUNTER — PATIENT MESSAGE (OUTPATIENT)
Dept: ADMINISTRATIVE | Facility: OTHER | Age: 48
End: 2023-03-30
Payer: COMMERCIAL

## 2023-03-30 ENCOUNTER — OFFICE VISIT (OUTPATIENT)
Dept: PRIMARY CARE CLINIC | Facility: CLINIC | Age: 48
End: 2023-03-30
Payer: COMMERCIAL

## 2023-03-30 VITALS
HEART RATE: 90 BPM | SYSTOLIC BLOOD PRESSURE: 126 MMHG | HEIGHT: 62 IN | WEIGHT: 222.69 LBS | BODY MASS INDEX: 40.98 KG/M2 | DIASTOLIC BLOOD PRESSURE: 72 MMHG | OXYGEN SATURATION: 98 %

## 2023-03-30 DIAGNOSIS — E66.01 CLASS 2 SEVERE OBESITY DUE TO EXCESS CALORIES WITH SERIOUS COMORBIDITY AND BODY MASS INDEX (BMI) OF 39.0 TO 39.9 IN ADULT: ICD-10-CM

## 2023-03-30 DIAGNOSIS — E55.9 VITAMIN D DEFICIENCY: ICD-10-CM

## 2023-03-30 DIAGNOSIS — E11.65 UNCONTROLLED TYPE 2 DIABETES MELLITUS WITH HYPERGLYCEMIA, WITHOUT LONG-TERM CURRENT USE OF INSULIN: Chronic | ICD-10-CM

## 2023-03-30 DIAGNOSIS — E78.5 DYSLIPIDEMIA: ICD-10-CM

## 2023-03-30 DIAGNOSIS — I10 ESSENTIAL HYPERTENSION: Chronic | ICD-10-CM

## 2023-03-30 DIAGNOSIS — E11.42 TYPE 2 DIABETES MELLITUS WITH DIABETIC POLYNEUROPATHY, WITHOUT LONG-TERM CURRENT USE OF INSULIN: ICD-10-CM

## 2023-03-30 DIAGNOSIS — E11.9 TYPE 2 DIABETES MELLITUS WITHOUT COMPLICATION, WITHOUT LONG-TERM CURRENT USE OF INSULIN: Primary | ICD-10-CM

## 2023-03-30 DIAGNOSIS — Z00.00 WELLNESS EXAMINATION: ICD-10-CM

## 2023-03-30 PROCEDURE — 99999 PR PBB SHADOW E&M-EST. PATIENT-LVL III: ICD-10-PCS | Mod: PBBFAC,,, | Performed by: INTERNAL MEDICINE

## 2023-03-30 PROCEDURE — 3074F SYST BP LT 130 MM HG: CPT | Mod: CPTII,S$GLB,, | Performed by: INTERNAL MEDICINE

## 2023-03-30 PROCEDURE — 1160F RVW MEDS BY RX/DR IN RCRD: CPT | Mod: CPTII,S$GLB,, | Performed by: INTERNAL MEDICINE

## 2023-03-30 PROCEDURE — 4010F PR ACE/ARB THEARPY RXD/TAKEN: ICD-10-PCS | Mod: CPTII,S$GLB,, | Performed by: INTERNAL MEDICINE

## 2023-03-30 PROCEDURE — 99999 PR PBB SHADOW E&M-EST. PATIENT-LVL III: CPT | Mod: PBBFAC,,, | Performed by: INTERNAL MEDICINE

## 2023-03-30 PROCEDURE — 3044F HG A1C LEVEL LT 7.0%: CPT | Mod: CPTII,S$GLB,, | Performed by: INTERNAL MEDICINE

## 2023-03-30 PROCEDURE — 99214 PR OFFICE/OUTPT VISIT, EST, LEVL IV, 30-39 MIN: ICD-10-PCS | Mod: S$GLB,,, | Performed by: INTERNAL MEDICINE

## 2023-03-30 PROCEDURE — 1160F PR REVIEW ALL MEDS BY PRESCRIBER/CLIN PHARMACIST DOCUMENTED: ICD-10-PCS | Mod: CPTII,S$GLB,, | Performed by: INTERNAL MEDICINE

## 2023-03-30 PROCEDURE — 1159F MED LIST DOCD IN RCRD: CPT | Mod: CPTII,S$GLB,, | Performed by: INTERNAL MEDICINE

## 2023-03-30 PROCEDURE — 3008F BODY MASS INDEX DOCD: CPT | Mod: CPTII,S$GLB,, | Performed by: INTERNAL MEDICINE

## 2023-03-30 PROCEDURE — 3074F PR MOST RECENT SYSTOLIC BLOOD PRESSURE < 130 MM HG: ICD-10-PCS | Mod: CPTII,S$GLB,, | Performed by: INTERNAL MEDICINE

## 2023-03-30 PROCEDURE — 3072F PR LOW RISK FOR RETINOPATHY: ICD-10-PCS | Mod: CPTII,S$GLB,, | Performed by: INTERNAL MEDICINE

## 2023-03-30 PROCEDURE — 3078F PR MOST RECENT DIASTOLIC BLOOD PRESSURE < 80 MM HG: ICD-10-PCS | Mod: CPTII,S$GLB,, | Performed by: INTERNAL MEDICINE

## 2023-03-30 PROCEDURE — 3072F LOW RISK FOR RETINOPATHY: CPT | Mod: CPTII,S$GLB,, | Performed by: INTERNAL MEDICINE

## 2023-03-30 PROCEDURE — 3078F DIAST BP <80 MM HG: CPT | Mod: CPTII,S$GLB,, | Performed by: INTERNAL MEDICINE

## 2023-03-30 PROCEDURE — 4010F ACE/ARB THERAPY RXD/TAKEN: CPT | Mod: CPTII,S$GLB,, | Performed by: INTERNAL MEDICINE

## 2023-03-30 PROCEDURE — 3008F PR BODY MASS INDEX (BMI) DOCUMENTED: ICD-10-PCS | Mod: CPTII,S$GLB,, | Performed by: INTERNAL MEDICINE

## 2023-03-30 PROCEDURE — 1159F PR MEDICATION LIST DOCUMENTED IN MEDICAL RECORD: ICD-10-PCS | Mod: CPTII,S$GLB,, | Performed by: INTERNAL MEDICINE

## 2023-03-30 PROCEDURE — 3044F PR MOST RECENT HEMOGLOBIN A1C LEVEL <7.0%: ICD-10-PCS | Mod: CPTII,S$GLB,, | Performed by: INTERNAL MEDICINE

## 2023-03-30 PROCEDURE — 99214 OFFICE O/P EST MOD 30 MIN: CPT | Mod: S$GLB,,, | Performed by: INTERNAL MEDICINE

## 2023-03-30 RX ORDER — ERGOCALCIFEROL 1.25 MG/1
50000 CAPSULE ORAL
Qty: 12 CAPSULE | Refills: 2 | Status: SHIPPED | OUTPATIENT
Start: 2023-03-30 | End: 2024-02-09 | Stop reason: SDUPTHER

## 2023-03-30 RX ORDER — DULAGLUTIDE 3 MG/.5ML
3 INJECTION, SOLUTION SUBCUTANEOUS
Qty: 12 PEN | Refills: 3 | Status: SHIPPED | OUTPATIENT
Start: 2023-03-30 | End: 2024-03-26 | Stop reason: SDUPTHER

## 2023-03-30 RX ORDER — ATORVASTATIN CALCIUM 80 MG/1
80 TABLET, FILM COATED ORAL DAILY
Qty: 90 TABLET | Refills: 3 | Status: SHIPPED | OUTPATIENT
Start: 2023-03-30 | End: 2023-04-04 | Stop reason: ALTCHOICE

## 2023-03-30 NOTE — PROGRESS NOTES
[facility] Note    Chief Complaint      Chief Complaint   Patient presents with    Follow-up     6 mth    Diabetes     History of Present Illness      Enriqueta Mccarthy is a 47 y.o. female who presents today for chief complaint follow up DM HTN HLD. Patient previously seen by me    PCP: Edilma Marquis MD  Patient comes to appointment alone.     Follow-up  Pertinent negatives include no chest pain, chills, coughing, fever, nausea or vomiting.   Diabetes  Pertinent negatives for diabetes include no chest pain and no weight loss.    Last seen in sept for annual   DM 7.7->6.6, she made diet changes, trulicity 3, metformin 500  bid,   HLD: last ldl 47 on vascepa and lipitor 8-   HTN at goal on amodipine, and coreg and valsartan, checks at home shes in [place] HTN she enjoys   LAST MMG 10.21, declines this year   PAP 2020  Asa she is forgetting   She works in ENT   Waking up at night to urinate   Active Problem List with Overview Notes    Diagnosis Date Noted    Tendonitis of elbow, right 07/27/2022    Primary osteoarthritis of left knee 04/13/2021    Primary osteoarthritis of right knee 04/13/2021    Quadriceps weakness 04/13/2021    Right hip pain 04/06/2021    Knee pain 04/06/2021    Pityriasis 02/11/2021    Frequent urination 08/11/2020    Hair loss 08/11/2020    Class 2 severe obesity with serious comorbidity and body mass index (BMI) of 39.0 to 39.9 in adult 08/01/2019    Type 2 diabetes mellitus with diabetic polyneuropathy, without long-term current use of insulin 11/17/2016    Hypertriglyceridemia 11/17/2016    Dyslipidemia 04/05/2016    Vitamin D deficiency 09/22/2014    Essential hypertension        Health Maintenance   Topic Date Due    Foot Exam  08/26/2022    Mammogram  10/14/2022    Eye Exam  07/18/2023    Lipid Panel  09/12/2023    Hemoglobin A1c  09/16/2023    Low Dose Statin  02/02/2024    TETANUS VACCINE  02/11/2031    Hepatitis C Screening  Completed       Past Medical History:    Diagnosis Date    Allergic conjunctivitis of both eyes     Anemia     Arthritis     Diabetic retinopathy     Essential hypertension     Herpes simplex virus (HSV) infection     Hypertension     Hypertriglyceridemia 2016    Joint pain     Neuropathy     Obesity (BMI 30-39.9) 2016    Type 2 diabetes mellitus with diabetic polyneuropathy, without long-term current use of insulin 2016    Uterine fibroid        Past Surgical History:   Procedure Laterality Date     SECTION      x 2    CHOLECYSTECTOMY  2016 ?    ENDOMETRIAL ABLATION  2016    GALLBLADDER SURGERY  2017    removed    LIPOMA RESECTION Left 2013    SINUS SURGERY  2006    TONSILLECTOMY, ADENOIDECTOMY      TUBAL LIGATION         family history includes Anemia in her daughter and son; Diabetes in her father, maternal grandmother, and maternal uncle; Hyperlipidemia in her father; Hypertension in her father, maternal grandmother, and mother; Kidney disease in her daughter; Stroke in her maternal grandmother.    Social History     Tobacco Use    Smoking status: Never    Smokeless tobacco: Never   Substance Use Topics    Alcohol use: No    Drug use: No       Review of Systems   Constitutional:  Negative for chills, fever, malaise/fatigue and weight loss.   Respiratory:  Negative for cough, sputum production, shortness of breath and wheezing.    Cardiovascular:  Negative for chest pain, palpitations, orthopnea and leg swelling.   Gastrointestinal:  Negative for constipation, diarrhea, nausea and vomiting.   Genitourinary:  Negative for dysuria, frequency, hematuria and urgency.      Outpatient Encounter Medications as of 3/30/2023   Medication Sig Note Dispense Refill    amLODIPine (NORVASC) 10 MG tablet Take 1 tablet (10 mg total) by mouth once daily.  30 tablet 5    aspirin (ECOTRIN) 81 MG EC tablet Take 1 tablet (81 mg total) by mouth once daily.   0    carvediloL (COREG) 25 MG tablet TAKE 1 TABLET BY MOUTH TWICE A DAY  180  tablet 2    cholestyramine-aspartame (CHOLESTYRAMINE LIGHT) 4 gram PwPk Dissolve 1 packet (4 g total) in liquid and take by mouth 2 (two) times daily.  180 packet 0    icosapent ethyL (VASCEPA) 1 gram Cap TAKE TWO CAPSULES BY MOUTH TWICE A DAY  360 capsule 1    ketoconazole (NIZORAL) 2 % shampoo Wash hair with medicated shampoo at least 2x/week - let sit on scalp at least 5 minutes prior to rinsing  120 mL 5    metFORMIN (GLUCOPHAGE) 500 MG tablet Take 1 tablet (500 mg total) by mouth 2 (two) times daily with meals.  180 tablet 3    valsartan (DIOVAN) 320 MG tablet Take 320 mg by mouth once daily.       [DISCONTINUED] atorvastatin (LIPITOR) 80 MG tablet Take 1 tablet (80 mg total) by mouth once daily.  90 tablet 0    [DISCONTINUED] dulaglutide (TRULICITY) 3 mg/0.5 mL pen injector Inject 3 mg into the skin every 7 days.  12 pen 1    [DISCONTINUED] ergocalciferol (VITAMIN D2) 50,000 unit Cap Take 1 capsule (50,000 Units total) by mouth every 7 days.  12 capsule 2    atorvastatin (LIPITOR) 80 MG tablet Take 1 tablet (80 mg total) by mouth once daily.  90 tablet 3    dulaglutide (TRULICITY) 3 mg/0.5 mL pen injector Inject 3 mg into the skin every 7 days.  12 pen 3    ergocalciferol (VITAMIN D2) 50,000 unit Cap Take 1 capsule (50,000 Units total) by mouth every 7 days.  12 capsule 2    triamcinolone acetonide 0.1% (KENALOG) 0.1 % cream Apply topically 2 (two) times daily. (Patient not taking: Reported on 3/30/2023) 8/26/2021: Use as needed 45 g 0    [DISCONTINUED] amLODIPine (NORVASC) 5 MG tablet Take 1 tablet (5 mg total) by mouth once daily.  30 tablet 0    [DISCONTINUED] diclofenac sodium (VOLTAREN) 1 % Gel Apply 2 g topically 4 (four) times daily. (Patient not taking: Reported on 3/30/2023)  3 each 2    [DISCONTINUED] diltiaZEM (CARDIZEM CD) 180 MG 24 hr capsule Take 2 capsules (360 mg total) by mouth once daily.  180 capsule 3    [DISCONTINUED] gabapentin (NEURONTIN) 300 MG capsule Take 1 capsule (300 mg total) by  "mouth every evening. (Patient not taking: Reported on 3/30/2023)  90 capsule 1     No facility-administered encounter medications on file as of 3/30/2023.        Review of patient's allergies indicates:   Allergen Reactions    No known allergies            Physical Exam      Vital Signs  Pulse: 90  SpO2: 98 %  BP: 126/72  BP Location: Right arm  Patient Position: Sitting  Height and Weight  Height: 5' 2" (157.5 cm)  Weight: 101 kg (222 lb 10.6 oz)  BSA (Calculated - sq m): 2.1 sq meters  BMI (Calculated): 40.7  Weight in (lb) to have BMI = 25: 136.4]    Physical Exam  Vitals reviewed.   Constitutional:       General: She is not in acute distress.     Appearance: She is well-developed. She is not diaphoretic.   HENT:      Head: Normocephalic and atraumatic.      Right Ear: External ear normal.      Left Ear: External ear normal.      Nose: Nose normal.   Eyes:      General:         Right eye: No discharge.         Left eye: No discharge.      Conjunctiva/sclera: Conjunctivae normal.   Cardiovascular:      Rate and Rhythm: Normal rate and regular rhythm.      Heart sounds: Normal heart sounds.   Pulmonary:      Effort: Pulmonary effort is normal. No respiratory distress.      Breath sounds: Normal breath sounds.   Musculoskeletal:         General: Normal range of motion.      Cervical back: Normal range of motion.   Skin:     Coloration: Skin is not pale.      Findings: No rash.   Neurological:      Mental Status: She is alert and oriented to person, place, and time.   Psychiatric:         Behavior: Behavior normal.         Thought Content: Thought content normal.        Laboratory:  CBC:  No results for input(s): WBC, RBC, HGB, HCT, PLT, MCV, MCH, MCHC in the last 2160 hours.  CMP:  No results for input(s): GLU, CALCIUM, ALBUMIN, PROT, NA, K, CO2, CL, BUN, ALKPHOS, ALT, AST, BILITOT in the last 2160 hours.    Invalid input(s): CREATININ  URINALYSIS:  No results for input(s): COLORU, CLARITYU, SPECGRAV, PHUR, " PROTEINUA, GLUCOSEU, BILIRUBINCON, BLOODU, WBCU, RBCU, BACTERIA, MUCUS, NITRITE, LEUKOCYTESUR, UROBILINOGEN, HYALINECASTS in the last 2160 hours.   LIPIDS:  No results for input(s): TSH, HDL, CHOL, TRIG, LDLCALC, CHOLHDL, NONHDLCHOL, TOTALCHOLEST in the last 2160 hours.  TSH:  No results for input(s): TSH in the last 2160 hours.  A1C:  Recent Labs   Lab Result Units 03/16/23  0730   Hemoglobin A1C % 6.6*       Radiology:      Assessment/Plan     Enriqueta Mccarthy is a 47 y.o.female with:    1. Type 2 diabetes mellitus without complication, without long-term current use of insulin  -     CBC Auto Differential; Future; Expected date: 03/30/2023  -     Comprehensive Metabolic Panel; Future; Expected date: 03/30/2023  -     Lipid Panel; Future; Expected date: 03/30/2023  -     Microalbumin/Creatinine Ratio, Urine; Future  -     Hemoglobin A1C; Future; Expected date: 03/30/2023    2. Wellness examination  -     TSH; Future; Expected date: 03/30/2023    3. Type 2 diabetes mellitus with diabetic polyneuropathy, without long-term current use of insulin  Assessment & Plan:  At goal       4. Class 2 severe obesity due to excess calories with serious comorbidity and body mass index (BMI) of 39.0 to 39.9 in adult  Assessment & Plan:  Continue glp1       5. Dyslipidemia  Assessment & Plan:  At goal     Orders:  -     atorvastatin (LIPITOR) 80 MG tablet; Take 1 tablet (80 mg total) by mouth once daily.  Dispense: 90 tablet; Refill: 3    6. Essential hypertension  Assessment & Plan:  At goal       7. Uncontrolled type 2 diabetes mellitus with hyperglycemia, without long-term current use of insulin  -     dulaglutide (TRULICITY) 3 mg/0.5 mL pen injector; Inject 3 mg into the skin every 7 days.  Dispense: 12 pen; Refill: 3    8. Vitamin D deficiency  -     ergocalciferol (VITAMIN D2) 50,000 unit Cap; Take 1 capsule (50,000 Units total) by mouth every 7 days.  Dispense: 12 capsule; Refill: 2         Use of the [employer] Patient Portal  discussed and encouraged during today's visit  -Continue current medications and maintain follow up with specialists.  Return to clinic in 6 [facility] prior .  No future appointments.    Edilma Marquis MD  3/30/2023 9:25 AM    Primary Care Internal Medicine

## 2023-04-14 ENCOUNTER — PATIENT MESSAGE (OUTPATIENT)
Dept: OTHER | Facility: OTHER | Age: 48
End: 2023-04-14
Payer: COMMERCIAL

## 2023-04-26 ENCOUNTER — PATIENT MESSAGE (OUTPATIENT)
Dept: PRIMARY CARE CLINIC | Facility: CLINIC | Age: 48
End: 2023-04-26
Payer: COMMERCIAL

## 2023-04-26 DIAGNOSIS — T75.3XXA MOTION SICKNESS, INITIAL ENCOUNTER: Primary | ICD-10-CM

## 2023-04-27 ENCOUNTER — PATIENT MESSAGE (OUTPATIENT)
Dept: PRIMARY CARE CLINIC | Facility: CLINIC | Age: 48
End: 2023-04-27
Payer: COMMERCIAL

## 2023-04-27 RX ORDER — ONDANSETRON 4 MG/1
4 TABLET, FILM COATED ORAL 2 TIMES DAILY
Qty: 20 TABLET | Refills: 0 | Status: SHIPPED | OUTPATIENT
Start: 2023-04-27 | End: 2023-10-05

## 2023-04-27 RX ORDER — SCOLOPAMINE TRANSDERMAL SYSTEM 1 MG/1
1 PATCH, EXTENDED RELEASE TRANSDERMAL
Qty: 3 PATCH | Refills: 1 | Status: SHIPPED | OUTPATIENT
Start: 2023-04-27 | End: 2023-10-05

## 2023-06-07 RX ORDER — AZELASTINE 1 MG/ML
1 SPRAY, METERED NASAL 2 TIMES DAILY
Qty: 30 ML | Refills: 6 | Status: SHIPPED | OUTPATIENT
Start: 2023-06-07 | End: 2023-07-29

## 2023-06-07 RX ORDER — FLUTICASONE PROPIONATE 50 MCG
1 SPRAY, SUSPENSION (ML) NASAL DAILY
Qty: 16 G | Refills: 6 | Status: SHIPPED | OUTPATIENT
Start: 2023-06-07

## 2023-06-16 ENCOUNTER — PATIENT MESSAGE (OUTPATIENT)
Dept: PODIATRY | Facility: CLINIC | Age: 48
End: 2023-06-16
Payer: COMMERCIAL

## 2023-07-12 ENCOUNTER — PATIENT MESSAGE (OUTPATIENT)
Dept: PRIMARY CARE CLINIC | Facility: CLINIC | Age: 48
End: 2023-07-12
Payer: COMMERCIAL

## 2023-07-13 ENCOUNTER — PATIENT MESSAGE (OUTPATIENT)
Dept: PRIMARY CARE CLINIC | Facility: CLINIC | Age: 48
End: 2023-07-13
Payer: COMMERCIAL

## 2023-07-26 ENCOUNTER — PATIENT MESSAGE (OUTPATIENT)
Dept: PRIMARY CARE CLINIC | Facility: CLINIC | Age: 48
End: 2023-07-26
Payer: COMMERCIAL

## 2023-08-16 ENCOUNTER — PATIENT MESSAGE (OUTPATIENT)
Dept: PRIMARY CARE CLINIC | Facility: CLINIC | Age: 48
End: 2023-08-16
Payer: COMMERCIAL

## 2023-08-16 NOTE — LETTER
August 16, 2023      [facility]  [address] [facility] [address]       Patient: Enriqueta Mccarthy   YOB: 1975  Date of Visit: 08/14/2023    To Whom It May Concern:    Emil Mccarthy  was at [facility] on 08/14/2023. The patient may return to work/school on 08/21/2023 with no restrictions. If you have any questions or concerns, or if I can be of further assistance, please do not hesitate to contact me.    Sincerely,    Ardiana Baugh MA

## 2023-08-29 ENCOUNTER — PATIENT MESSAGE (OUTPATIENT)
Dept: PRIMARY CARE CLINIC | Facility: CLINIC | Age: 48
End: 2023-08-29
Payer: COMMERCIAL

## 2023-09-13 ENCOUNTER — PATIENT MESSAGE (OUTPATIENT)
Dept: PRIMARY CARE CLINIC | Facility: CLINIC | Age: 48
End: 2023-09-13
Payer: COMMERCIAL

## 2023-09-21 ENCOUNTER — LAB VISIT (OUTPATIENT)
Dept: LAB | Facility: HOSPITAL | Age: 48
End: 2023-09-21
Attending: INTERNAL MEDICINE
Payer: COMMERCIAL

## 2023-09-21 DIAGNOSIS — Z00.00 WELLNESS EXAMINATION: ICD-10-CM

## 2023-09-21 DIAGNOSIS — E11.9 TYPE 2 DIABETES MELLITUS WITHOUT COMPLICATION, WITHOUT LONG-TERM CURRENT USE OF INSULIN: ICD-10-CM

## 2023-09-21 LAB
ALBUMIN SERPL BCP-MCNC: 4.2 G/DL (ref 3.5–5.2)
ALP SERPL-CCNC: 47 U/L (ref 55–135)
ALT SERPL W/O P-5'-P-CCNC: 17 U/L (ref 10–44)
ANION GAP SERPL CALC-SCNC: 7 MMOL/L (ref 8–16)
AST SERPL-CCNC: 16 U/L (ref 10–40)
BASOPHILS # BLD AUTO: 0.05 K/UL (ref 0–0.2)
BASOPHILS NFR BLD: 0.8 % (ref 0–1.9)
BILIRUB SERPL-MCNC: 1 MG/DL (ref 0.1–1)
BUN SERPL-MCNC: 13 MG/DL (ref 6–20)
CALCIUM SERPL-MCNC: 9.7 MG/DL (ref 8.7–10.5)
CHLORIDE SERPL-SCNC: 102 MMOL/L (ref 95–110)
CHOLEST SERPL-MCNC: 108 MG/DL (ref 120–199)
CHOLEST/HDLC SERPL: 3.5 {RATIO} (ref 2–5)
CO2 SERPL-SCNC: 29 MMOL/L (ref 23–29)
CREAT SERPL-MCNC: 1.2 MG/DL (ref 0.5–1.4)
DIFFERENTIAL METHOD: ABNORMAL
EOSINOPHIL # BLD AUTO: 0.2 K/UL (ref 0–0.5)
EOSINOPHIL NFR BLD: 2.6 % (ref 0–8)
ERYTHROCYTE [DISTWIDTH] IN BLOOD BY AUTOMATED COUNT: 14 % (ref 11.5–14.5)
EST. GFR  (NO RACE VARIABLE): 56.2 ML/MIN/1.73 M^2
ESTIMATED AVG GLUCOSE: 151 MG/DL (ref 68–131)
GLUCOSE SERPL-MCNC: 215 MG/DL (ref 70–110)
HBA1C MFR BLD: 6.9 % (ref 4–5.6)
HCT VFR BLD AUTO: 32.6 % (ref 37–48.5)
HDLC SERPL-MCNC: 31 MG/DL (ref 40–75)
HDLC SERPL: 28.7 % (ref 20–50)
HGB BLD-MCNC: 11.4 G/DL (ref 12–16)
IMM GRANULOCYTES # BLD AUTO: 0.05 K/UL (ref 0–0.04)
IMM GRANULOCYTES NFR BLD AUTO: 0.8 % (ref 0–0.5)
LDLC SERPL CALC-MCNC: 17.4 MG/DL (ref 63–159)
LYMPHOCYTES # BLD AUTO: 2.5 K/UL (ref 1–4.8)
LYMPHOCYTES NFR BLD: 41.7 % (ref 18–48)
MCH RBC QN AUTO: 28.1 PG (ref 27–31)
MCHC RBC AUTO-ENTMCNC: 35 G/DL (ref 32–36)
MCV RBC AUTO: 81 FL (ref 82–98)
MONOCYTES # BLD AUTO: 0.5 K/UL (ref 0.3–1)
MONOCYTES NFR BLD: 8.2 % (ref 4–15)
NEUTROPHILS # BLD AUTO: 2.8 K/UL (ref 1.8–7.7)
NEUTROPHILS NFR BLD: 45.9 % (ref 38–73)
NONHDLC SERPL-MCNC: 77 MG/DL
NRBC BLD-RTO: 0 /100 WBC
PLATELET # BLD AUTO: 245 K/UL (ref 150–450)
PMV BLD AUTO: 10.5 FL (ref 9.2–12.9)
POTASSIUM SERPL-SCNC: 3.8 MMOL/L (ref 3.5–5.1)
PROT SERPL-MCNC: 7.4 G/DL (ref 6–8.4)
RBC # BLD AUTO: 4.05 M/UL (ref 4–5.4)
SODIUM SERPL-SCNC: 138 MMOL/L (ref 136–145)
TRIGL SERPL-MCNC: 298 MG/DL (ref 30–150)
TSH SERPL DL<=0.005 MIU/L-ACNC: 0.94 UIU/ML (ref 0.4–4)
WBC # BLD AUTO: 6.09 K/UL (ref 3.9–12.7)

## 2023-09-21 PROCEDURE — 85025 COMPLETE CBC W/AUTO DIFF WBC: CPT | Performed by: INTERNAL MEDICINE

## 2023-09-21 PROCEDURE — 83036 HEMOGLOBIN GLYCOSYLATED A1C: CPT | Performed by: INTERNAL MEDICINE

## 2023-09-21 PROCEDURE — 80061 LIPID PANEL: CPT | Performed by: INTERNAL MEDICINE

## 2023-09-21 PROCEDURE — 36415 COLL VENOUS BLD VENIPUNCTURE: CPT | Performed by: INTERNAL MEDICINE

## 2023-09-21 PROCEDURE — 80053 COMPREHEN METABOLIC PANEL: CPT | Performed by: INTERNAL MEDICINE

## 2023-09-21 PROCEDURE — 84443 ASSAY THYROID STIM HORMONE: CPT | Performed by: INTERNAL MEDICINE

## 2023-10-05 ENCOUNTER — OFFICE VISIT (OUTPATIENT)
Dept: PRIMARY CARE CLINIC | Facility: CLINIC | Age: 48
End: 2023-10-05
Payer: COMMERCIAL

## 2023-10-05 VITALS
SYSTOLIC BLOOD PRESSURE: 130 MMHG | BODY MASS INDEX: 40.13 KG/M2 | HEART RATE: 79 BPM | WEIGHT: 218.06 LBS | DIASTOLIC BLOOD PRESSURE: 72 MMHG | OXYGEN SATURATION: 98 % | HEIGHT: 62 IN

## 2023-10-05 DIAGNOSIS — I10 ESSENTIAL HYPERTENSION: Chronic | ICD-10-CM

## 2023-10-05 DIAGNOSIS — Z00.00 WELLNESS EXAMINATION: Primary | ICD-10-CM

## 2023-10-05 DIAGNOSIS — E78.5 DYSLIPIDEMIA: ICD-10-CM

## 2023-10-05 DIAGNOSIS — E11.65 UNCONTROLLED TYPE 2 DIABETES MELLITUS WITH HYPERGLYCEMIA, WITHOUT LONG-TERM CURRENT USE OF INSULIN: Chronic | ICD-10-CM

## 2023-10-05 DIAGNOSIS — E11.21 DIABETIC NEPHROPATHY ASSOCIATED WITH TYPE 2 DIABETES MELLITUS: ICD-10-CM

## 2023-10-05 PROCEDURE — 3066F PR DOCUMENTATION OF TREATMENT FOR NEPHROPATHY: ICD-10-PCS | Mod: CPTII,S$GLB,, | Performed by: INTERNAL MEDICINE

## 2023-10-05 PROCEDURE — 4010F ACE/ARB THERAPY RXD/TAKEN: CPT | Mod: CPTII,S$GLB,, | Performed by: INTERNAL MEDICINE

## 2023-10-05 PROCEDURE — 3008F PR BODY MASS INDEX (BMI) DOCUMENTED: ICD-10-PCS | Mod: CPTII,S$GLB,, | Performed by: INTERNAL MEDICINE

## 2023-10-05 PROCEDURE — 1159F MED LIST DOCD IN RCRD: CPT | Mod: CPTII,S$GLB,, | Performed by: INTERNAL MEDICINE

## 2023-10-05 PROCEDURE — 3044F PR MOST RECENT HEMOGLOBIN A1C LEVEL <7.0%: ICD-10-PCS | Mod: CPTII,S$GLB,, | Performed by: INTERNAL MEDICINE

## 2023-10-05 PROCEDURE — 99999 PR PBB SHADOW E&M-EST. PATIENT-LVL III: CPT | Mod: PBBFAC,,, | Performed by: INTERNAL MEDICINE

## 2023-10-05 PROCEDURE — 3008F BODY MASS INDEX DOCD: CPT | Mod: CPTII,S$GLB,, | Performed by: INTERNAL MEDICINE

## 2023-10-05 PROCEDURE — 3072F PR LOW RISK FOR RETINOPATHY: ICD-10-PCS | Mod: CPTII,S$GLB,, | Performed by: INTERNAL MEDICINE

## 2023-10-05 PROCEDURE — 99396 PR PREVENTIVE VISIT,EST,40-64: ICD-10-PCS | Mod: S$GLB,,, | Performed by: INTERNAL MEDICINE

## 2023-10-05 PROCEDURE — 4010F PR ACE/ARB THEARPY RXD/TAKEN: ICD-10-PCS | Mod: CPTII,S$GLB,, | Performed by: INTERNAL MEDICINE

## 2023-10-05 PROCEDURE — 3078F PR MOST RECENT DIASTOLIC BLOOD PRESSURE < 80 MM HG: ICD-10-PCS | Mod: CPTII,S$GLB,, | Performed by: INTERNAL MEDICINE

## 2023-10-05 PROCEDURE — 3044F HG A1C LEVEL LT 7.0%: CPT | Mod: CPTII,S$GLB,, | Performed by: INTERNAL MEDICINE

## 2023-10-05 PROCEDURE — 99396 PREV VISIT EST AGE 40-64: CPT | Mod: S$GLB,,, | Performed by: INTERNAL MEDICINE

## 2023-10-05 PROCEDURE — 3066F NEPHROPATHY DOC TX: CPT | Mod: CPTII,S$GLB,, | Performed by: INTERNAL MEDICINE

## 2023-10-05 PROCEDURE — 1159F PR MEDICATION LIST DOCUMENTED IN MEDICAL RECORD: ICD-10-PCS | Mod: CPTII,S$GLB,, | Performed by: INTERNAL MEDICINE

## 2023-10-05 PROCEDURE — 3060F PR POS MICROALBUMINURIA RESULT DOCUMENTED/REVIEW: ICD-10-PCS | Mod: CPTII,S$GLB,, | Performed by: INTERNAL MEDICINE

## 2023-10-05 PROCEDURE — 3075F SYST BP GE 130 - 139MM HG: CPT | Mod: CPTII,S$GLB,, | Performed by: INTERNAL MEDICINE

## 2023-10-05 PROCEDURE — 3072F LOW RISK FOR RETINOPATHY: CPT | Mod: CPTII,S$GLB,, | Performed by: INTERNAL MEDICINE

## 2023-10-05 PROCEDURE — 3060F POS MICROALBUMINURIA REV: CPT | Mod: CPTII,S$GLB,, | Performed by: INTERNAL MEDICINE

## 2023-10-05 PROCEDURE — 3075F PR MOST RECENT SYSTOLIC BLOOD PRESS GE 130-139MM HG: ICD-10-PCS | Mod: CPTII,S$GLB,, | Performed by: INTERNAL MEDICINE

## 2023-10-05 PROCEDURE — 3078F DIAST BP <80 MM HG: CPT | Mod: CPTII,S$GLB,, | Performed by: INTERNAL MEDICINE

## 2023-10-05 PROCEDURE — 99999 PR PBB SHADOW E&M-EST. PATIENT-LVL III: ICD-10-PCS | Mod: PBBFAC,,, | Performed by: INTERNAL MEDICINE

## 2023-10-05 RX ORDER — VALSARTAN 320 MG/1
320 TABLET ORAL DAILY
Qty: 90 TABLET | Refills: 3 | Status: SHIPPED | OUTPATIENT
Start: 2023-10-05

## 2023-10-05 RX ORDER — ROSUVASTATIN CALCIUM 20 MG/1
20 TABLET, COATED ORAL DAILY
Qty: 90 TABLET | Refills: 1 | Status: SHIPPED | OUTPATIENT
Start: 2023-10-05 | End: 2024-03-28 | Stop reason: SDUPTHER

## 2023-10-05 RX ORDER — METFORMIN HYDROCHLORIDE 1000 MG/1
1000 TABLET ORAL 2 TIMES DAILY WITH MEALS
Qty: 180 TABLET | Refills: 3
Start: 2023-10-05 | End: 2023-10-21 | Stop reason: SDUPTHER

## 2023-10-05 NOTE — PROGRESS NOTES
[facility] Note    Chief Complaint      Chief Complaint   Patient presents with    Annual Exam     History of Present Illness      Enriqueta Mccarthy is a 47 y.o. female who presents today for chief complaint annual wellness . Patient previously seen by me    PCP: Edilma Marquis MD.     HPI   Last seen 3.23 follow up  Here on annual feels well no complaints   Labs last week   DM 7.7->6.6->6.9, she made diet changes, trulicity 3, metformin 500  bid,   HLD: last ldl 17 on vascepa and crestor 40  Was on lipitor and vascepa, stopped vascepa  HTN at goal on amodipine 10, and [id number] and valsartan 320, checks at home shes in [place] HTN she enjoys   Due mmg pap - declines both   LAST MMG 10.21, declined 2022  PAP 2020  Cologuard 3.22    She works in ENT   Waking up at night to urinate but less often   Mood is good   Not sleeping not exercising is not watching diet   Active Problem List with Overview Notes    Diagnosis Date Noted    Diabetic nephropathy associated with type 2 diabetes mellitus 10/05/2023    Tendonitis of elbow, right 07/27/2022    Primary osteoarthritis of left knee 04/13/2021    Primary osteoarthritis of right knee 04/13/2021    Quadriceps weakness 04/13/2021    Right hip pain 04/06/2021    Knee pain 04/06/2021    Pityriasis 02/11/2021    Frequent urination 08/11/2020    Hair loss 08/11/2020    Class 2 severe obesity with serious comorbidity and body mass index (BMI) of 39.0 to 39.9 in adult 08/01/2019    Type 2 diabetes mellitus with diabetic polyneuropathy, without long-term current use of insulin 11/17/2016    Hypertriglyceridemia 11/17/2016    Dyslipidemia 04/05/2016    Vitamin D deficiency 09/22/2014    Essential hypertension        Health Maintenance   Topic Date Due    Foot Exam  08/26/2022    Mammogram  10/14/2022    Eye Exam  07/18/2023    Hemoglobin A1c  03/21/2024    Lipid Panel  09/21/2024    Low Dose Statin  10/05/2024    Colorectal Cancer Screening  03/22/2025    TETANUS  VACCINE  2031    Hepatitis C Screening  Completed       Past Medical History:   Diagnosis Date    Allergic conjunctivitis of both eyes     Anemia     Arthritis     Diabetic nephropathy associated with type 2 diabetes mellitus 10/5/2023    Diabetic retinopathy     Essential hypertension     Herpes simplex virus (HSV) infection     Hypertension     Hypertriglyceridemia 2016    Joint pain     Neuropathy     Obesity (BMI 30-39.9) 2016    Type 2 diabetes mellitus with diabetic polyneuropathy, without long-term current use of insulin 2016    Uterine fibroid        Past Surgical History:   Procedure Laterality Date     SECTION      x 2    CHOLECYSTECTOMY  2016 ?    ENDOMETRIAL ABLATION  2016    GALLBLADDER SURGERY  2017    removed    LIPOMA RESECTION Left 2013    SINUS SURGERY  2006    TONSILLECTOMY, ADENOIDECTOMY      TUBAL LIGATION         family history includes Anemia in her daughter and son; Diabetes in her father, maternal grandmother, and maternal uncle; Hyperlipidemia in her father; Hypertension in her father, maternal grandmother, and mother; Kidney disease in her daughter; Stroke in her maternal grandmother.    Social History     Tobacco Use    Smoking status: Never    Smokeless tobacco: Never   Substance Use Topics    Alcohol use: No    Drug use: No       Review of Systems   Constitutional:  Negative for chills, fever, malaise/fatigue and weight loss.   Respiratory:  Negative for cough, sputum production, shortness of breath and wheezing.    Cardiovascular:  Negative for chest pain, palpitations, orthopnea and leg swelling.   Gastrointestinal:  Negative for constipation, diarrhea, nausea and vomiting.   Genitourinary:  Negative for dysuria, frequency, hematuria and urgency.        Outpatient Encounter Medications as of 10/5/2023   Medication Sig Note Dispense Refill    amLODIPine (NORVASC) 10 MG tablet Take 1 tablet (10 mg total) by mouth once daily.  30 tablet 5     carvediloL (COREG) 25 MG tablet TAKE 1 TABLET BY MOUTH TWICE A DAY  180 tablet 2    cholestyramine-aspartame (CHOLESTYRAMINE LIGHT) 4 gram PwPk Dissolve 1 packet (4 g total) in liquid and take by mouth 2 (two) times daily.  180 packet 0    dulaglutide (TRULICITY) 3 mg/0.5 mL pen injector Inject 3 mg into the skin every 7 days.  12 pen 3    ergocalciferol (VITAMIN D2) 50,000 unit Cap Take 1 capsule (50,000 Units total) by mouth every 7 days.  12 capsule 2    fluticasone propionate (FLONASE) 50 mcg/actuation nasal spray 1 spray (50 mcg total) in Each Nostril route once daily.  16 g 6    icosapent ethyL (VASCEPA) 1 gram Cap Take 2 capsules (2 g total) by mouth 2 (two) times daily.  120 capsule 5    ketoconazole (NIZORAL) 2 % shampoo Wash hair with medicated shampoo at least 2x/week - let sit on scalp at least 5 minutes prior to rinsing  120 mL 5    triamcinolone acetonide 0.1% (KENALOG) 0.1 % cream Apply topically 2 (two) times daily. 8/26/2021: Use as needed 45 g 0    [DISCONTINUED] metFORMIN (GLUCOPHAGE) 500 MG tablet Take 1 tablet (500 mg total) by mouth 2 (two) times daily with meals.  180 tablet 3    [DISCONTINUED] rosuvastatin (CRESTOR) 40 MG Tab Take 1 tablet (40 mg total) by mouth every evening.  90 tablet 1    [DISCONTINUED] valsartan (DIOVAN) 320 MG tablet Take 1 tablet (320 mg total) by mouth once daily.  90 tablet 0    aspirin (ECOTRIN) 81 MG EC tablet Take 1 tablet (81 mg total) by mouth once daily.   0    azelastine (ASTELIN) 137 mcg (0.1 %) nasal spray 1 spray (137 mcg total) by Nasal route 2 (two) times daily.  30 mL 6    metFORMIN (GLUCOPHAGE) 1000 MG tablet Take 1 tablet (1,000 mg total) by mouth 2 (two) times daily with meals.  180 tablet 3    rosuvastatin (CRESTOR) 20 MG tablet Take 1 tablet (20 mg total) by mouth once daily.  90 tablet 1    valsartan (DIOVAN) 320 MG tablet Take 1 tablet (320 mg total) by mouth once daily.  90 tablet 3    [DISCONTINUED] diltiaZEM (CARDIZEM CD) 180 MG 24 hr capsule  "Take 2 capsules (360 mg total) by mouth once daily.  180 capsule 3    [DISCONTINUED] ondansetron (ZOFRAN) 4 MG tablet Take 1 tablet (4 mg total) by mouth 2 (two) times daily.  20 tablet 0    [DISCONTINUED] scopolamine (TRANSDERM-SCOP) 1.3-1.5 mg (1 mg over 3 days) Place 1 patch onto the skin every 72 hours.  3 patch 1     No facility-administered encounter medications on file as of 10/5/2023.        Review of patient's allergies indicates:   Allergen Reactions    No known allergies            Physical Exam      Vital Signs  Pulse: 79  SpO2: 98 %  BP: 130/72  BP Location: Right arm  Patient Position: Sitting  Height and Weight  Height: 5' 2" (157.5 cm)  Weight: 98.9 kg (218 lb 0.6 oz)  BSA (Calculated - sq m): 2.08 sq meters  BMI (Calculated): 39.9  Weight in (lb) to have BMI = 25: 136.4]    Physical Exam  Vitals reviewed.   Constitutional:       General: She is not in acute distress.     Appearance: She is well-developed. She is not diaphoretic.   HENT:      Head: Normocephalic and atraumatic.      Right Ear: External ear normal.      Left Ear: External ear normal.      Nose: Nose normal.   Eyes:      General:         Right eye: No discharge.         Left eye: No discharge.      Conjunctiva/sclera: Conjunctivae normal.   Cardiovascular:      Rate and Rhythm: Normal rate and regular rhythm.      Heart sounds: Normal heart sounds.   Pulmonary:      Effort: Pulmonary effort is normal. No respiratory distress.      Breath sounds: Normal breath sounds.   Musculoskeletal:         General: Normal range of motion.      Cervical back: Normal range of motion.   Skin:     Coloration: Skin is not pale.      Findings: No rash.   Neurological:      Mental Status: She is alert and oriented to person, place, and time.   Psychiatric:         Behavior: Behavior normal.         Thought Content: Thought content normal.          Laboratory:  CBC:  Recent Labs   Lab Result Units 09/21/23  0736   WBC K/uL 6.09   RBC M/uL 4.05   Hemoglobin " "g/dL 11.4*   Hematocrit % 32.6*   Platelets K/uL 245   MCV fL 81*   MCH pg 28.1   MCHC g/dL 35.0     CMP:  Recent Labs   Lab Result Units 09/21/23  0736   Glucose mg/dL 215*   Calcium mg/dL 9.7   Albumin g/dL 4.2   Total Protein g/dL 7.4   Sodium mmol/L 138   Potassium mmol/L 3.8   CO2 mmol/L 29   Chloride mmol/L 102   BUN mg/dL 13   Alkaline Phosphatase U/L 47*   ALT U/L 17   AST U/L 16   Total Bilirubin mg/dL 1.0     URINALYSIS:  No results for input(s): "COLORU", "CLARITYU", "SPECGRAV", "PHUR", "PROTEINUA", "GLUCOSEU", "BILIRUBINCON", "BLOODU", "WBCU", "RBCU", "BACTERIA", "MUCUS", "NITRITE", "LEUKOCYTESUR", "UROBILINOGEN", "HYALINECASTS" in the last 2160 hours.   LIPIDS:  Recent Labs   Lab Result Units 09/21/23  0736   TSH uIU/mL 0.939   HDL mg/dL 31*   Cholesterol mg/dL 108*   Triglycerides mg/dL 298*   LDL Cholesterol mg/dL 17.4*   HDL/Cholesterol Ratio % 28.7   Non-HDL Cholesterol mg/dL 77   Total Cholesterol/HDL Ratio  3.5     TSH:  Recent Labs   Lab Result Units 09/21/23  0736   TSH uIU/mL 0.939     A1C:  Recent Labs   Lab Result Units 09/21/23  0736   Hemoglobin A1C % 6.9*           Assessment/Plan     Enriqueta Mccarthy is a 47 y.o.female with:    1. Wellness examination    2. Dyslipidemia  -     rosuvastatin (CRESTOR) 20 MG tablet; Take 1 tablet (20 mg total) by mouth once daily.  Dispense: 90 tablet; Refill: 1    3. Uncontrolled type 2 diabetes mellitus with hyperglycemia, without long-term current use of insulin  -     metFORMIN (GLUCOPHAGE) 1000 MG tablet; Take 1 tablet (1,000 mg total) by mouth 2 (two) times daily with meals.  Dispense: 180 tablet; Refill: 3    4. Essential hypertension  Assessment & Plan:  At goal     Orders:  -     valsartan (DIOVAN) 320 MG tablet; Take 1 tablet (320 mg total) by mouth once daily.  Dispense: 90 tablet; Refill: 3    5. Diabetic nephropathy associated with type 2 diabetes mellitus  Assessment & Plan:  Will see if we can improve a1c with increased dose metformin        "     Use of the [employer] Patient Portal discussed and encouraged during today's visit  -Continue current medications and maintain follow up with specialists.  Return to clinic in 6 mos .  No future appointments.    Edilma Marquis MD  10/5/2023 8:59 AM    Primary Care Internal Medicine

## 2023-10-21 DIAGNOSIS — E11.65 UNCONTROLLED TYPE 2 DIABETES MELLITUS WITH HYPERGLYCEMIA, WITHOUT LONG-TERM CURRENT USE OF INSULIN: Chronic | ICD-10-CM

## 2023-10-21 NOTE — TELEPHONE ENCOUNTER
No care due was identified.  [facility] Embedded Care Due Messages. Reference number: [id number].   10/21/2023 6:52:24 PM CDT

## 2023-10-22 DIAGNOSIS — I10 ESSENTIAL HYPERTENSION: Chronic | ICD-10-CM

## 2023-10-22 RX ORDER — CARVEDILOL 25 MG/1
TABLET ORAL
Qty: 180 TABLET | Refills: 3 | Status: SHIPPED | OUTPATIENT
Start: 2023-10-22

## 2023-10-22 NOTE — TELEPHONE ENCOUNTER
Refill Decision Note   Enriqueta Mccarthy  is requesting a refill authorization.  Brief Assessment and Rationale for Refill:  Approve     Medication Therapy Plan:         Comments:     Note composed:4:57 PM 10/22/2023             Appointments     Last Visit   10/5/2023 Edilma Marquis MD   Next Visit   4/1/2024 Edilma Marquis MD

## 2023-10-22 NOTE — TELEPHONE ENCOUNTER
No care due was identified.  Health [facility] Embedded Care Due Messages. Reference number: [id number].   10/22/2023 10:26:08 AM CDT

## 2023-10-23 RX ORDER — METFORMIN HYDROCHLORIDE 1000 MG/1
1000 TABLET ORAL 2 TIMES DAILY WITH MEALS
Qty: 180 TABLET | Refills: 3
Start: 2023-10-23 | End: 2023-10-25 | Stop reason: SDUPTHER

## 2023-10-25 DIAGNOSIS — E11.65 UNCONTROLLED TYPE 2 DIABETES MELLITUS WITH HYPERGLYCEMIA, WITHOUT LONG-TERM CURRENT USE OF INSULIN: Chronic | ICD-10-CM

## 2023-10-25 RX ORDER — METFORMIN HYDROCHLORIDE 1000 MG/1
1000 TABLET ORAL 2 TIMES DAILY WITH MEALS
Qty: 180 TABLET | Refills: 3 | Status: SHIPPED | OUTPATIENT
Start: 2023-10-25

## 2023-10-25 NOTE — TELEPHONE ENCOUNTER
No care due was identified.  Health [facility] Embedded Care Due Messages. Reference number: [id number].   10/25/2023 9:04:20 AM CDT

## 2023-11-06 ENCOUNTER — OFFICE VISIT (OUTPATIENT)
Dept: GASTROENTEROLOGY | Facility: CLINIC | Age: 48
End: 2023-11-06
Payer: COMMERCIAL

## 2023-11-06 ENCOUNTER — TELEPHONE (OUTPATIENT)
Dept: ENDOSCOPY | Facility: HOSPITAL | Age: 48
End: 2023-11-06
Payer: COMMERCIAL

## 2023-11-06 VITALS — BODY MASS INDEX: 39.47 KG/M2 | WEIGHT: 214.5 LBS | HEIGHT: 62 IN

## 2023-11-06 DIAGNOSIS — R10.9 ABDOMINAL PAIN, UNSPECIFIED ABDOMINAL LOCATION: Primary | ICD-10-CM

## 2023-11-06 DIAGNOSIS — R11.2 NAUSEA AND VOMITING, UNSPECIFIED VOMITING TYPE: ICD-10-CM

## 2023-11-06 PROCEDURE — 1159F PR MEDICATION LIST DOCUMENTED IN MEDICAL RECORD: ICD-10-PCS | Mod: CPTII,S$GLB,, | Performed by: INTERNAL MEDICINE

## 2023-11-06 PROCEDURE — 4010F PR ACE/ARB THEARPY RXD/TAKEN: ICD-10-PCS | Mod: CPTII,S$GLB,, | Performed by: INTERNAL MEDICINE

## 2023-11-06 PROCEDURE — 3060F PR POS MICROALBUMINURIA RESULT DOCUMENTED/REVIEW: ICD-10-PCS | Mod: CPTII,S$GLB,, | Performed by: INTERNAL MEDICINE

## 2023-11-06 PROCEDURE — 99999 PR PBB SHADOW E&M-EST. PATIENT-LVL IV: ICD-10-PCS | Mod: PBBFAC,,, | Performed by: INTERNAL MEDICINE

## 2023-11-06 PROCEDURE — 4010F ACE/ARB THERAPY RXD/TAKEN: CPT | Mod: CPTII,S$GLB,, | Performed by: INTERNAL MEDICINE

## 2023-11-06 PROCEDURE — 3044F HG A1C LEVEL LT 7.0%: CPT | Mod: CPTII,S$GLB,, | Performed by: INTERNAL MEDICINE

## 2023-11-06 PROCEDURE — 3060F POS MICROALBUMINURIA REV: CPT | Mod: CPTII,S$GLB,, | Performed by: INTERNAL MEDICINE

## 2023-11-06 PROCEDURE — 1159F MED LIST DOCD IN RCRD: CPT | Mod: CPTII,S$GLB,, | Performed by: INTERNAL MEDICINE

## 2023-11-06 PROCEDURE — 3072F PR LOW RISK FOR RETINOPATHY: ICD-10-PCS | Mod: CPTII,S$GLB,, | Performed by: INTERNAL MEDICINE

## 2023-11-06 PROCEDURE — 99999 PR PBB SHADOW E&M-EST. PATIENT-LVL IV: CPT | Mod: PBBFAC,,, | Performed by: INTERNAL MEDICINE

## 2023-11-06 PROCEDURE — 3008F BODY MASS INDEX DOCD: CPT | Mod: CPTII,S$GLB,, | Performed by: INTERNAL MEDICINE

## 2023-11-06 PROCEDURE — 3066F NEPHROPATHY DOC TX: CPT | Mod: CPTII,S$GLB,, | Performed by: INTERNAL MEDICINE

## 2023-11-06 PROCEDURE — 3066F PR DOCUMENTATION OF TREATMENT FOR NEPHROPATHY: ICD-10-PCS | Mod: CPTII,S$GLB,, | Performed by: INTERNAL MEDICINE

## 2023-11-06 PROCEDURE — 3072F LOW RISK FOR RETINOPATHY: CPT | Mod: CPTII,S$GLB,, | Performed by: INTERNAL MEDICINE

## 2023-11-06 PROCEDURE — 3008F PR BODY MASS INDEX (BMI) DOCUMENTED: ICD-10-PCS | Mod: CPTII,S$GLB,, | Performed by: INTERNAL MEDICINE

## 2023-11-06 PROCEDURE — 3044F PR MOST RECENT HEMOGLOBIN A1C LEVEL <7.0%: ICD-10-PCS | Mod: CPTII,S$GLB,, | Performed by: INTERNAL MEDICINE

## 2023-11-06 PROCEDURE — 99204 PR OFFICE/OUTPT VISIT, NEW, LEVL IV, 45-59 MIN: ICD-10-PCS | Mod: S$GLB,,, | Performed by: INTERNAL MEDICINE

## 2023-11-06 PROCEDURE — 99204 OFFICE O/P NEW MOD 45 MIN: CPT | Mod: S$GLB,,, | Performed by: INTERNAL MEDICINE

## 2023-11-06 RX ORDER — OMEPRAZOLE 40 MG/1
40 CAPSULE, DELAYED RELEASE ORAL DAILY
Qty: 30 CAPSULE | Refills: 11 | Status: SHIPPED | OUTPATIENT
Start: 2023-11-06 | End: 2024-11-05

## 2023-11-06 NOTE — PATIENT INSTRUCTIONS
EGD Instructions    [facility] [address]  Clinic Office [phone]  Endoscopy Lab [phone]    You are scheduled for an EGD with Dr. Barrios   on  Dec. 15, 2023  at [facility] in [place].    Check in at the Hospital -1st floor, Information desk.   Call [phone] to reschedule.    You cannot have anything to eat or drink after Midnight. You can brush your teeth with a sip of water.     An adult friend/family member must come with you to drive you home.  You cannot drive, take a taxi, Uber/Lyft or bus to leave the Endoscopy Center alone.  If you do not have someone to drive you home, your test will be cancelled.     Please follow the directions of your doctor if you take any pills that thin your blood. If you take these meds: Aggrenox, Brilinta, Effient, Eliquis, Lovenox, Plavix, Pletal, Pradaxa, Ticilid, Xarelto or Coumadin, let the doctor's office know.    Please hold any GLP-1 medications prior to the procedure: Dulaglutide Trulicity(hold week prior), Exenatide Byetta (hold the morning of procedure), Semaglutide Ozempic (hold week prior), Liraglutide Victoza, Saxenda(hold week prior), Lixisenatide Adlyxin (hold the morning of procedure), Semaglutide Rybelsus (hold the morning of procedure), Tirzepatide Mounjaro (hold week prior)     DON'T: On the morning of the test do not take insulin or pills for diabetes.     DO: On the morning of the test, do take any pills for blood pressure, heart, anti-rejection and or seizures with a small sip of water. Bring any inhalers with you.    Leave all valuables and jewelry at home. You will be at the hospital for 2-4 hours.    Call the Endoscopy department at [phone] with any questions about your procedure.    Thank you for choosing [facility].

## 2023-11-06 NOTE — PROGRESS NOTES
"Subjective:       Patient ID: Enriqueta Mccarthy is a 47 y.o. female.    Chief Complaint: Abdominal Pain, Nausea, Other (Smelly belch/), Bloated (Feeling full days after), and Gas    Patient here today to reestablish care with aforementioned complaints.  She was previously followed by Dr. Parsons in 2017.      Diagnosed with gastroparesis while back. Went away for a while. Retuned over past 2 year: on and off stomach pain, bloating, nausea, beltching, gas. Has been on reglan in the past.     Recalls undergoing GES many years ago (<2004). Doesn't seem to be in [place].     Diarrhea. Started after ccy. Worse with bloating. Has questran which helps "when needed".     In 2017 patient underwent EGD for evaluation abdominal pain.  Endoscopically exam was unremarkable.  Biopsies the stomach as well as duodenal bulb did not show any evidence H pylori, intestinal metaplasia, or celiac disease.    Cologuard negative .      Past Medical History:   Diagnosis Date    Allergic conjunctivitis of both eyes     Anemia     Arthritis     Diabetic nephropathy associated with type 2 diabetes mellitus 10/5/2023    Diabetic retinopathy     Essential hypertension     Herpes simplex virus (HSV) infection     Hypertension     Hypertriglyceridemia 2016    Joint pain     Neuropathy     Obesity (BMI 30-39.9) 2016    Type 2 diabetes mellitus with diabetic polyneuropathy, without long-term current use of insulin 2016    Uterine fibroid        Past Surgical History:   Procedure Laterality Date     SECTION      x 2    CHOLECYSTECTOMY  2016 ?    ENDOMETRIAL ABLATION  2016    GALLBLADDER SURGERY  2017    removed    LIPOMA RESECTION Left 2013    SINUS SURGERY  2006    TONSILLECTOMY, ADENOIDECTOMY      TUBAL LIGATION         Social History  Social History     Tobacco Use    Smoking status: Never    Smokeless tobacco: Never   Substance Use Topics    Alcohol use: No    Drug use: No       Family History   Problem Relation " Age of Onset    Diabetes Father             Hypertension Father             Hyperlipidemia Father     Hypertension Mother     Anemia Daughter     Kidney disease Daughter         MCKD dx during pregnancy w/ child    Anemia Son     Diabetes Maternal Uncle     Diabetes Maternal Grandmother             Hypertension Maternal Grandmother             Stroke Maternal Grandmother             Blindness Neg Hx     Glaucoma Neg Hx     Macular degeneration Neg Hx     Retinal detachment Neg Hx     Breast cancer Neg Hx     Colon cancer Neg Hx     Ovarian cancer Neg Hx        Review of Systems   Gastrointestinal:  Positive for abdominal distention, abdominal pain, diarrhea, nausea and vomiting.           Objective:      Physical Exam  Constitutional:       Appearance: She is well-developed.   HENT:      Head: Normocephalic and atraumatic.   Eyes:      Conjunctiva/sclera: Conjunctivae normal.   Pulmonary:      Effort: Pulmonary effort is normal. No respiratory distress.   Musculoskeletal:      Cervical back: Normal range of motion.   Neurological:      Mental Status: She is alert and oriented to person, place, and time.   Psychiatric:         Behavior: Behavior normal.         Thought Content: Thought content normal.         Judgment: Judgment normal.           Pertinent labs and imaging studies reviewed    Assessment:       1. Abdominal pain, unspecified abdominal location    2. Nausea and vomiting, unspecified vomiting type        Plan:       Schedule EGD to exclude obstructive causes.  Could consider gastric emptying study however it reportedly was positive in the past  Start PPI  Will refer to GI dietitian      (Portions of this note were dictated using voice recognition software and may contain dictation related errors in spelling/grammar/syntax not found on text review)

## 2023-11-07 ENCOUNTER — PATIENT MESSAGE (OUTPATIENT)
Dept: PRIMARY CARE CLINIC | Facility: CLINIC | Age: 48
End: 2023-11-07
Payer: COMMERCIAL

## 2023-11-07 ENCOUNTER — TELEPHONE (OUTPATIENT)
Dept: ENDOSCOPY | Facility: HOSPITAL | Age: 48
End: 2023-11-07
Payer: COMMERCIAL

## 2023-11-07 NOTE — TELEPHONE ENCOUNTER
----- Message from Stef Barrios MD sent at 11/6/2023  9:04 AM CST -----  Regarding: GI dietitian  Please schedule with GI dietitian, re gastroparesis

## 2023-11-07 NOTE — TELEPHONE ENCOUNTER
----- Message from Nate Agarwal MA sent at 11/7/2023 10:51 AM CST -----  Regarding: FW: Pt advice  Contact: Ext no . [phone]    ----- Message -----  From: Manjeet Sadler  Sent: 11/7/2023  10:46 AM CST  To: [facility] Staff  Subject: Pt advice                                        Pt is scheduled for 11/21 at 10 and would like to know if 9am if available. Please call  or Teams message the pt

## 2023-11-08 ENCOUNTER — PATIENT MESSAGE (OUTPATIENT)
Dept: ENDOSCOPY | Facility: HOSPITAL | Age: 48
End: 2023-11-08
Payer: COMMERCIAL

## 2023-12-06 ENCOUNTER — PATIENT MESSAGE (OUTPATIENT)
Dept: GASTROENTEROLOGY | Facility: CLINIC | Age: 48
End: 2023-12-06
Payer: COMMERCIAL

## 2023-12-07 ENCOUNTER — PATIENT MESSAGE (OUTPATIENT)
Dept: PRIMARY CARE CLINIC | Facility: CLINIC | Age: 48
End: 2023-12-07
Payer: COMMERCIAL

## 2023-12-24 ENCOUNTER — PATIENT MESSAGE (OUTPATIENT)
Dept: ADMINISTRATIVE | Facility: OTHER | Age: 48
End: 2023-12-24
Payer: COMMERCIAL

## 2024-02-08 ENCOUNTER — PATIENT MESSAGE (OUTPATIENT)
Dept: PRIMARY CARE CLINIC | Facility: CLINIC | Age: 49
End: 2024-02-08
Payer: COMMERCIAL

## 2024-02-08 DIAGNOSIS — E55.9 VITAMIN D DEFICIENCY: Primary | ICD-10-CM

## 2024-02-09 DIAGNOSIS — E55.9 VITAMIN D DEFICIENCY: ICD-10-CM

## 2024-02-09 NOTE — TELEPHONE ENCOUNTER
Care Due:                  Date            Visit Type   Department     Provider  --------------------------------------------------------------------------------                                EP -                              PRIMARY      [facility] PRIMARY  Last Visit: 10-      CARE ([facility])   HANNAH Marquis                              EP -                              PRIMARY      [facility] PRIMARY  Next Visit: 04-      CARE ([facility])   HANNAH Marquis                                                            Last  Test          Frequency    Reason                     Performed    Due Date  --------------------------------------------------------------------------------    HBA1C.......  6 months...  dulaglutide, metFORMIN...  09- 03-    Vitamin D...  12 months..  ergocalciferol...........  Not Found    Overdue    [employer] Embedded Care Due Messages. Reference number: [id number].   2/09/2024 5:02:28 PM CST

## 2024-02-10 NOTE — TELEPHONE ENCOUNTER
Refill Routing Note   Medication(s) are not appropriate for processing by [facility] for the following reason(s):        Outside of protocol    [facility] action(s):  Route     Requires labs : Yes      Medication Therapy Plan: LABS(VITAMIN D)      Appointments  past 12m or future 3m with PCP    Date Provider   Last Visit   10/5/2023 Edilma Marquis MD   Next Visit   4/3/2024 Edilma Marquis MD   ED visits in past 90 days: 0        Note composed:11:26 PM 02/09/2024

## 2024-02-12 RX ORDER — ERGOCALCIFEROL 1.25 MG/1
50000 CAPSULE ORAL
Qty: 12 CAPSULE | Refills: 2 | Status: SHIPPED | OUTPATIENT
Start: 2024-02-12

## 2024-02-20 ENCOUNTER — PATIENT MESSAGE (OUTPATIENT)
Dept: PRIMARY CARE CLINIC | Facility: CLINIC | Age: 49
End: 2024-02-20
Payer: COMMERCIAL

## 2024-03-06 ENCOUNTER — PATIENT MESSAGE (OUTPATIENT)
Dept: PRIMARY CARE CLINIC | Facility: CLINIC | Age: 49
End: 2024-03-06
Payer: COMMERCIAL

## 2024-03-24 ENCOUNTER — PATIENT MESSAGE (OUTPATIENT)
Dept: ADMINISTRATIVE | Facility: OTHER | Age: 49
End: 2024-03-24
Payer: COMMERCIAL

## 2024-03-26 ENCOUNTER — PATIENT MESSAGE (OUTPATIENT)
Dept: PRIMARY CARE CLINIC | Facility: CLINIC | Age: 49
End: 2024-03-26
Payer: COMMERCIAL

## 2024-03-26 ENCOUNTER — PATIENT OUTREACH (OUTPATIENT)
Dept: ADMINISTRATIVE | Facility: HOSPITAL | Age: 49
End: 2024-03-26
Payer: COMMERCIAL

## 2024-03-26 DIAGNOSIS — E11.65 UNCONTROLLED TYPE 2 DIABETES MELLITUS WITH HYPERGLYCEMIA, WITHOUT LONG-TERM CURRENT USE OF INSULIN: Chronic | ICD-10-CM

## 2024-03-26 NOTE — PROGRESS NOTES
Health Maintenance Due   Topic Date Due    Pneumococcal Vaccines (Age 0-64) (2 of 2 - PCV) 03/07/2014    Foot Exam  08/26/2022    Mammogram  10/14/2022    Eye Exam  07/18/2023    Cervical Cancer Screening  07/31/2023    COVID-19 Vaccine (4 - 2023-24 season) 09/01/2023      Chart review completed. HM Updated. Triggered Links. Immunizations reviewed and updated. Care Everywhere Updated. Care Team Updated.  Patient is due for DM eye exam. Upcoming appt with PCP on 3/28/2024.

## 2024-03-26 NOTE — TELEPHONE ENCOUNTER
No care due was identified.  [facility] Embedded Care Due Messages. Reference number: [id number].   3/26/2024 4:19:38 PM CDT

## 2024-03-27 RX ORDER — DULAGLUTIDE 3 MG/.5ML
3 INJECTION, SOLUTION SUBCUTANEOUS
Qty: 12 PEN | Refills: 3 | Status: SHIPPED | OUTPATIENT
Start: 2024-03-27 | End: 2025-03-27

## 2024-03-28 ENCOUNTER — OFFICE VISIT (OUTPATIENT)
Dept: PRIMARY CARE CLINIC | Facility: CLINIC | Age: 49
End: 2024-03-28
Payer: COMMERCIAL

## 2024-03-28 VITALS
WEIGHT: 218.5 LBS | HEIGHT: 62 IN | DIASTOLIC BLOOD PRESSURE: 76 MMHG | OXYGEN SATURATION: 98 % | BODY MASS INDEX: 40.21 KG/M2 | HEART RATE: 78 BPM | SYSTOLIC BLOOD PRESSURE: 134 MMHG

## 2024-03-28 DIAGNOSIS — E78.1 HYPERTRIGLYCERIDEMIA: Primary | Chronic | ICD-10-CM

## 2024-03-28 DIAGNOSIS — E78.5 DYSLIPIDEMIA: ICD-10-CM

## 2024-03-28 DIAGNOSIS — Z12.31 ENCOUNTER FOR SCREENING MAMMOGRAM FOR MALIGNANT NEOPLASM OF BREAST: ICD-10-CM

## 2024-03-28 DIAGNOSIS — E11.42 TYPE 2 DIABETES MELLITUS WITH DIABETIC POLYNEUROPATHY, WITHOUT LONG-TERM CURRENT USE OF INSULIN: Chronic | ICD-10-CM

## 2024-03-28 DIAGNOSIS — E66.01 CLASS 2 SEVERE OBESITY DUE TO EXCESS CALORIES WITH SERIOUS COMORBIDITY AND BODY MASS INDEX (BMI) OF 39.0 TO 39.9 IN ADULT: ICD-10-CM

## 2024-03-28 PROCEDURE — 99999 PR PBB SHADOW E&M-EST. PATIENT-LVL IV: CPT | Mod: PBBFAC,,, | Performed by: INTERNAL MEDICINE

## 2024-03-28 PROCEDURE — 1159F MED LIST DOCD IN RCRD: CPT | Mod: CPTII,S$GLB,, | Performed by: INTERNAL MEDICINE

## 2024-03-28 PROCEDURE — 3044F HG A1C LEVEL LT 7.0%: CPT | Mod: CPTII,S$GLB,, | Performed by: INTERNAL MEDICINE

## 2024-03-28 PROCEDURE — 3075F SYST BP GE 130 - 139MM HG: CPT | Mod: CPTII,S$GLB,, | Performed by: INTERNAL MEDICINE

## 2024-03-28 PROCEDURE — 99214 OFFICE O/P EST MOD 30 MIN: CPT | Mod: S$GLB,,, | Performed by: INTERNAL MEDICINE

## 2024-03-28 PROCEDURE — 3008F BODY MASS INDEX DOCD: CPT | Mod: CPTII,S$GLB,, | Performed by: INTERNAL MEDICINE

## 2024-03-28 PROCEDURE — 3078F DIAST BP <80 MM HG: CPT | Mod: CPTII,S$GLB,, | Performed by: INTERNAL MEDICINE

## 2024-03-28 RX ORDER — ROSUVASTATIN CALCIUM 20 MG/1
20 TABLET, COATED ORAL DAILY
Qty: 90 TABLET | Refills: 1 | Status: SHIPPED | OUTPATIENT
Start: 2024-03-28 | End: 2025-03-28

## 2024-03-28 RX ORDER — FENOFIBRATE 160 MG/1
160 TABLET ORAL DAILY
Qty: 90 TABLET | Refills: 3 | Status: SHIPPED | OUTPATIENT
Start: 2024-03-28 | End: 2025-03-28

## 2024-03-28 NOTE — PROGRESS NOTES
[facility] Note    Chief Complaint    No chief complaint on file.    History of Present Illness      Enriqueta Mccarthy is a 48 y.o. female who presents today for chief complaint follow up chronic issues . Patient previously seen by me        HPI   Last seen 10.23 annual, labs last week : ldl and tg  DM 7.7->6.9->6.1, she made diet changes, trulicity 3, metformin 500  bid,   HLD: last ldl 17 on vascepa and crestor 40  Was on lipitor and vascepa, stopped vascepa: LDL 23 AND   - plan change to lipitort qod and start fenofiobrate stop vascep   HTN at goal on amodipine 10, and [id number] and valsartan 320, checks at home shes in [place] HTN she enjoys   Due mmg pap - declines both   LAST MMG 10.21, declined 2022  PAP 2020  Cologuard 3.22    Active Problem List with Overview Notes    Diagnosis Date Noted    Diabetic nephropathy associated with type 2 diabetes mellitus 10/05/2023    Tendonitis of elbow, right 07/27/2022    Primary osteoarthritis of left knee 04/13/2021    Primary osteoarthritis of right knee 04/13/2021    Quadriceps weakness 04/13/2021    Right hip pain 04/06/2021    Knee pain 04/06/2021    Pityriasis 02/11/2021    Frequent urination 08/11/2020    Hair loss 08/11/2020    Class 2 severe obesity with serious comorbidity and body mass index (BMI) of 39.0 to 39.9 in adult 08/01/2019    Type 2 diabetes mellitus with diabetic polyneuropathy, without long-term current use of insulin 11/17/2016    Hypertriglyceridemia 11/17/2016    Dyslipidemia 04/05/2016    Vitamin D deficiency 09/22/2014    Essential hypertension        Health Maintenance   Topic Date Due    Foot Exam  08/26/2022    Mammogram  10/14/2022    Eye Exam  07/18/2023    Hemoglobin A1c  09/19/2024    Lipid Panel  03/19/2025    Colorectal Cancer Screening  03/22/2025    Low Dose Statin  03/28/2025    TETANUS VACCINE  02/11/2031    Hepatitis C Screening  Completed       Past Medical History:   Diagnosis Date    Allergic conjunctivitis  of both eyes     Anemia     Arthritis     Diabetic nephropathy associated with type 2 diabetes mellitus 10/5/2023    Diabetic retinopathy     Essential hypertension     Herpes simplex virus (HSV) infection     Hypertension     Hypertriglyceridemia 2016    Joint pain     Neuropathy     Obesity (BMI 30-39.9) 2016    Type 2 diabetes mellitus with diabetic polyneuropathy, without long-term current use of insulin 2016    Uterine fibroid        Past Surgical History:   Procedure Laterality Date     SECTION      x 2    CHOLECYSTECTOMY  2016 ?    ENDOMETRIAL ABLATION  2016    GALLBLADDER SURGERY  2017    removed    LIPOMA RESECTION Left 2013    SINUS SURGERY      TONSILLECTOMY, ADENOIDECTOMY      TUBAL LIGATION         family history includes Anemia in her daughter and son; Diabetes in her father, maternal grandmother, and maternal uncle; Hyperlipidemia in her father; Hypertension in her father, maternal grandmother, and mother; Kidney disease in her daughter; Stroke in her maternal grandmother.    Social History     Tobacco Use    Smoking status: Never    Smokeless tobacco: Never   Substance Use Topics    Alcohol use: No    Drug use: No       Review of Systems   Constitutional:  Negative for chills, fever, malaise/fatigue and weight loss.   Respiratory:  Negative for cough, sputum production, shortness of breath and wheezing.    Cardiovascular:  Negative for chest pain, palpitations, orthopnea and leg swelling.   Gastrointestinal:  Negative for constipation, diarrhea, nausea and vomiting.   Genitourinary:  Negative for dysuria, frequency, hematuria and urgency.        Outpatient Encounter Medications as of 3/28/2024   Medication Sig Note Dispense Refill    amLODIPine (NORVASC) 10 MG tablet Take 1 tablet (10 mg total) by mouth once daily.  90 tablet 1    carvediloL (COREG) 25 MG tablet TAKE 1 TABLET BY MOUTH TWICE A DAY  180 tablet 3    dulaglutide (TRULICITY) 3 mg/0.5 mL pen injector  Inject 3 mg into the skin every 7 days.  12 pen 3    ergocalciferol (VITAMIN D2) 50,000 unit Cap Take 1 capsule (50,000 Units total) by mouth every 7 days.  12 capsule 2    fluticasone propionate (FLONASE) 50 mcg/actuation nasal spray 1 spray (50 mcg total) in Each Nostril route once daily.  16 g 6    ketoconazole (NIZORAL) 2 % shampoo Wash hair with medicated shampoo at least 2x/week - let sit on scalp at least 5 minutes prior to rinsing  120 mL 5    metFORMIN (GLUCOPHAGE) 1000 MG tablet Take 1 tablet (1,000 mg total) by mouth 2 (two) times daily with meals.  180 tablet 3    omeprazole (PRILOSEC) 40 MG capsule Take 1 capsule (40 mg total) by mouth once daily.  30 capsule 11    triamcinolone acetonide 0.1% (KENALOG) 0.1 % cream Apply topically 2 (two) times daily. 8/26/2021: Use as needed 45 g 0    valsartan (DIOVAN) 320 MG tablet Take 1 tablet (320 mg total) by mouth once daily.  90 tablet 3    [DISCONTINUED] icosapent ethyL (VASCEPA) 1 gram Cap Take 2 capsules (2 g total) by mouth 2 (two) times daily.  120 capsule 5    [DISCONTINUED] metFORMIN (GLUCOPHAGE) 1000 MG tablet Take 1 tablet (1,000 mg total) by mouth 2 (two) times daily with meals.  180 tablet 3    [DISCONTINUED] rosuvastatin (CRESTOR) 20 MG tablet Take 1 tablet (20 mg total) by mouth once daily.  90 tablet 1    aspirin (ECOTRIN) 81 MG EC tablet Take 1 tablet (81 mg total) by mouth once daily.   0    azelastine (ASTELIN) 137 mcg (0.1 %) nasal spray 1 spray (137 mcg total) by Nasal route 2 (two) times daily.  30 mL 6    cholestyramine-aspartame (CHOLESTYRAMINE LIGHT) 4 gram PwPk Dissolve 1 packet (4 g total) in liquid and take by mouth 2 (two) times daily.  180 packet 0    fenofibrate 160 MG Tab Take 1 tablet (160 mg total) by mouth once daily.  90 tablet 3    rosuvastatin (CRESTOR) 20 MG tablet Take 1 tablet (20 mg total) by mouth once daily.  90 tablet 1    [DISCONTINUED] diltiaZEM (CARDIZEM CD) 180 MG 24 hr capsule Take 2 capsules (360 mg total) by  "mouth once daily.  180 capsule 3    [DISCONTINUED] dulaglutide (TRULICITY) 3 mg/0.5 mL pen injector Inject 3 mg into the skin every 7 days.  12 pen 3     No facility-administered encounter medications on file as of 3/28/2024.        Review of patient's allergies indicates:   Allergen Reactions    No known allergies            Physical Exam      Vital Signs  Pulse: 78  SpO2: 98 %  BP: 134/76  BP Location: Right arm  Patient Position: Sitting  Height and Weight  Height: 5' 2" (157.5 cm)  Weight: 99.1 kg (218 lb 7.6 oz)  BSA (Calculated - sq m): 2.08 sq meters  BMI (Calculated): 39.9  Weight in (lb) to have BMI = 25: 136.4]    Physical Exam  Vitals reviewed.   Constitutional:       General: She is not in acute distress.     Appearance: She is well-developed. She is not diaphoretic.   HENT:      Head: Normocephalic and atraumatic.      Right Ear: External ear normal.      Left Ear: External ear normal.      Nose: Nose normal.   Eyes:      General:         Right eye: No discharge.         Left eye: No discharge.      Conjunctiva/sclera: Conjunctivae normal.   Pulmonary:      Effort: Pulmonary effort is normal. No respiratory distress.   Musculoskeletal:         General: Normal range of motion.      Cervical back: Normal range of motion.   Skin:     Coloration: Skin is not pale.      Findings: No rash.   Neurological:      Mental Status: She is alert and oriented to person, place, and time.   Psychiatric:         Behavior: Behavior normal.         Thought Content: Thought content normal.          Laboratory:  CBC:  No results for input(s): "WBC", "RBC", "HGB", "HCT", "PLT", "MCV", "MCH", "MCHC" in the last 2160 hours.  CMP:  No results for input(s): "GLU", "CALCIUM", "ALBUMIN", "PROT", "NA", "K", "CO2", "CL", "BUN", "ALKPHOS", "ALT", "AST", "BILITOT" in the last 2160 hours.    Invalid input(s): "CREATININ"  URINALYSIS:  No results for input(s): "COLORU", "CLARITYU", "SPECGRAV", "PHUR", "PROTEINUA", "GLUCOSEU", " ""BILIRUBINCON", "BLOODU", "WBCU", "RBCU", "BACTERIA", "MUCUS", "NITRITE", "LEUKOCYTESUR", "UROBILINOGEN", "HYALINECASTS" in the last 2160 hours.   LIPIDS:  Recent Labs   Lab Result Units 03/19/24  0818   HDL mg/dL 32*   Cholesterol mg/dL 105*   Triglycerides mg/dL 246*   LDL Cholesterol mg/dL 23.8*   HDL/Cholesterol Ratio % 30.5   Non-HDL Cholesterol mg/dL 73   Total Cholesterol/HDL Ratio  3.3     TSH:  No results for input(s): "TSH" in the last 2160 hours.  A1C:  Recent Labs   Lab Result Units 03/19/24  0818   Hemoglobin A1C % 6.1*       Radiology:      Assessment/Plan     Enriqueta Mccarthy is a 48 y.o.female with:    1. Hypertriglyceridemia  -     fenofibrate 160 MG Tab; Take 1 tablet (160 mg total) by mouth once daily.  Dispense: 90 tablet; Refill: 3    2. Dyslipidemia  -     rosuvastatin (CRESTOR) 20 MG tablet; Take 1 tablet (20 mg total) by mouth once daily.  Dispense: 90 tablet; Refill: 1  -     Lipid Panel; Future; Expected date: 03/28/2024    3. Type 2 diabetes mellitus with diabetic polyneuropathy, without long-term current use of insulin  -     Comprehensive Metabolic Panel; Future; Expected date: 03/28/2024  -     Microalbumin/Creatinine Ratio, Urine; Future  -     Hemoglobin A1C; Future; Expected date: 03/28/2024  -     CBC Without Differential; Future; Expected date: 03/28/2024    4. Class 2 severe obesity due to excess calories with serious comorbidity and body mass index (BMI) of 39.0 to 39.9 in adult  Assessment & Plan:  discussed      5. Encounter for screening mammogram for malignant neoplasm of breast  -     Mammo Digital Screening Bilat w/ [place]; Future; Expected date: 03/28/2024         Use of the [employer] Patient Portal discussed and encouraged during today's visit  -Continue current medications and maintain follow up with specialists.  Return to clinic in 6 mos annual .  Future Appointments   Date Time Provider Department Center   10/21/2024  9:40 AM Edilma Marquis MD [facility] [place] "       Edilma Marquis MD  3/28/2024 8:48 AM    Primary Care Internal Medicine

## 2024-03-28 NOTE — PATIENT INSTRUCTIONS
plan for triglycerides change crestor to 10 mg daily(half tab) and start fenofiobrate stop vascep a

## 2024-07-10 ENCOUNTER — PATIENT MESSAGE (OUTPATIENT)
Dept: GASTROENTEROLOGY | Facility: CLINIC | Age: 49
End: 2024-07-10
Payer: COMMERCIAL

## 2024-07-11 ENCOUNTER — TELEPHONE (OUTPATIENT)
Dept: GASTROENTEROLOGY | Facility: CLINIC | Age: 49
End: 2024-07-11
Payer: COMMERCIAL

## 2024-07-11 DIAGNOSIS — R11.2 NAUSEA AND VOMITING, UNSPECIFIED VOMITING TYPE: Primary | ICD-10-CM

## 2024-07-17 ENCOUNTER — PATIENT MESSAGE (OUTPATIENT)
Dept: PRIMARY CARE CLINIC | Facility: CLINIC | Age: 49
End: 2024-07-17
Payer: COMMERCIAL

## 2024-07-22 DIAGNOSIS — I10 ESSENTIAL HYPERTENSION: Chronic | ICD-10-CM

## 2024-07-22 RX ORDER — VALSARTAN 320 MG/1
320 TABLET ORAL DAILY
Qty: 90 TABLET | Refills: 0 | Status: SHIPPED | OUTPATIENT
Start: 2024-07-22

## 2024-07-22 NOTE — TELEPHONE ENCOUNTER
Care Due:                  Date            Visit Type   Department     Provider  --------------------------------------------------------------------------------                                EP -                              PRIMARY      [facility] PRIMARY  Last Visit: 03-      CARE ([facility])   HANNAH Marquis                              EP -                              PRIMARY      [facility] PRIMARY  Next Visit: 10-      CARE ([facility])   HANNAH Marquis                                                            Last  Test          Frequency    Reason                     Performed    Due Date  --------------------------------------------------------------------------------    CBC.........  12 months..  fenofibrate..............  09-   09-    CMP.........  12 months..  cholestyramine-aspartame,   09-   09-                             ergocalciferol,                             fenofibrate, metFORMIN,                             rosuvastatin, valsartan..    HBA1C.......  6 months...  dulaglutide, metFORMIN...  03-   09-    [facility] Embedded Care Due Messages. Reference number: [id number].   7/22/2024 6:40:30 AM CDT

## 2024-07-22 NOTE — TELEPHONE ENCOUNTER
Refill Decision Note   Enriqueta Mccarthy  is requesting a refill authorization.  Brief Assessment and Rationale for Refill:  Approve     Medication Therapy Plan: FLOS      Comments:     Note composed:9:35 AM 07/22/2024

## 2024-08-08 ENCOUNTER — PATIENT MESSAGE (OUTPATIENT)
Dept: GASTROENTEROLOGY | Facility: CLINIC | Age: 49
End: 2024-08-08
Payer: COMMERCIAL

## 2024-08-19 ENCOUNTER — PATIENT MESSAGE (OUTPATIENT)
Dept: ADMINISTRATIVE | Facility: HOSPITAL | Age: 49
End: 2024-08-19
Payer: COMMERCIAL

## 2024-08-19 ENCOUNTER — PATIENT MESSAGE (OUTPATIENT)
Dept: GASTROENTEROLOGY | Facility: CLINIC | Age: 49
End: 2024-08-19
Payer: COMMERCIAL

## 2024-08-20 ENCOUNTER — PATIENT MESSAGE (OUTPATIENT)
Dept: ENDOSCOPY | Facility: HOSPITAL | Age: 49
End: 2024-08-20
Payer: COMMERCIAL

## 2024-08-20 ENCOUNTER — TELEPHONE (OUTPATIENT)
Dept: ENDOSCOPY | Facility: HOSPITAL | Age: 49
End: 2024-08-20
Payer: COMMERCIAL

## 2024-08-20 NOTE — TELEPHONE ENCOUNTER
Contacted Pt for Endoscopy precall to confirm scheduled procedure(s) Upper Endoscopy (EGD) on 8/27/24. Pt did not answer. Left voicemail for pt to call Endoscopy Scheduling to confirm.

## 2024-08-21 ENCOUNTER — TELEPHONE (OUTPATIENT)
Dept: ENDOSCOPY | Facility: HOSPITAL | Age: 49
End: 2024-08-21
Payer: COMMERCIAL

## 2024-08-21 NOTE — TELEPHONE ENCOUNTER
Spoke to pt for pre-call to confirm scheduled Upper Endoscopy (EGD) and patient verbalized understanding of the following:       Date of Procedure (s)  verified 8/27/24  Arrival Time 8:30 AM verified.  Location of Procedure(s) [address] verified.  NPO status reinforced. Ok to continue clear liquids up until 4 hours prior to the Endoscopy procedure.   Confirmed Pt is currently taking Trulicity (Dulaglutide), Pt instructed to stop stop taking Trulicity (Dulaglutide) on 8/16/24.     Pt confirmed receipt of prep instructions and Rx prep (if applicable).  Instructions provided to patient via [facility]  Pt confirmed ride home after procedure if procedure requires anesthesia.   Pre-call screening questionnaire reviewed and completed with patient.   Appointment details are tentative, including check-in time.  If the patient begins taking any blood thinning medications, injectable weight loss/diabetes medications (other than insulin), or Adipex (phentermine) patient was instructed to contact the endoscopy scheduling department as soon as possible.  Patient was advised to call the endoscopy scheduling department if any questions or concerns arise.       [facility] Endoscopy Scheduling Department

## 2024-08-27 ENCOUNTER — HOSPITAL ENCOUNTER (OUTPATIENT)
Facility: HOSPITAL | Age: 49
Discharge: HOME | End: 2024-08-27
Attending: INTERNAL MEDICINE | Admitting: INTERNAL MEDICINE
Payer: COMMERCIAL

## 2024-08-27 ENCOUNTER — ANESTHESIA EVENT (OUTPATIENT)
Dept: ENDOSCOPY | Facility: HOSPITAL | Age: 49
End: 2024-08-27
Payer: COMMERCIAL

## 2024-08-27 ENCOUNTER — ANESTHESIA (OUTPATIENT)
Dept: ENDOSCOPY | Facility: HOSPITAL | Age: 49
End: 2024-08-27
Payer: COMMERCIAL

## 2024-08-27 VITALS
SYSTOLIC BLOOD PRESSURE: 123 MMHG | DIASTOLIC BLOOD PRESSURE: 76 MMHG | RESPIRATION RATE: 14 BRPM | HEIGHT: 62 IN | OXYGEN SATURATION: 97 % | BODY MASS INDEX: 40.85 KG/M2 | TEMPERATURE: 98 F | WEIGHT: 222 LBS | HEART RATE: 70 BPM

## 2024-08-27 DIAGNOSIS — R10.9 ABDOMINAL PAIN: ICD-10-CM

## 2024-08-27 LAB — POCT GLUCOSE: 192 MG/DL (ref 70–110)

## 2024-08-27 PROCEDURE — 43239 EGD BIOPSY SINGLE/MULTIPLE: CPT | Performed by: INTERNAL MEDICINE

## 2024-08-27 PROCEDURE — 25000003 PHARM REV CODE 250: Performed by: INTERNAL MEDICINE

## 2024-08-27 PROCEDURE — 88305 TISSUE EXAM BY PATHOLOGIST: CPT | Performed by: STUDENT IN AN ORGANIZED HEALTH CARE EDUCATION/TRAINING PROGRAM

## 2024-08-27 PROCEDURE — 43239 EGD BIOPSY SINGLE/MULTIPLE: CPT | Mod: ,,, | Performed by: INTERNAL MEDICINE

## 2024-08-27 PROCEDURE — 37000009 HC ANESTHESIA EA ADD 15 MINS: Performed by: INTERNAL MEDICINE

## 2024-08-27 PROCEDURE — 88342 IMHCHEM/IMCYTCHM 1ST ANTB: CPT | Mod: 26,,, | Performed by: STUDENT IN AN ORGANIZED HEALTH CARE EDUCATION/TRAINING PROGRAM

## 2024-08-27 PROCEDURE — 88305 TISSUE EXAM BY PATHOLOGIST: CPT | Mod: 26,,, | Performed by: STUDENT IN AN ORGANIZED HEALTH CARE EDUCATION/TRAINING PROGRAM

## 2024-08-27 PROCEDURE — 37000008 HC ANESTHESIA 1ST 15 MINUTES: Performed by: INTERNAL MEDICINE

## 2024-08-27 PROCEDURE — 88342 IMHCHEM/IMCYTCHM 1ST ANTB: CPT | Mod: 59 | Performed by: STUDENT IN AN ORGANIZED HEALTH CARE EDUCATION/TRAINING PROGRAM

## 2024-08-27 PROCEDURE — 82962 GLUCOSE BLOOD TEST: CPT | Performed by: INTERNAL MEDICINE

## 2024-08-27 PROCEDURE — 27201012 HC FORCEPS, HOT/COLD, DISP: Performed by: INTERNAL MEDICINE

## 2024-08-27 PROCEDURE — 63600175 PHARM REV CODE 636 W HCPCS: Performed by: NURSE ANESTHETIST, CERTIFIED REGISTERED

## 2024-08-27 PROCEDURE — 25000003 PHARM REV CODE 250: Performed by: NURSE ANESTHETIST, CERTIFIED REGISTERED

## 2024-08-27 RX ORDER — LIDOCAINE HYDROCHLORIDE 20 MG/ML
INJECTION, SOLUTION EPIDURAL; INFILTRATION; INTRACAUDAL; PERINEURAL
Status: DISCONTINUED | OUTPATIENT
Start: 2024-08-27 | End: 2024-08-27

## 2024-08-27 RX ORDER — SODIUM CHLORIDE 9 MG/ML
INJECTION, SOLUTION INTRAVENOUS CONTINUOUS
Status: DISCONTINUED | OUTPATIENT
Start: 2024-08-27 | End: 2024-08-27 | Stop reason: HOSPADM

## 2024-08-27 RX ORDER — PROPOFOL 10 MG/ML
VIAL (ML) INTRAVENOUS
Status: DISCONTINUED | OUTPATIENT
Start: 2024-08-27 | End: 2024-08-27

## 2024-08-27 RX ADMIN — SODIUM CHLORIDE: 9 INJECTION, SOLUTION INTRAVENOUS at 09:08

## 2024-08-27 RX ADMIN — PROPOFOL 40 MG: 10 INJECTION, EMULSION INTRAVENOUS at 09:08

## 2024-08-27 RX ADMIN — PROPOFOL 100 MG: 10 INJECTION, EMULSION INTRAVENOUS at 09:08

## 2024-08-27 RX ADMIN — LIDOCAINE HYDROCHLORIDE 5 ML: 20 INJECTION, SOLUTION EPIDURAL; INFILTRATION; INTRACAUDAL; PERINEURAL at 09:08

## 2024-08-27 NOTE — TRANSFER OF CARE
"Anesthesia Transfer of Care Note    Patient: Enriqueta Mccarthy    Procedure(s) Performed: Procedure(s) (LRB):  EGD (ESOPHAGOGASTRODUODENOSCOPY) (N/A)    Patient location: GI    Anesthesia Type: general    Transport from OR: Transported from OR on room air with adequate spontaneous ventilation    Post pain: adequate analgesia    Post assessment: no apparent anesthetic complications and tolerated procedure well    Post vital signs: stable    Level of consciousness: awake and alert    Nausea/Vomiting: no nausea/vomiting    Complications: none    Transfer of care protocol was followed      Last vitals: Visit Vitals  /63   Pulse 91   Temp 36.5 °C (97.7 °F)   Resp 18   Ht 5' 2" (1.575 m)   Wt 100.7 kg (222 lb)   SpO2 100%   Breastfeeding No   BMI 40.60 kg/m²     "

## 2024-08-27 NOTE — [employer] PATIENT INSTRUCTIONS
Discharge Summary/Instructions after an Endoscopic Procedure  Patient Name: Enriqueta Mccarthy  Patient MRN: [id number]  Patient YOB: 1975 Tuesday, August 27, 2024  Stef Barrios MD  Dear patient,  As a result of recent federal legislation (The Federal Cures Act), you may   receive lab or pathology results from your procedure in your [facility]   account before your physician is able to contact you. Your physician or   their representative will relay the results to you with their   recommendations at their soonest availability.  Thank you,  Your health is very important to us during the Covid Crisis. Following your   procedure today, you will receive a daily text for 2 weeks asking about   signs or symptoms of Covid 19.  Please respond to this text when you   receive it so we can follow up and keep you as safe as possible.   RESTRICTIONS:  During your procedure today, you received medications for sedation.  These   medications may affect your judgment, balance and coordination.  Therefore,   for 24 hours, you have the following restrictions:   - DO NOT drive a car, operate machinery, make legal/financial decisions,   sign important papers or drink alcohol.    ACTIVITY:  Today: no heavy lifting, straining or running due to procedural   sedation/anesthesia.  The following day: return to full activity including work.  DIET:  Eat and drink normally unless instructed otherwise.     TREATMENT FOR COMMON SIDE EFFECTS:  - Mild abdominal pain, nausea, belching, bloating or excessive gas:  rest,   eat lightly and use a heating pad.  - Sore Throat: treat with throat lozenges and/or gargle with warm salt   water.  - Because air was used during the procedure, expelling large amounts of air   from your rectum or belching is normal.  - If a bowel prep was taken, you may not have a bowel movement for 1-3 days.    This is normal.  SYMPTOMS TO WATCH FOR AND REPORT TO YOUR PHYSICIAN:  1. Abdominal pain or bloating, other  than gas cramps.  2. Chest pain.  3. Back pain.  4. Signs of infection such as: chills or fever occurring within 24 hours   after the procedure.  5. Rectal bleeding, which would show as bright red, maroon, or black stools.   (A tablespoon of blood from the rectum is not serious, especially if   hemorrhoids are present.)  6. Vomiting.  7. Weakness or dizziness.  GO DIRECTLY TO THE NEAREST EMERGENCY ROOM IF YOU HAVE ANY OF THE FOLLOWING:      Difficulty breathing              Chills and/or fever over 101 F   Persistent vomiting and/or vomiting blood   Severe abdominal pain   Severe chest pain   Black, tarry stools   Bleeding- more than one tablespoon   Any other symptom or condition that you feel may need urgent attention  Your doctor recommends these additional instructions:  If any biopsies were taken, your doctors clinic will contact you in 1 to 2   weeks with any results.  - Discharge patient to home.   - Resume previous diet.   - Continue present medications.   - Await pathology results.   - Return to GI office PRN.  For questions, problems or results please call your physician - Stef Barrios MD.  EMERGENCY PHONE NUMBER: [phone],  LAB RESULTS: [phone]  IF A COMPLICATION OR EMERGENCY SITUATION ARISES AND YOU ARE UNABLE TO REACH   YOUR PHYSICIAN - GO DIRECTLY TO THE EMERGENCY ROOM.  Stef Barrios MD  8/27/2024 9:38:10 AM  This report has been verified and signed electronically.  Dear patient,  As a result of recent federal legislation (The Federal Cures Act), you may   receive lab or pathology results from your procedure in your [facility]   account before your physician is able to contact you. Your physician or   their representative will relay the results to you with their   recommendations at their soonest availability.  Thank you,  [employer]

## 2024-08-27 NOTE — ANESTHESIA POSTPROCEDURE EVALUATION
Anesthesia Post Evaluation    Patient: Enriqueta Mccarthy    Procedure(s) Performed: Procedure(s) (LRB):  EGD (ESOPHAGOGASTRODUODENOSCOPY) (N/A)    Final Anesthesia Type: general      Patient location during evaluation: GI PACU  Patient participation: Yes- Able to Participate  Level of consciousness: awake and alert, oriented and awake  Post-procedure vital signs: reviewed and stable  Pain management: adequate  Airway patency: patent  RHYS mitigation strategies: Multimodal analgesia  PONV status at discharge: No PONV  Anesthetic complications: no      Cardiovascular status: blood pressure returned to baseline and hemodynamically stable  Respiratory status: unassisted and spontaneous ventilation  Hydration status: euvolemic  Follow-up not needed.              Vitals Value Taken Time   /76 08/27/24 1010   Temp 36.6 °C (97.9 °F) 08/27/24 0940   Pulse 70 08/27/24 1010   Resp 14 08/27/24 1010   SpO2 97 % 08/27/24 1010         Event Time   Out of Recovery 10:12:35         Pain/Becka Score: Becka Score: 10 (8/27/2024 10:10 AM)

## 2024-08-27 NOTE — H&P
Short Stay Endoscopy History and Physical    PCP - Edilma Marquis MD    Procedure - EGD  ASA - III  Mallampati - per anesthesia  History of Anesthesia problems - no  Family history Anesthesia problems - no     HPI:  This is a 48 y.o. female here for evaluation of : N/V    ROS:  Constitutional: No fevers, chills, No weight loss  ENT: No allergies  CV: No chest pain  Pulm: No shortness of breath  GI: see HPI  Derm: No rash    Medical History:  has a past medical history of Allergic conjunctivitis of both eyes, Anemia, Arthritis, Diabetic nephropathy associated with type 2 diabetes mellitus (10/5/2023), Diabetic retinopathy, Essential hypertension, Herpes simplex virus (HSV) infection, Hypertension, Hypertriglyceridemia (2016), Joint pain, Neuropathy, Obesity (BMI 30-39.9) (2016), Type 2 diabetes mellitus with diabetic polyneuropathy, without long-term current use of insulin (2016), and Uterine fibroid.    Surgical History:  has a past surgical history that includes Sinus surgery ();  section; Tubal ligation; Endometrial ablation (2016); TONSILLECTOMY, ADENOIDECTOMY; Lipoma resection (Left, 2013); Gallbladder surgery (2017); and Cholecystectomy (2016 ?).    Family History: family history includes Anemia in her daughter and son; Diabetes in her father, maternal grandmother, and maternal uncle; Hyperlipidemia in her father; Hypertension in her father, maternal grandmother, and mother; Kidney disease in her daughter; Stroke in her maternal grandmother.. Otherwise no colon cancer, inflammatory bowel disease, or GI malignancies.    Social History:  reports that she has never smoked. She has never used smokeless tobacco. She reports that she does not drink alcohol and does not use drugs.    Review of patient's allergies indicates:   Allergen Reactions    No known allergies        Medications:   Medications Prior to Admission   Medication Sig Dispense Refill Last Dose    amLODIPine  (NORVASC) 10 MG tablet Take 1 tablet (10 mg total) by mouth once daily. 90 tablet 1     aspirin (ECOTRIN) 81 MG EC tablet Take 1 tablet (81 mg total) by mouth once daily.  0     azelastine (ASTELIN) 137 mcg (0.1 %) nasal spray 1 spray (137 mcg total) by Nasal route 2 (two) times daily. 30 mL 6     carvediloL (COREG) 25 MG tablet TAKE 1 TABLET BY MOUTH TWICE A  tablet 3     cholestyramine-aspartame (CHOLESTYRAMINE LIGHT) 4 gram PwPk Take 1 packet (4 g total) by mouth as needed (gallstones).       dulaglutide (TRULICITY) 3 mg/0.5 mL pen injector Inject 3 mg into the skin every 7 days. 12 pen 3     ergocalciferol (VITAMIN D2) 50,000 unit Cap Take 1 capsule (50,000 Units total) by mouth every 7 days. 12 capsule 2     fenofibrate 160 MG Tab Take 1 tablet (160 mg total) by mouth once daily. 90 tablet 3     fluticasone propionate (FLONASE) 50 mcg/actuation nasal spray 1 spray (50 mcg total) in Each Nostril route once daily. 16 g 6     ketoconazole (NIZORAL) 2 % shampoo Wash hair with medicated shampoo at least 2x/week - let sit on scalp at least 5 minutes prior to rinsing 120 mL 5     metFORMIN (GLUCOPHAGE) 1000 MG tablet Take 1 tablet (1,000 mg total) by mouth 2 (two) times daily with meals. 180 tablet 3     omeprazole (PRILOSEC) 40 MG capsule Take 1 capsule (40 mg total) by mouth once daily. 30 capsule 11     rosuvastatin (CRESTOR) 20 MG tablet Take 1 tablet (20 mg total) by mouth once daily. 90 tablet 1     triamcinolone acetonide 0.1% (KENALOG) 0.1 % cream Apply topically 2 (two) times daily. 45 g 0     valsartan (DIOVAN) 320 MG tablet Take 1 tablet (320 mg total) by mouth once daily. 90 tablet 0          Objective Findings:    Vital Signs: see nursing notes  Physical Exam:  General Appearance: Well appearing in no acute distress  Eyes:    No scleral icterus  ENT: Neck supple  Lungs: CTA anteriorly  Heart:  S1, S2 normal, no murmurs heard  Abdomen: Soft, non tender, non distended with positive bowel sounds. No  hepatosplenomegaly, ascites, or mass  Extremities: no edema  Skin: No rash      Labs:  Lab Results   Component Value Date    WBC 6.09 09/21/2023    HGB 11.4 (L) 09/21/2023    HCT 32.6 (L) 09/21/2023     09/21/2023    CHOL 105 (L) 03/19/2024    TRIG 246 (H) 03/19/2024    HDL 32 (L) 03/19/2024    ALT 17 09/21/2023    AST 16 09/21/2023     09/21/2023    K 3.8 09/21/2023     09/21/2023    CREATININE 1.2 09/21/2023    BUN 13 09/21/2023    CO2 29 09/21/2023    TSH 0.939 09/21/2023    INR 1.0 05/31/2016    HGBA1C 6.1 (H) 03/19/2024       I have explained the risks and benefits of endoscopy procedures to the patient including but not limited to bleeding, perforation, infection, and death.    Stef Barrios MD

## 2024-08-27 NOTE — ANESTHESIA PREPROCEDURE EVALUATION
08/27/2024  [facility]  Anesthesia Pre-Operative Evaluation         Patient Name: Enriqueta Mccarthy  YOB: 1975  MRN: [id number]    SUBJECTIVE:     Pre-operative evaluation for Procedure(s) (LRB):  EGD (ESOPHAGOGASTRODUODENOSCOPY) (N/A)     08/27/2024    Enriqueta Mccarthy is a 48 y.o. female w/ a significant PMHx of HTN, T2DM, HLD, obesity.  Patient now presents for the above procedure(s).    TTE:   none documented.     Patient Active Problem List   Diagnosis    Essential hypertension    Vitamin D deficiency    Dyslipidemia    Type 2 diabetes mellitus with diabetic polyneuropathy, without long-term current use of insulin    Hypertriglyceridemia    Class 2 severe obesity with serious comorbidity and body mass index (BMI) of 39.0 to 39.9 in adult    Frequent urination    Hair loss    Pityriasis    Right hip pain    Knee pain    Primary osteoarthritis of left knee    Primary osteoarthritis of right knee    Quadriceps weakness    Tendonitis of elbow, right    Diabetic nephropathy associated with type 2 diabetes mellitus       Review of patient's allergies indicates:   Allergen Reactions    No known allergies        Current Inpatient Medications:      No current facility-administered medications on file prior to encounter.     Current Outpatient Medications on File Prior to Encounter   Medication Sig Dispense Refill    amLODIPine (NORVASC) 10 MG tablet Take 1 tablet (10 mg total) by mouth once daily. 90 tablet 1    aspirin (ECOTRIN) 81 MG EC tablet Take 1 tablet (81 mg total) by mouth once daily.  0    azelastine (ASTELIN) 137 mcg (0.1 %) nasal spray 1 spray (137 mcg total) by Nasal route 2 (two) times daily. 30 mL 6    carvediloL (COREG) 25 MG tablet TAKE 1 TABLET BY MOUTH TWICE A  tablet 3    cholestyramine-aspartame (CHOLESTYRAMINE LIGHT) 4 gram PwPk Take 1 packet (4 g total) by  mouth as needed (gallstones).      dulaglutide (TRULICITY) 3 mg/0.5 mL pen injector Inject 3 mg into the skin every 7 days. 12 pen 3    ergocalciferol (VITAMIN D2) 50,000 unit Cap Take 1 capsule (50,000 Units total) by mouth every 7 days. 12 capsule 2    fenofibrate 160 MG Tab Take 1 tablet (160 mg total) by mouth once daily. 90 tablet 3    fluticasone propionate (FLONASE) 50 mcg/actuation nasal spray 1 spray (50 mcg total) in Each Nostril route once daily. 16 g 6    ketoconazole (NIZORAL) 2 % shampoo Wash hair with medicated shampoo at least 2x/week - let sit on scalp at least 5 minutes prior to rinsing 120 mL 5    metFORMIN (GLUCOPHAGE) 1000 MG tablet Take 1 tablet (1,000 mg total) by mouth 2 (two) times daily with meals. 180 tablet 3    omeprazole (PRILOSEC) 40 MG capsule Take 1 capsule (40 mg total) by mouth once daily. 30 capsule 11    rosuvastatin (CRESTOR) 20 MG tablet Take 1 tablet (20 mg total) by mouth once daily. 90 tablet 1    triamcinolone acetonide 0.1% (KENALOG) 0.1 % cream Apply topically 2 (two) times daily. 45 g 0    valsartan (DIOVAN) 320 MG tablet Take 1 tablet (320 mg total) by mouth once daily. 90 tablet 0    [DISCONTINUED] diltiaZEM (CARDIZEM CD) 180 MG 24 hr capsule Take 2 capsules (360 mg total) by mouth once daily. 180 capsule 3       Past Surgical History:   Procedure Laterality Date     SECTION      x 2    CHOLECYSTECTOMY  2016 ?    ENDOMETRIAL ABLATION  2016    GALLBLADDER SURGERY  2017    removed    LIPOMA RESECTION Left 2013    SINUS SURGERY  2006    TONSILLECTOMY, ADENOIDECTOMY      TUBAL LIGATION         OBJECTIVE:     Vital Signs Range (Last 24H):         Significant Labs:  Lab Results   Component Value Date    WBC 6.09 2023    HGB 11.4 (L) 2023    HCT 32.6 (L) 2023     2023    CHOL 105 (L) 2024    TRIG 246 (H) 2024    HDL 32 (L) 2024    ALT 17 2023    AST 16 2023     2023    K 3.8 2023      09/21/2023    CREATININE 1.2 09/21/2023    BUN 13 09/21/2023    CO2 29 09/21/2023    TSH 0.939 09/21/2023    INR 1.0 05/31/2016    HGBA1C 6.1 (H) 03/19/2024       Diagnostic Studies: No relevant studies.    EKG:   No results found. However, due to the size of the patient record, not all encounters were searched. Please check Results Review for a complete set of results.    ASSESSMENT/PLAN:           Pre-op Assessment    I have reviewed the Patient Summary Reports.     I have reviewed the Nursing Notes. I have reviewed the NPO Status.   I have reviewed the Medications.     Review of Systems  Anesthesia Hx:  No problems with previous Anesthesia   History of prior surgery of interest to airway management or planning:  Previous anesthesia: General, MAC         Procedure performed at an [facility].    Denies Personal Hx of Anesthesia complications.                    Social:  Non-Smoker, No Alcohol Use       Hematology/Oncology:       -- Anemia (chronic):                                  EENT/Dental:  EENT/Dental Normal           Cardiovascular:  Exercise tolerance: good   Hypertension, well controlled     Denies Dysrhythmias.   Denies Angina.     hyperlipidemia  Denies REYES.  ECG has been reviewed.                          Pulmonary:       Denies Shortness of breath.                  Renal/:  Renal/ Normal                 Hepatic/GI:     GERD, well controlled             Musculoskeletal:  Arthritis               Neurological:    Neuromuscular Disease,                                   Endocrine:  Diabetes, well controlled, type 2    Diabetes, Type 2 Diabetes   , controlled by oral hypoglycemics.  , most recent HgA1c value was 4.8 on 2017.                    Physical Exam  General: Cooperative, Alert and Oriented    Airway:  Mallampati: II   Mouth Opening: Normal  TM Distance: Normal  Tongue: Normal  Neck ROM: Normal ROM    Dental:  Intact        Anesthesia Plan  Type of Anesthesia, risks & benefits  discussed:    Anesthesia Type: Gen Natural Airway  Intra-op Monitoring Plan: Standard ASA Monitors  Post Op Pain Control Plan: multimodal analgesia  Induction:  IV  Informed Consent: Informed consent signed with the Patient and all parties understand the risks and agree with anesthesia plan.  All questions answered.   ASA Score: 2  Day of Surgery Review of History & Physical: H&P Update referred to the surgeon/provider.    Ready For Surgery From Anesthesia Perspective.     .

## 2024-08-30 LAB
FINAL PATHOLOGIC DIAGNOSIS: NORMAL
GROSS: NORMAL
Lab: NORMAL
MICROSCOPIC EXAM: NORMAL

## 2024-09-06 ENCOUNTER — PATIENT MESSAGE (OUTPATIENT)
Dept: GASTROENTEROLOGY | Facility: CLINIC | Age: 49
End: 2024-09-06
Payer: COMMERCIAL

## 2024-09-06 DIAGNOSIS — R11.2 NAUSEA AND VOMITING, UNSPECIFIED VOMITING TYPE: Primary | ICD-10-CM

## 2024-09-20 ENCOUNTER — LAB VISIT (OUTPATIENT)
Dept: LAB | Facility: HOSPITAL | Age: 49
End: 2024-09-20
Attending: INTERNAL MEDICINE
Payer: COMMERCIAL

## 2024-09-20 DIAGNOSIS — E11.42 TYPE 2 DIABETES MELLITUS WITH DIABETIC POLYNEUROPATHY, WITHOUT LONG-TERM CURRENT USE OF INSULIN: Chronic | ICD-10-CM

## 2024-09-20 DIAGNOSIS — E78.5 DYSLIPIDEMIA: ICD-10-CM

## 2024-09-20 LAB
ALBUMIN SERPL BCP-MCNC: 4.5 G/DL (ref 3.5–5.2)
ALP SERPL-CCNC: 35 U/L (ref 55–135)
ALT SERPL W/O P-5'-P-CCNC: 21 U/L (ref 10–44)
ANION GAP SERPL CALC-SCNC: 9 MMOL/L (ref 8–16)
AST SERPL-CCNC: 18 U/L (ref 10–40)
BILIRUB SERPL-MCNC: 0.7 MG/DL (ref 0.1–1)
BUN SERPL-MCNC: 16 MG/DL (ref 6–20)
CALCIUM SERPL-MCNC: 10.1 MG/DL (ref 8.7–10.5)
CHLORIDE SERPL-SCNC: 105 MMOL/L (ref 95–110)
CHOLEST SERPL-MCNC: 157 MG/DL (ref 120–199)
CHOLEST/HDLC SERPL: 3.8 {RATIO} (ref 2–5)
CO2 SERPL-SCNC: 24 MMOL/L (ref 23–29)
CREAT SERPL-MCNC: 1 MG/DL (ref 0.5–1.4)
ERYTHROCYTE [DISTWIDTH] IN BLOOD BY AUTOMATED COUNT: 13.4 % (ref 11.5–14.5)
EST. GFR  (NO RACE VARIABLE): >60 ML/MIN/1.73 M^2
ESTIMATED AVG GLUCOSE: 137 MG/DL (ref 68–131)
GLUCOSE SERPL-MCNC: 171 MG/DL (ref 70–110)
HBA1C MFR BLD: 6.4 % (ref 4–5.6)
HCT VFR BLD AUTO: 34.2 % (ref 37–48.5)
HDLC SERPL-MCNC: 41 MG/DL (ref 40–75)
HDLC SERPL: 26.1 % (ref 20–50)
HGB BLD-MCNC: 11.9 G/DL (ref 12–16)
LDLC SERPL CALC-MCNC: 81.4 MG/DL (ref 63–159)
MCH RBC QN AUTO: 29.4 PG (ref 27–31)
MCHC RBC AUTO-ENTMCNC: 34.8 G/DL (ref 32–36)
MCV RBC AUTO: 84 FL (ref 82–98)
NONHDLC SERPL-MCNC: 116 MG/DL
PLATELET # BLD AUTO: 220 K/UL (ref 150–450)
PMV BLD AUTO: 10.7 FL (ref 9.2–12.9)
POTASSIUM SERPL-SCNC: 3.9 MMOL/L (ref 3.5–5.1)
PROT SERPL-MCNC: 7.5 G/DL (ref 6–8.4)
RBC # BLD AUTO: 4.05 M/UL (ref 4–5.4)
SODIUM SERPL-SCNC: 138 MMOL/L (ref 136–145)
TRIGL SERPL-MCNC: 173 MG/DL (ref 30–150)
WBC # BLD AUTO: 4.54 K/UL (ref 3.9–12.7)

## 2024-09-20 PROCEDURE — 36415 COLL VENOUS BLD VENIPUNCTURE: CPT | Performed by: INTERNAL MEDICINE

## 2024-09-20 PROCEDURE — 83036 HEMOGLOBIN GLYCOSYLATED A1C: CPT | Performed by: INTERNAL MEDICINE

## 2024-09-20 PROCEDURE — 85027 COMPLETE CBC AUTOMATED: CPT | Performed by: INTERNAL MEDICINE

## 2024-09-20 PROCEDURE — 80053 COMPREHEN METABOLIC PANEL: CPT | Performed by: INTERNAL MEDICINE

## 2024-09-20 PROCEDURE — 80061 LIPID PANEL: CPT | Performed by: INTERNAL MEDICINE

## 2024-09-23 ENCOUNTER — HOSPITAL ENCOUNTER (OUTPATIENT)
Dept: RADIOLOGY | Facility: HOSPITAL | Age: 49
Discharge: HOME | End: 2024-09-23
Attending: INTERNAL MEDICINE
Payer: COMMERCIAL

## 2024-09-23 DIAGNOSIS — Z12.31 ENCOUNTER FOR SCREENING MAMMOGRAM FOR MALIGNANT NEOPLASM OF BREAST: ICD-10-CM

## 2024-09-23 PROCEDURE — 77063 BREAST TOMOSYNTHESIS BI: CPT | Mod: 26,,, | Performed by: RADIOLOGY

## 2024-09-23 PROCEDURE — 77067 SCR MAMMO BI INCL CAD: CPT | Mod: TC

## 2024-09-23 PROCEDURE — 77067 SCR MAMMO BI INCL CAD: CPT | Mod: 26,,, | Performed by: RADIOLOGY

## 2024-09-23 PROCEDURE — 77063 BREAST TOMOSYNTHESIS BI: CPT | Mod: TC

## 2024-09-24 ENCOUNTER — PATIENT MESSAGE (OUTPATIENT)
Dept: PRIMARY CARE CLINIC | Facility: CLINIC | Age: 49
End: 2024-09-24
Payer: COMMERCIAL

## 2024-09-24 ENCOUNTER — PATIENT MESSAGE (OUTPATIENT)
Dept: GASTROENTEROLOGY | Facility: CLINIC | Age: 49
End: 2024-09-24
Payer: COMMERCIAL

## 2024-10-02 ENCOUNTER — PATIENT MESSAGE (OUTPATIENT)
Dept: PRIMARY CARE CLINIC | Facility: CLINIC | Age: 49
End: 2024-10-02
Payer: COMMERCIAL

## 2024-10-09 ENCOUNTER — OFFICE VISIT (OUTPATIENT)
Dept: PRIMARY CARE CLINIC | Facility: CLINIC | Age: 49
End: 2024-10-09
Payer: COMMERCIAL

## 2024-10-09 VITALS
HEIGHT: 62 IN | BODY MASS INDEX: 39.56 KG/M2 | DIASTOLIC BLOOD PRESSURE: 74 MMHG | OXYGEN SATURATION: 98 % | HEART RATE: 78 BPM | SYSTOLIC BLOOD PRESSURE: 120 MMHG | WEIGHT: 214.94 LBS

## 2024-10-09 DIAGNOSIS — K31.84 GASTROPARESIS: ICD-10-CM

## 2024-10-09 DIAGNOSIS — E66.812 CLASS 2 SEVERE OBESITY DUE TO EXCESS CALORIES WITH SERIOUS COMORBIDITY AND BODY MASS INDEX (BMI) OF 39.0 TO 39.9 IN ADULT: ICD-10-CM

## 2024-10-09 DIAGNOSIS — Z00.00 WELLNESS EXAMINATION: Primary | ICD-10-CM

## 2024-10-09 DIAGNOSIS — E78.5 DYSLIPIDEMIA: ICD-10-CM

## 2024-10-09 DIAGNOSIS — E11.42 TYPE 2 DIABETES MELLITUS WITH DIABETIC POLYNEUROPATHY, WITHOUT LONG-TERM CURRENT USE OF INSULIN: Chronic | ICD-10-CM

## 2024-10-09 DIAGNOSIS — E66.01 CLASS 2 SEVERE OBESITY DUE TO EXCESS CALORIES WITH SERIOUS COMORBIDITY AND BODY MASS INDEX (BMI) OF 39.0 TO 39.9 IN ADULT: ICD-10-CM

## 2024-10-09 PROCEDURE — 3044F HG A1C LEVEL LT 7.0%: CPT | Mod: CPTII,S$GLB,, | Performed by: INTERNAL MEDICINE

## 2024-10-09 PROCEDURE — 1159F MED LIST DOCD IN RCRD: CPT | Mod: CPTII,S$GLB,, | Performed by: INTERNAL MEDICINE

## 2024-10-09 PROCEDURE — 3060F POS MICROALBUMINURIA REV: CPT | Mod: CPTII,S$GLB,, | Performed by: INTERNAL MEDICINE

## 2024-10-09 PROCEDURE — 3078F DIAST BP <80 MM HG: CPT | Mod: CPTII,S$GLB,, | Performed by: INTERNAL MEDICINE

## 2024-10-09 PROCEDURE — 3066F NEPHROPATHY DOC TX: CPT | Mod: CPTII,S$GLB,, | Performed by: INTERNAL MEDICINE

## 2024-10-09 PROCEDURE — 99396 PREV VISIT EST AGE 40-64: CPT | Mod: S$GLB,,, | Performed by: INTERNAL MEDICINE

## 2024-10-09 PROCEDURE — 4010F ACE/ARB THERAPY RXD/TAKEN: CPT | Mod: CPTII,S$GLB,, | Performed by: INTERNAL MEDICINE

## 2024-10-09 PROCEDURE — 99999 PR PBB SHADOW E&M-EST. PATIENT-LVL III: CPT | Mod: PBBFAC,,, | Performed by: INTERNAL MEDICINE

## 2024-10-09 PROCEDURE — 3074F SYST BP LT 130 MM HG: CPT | Mod: CPTII,S$GLB,, | Performed by: INTERNAL MEDICINE

## 2024-10-09 PROCEDURE — 3008F BODY MASS INDEX DOCD: CPT | Mod: CPTII,S$GLB,, | Performed by: INTERNAL MEDICINE

## 2024-10-09 RX ORDER — METFORMIN HYDROCHLORIDE 1000 MG/1
1000 TABLET ORAL 2 TIMES DAILY WITH MEALS
Qty: 180 TABLET | Refills: 3 | Status: SHIPPED | OUTPATIENT
Start: 2024-10-09

## 2024-10-09 NOTE — PROGRESS NOTES
[facility] Note    Chief Complaint      Chief Complaint   Patient presents with    Annual Exam     History of Present Illness      Enriqueta Mccarthy is a 48 y.o. female who presents today for chief complaint annual wellness .  HPI   Annual feels well, labs A1c 6.4, HH 11.9/34, LDL 81, , MCAR 148 stable  DM 7.7->6.9->6.1->6.4, she made diet changes, trulicity 3, metformin 500  bid,   HLD: last ldl 17 on vascepa and crestor 20  Was on lipitor QOD and fenofibrate, prev on vascepa, lipids as above   HTN at goal on amodipine 10, and [id number] and valsartan 320, checks at home shes in [place] HTN she enjoys   She had an EGD with Dr Barrios in August 2024- single lesion consistent with aberrant pancreas was found in the stomach. She has known gastroparesis and is having a gastric emptying study     MMG 9.24  PAP 2020  Cologuard 3.22  Active Problem List with Overview Notes    Diagnosis Date Noted    Gastroparesis 10/09/2024    Diabetic nephropathy associated with type 2 diabetes mellitus 10/05/2023    Tendonitis of elbow, right 07/27/2022    Primary osteoarthritis of left knee 04/13/2021    Primary osteoarthritis of right knee 04/13/2021    Quadriceps weakness 04/13/2021    Right hip pain 04/06/2021    Knee pain 04/06/2021    Pityriasis 02/11/2021    Frequent urination 08/11/2020    Hair loss 08/11/2020    Class 2 severe obesity with serious comorbidity and body mass index (BMI) of 39.0 to 39.9 in adult 08/01/2019    Type 2 diabetes mellitus with diabetic polyneuropathy, without long-term current use of insulin 11/17/2016    Hypertriglyceridemia 11/17/2016    Dyslipidemia 04/05/2016    Vitamin D deficiency 09/22/2014    Essential hypertension        Health Maintenance   Topic Date Due    Foot Exam  08/26/2022    Eye Exam  07/18/2023    Hemoglobin A1c  03/20/2025    Colorectal Cancer Screening  03/22/2025    Lipid Panel  09/20/2025    Mammogram  09/23/2025    TETANUS VACCINE  02/11/2031    Hepatitis C  Screening  Completed       Past Medical History:   Diagnosis Date    Allergic conjunctivitis of both eyes     Anemia     Arthritis     Diabetic nephropathy associated with type 2 diabetes mellitus 10/5/2023    Diabetic retinopathy     Essential hypertension     Herpes simplex virus (HSV) infection     Hypertension     Hypertriglyceridemia 2016    Joint pain     Neuropathy     Obesity (BMI 30-39.9) 2016    Type 2 diabetes mellitus with diabetic polyneuropathy, without long-term current use of insulin 2016    Uterine fibroid        Past Surgical History:   Procedure Laterality Date     SECTION      x 2    CHOLECYSTECTOMY  2016 ?    ENDOMETRIAL ABLATION  2016    ESOPHAGOGASTRODUODENOSCOPY N/A 2024    Procedure: EGD (ESOPHAGOGASTRODUODENOSCOPY);  Surgeon: Stef Barrios MD;  Location: [facility];  Service: Endoscopy;  Laterality: N/A;   LVM/ portal message for precall- Trulicity on med list- hold 8 days prior-RB    pt confirmed. last dose trulicity on 24 cf    GALLBLADDER SURGERY  2017    removed    LIPOMA RESECTION Left 2013    SINUS SURGERY  2006    TONSILLECTOMY, ADENOIDECTOMY      TUBAL LIGATION         family history includes Anemia in her daughter and son; Diabetes in her father, maternal grandmother, and maternal uncle; Hyperlipidemia in her father; Hypertension in her father, maternal grandmother, and mother; Kidney disease in her daughter; Stroke in her maternal grandmother.    Social History     Tobacco Use    Smoking status: Never    Smokeless tobacco: Never   Substance Use Topics    Alcohol use: No    Drug use: No       Review of Systems   Constitutional:  Negative for chills, fever, malaise/fatigue and weight loss.   Respiratory:  Negative for cough, sputum production, shortness of breath and wheezing.    Cardiovascular:  Negative for chest pain, palpitations, orthopnea and leg swelling.   Gastrointestinal:  Negative for constipation, diarrhea,  nausea and vomiting.   Genitourinary:  Negative for dysuria, frequency, hematuria and urgency.        Outpatient Encounter Medications as of 10/9/2024   Medication Sig Note Dispense Refill    amLODIPine (NORVASC) 10 MG tablet Take 1 tablet (10 mg total) by mouth once daily.  90 tablet 1    carvediloL (COREG) 25 MG tablet TAKE 1 TABLET BY MOUTH TWICE A DAY  180 tablet 3    cholestyramine-aspartame (CHOLESTYRAMINE LIGHT) 4 gram PwPk Take 1 packet (4 g total) by mouth as needed (gallstones).       dulaglutide (TRULICITY) 3 mg/0.5 mL pen injector Inject 3 mg into the skin every 7 days.  12 pen 3    ergocalciferol (VITAMIN D2) 50,000 unit Cap Take 1 capsule (50,000 Units total) by mouth every 7 days.  12 capsule 2    fenofibrate 160 MG Tab Take 1 tablet (160 mg total) by mouth once daily.  90 tablet 3    fluticasone propionate (FLONASE) 50 mcg/actuation nasal spray 1 spray (50 mcg total) in Each Nostril route once daily.  16 g 6    ketoconazole (NIZORAL) 2 % shampoo Wash hair with medicated shampoo at least 2x/week - let sit on scalp at least 5 minutes prior to rinsing  120 mL 5    omeprazole (PRILOSEC) 40 MG capsule Take 1 capsule (40 mg total) by mouth once daily.  30 capsule 11    rosuvastatin (CRESTOR) 20 MG tablet Take 1 tablet (20 mg total) by mouth once daily.  90 tablet 1    valsartan (DIOVAN) 320 MG tablet Take 1 tablet (320 mg total) by mouth once daily.  90 tablet 0    [DISCONTINUED] metFORMIN (GLUCOPHAGE) 1000 MG tablet Take 1 tablet (1,000 mg total) by mouth 2 (two) times daily with meals.  180 tablet 3    azelastine (ASTELIN) 137 mcg (0.1 %) nasal spray 1 spray (137 mcg total) by Nasal route 2 (two) times daily.  30 mL 6    metFORMIN (GLUCOPHAGE) 1000 MG tablet Take 1 tablet (1,000 mg total) by mouth 2 (two) times daily with meals.  180 tablet 3    [DISCONTINUED] aspirin (ECOTRIN) 81 MG EC tablet Take 1 tablet (81 mg total) by mouth once daily.   0    [DISCONTINUED] diltiaZEM (CARDIZEM CD) 180 MG 24 hr  "capsule Take 2 capsules (360 mg total) by mouth once daily.  180 capsule 3    [DISCONTINUED] triamcinolone acetonide 0.1% (KENALOG) 0.1 % cream Apply topically 2 (two) times daily. 8/26/2021: Use as needed 45 g 0     No facility-administered encounter medications on file as of 10/9/2024.        Review of patient's allergies indicates:   Allergen Reactions    No known allergies            Physical Exam      Vital Signs  Pulse: 78  SpO2: 98 %  BP: 120/74  BP Location: Left arm  Patient Position: Sitting  Height and Weight  Height: 5' 2" (157.5 cm)  Weight: 97.5 kg (214 lb 15.2 oz)  BSA (Calculated - sq m): 2.07 sq meters  BMI (Calculated): 39.3  Weight in (lb) to have BMI = 25: 136.4]    Physical Exam  Vitals reviewed.   Constitutional:       General: She is not in acute distress.     Appearance: She is well-developed. She is not diaphoretic.   HENT:      Head: Normocephalic and atraumatic.      Right Ear: External ear normal.      Left Ear: External ear normal.      Nose: Nose normal.   Eyes:      General:         Right eye: No discharge.         Left eye: No discharge.      Conjunctiva/sclera: Conjunctivae normal.   Cardiovascular:      Rate and Rhythm: Normal rate and regular rhythm.      Heart sounds: Normal heart sounds.   Pulmonary:      Effort: Pulmonary effort is normal. No respiratory distress.      Breath sounds: Normal breath sounds.   Musculoskeletal:         General: Normal range of motion.      Cervical back: Normal range of motion.   Skin:     Coloration: Skin is not pale.      Findings: No rash.   Neurological:      Mental Status: She is alert and oriented to person, place, and time.   Psychiatric:         Behavior: Behavior normal.         Thought Content: Thought content normal.          Laboratory:  CBC:  Recent Labs   Lab Result Units 09/20/24  0748   WBC K/uL 4.54   RBC M/uL 4.05   Hemoglobin g/dL 11.9*   Hematocrit % 34.2*   Platelets K/uL 220   MCV fL 84   MCH pg 29.4   MCHC g/dL 34.8 " "    CMP:  Recent Labs   Lab Result Units 09/20/24  0748   Glucose mg/dL 171*   Calcium mg/dL 10.1   Albumin g/dL 4.5   Total Protein g/dL 7.5   Sodium mmol/L 138   Potassium mmol/L 3.9   CO2 mmol/L 24   Chloride mmol/L 105   BUN mg/dL 16   Alkaline Phosphatase U/L 35*   ALT U/L 21   AST U/L 18   Total Bilirubin mg/dL 0.7     URINALYSIS:  No results for input(s): "COLORU", "CLARITYU", "SPECGRAV", "PHUR", "PROTEINUA", "GLUCOSEU", "BILIRUBINCON", "BLOODU", "WBCU", "RBCU", "BACTERIA", "MUCUS", "NITRITE", "LEUKOCYTESUR", "UROBILINOGEN", "HYALINECASTS" in the last 2160 hours.   LIPIDS:  Recent Labs   Lab Result Units 09/20/24  0748   HDL mg/dL 41   Cholesterol mg/dL 157   Triglycerides mg/dL 173*   LDL Cholesterol mg/dL 81.4   HDL/Cholesterol Ratio % 26.1   Non-HDL Cholesterol mg/dL 116   Total Cholesterol/HDL Ratio  3.8     TSH:  No results for input(s): "TSH" in the last 2160 hours.  A1C:  Recent Labs   Lab Result Units 09/20/24  0748   Hemoglobin A1C % 6.4*           Assessment/Plan     Enriqueta Mccarthy is a 48 y.o.female with:    1. Wellness examination    2. Type 2 diabetes mellitus with diabetic polyneuropathy, without long-term current use of insulin  -     metFORMIN (GLUCOPHAGE) 1000 MG tablet; Take 1 tablet (1,000 mg total) by mouth 2 (two) times daily with meals.  Dispense: 180 tablet; Refill: 3  -     Hemoglobin A1C; Future; Expected date: 03/09/2025    3. Dyslipidemia  Assessment & Plan:  Stable       4. Gastroparesis  Assessment & Plan:  New problem to me, can get old GES from med recs, mgmt per gi       5. Class 2 severe obesity due to excess calories with serious comorbidity and body mass index (BMI) of 39.0 to 39.9 in adult  Assessment & Plan:  Discussed changing glp1 pending next a1c           Use of the [employer] Patient Portal discussed and encouraged during today's visit  -Continue current medications and maintain follow up with specialists.  Return to clinic in .  Future Appointments   Date Time Provider " Department Center   10/21/2024  7:30 AM [facility]       Edilma Marquis MD  10/9/2024 8:45 AM    Primary Care Internal Medicine

## 2024-10-20 DIAGNOSIS — E78.5 DYSLIPIDEMIA: ICD-10-CM

## 2024-10-20 DIAGNOSIS — I10 ESSENTIAL HYPERTENSION: Chronic | ICD-10-CM

## 2024-10-20 RX ORDER — CARVEDILOL 25 MG/1
25 TABLET ORAL 2 TIMES DAILY
Qty: 180 TABLET | Refills: 3 | Status: SHIPPED | OUTPATIENT
Start: 2024-10-20

## 2024-10-20 RX ORDER — VALSARTAN 320 MG/1
320 TABLET ORAL DAILY
Qty: 90 TABLET | Refills: 3 | Status: SHIPPED | OUTPATIENT
Start: 2024-10-20

## 2024-10-20 RX ORDER — ROSUVASTATIN CALCIUM 20 MG/1
20 TABLET, COATED ORAL DAILY
Qty: 90 TABLET | Refills: 3 | Status: SHIPPED | OUTPATIENT
Start: 2024-10-20

## 2024-10-20 NOTE — TELEPHONE ENCOUNTER
No care due was identified.  Health [facility] Embedded Care Due Messages. Reference number: [id number].   10/20/2024 10:03:50 AM CDT

## 2024-10-20 NOTE — TELEPHONE ENCOUNTER
Refill Decision Note   Enriqueta Fabio  is requesting a refill authorization.  Brief Assessment and Rationale for Refill:  Approve     Medication Therapy Plan:        Comments:     Note composed:10:49 AM 10/20/2024

## 2024-10-20 NOTE — TELEPHONE ENCOUNTER
No care due was identified.  Health [facility] Embedded Care Due Messages. Reference number: [id number].   10/20/2024 10:04:20 AM CDT

## 2024-10-21 ENCOUNTER — HOSPITAL ENCOUNTER (OUTPATIENT)
Dept: RADIOLOGY | Facility: HOSPITAL | Age: 49
Discharge: HOME | End: 2024-10-21
Attending: INTERNAL MEDICINE
Payer: COMMERCIAL

## 2024-10-21 DIAGNOSIS — R11.2 NAUSEA AND VOMITING, UNSPECIFIED VOMITING TYPE: ICD-10-CM

## 2024-10-21 PROCEDURE — A9541 TC99M SULFUR COLLOID: HCPCS | Performed by: INTERNAL MEDICINE

## 2024-10-21 PROCEDURE — 78264 GASTRIC EMPTYING IMG STUDY: CPT | Mod: 26,,, | Performed by: RADIOLOGY

## 2024-10-21 PROCEDURE — 78264 GASTRIC EMPTYING IMG STUDY: CPT | Mod: TC

## 2024-10-21 RX ORDER — TECHNETIUM TC 99M SULFUR COLLOID 2 MG
1 KIT MISCELLANEOUS
Status: COMPLETED | OUTPATIENT
Start: 2024-10-21 | End: 2024-10-21

## 2024-10-21 RX ADMIN — TECHNETIUM TC 99M SULFUR COLLOID KIT 1 MILLICURIE: KIT at 07:10

## 2024-10-21 NOTE — TELEPHONE ENCOUNTER
Refill Decision Note   Enriqueta Fabio  is requesting a refill authorization.  Brief Assessment and Rationale for Refill:  Approve     Medication Therapy Plan:         Comments:     Note composed:10:04 PM 10/20/2024

## 2024-10-24 ENCOUNTER — PATIENT MESSAGE (OUTPATIENT)
Dept: GASTROENTEROLOGY | Facility: CLINIC | Age: 49
End: 2024-10-24
Payer: COMMERCIAL

## 2024-10-28 RX ORDER — ONDANSETRON 4 MG/1
4 TABLET, FILM COATED ORAL EVERY 8 HOURS PRN
Qty: 40 TABLET | Refills: 3 | Status: SHIPPED | OUTPATIENT
Start: 2024-10-28 | End: 2024-11-27

## 2024-11-13 ENCOUNTER — PATIENT MESSAGE (OUTPATIENT)
Dept: GASTROENTEROLOGY | Facility: CLINIC | Age: 49
End: 2024-11-13
Payer: COMMERCIAL

## 2024-12-03 ENCOUNTER — TELEPHONE (OUTPATIENT)
Dept: GASTROENTEROLOGY | Facility: CLINIC | Age: 49
End: 2024-12-03
Payer: COMMERCIAL

## 2024-12-03 ENCOUNTER — OFFICE VISIT (OUTPATIENT)
Dept: GASTROENTEROLOGY | Facility: CLINIC | Age: 49
End: 2024-12-03
Payer: COMMERCIAL

## 2024-12-03 VITALS — BODY MASS INDEX: 39.88 KG/M2 | HEIGHT: 62 IN | WEIGHT: 216.69 LBS

## 2024-12-03 DIAGNOSIS — K31.84 GASTROPARESIS: Primary | ICD-10-CM

## 2024-12-03 PROCEDURE — 3066F NEPHROPATHY DOC TX: CPT | Mod: CPTII,S$GLB,, | Performed by: INTERNAL MEDICINE

## 2024-12-03 PROCEDURE — 3044F HG A1C LEVEL LT 7.0%: CPT | Mod: CPTII,S$GLB,, | Performed by: INTERNAL MEDICINE

## 2024-12-03 PROCEDURE — 1159F MED LIST DOCD IN RCRD: CPT | Mod: CPTII,S$GLB,, | Performed by: INTERNAL MEDICINE

## 2024-12-03 PROCEDURE — 3008F BODY MASS INDEX DOCD: CPT | Mod: CPTII,S$GLB,, | Performed by: INTERNAL MEDICINE

## 2024-12-03 PROCEDURE — 99214 OFFICE O/P EST MOD 30 MIN: CPT | Mod: S$GLB,,, | Performed by: INTERNAL MEDICINE

## 2024-12-03 PROCEDURE — 1160F RVW MEDS BY RX/DR IN RCRD: CPT | Mod: CPTII,S$GLB,, | Performed by: INTERNAL MEDICINE

## 2024-12-03 PROCEDURE — 99999 PR PBB SHADOW E&M-EST. PATIENT-LVL III: CPT | Mod: PBBFAC,,, | Performed by: INTERNAL MEDICINE

## 2024-12-03 PROCEDURE — 3060F POS MICROALBUMINURIA REV: CPT | Mod: CPTII,S$GLB,, | Performed by: INTERNAL MEDICINE

## 2024-12-03 PROCEDURE — 4010F ACE/ARB THERAPY RXD/TAKEN: CPT | Mod: CPTII,S$GLB,, | Performed by: INTERNAL MEDICINE

## 2024-12-03 NOTE — TELEPHONE ENCOUNTER
----- Message from Stef Barrios MD sent at 12/3/2024  8:32 AM CST -----  Regarding: GI dietitian  Please schedule with GI dietitian, re- gastroparesis.

## 2024-12-03 NOTE — PROGRESS NOTES
Wears this Subjective:       Patient ID: Enriqueta Mccarthy is a 48 y.o. female.    Chief Complaint: Follow-up    Patient here today to follow-up aforementioned complaints.  She was previously seen by me last November with complaints of abdominal pain and concern for gastroparesis.  She was sent for EGD which was unremarkable and subsequently gastric emptying study which did show delay.      Doing fairly well. Nausea occurs very infrequently. Pain generally controlled. Currently taking prilosec. Not sure if it's helping        Review of Systems   Gastrointestinal:  Positive for abdominal pain and nausea.       The following portions of the patient's history were reviewed and updated as appropriate: allergies, current medications, past family history, past medical history, past social history, past surgical history and problem list.    Objective:      Physical Exam  Constitutional:       Appearance: She is well-developed.   HENT:      Head: Normocephalic and atraumatic.   Eyes:      Conjunctiva/sclera: Conjunctivae normal.   Pulmonary:      Effort: Pulmonary effort is normal. No respiratory distress.   Musculoskeletal:      Cervical back: Normal range of motion.   Neurological:      Mental Status: She is alert and oriented to person, place, and time.   Psychiatric:         Behavior: Behavior normal.         Thought Content: Thought content normal.         Judgment: Judgment normal.           Pertinent labs and imaging studies reviewed    Assessment:       1. Gastroparesis        Plan:       Generally controlled  Okay to stop PPI  Will refer to GI dietitian   Discussed therapy with Reglan.  Fortunately we do not need it however if necessary side effects have been discussed at length   Up-to-date with colon cancer screening.  Due to repeat Cologuard or similar tests next year.         (Portions of this note were dictated using voice recognition software and may contain dictation related errors in spelling/grammar/syntax not  found on text review)

## 2024-12-04 ENCOUNTER — PATIENT MESSAGE (OUTPATIENT)
Dept: GASTROENTEROLOGY | Facility: CLINIC | Age: 49
End: 2024-12-04
Payer: COMMERCIAL

## 2024-12-06 ENCOUNTER — PATIENT MESSAGE (OUTPATIENT)
Dept: OTHER | Facility: OTHER | Age: 49
End: 2024-12-06
Payer: COMMERCIAL

## 2024-12-09 RX ORDER — METOCLOPRAMIDE 5 MG/1
5 TABLET ORAL
Qty: 90 TABLET | Refills: 2 | Status: SHIPPED | OUTPATIENT
Start: 2024-12-09

## 2024-12-09 NOTE — PROGRESS NOTES
"Gastroenterology Nutrition Consultation   MRN: [id number]  Referring Provider: Dr Barrios   Visit Type: initial evaluation and education   Patient Information: Enriqueta Mccarthy 48 y.o.-year-old White female presenting with Delayed gastric emptying 30% GES 10/21/24  and Type II DM ( gestational, then recurred 2016). Patient notes she was dx with gastroparesis years ago but did not receive education at that time .    Reason for Nutrition Referral:     Nausea Less than once a week   Heartburn Never   Gas Several times a day   Diarrhea Once or twice a week   Bloating Once every other day   Constipation Never   Pain Less than once a week   Location of the pain Upper stomach     A = Nutrition Assessment  Anthropometric Data Estimated body mass index is 39.64 kg/m² as calculated from the following:    Height as of 12/3/24: 5' 2" (1.575 m).    Weight as of 12/3/24: 98.3 kg (216 lb 11.4 oz).  Ideal Body Weight (IBW): 200lbs (91kg); 107%IBW   Last 3 Weights:   Wt Readings from Last 3 Encounters:   12/03/24 98.3 kg (216 lb 11.4 oz)   10/09/24 97.5 kg (214 lb 15.2 oz)   08/27/24 100.7 kg (222 lb)     Your usual weight (in pounds):  (range: at least 1) 216   Have you experienced any weight gain in the last year? No   Have you experienced any weight loss in the last year? Yes   How much did you lose in the last year (in pounds)? (range: at least 1) 8   What is the most you have weighed as an adult?  (range: at least 1) 222   What is the least you have weighed as an adult?  (range: at least 1) 214   What is your goal weight? 200     Relevant Wt hx:  BMI Weight Status   <16 Severe Thinness   16-16.9 Moderate Thinness   17.0-18.4 Mild Thinness   Below 18.5 Underweight   18.6-24.9 Normal/Healthy   25.0-29.9 Overweight   30.0-34.9 Obesity Class I   35.0-39.9 Obesity Class II   >40 Extreme Obesity   Class III      Ideal Weight Range for Your Height: 5'2" = 104 - 135 lbs  How often do you have a bowel movement? Several times a day   What is " "the consistency of your bowel movements? Semi-formed   Are you taking any medications for your bowels? Yes   How often do you take these medications? As needed      Biochemical Data LABS:  Lab Results   Component Value Date     (H) 09/20/2024    K 3.9 09/20/2024    MG 2.2 04/04/2016    CHOL 157 09/20/2024    HDL 41 09/20/2024    TRIG 173 (H) 09/20/2024    ALBUMIN 4.5 09/20/2024    HGBA1C 6.4 (H) 09/20/2024    CALCIUM 10.1 09/20/2024     Lab Results   Component Value Date    FKGDCPUV72WO 44 03/19/2024    CHMALGJJ25 242 01/21/2020     Lab Results   Component Value Date    HGB 11.9 (L) 09/20/2024    HCT 34.2 (L) 09/20/2024    MCV 84 09/20/2024     Lab Results   Component Value Date    CREATININE 1.0 09/20/2024    ALBUMIN 4.5 09/20/2024     Hemoglobin A1C   Date Value Ref Range Status   09/20/2024 6.4 (H) 4.0 - 5.6 % Final     Comment:     ADA Screening Guidelines:  5.7-6.4%  Consistent with prediabetes  >or=6.5%  Consistent with diabetes    High levels of fetal hemoglobin interfere with the HbA1C  assay. Heterozygous hemoglobin variants (HbS, HgC, etc)do  not significantly interfere with this assay.   However, presence of multiple variants may affect accuracy.     03/19/2024 6.1 (H) 4.0 - 5.6 % Final     Comment:     ADA Screening Guidelines:  5.7-6.4%  Consistent with prediabetes  >or=6.5%  Consistent with diabetes    High levels of fetal hemoglobin interfere with the HbA1C  assay. Heterozygous hemoglobin variants (HbS, HgC, etc)do  not significantly interfere with this assay.   However, presence of multiple variants may affect accuracy.       Lab Results   Component Value Date    CRP 1.4 04/15/2021     No components found for: "FROLATE"  Iron   Date Value Ref Range Status   01/21/2020 63 30 - 160 ug/dL Final   07/24/2019 44 30 - 160 ug/dL Final     Outpatient Medications Prior to Visit   Medication Sig Dispense Refill    amLODIPine (NORVASC) 10 MG tablet Take 1 tablet (10 mg total) by mouth once daily. 90 " tablet 1    azelastine (ASTELIN) 137 mcg (0.1 %) nasal spray 1 spray (137 mcg total) by Nasal route 2 (two) times daily. 30 mL 6    carvediloL (COREG) 25 MG tablet Take 1 tablet (25 mg total) by mouth 2 (two) times daily. 180 tablet 3    cholestyramine-aspartame (CHOLESTYRAMINE LIGHT) 4 gram PwPk Take 1 packet (4 g total) by mouth as needed (gallstones).      dulaglutide (TRULICITY) 3 mg/0.5 mL pen injector Inject 3 mg into the skin every 7 days. 12 pen 3    ergocalciferol (VITAMIN D2) 50,000 unit Cap Take 1 capsule (50,000 Units total) by mouth every 7 days. 12 capsule 2    fenofibrate 160 MG Tab Take 1 tablet (160 mg total) by mouth once daily. 90 tablet 3    fluticasone propionate (FLONASE) 50 mcg/actuation nasal spray 1 spray (50 mcg total) in Each Nostril route once daily. 16 g 6    ketoconazole (NIZORAL) 2 % shampoo Wash hair with medicated shampoo at least 2x/week - let sit on scalp at least 5 minutes prior to rinsing 120 mL 5    metFORMIN (GLUCOPHAGE) 1000 MG tablet Take 1 tablet (1,000 mg total) by mouth 2 (two) times daily with meals. 180 tablet 3    omeprazole (PRILOSEC) 40 MG capsule Take 1 capsule (40 mg total) by mouth once daily. 30 capsule 11    rosuvastatin (CRESTOR) 20 MG tablet Take 1 tablet (20 mg total) by mouth once daily. 90 tablet 3    valsartan (DIOVAN) 320 MG tablet Take 1 tablet (320 mg total) by mouth once daily. 90 tablet 3     No facility-administered medications prior to visit.      Dietary Data  Appetite: +decreased appetite  Dentition: normal dentition for age                  Difficulty chewing or swallowing? No  Dietary Intake:  Please list any food intolerance or sensitivies: None   Who does the majority of the grocery shopping for your home? My mom and myself   Who prepares the majority of the meals in your house? My mom     Including yourself, how many people live in your home? (range: at least 1) 4   How many children are living in your home? (range: at least 0) 1   How old is  the child? 13   Please indicate how often do you eat during these times:    After midnight (midnight to 4am) Never   Early morning (4am to 8am) Several time a week   Morning (8am to noon) Once or twice a week   Afternoon (Noon to 4pm) Once or twice a week   Evening (4pm to 8pm) Once or twice a week   Late evening (8pm to midnight) Never   From 4 am to 8 am,  give examples of the kinds of food you usually eat during this time.    Home cooked meals prepared by yourself or others Cereal, oatmeal, yogurt, fruit or eggs with toast   Fast food meals purchased None   Snacks made or purchased from a grocery/convenience store: None   From 8 am to noon, give examples of the kinds of food you usually eat during this time.    Home cooked meals prepared by yourself or others Same as above   Fast food meals purchased None   Snacks made or purchased from a grocery/convenience store: None   From noon to 4pm, give examples of the kinds of food you usually eat during this time.    Home cooked meals prepared by yourself or others Depending on the day   Fast food meals purchased No   Snacks made or purchased from a grocery/convenience store: Sudihr crackers or peanut butter crackers   From 4 pm to 8 pm, give examples of the kinds of food you usually eat during this time.    Home cooked meals prepared by yourself or others 6 to 7 days per week   Fast food meals purchased Maybe once a week   Snacks made or purchased from a grocery/convenience store: Depends on what is in the house   How hard is it for you to pay for the very basics like food, housing, medical care, and heating? Not very hard   Within the past 12 months, you worried that your food would run out before you got the money to buy more. Never true   Within the past 12 months, the food you bought just didn't last and you didn't have money to get more. Never true   Do you find difficulty chewing certain foods? No   Do you find difficulty swallowing certain foods? No   After  eating a meal, how long do you feel full (in hours?) 12     Breakfast: Cereal, oatmeal, yogurt, fruit or eggs with toast  Lunch:   Dinner:   Snacks: Sudhir Crackers and Peanut Butter  How many cups do you have of the following each day?    Water: 5   Milk: 0   Juice: 0   Supplement shakes: 0   Coffee: 0   Tea: 0   Soda: 1   Other non-alcoholic beverages: 0   How often do you have a drink containing alcohol? Never   How many drinks containing alcohol do you have on a typical day when you are drinking? Patient does not drink   How often do you have six or more drinks on one occasion? Never     Overall impression of dietary recall:         Lives with mom and 13 year old       Supplements/ MVI:Vit D2                  Food Allergies/intolerances:   Review of patient's allergies indicates:   Allergen Reactions    No known allergies      Grocery shopping and food prep shared with mom   Patient believes the household  will be supportive of patient's dietary modifications.     D = Nutrition Diagnosis   Patient Assessment:  Patient is at nutrition risk due to dx of Gastroparesis  and need for diet education.     Patient knowledge of healthy eating and exercise patterns was assessed to be fair. Session was spent educating patient on reducing sources of fiber and fat in diet . Reviewed strategies for choosing and consuming healthy, well-balanced meals as well as snacks regularly.  Also discussed need for vitamin and mineral supplementation. Advised patient of  need to add nutrition supplement beverages  daily.  Patient verbalized understanding.  Provided RD contact information for concerns or questions. Further education will be required to ensure adherence to prevent symptoms of nausea associated with eating  Expect good  compliance with dietary recommendations at this time.   Nutrition Diagnosis:   Altered GI function related to Gastroparesis   As evidenced by Abnormal GES study     I Intervention   Education Materials  "provided:   1. [place]: Gastroparesis Diet Tips       I = Nutrition Intervention  Calorie Requirements: 1800 kcal/day (20-25 kcal/kg) *using IBW  Protein requirements: 95g/day (1.0-1.1 g/kg) *using actual body weight    Recommendation #1 Gastroparesis -Foods which empty quickest from stomach : Liquids ( water, juices, clear soups, milk or substitutes, smoothies without pulp or liquid meal replacements - Ensure milk or plant- based , Boost, Orgain plant based ) followed by smooth creamy foods such as mashed potatoes , cooked low fiber cereals ( grits, instant oatmeal) , low fat protein foods with added moisture ( scrambled eggs, chicken, fish ) use of fat free gravies or sauces ;    Recommendation #2 Delayed stomach emptying with Diabetes is improved with use of low fiber foods - cooked cereals, low fiber pasta, rice, breads, crackers , canned fruits without sugar and cooked vegetables. Blood sugar drop 1-2 hours after meals due to stomach delay may occur . This is the reason for using low fiber or "processed carbohydrate"     Recommendation #3 Follow up as necessary per weight changes and further education needs      M/E = NUTRITION MONITORING AND EVALUATION   SMART Goal 1: Patient adherence to Gastroparesis  diet for dx of delayed emptying  without GI discomfort OR with decrease in GI symptoms by next RD visit.   Indicator: Weight/BMI    Follow Up:  1 Months    Communication with provider via Epic  Signature: Sunita Narvaez, JESSEN ,MPH, LPC          "

## 2024-12-10 ENCOUNTER — NUTRITION (OUTPATIENT)
Dept: GASTROENTEROLOGY | Facility: CLINIC | Age: 49
End: 2024-12-10
Payer: COMMERCIAL

## 2024-12-10 DIAGNOSIS — K31.84 GASTROPARESIS: Primary | ICD-10-CM

## 2024-12-10 DIAGNOSIS — R11.2 NAUSEA AND VOMITING, UNSPECIFIED VOMITING TYPE: ICD-10-CM

## 2024-12-10 DIAGNOSIS — R10.9 ABDOMINAL PAIN, UNSPECIFIED ABDOMINAL LOCATION: ICD-10-CM

## 2024-12-10 PROCEDURE — 99999 PR PBB SHADOW E&M-EST. PATIENT-LVL I: CPT | Mod: PBBFAC,,,

## 2024-12-14 DIAGNOSIS — E55.9 VITAMIN D DEFICIENCY: ICD-10-CM

## 2024-12-14 NOTE — TELEPHONE ENCOUNTER
No care due was identified.  [facility] Embedded Care Due Messages. Reference number: [id number].   12/14/2024 4:59:05 AM CST

## 2024-12-16 ENCOUNTER — PATIENT MESSAGE (OUTPATIENT)
Dept: GASTROENTEROLOGY | Facility: CLINIC | Age: 49
End: 2024-12-16
Payer: COMMERCIAL

## 2024-12-16 DIAGNOSIS — E78.1 HYPERTRIGLYCERIDEMIA: Chronic | ICD-10-CM

## 2024-12-16 RX ORDER — FENOFIBRATE 160 MG/1
160 TABLET ORAL DAILY
Qty: 90 TABLET | Refills: 3 | Status: SHIPPED | OUTPATIENT
Start: 2024-12-16 | End: 2025-12-16

## 2024-12-16 RX ORDER — ERGOCALCIFEROL 1.25 MG/1
50000 CAPSULE ORAL
Qty: 12 CAPSULE | Refills: 2 | Status: SHIPPED | OUTPATIENT
Start: 2024-12-16

## 2024-12-16 NOTE — TELEPHONE ENCOUNTER
Care Due:                  Date            Visit Type   Department     Provider  --------------------------------------------------------------------------------                                EP -                              PRIMARY      [facility]  Last Visit: 10-      CARE ([facility])   CARE           Edilma Marquis  Next Visit: None Scheduled  None         None Found                                                            Last  Test          Frequency    Reason                     Performed    Due Date  --------------------------------------------------------------------------------    Vitamin D...  12 months..  ergocalciferol...........  03-   03-    [facility] Embedded Care Due Messages. Reference number: [id number].   12/16/2024 6:08:24 AM CST

## 2024-12-17 NOTE — TELEPHONE ENCOUNTER
Provider Staff:  Action required for this patient    Requires labs      Please see care gap opportunities below in Care Due Message.    Thanks!  [facility]     Appointments      Date Provider   Last Visit   10/9/2024 Edilma Marquis MD   Next Visit   Visit date not found Edilma Marquis MD     Refill Decision Note   Enriqueta Mccarthy  is requesting a refill authorization.  Brief Assessment and Rationale for Refill:  Approve     Medication Therapy Plan:        Comments:     Note composed:8:50 PM 12/16/2024

## 2025-01-11 VITALS — WEIGHT: 215.63 LBS | BODY MASS INDEX: 39.44 KG/M2

## 2025-01-27 ENCOUNTER — PATIENT MESSAGE (OUTPATIENT)
Dept: ADMINISTRATIVE | Facility: OTHER | Age: 50
End: 2025-01-27
Payer: COMMERCIAL

## 2025-03-11 DIAGNOSIS — E11.65 UNCONTROLLED TYPE 2 DIABETES MELLITUS WITH HYPERGLYCEMIA, WITHOUT LONG-TERM CURRENT USE OF INSULIN: Chronic | ICD-10-CM

## 2025-03-11 RX ORDER — DULAGLUTIDE 3 MG/.5ML
3 INJECTION, SOLUTION SUBCUTANEOUS
Qty: 12 PEN | Refills: 3 | Status: SHIPPED | OUTPATIENT
Start: 2025-03-11 | End: 2026-03-11

## 2025-03-26 ENCOUNTER — PATIENT MESSAGE (OUTPATIENT)
Dept: ADMINISTRATIVE | Facility: OTHER | Age: 50
End: 2025-03-26
Payer: COMMERCIAL

## 2025-04-10 ENCOUNTER — LAB VISIT (OUTPATIENT)
Dept: LAB | Facility: HOSPITAL | Age: 50
End: 2025-04-10
Payer: COMMERCIAL

## 2025-04-10 DIAGNOSIS — E11.42 TYPE 2 DIABETES MELLITUS WITH DIABETIC POLYNEUROPATHY, WITHOUT LONG-TERM CURRENT USE OF INSULIN: Chronic | ICD-10-CM

## 2025-04-10 LAB
EAG (OHS): 120 MG/DL (ref 68–131)
HBA1C MFR BLD: 5.8 % (ref 4–5.6)

## 2025-04-10 PROCEDURE — 83036 HEMOGLOBIN GLYCOSYLATED A1C: CPT

## 2025-04-10 PROCEDURE — 36415 COLL VENOUS BLD VENIPUNCTURE: CPT

## 2025-04-17 ENCOUNTER — OFFICE VISIT (OUTPATIENT)
Dept: PRIMARY CARE CLINIC | Facility: CLINIC | Age: 50
End: 2025-04-17
Payer: COMMERCIAL

## 2025-04-17 VITALS
HEIGHT: 62 IN | HEART RATE: 78 BPM | BODY MASS INDEX: 38.5 KG/M2 | WEIGHT: 209.19 LBS | SYSTOLIC BLOOD PRESSURE: 122 MMHG | DIASTOLIC BLOOD PRESSURE: 80 MMHG | RESPIRATION RATE: 16 BRPM | OXYGEN SATURATION: 98 %

## 2025-04-17 DIAGNOSIS — I10 ESSENTIAL HYPERTENSION: Chronic | ICD-10-CM

## 2025-04-17 DIAGNOSIS — Z12.11 SCREEN FOR COLON CANCER: ICD-10-CM

## 2025-04-17 DIAGNOSIS — Z76.89 ENCOUNTER TO ESTABLISH CARE: Primary | ICD-10-CM

## 2025-04-17 DIAGNOSIS — K31.84 GASTROPARESIS: ICD-10-CM

## 2025-04-17 DIAGNOSIS — E11.42 TYPE 2 DIABETES MELLITUS WITH DIABETIC POLYNEUROPATHY, WITHOUT LONG-TERM CURRENT USE OF INSULIN: Chronic | ICD-10-CM

## 2025-04-17 DIAGNOSIS — E78.5 DYSLIPIDEMIA: ICD-10-CM

## 2025-04-17 DIAGNOSIS — E11.65 UNCONTROLLED TYPE 2 DIABETES MELLITUS WITH HYPERGLYCEMIA, WITHOUT LONG-TERM CURRENT USE OF INSULIN: ICD-10-CM

## 2025-04-17 PROCEDURE — 3008F BODY MASS INDEX DOCD: CPT | Mod: CPTII,S$GLB,, | Performed by: NURSE PRACTITIONER

## 2025-04-17 PROCEDURE — 4010F ACE/ARB THERAPY RXD/TAKEN: CPT | Mod: CPTII,S$GLB,, | Performed by: NURSE PRACTITIONER

## 2025-04-17 PROCEDURE — 1159F MED LIST DOCD IN RCRD: CPT | Mod: CPTII,S$GLB,, | Performed by: NURSE PRACTITIONER

## 2025-04-17 PROCEDURE — 99999 PR PBB SHADOW E&M-EST. PATIENT-LVL III: CPT | Mod: PBBFAC,,, | Performed by: NURSE PRACTITIONER

## 2025-04-17 PROCEDURE — 99214 OFFICE O/P EST MOD 30 MIN: CPT | Mod: S$GLB,,, | Performed by: NURSE PRACTITIONER

## 2025-04-17 PROCEDURE — 3074F SYST BP LT 130 MM HG: CPT | Mod: CPTII,S$GLB,, | Performed by: NURSE PRACTITIONER

## 2025-04-17 PROCEDURE — 3079F DIAST BP 80-89 MM HG: CPT | Mod: CPTII,S$GLB,, | Performed by: NURSE PRACTITIONER

## 2025-04-17 PROCEDURE — 1160F RVW MEDS BY RX/DR IN RCRD: CPT | Mod: CPTII,S$GLB,, | Performed by: NURSE PRACTITIONER

## 2025-04-17 PROCEDURE — 3044F HG A1C LEVEL LT 7.0%: CPT | Mod: CPTII,S$GLB,, | Performed by: NURSE PRACTITIONER

## 2025-04-17 RX ORDER — TIRZEPATIDE 5 MG/.5ML
5 INJECTION, SOLUTION SUBCUTANEOUS
Qty: 2 ML | Refills: 0 | Status: SHIPPED | OUTPATIENT
Start: 2025-04-17

## 2025-04-17 NOTE — PROGRESS NOTES
[facility] Note    Chief Complaint    No chief complaint on file.    History of Present Illness      Enriqueta Mccarthy is a 49 y.o. female patient with chronic conditions of  hyper triglycerides, hypertension, dyslipidemia, diabetes who presents today for establish care.  Continue care from Dr. CRISTOBAL Desai in October    Labs reviewed  Eye exams up-to-date  Gyn  Mammogram due in September  Colonoscopy- Cologuard 3/22    Vaccines:   COVID x3   Flu up-to-date    History of Present Illness    CHIEF COMPLAINT:  Enriqueta presents today for medication refills    DIABETES:  She has been taking Trulicity for approximately 10 years and is currently on Metformin. Recent lab work showed elevated A1C.    FALLS AND MUSCULOSKELETAL:  She experienced multiple falls last year with her legs giving out unexpectedly. 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      ROS:  Musculoskeletal: +back pain  Neurological: +falling         Health Maintenance   Topic Date Due    Pneumococcal Vaccines (Age 0-49) (2 of 2 - PCV) 03/07/2014    Foot Exam  08/26/2022    Diabetic Eye Exam  07/18/2023    Cervical Cancer Screening  07/31/2023    COVID-19 Vaccine (4 - 2024-25 season) 09/01/2024    Colorectal Cancer Screening  03/22/2025    Diabetes Urine Screening  09/20/2025    Lipid Panel  09/20/2025    Mammogram  09/23/2025    Hemoglobin A1c  10/10/2025    TETANUS VACCINE  02/11/2031    RSV Vaccine (Age 60+ and Pregnant patients) (1 - 1-dose 75+ series) 12/12/2050    Hepatitis C Screening  Completed    Influenza Vaccine  Completed    HIV Screening  Completed       Past Medical History:   Diagnosis Date    Allergic conjunctivitis of both eyes     Amenorrhea     Anemia     Arthritis     Diabetic nephropathy associated with type 2 diabetes mellitus 10/05/2023    Diabetic retinopathy     Dysmenorrhea     Dyspareunia     Essential hypertension     Herpes simplex virus (HSV)  "infection     Hypertension     Hypertriglyceridemia 2016    Joint pain     Neuropathy     Obesity (BMI 30-39.9) 2016    Seasonal allergies     STD (sexually transmitted disease)     Type 2 diabetes mellitus with diabetic polyneuropathy, without long-term current use of insulin 2016    Uterine fibroid        Past Surgical History:   Procedure Laterality Date     SECTION      x 2    CHOLECYSTECTOMY  2016 ?    ENDOMETRIAL ABLATION  2016    ESOPHAGOGASTRODUODENOSCOPY N/A 2024    Procedure: EGD (ESOPHAGOGASTRODUODENOSCOPY);  Surgeon: Stef Barrios MD;  Location: [facility] ENDO;  Service: Endoscopy;  Laterality: N/A;   LVM/ portal message for precall- Trulicity on med list- hold 8 days prior-RB    pt confirmed. last dose trulicity on 24 cf    GALLBLADDER SURGERY  2017    removed    LIPOMA RESECTION Left 2013    SINUS SURGERY  2006    TONSILLECTOMY, ADENOIDECTOMY      TUBAL LIGATION         family history includes Anemia in her daughter and son; Diabetes in her father, maternal grandmother, maternal uncle, and maternal uncle; Hyperlipidemia in her father; Hypertension in her father, maternal grandmother, and mother; Kidney disease in her daughter; Stroke in her maternal grandmother.    Social History[1]    Encounter Medications[2]     Review of patient's allergies indicates:   Allergen Reactions    No known allergies        Physical Exam      Vital Signs  Pulse: 78  Resp: 16  SpO2: 98 %  BP: 122/80  BP Location: Right arm  Patient Position: Sitting  Height and Weight  Height: 5' 2" (157.5 cm)  Weight: 94.9 kg (209 lb 3.5 oz)  BSA (Calculated - sq m): 2.04 sq meters  BMI (Calculated): 38.3  Weight in (lb) to have BMI = 25: 136.4    Physical Exam  Vitals and nursing note reviewed.   Constitutional:       General: She is not in acute distress.     Appearance: Normal appearance. She is well-developed. She is obese. She is not ill-appearing.   HENT:      Head: " Normocephalic and atraumatic.      Right Ear: External ear normal.      Left Ear: External ear normal.   Eyes:      Conjunctiva/sclera: Conjunctivae normal.      Pupils: Pupils are equal, round, and reactive to light.   Neck:      Thyroid: No thyromegaly.      Vascular: No JVD.      Trachea: No tracheal deviation.   Cardiovascular:      Rate and Rhythm: Normal rate and regular rhythm.      Heart sounds: Normal heart sounds. No murmur heard.  Pulmonary:      Effort: Pulmonary effort is normal.      Breath sounds: Normal breath sounds.   Chest:      Chest wall: No tenderness.   Abdominal:      General: Bowel sounds are normal.      Palpations: Abdomen is soft.      Tenderness: There is no abdominal tenderness. There is no guarding.   Musculoskeletal:         General: Normal range of motion.      Cervical back: Normal range of motion and neck supple.   Lymphadenopathy:      Cervical: No cervical adenopathy.   Skin:     General: Skin is warm and dry.   Neurological:      General: No focal deficit present.      Mental Status: She is alert and oriented to person, place, and time.   Psychiatric:         Mood and Affect: Mood normal.         Behavior: Behavior normal.         Thought Content: Thought content normal.         Judgment: Judgment normal.          Laboratory:  CBC:  Lab Results   Component Value Date    WBC 4.54 09/20/2024    RBC 4.05 09/20/2024    HGB 11.9 (L) 09/20/2024    HCT 34.2 (L) 09/20/2024     09/20/2024    MCV 84 09/20/2024    MCH 29.4 09/20/2024    MCHC 34.8 09/20/2024    MCHC 35.0 09/21/2023    MCHC 36.1 (H) 09/12/2022    MCHC 36.1 (H) 09/12/2022     CMP:  Lab Results   Component Value Date     (H) 09/20/2024    CALCIUM 10.1 09/20/2024    ALBUMIN 4.5 09/20/2024    PROT 7.5 09/20/2024     09/20/2024    K 3.9 09/20/2024    CO2 24 09/20/2024     09/20/2024    BUN 16 09/20/2024    ALKPHOS 35 (L) 09/20/2024    ALT 21 09/20/2024    AST 18 09/20/2024    BILITOT 0.7 09/20/2024     BILITOT 1.0 09/21/2023    BILITOT 0.8 09/12/2022     URINALYSIS:  Lab Results   Component Value Date    COLORU YELLOW 04/15/2011    SPECGRAV 1.024 04/15/2011    PHUR 5.5 04/15/2011    PROTEINUA 30 (A) 04/15/2011    BACTERIA FEW 02/17/2011    NITRITE Negative 04/15/2011    LEUKOCYTESUR Negative 04/15/2011    UROBILINOGEN 1 04/15/2011    HYALINECASTS 1 02/17/2011      LIPIDS:  Lab Results   Component Value Date    TSH 0.939 09/21/2023    TSH 1.150 08/11/2020    TSH 1.483 01/21/2020    HDL 41 09/20/2024    HDL 32 (L) 03/19/2024    HDL 31 (L) 09/21/2023    CHOL 157 09/20/2024    CHOL 105 (L) 03/19/2024    CHOL 108 (L) 09/21/2023    TRIG 173 (H) 09/20/2024    TRIG 246 (H) 03/19/2024    TRIG 298 (H) 09/21/2023    LDLCALC 81.4 09/20/2024    LDLCALC 23.8 (L) 03/19/2024    LDLCALC 17.4 (L) 09/21/2023    CHOLHDL 26.1 09/20/2024    CHOLHDL 30.5 03/19/2024    CHOLHDL 28.7 09/21/2023    NONHDLCHOL 116 09/20/2024    NONHDLCHOL 73 03/19/2024    NONHDLCHOL 77 09/21/2023    TOTALCHOLEST 3.8 09/20/2024    TOTALCHOLEST 3.3 03/19/2024    TOTALCHOLEST 3.5 09/21/2023     TSH:  Lab Results   Component Value Date    TSH 0.939 09/21/2023    TSH 1.150 08/11/2020    TSH 1.483 01/21/2020     A1C:  Lab Results   Component Value Date    HGBA1C 5.8 (H) 04/10/2025    HGBA1C 6.4 (H) 09/20/2024    HGBA1C 6.1 (H) 03/19/2024    HGBA1C 6.9 (H) 09/21/2023    HGBA1C 6.6 (H) 03/16/2023    HGBA1C 7.7 (H) 09/12/2022    HGBA1C 6.3 (H) 03/04/2022    HGBA1C 6.4 (H) 08/14/2021    HGBA1C 5.9 (H) 02/08/2021    HGBA1C 5.4 07/25/2020    HGBA1C 5.2 01/21/2020    HGBA1C 5.0 07/24/2019         Assessment/Plan     Enriqueta Mccarthy is a 49 y.o.female with:    Assessment & Plan        IMPRESSION:  - Switched patient from Trulicity 3 mg injection weekly to Mounjaro 0.5 mg injection weekly for diabetes management due to elevated A1C levels.  - Plan gradual dose escalation of Mounjaro, starting at 0.5 mg and increasing every 3 weeks as tolerated.  - Continued metformin.    TYPE  2 DIABETES MELLITUS WITH DIABETIC POLYNEUROPATHY, WITHOUT LONG-TERM CURRENT USE OF INSULIN:  - Evaluated glycemic control and noted elevated A1C levels.  - Switched patient from Trulicity 3 mg injection weekly to Mounjaro 0.5 mg injection weekly for improved diabetes management.  - Planned gradual dose escalation of Mounjaro, starting at 0.5 mg and increasing every 3 weeks as tolerated.  - Scheduled follow up in 3 weeks to assess tolerance and consider next higher dose.  - Refilled metformin prescription for continued oral hypoglycemic therapy.    OBESITY, CLASS 2:  - Prescribed Mounjaro, which serves dual purpose for diabetes management and weight management in patients with obesity.    UNSTEADINESS AND HISTORY OF FALLING:  - Enriqueta reported falling multiple times last year due to legs giving out unexpectedly.  - Suspected back problems as the potential cause of unsteadiness and falling.  - Ordered MRI to evaluate the underlying cause of these issues.    LONG-TERM USE OF ORAL HYPOGLYCEMIC DRUGS:  - Instructed the patient to contact the office for any needed medication refills.         Encounter to establish care    Uncontrolled type 2 diabetes mellitus with hyperglycemia, without long-term current use of insulin  -     tirzepatide (MOUNJARO) 5 mg/0.5 mL PnIj; Inject 5 mg into the skin every 7 days.  Dispense: 2 mL; Refill: 0    Screen for colon cancer  -     Cologuard Screening (Multitarget Stool DNA); Future; Expected date: 04/28/2025    Essential hypertension    Dyslipidemia    Type 2 diabetes mellitus with diabetic polyneuropathy, without long-term current use of insulin    Gastroparesis          Health Maintenance Due   Topic Date Due    Pneumococcal Vaccines (Age 0-49) (2 of 2 - PCV) 03/07/2014    Foot Exam  08/26/2022    Diabetic Eye Exam  07/18/2023    Cervical Cancer Screening  07/31/2023    COVID-19 Vaccine (4 - 2024-25 season) 09/01/2024    Colorectal Cancer Screening  03/22/2025        I spent 35 minutes on  the day of this encounter for preparing for, evaluating, treating, and managing this patient.      -Continue current medications and maintain follow up with specialists.  Return to clinic in October for annual No follow-ups on file.     This note was generated with the assistance of ambient listening technology. Verbal consent was obtained by the patient and accompanying visitor(s) for the recording of patient appointment to facilitate this note. I attest to having reviewed and edited the generated note for accuracy, though some syntax or spelling errors may persist. Please contact the author of this note for any clarification.        FRANCESCA Crawley  [facility] location                       [1]   Social History  Tobacco Use    Smoking status: Never    Smokeless tobacco: Never   Substance Use Topics    Alcohol use: No    Drug use: No   [2]   Outpatient Encounter Medications as of 4/17/2025   Medication Sig Dispense Refill    carvediloL (COREG) 25 MG tablet Take 1 tablet (25 mg total) by mouth 2 (two) times daily. 180 tablet 3    cholestyramine-aspartame (CHOLESTYRAMINE LIGHT) 4 gram PwPk Take 1 packet (4 g total) by mouth as needed (gallstones).      ergocalciferol (VITAMIN D2) 50,000 unit Cap Take 1 capsule (50,000 Units total) by mouth every 7 days. 12 capsule 2    fenofibrate 160 MG Tab Take 1 tablet (160 mg total) by mouth every other day. 45 tablet 1    fluticasone propionate (FLONASE) 50 mcg/actuation nasal spray 1 spray (50 mcg total) in Each Nostril route once daily. 16 g 6    metFORMIN (GLUCOPHAGE) 1000 MG tablet Take 1 tablet (1,000 mg total) by mouth 2 (two) times daily with meals. 180 tablet 3    metoclopramide HCl (REGLAN) 5 MG tablet Take 1 tablet (5 mg total) by mouth 3 (three) times daily before meals. 90 tablet 2    rosuvastatin (CRESTOR) 20 MG tablet Take 1 tablet (20 mg total) by mouth once daily. 90 tablet 3    valsartan (DIOVAN) 320 MG tablet Take 1 tablet (320 mg total)  by mouth once daily. 90 tablet 3    [DISCONTINUED] amLODIPine (NORVASC) 10 MG tablet Take 1 tablet (10 mg total) by mouth once daily. 90 tablet 1    [DISCONTINUED] dulaglutide (TRULICITY) 3 mg/0.5 mL pen injector Inject 3 mg into the skin every 7 days. 12 pen 3    azelastine (ASTELIN) 137 mcg (0.1 %) nasal spray 1 spray (137 mcg total) by Nasal route 2 (two) times daily. 30 mL 6    tirzepatide (MOUNJARO) 5 mg/0.5 mL PnIj Inject 5 mg into the skin every 7 days. 2 mL 0    [DISCONTINUED] diltiaZEM (CARDIZEM CD) 180 MG 24 hr capsule Take 2 capsules (360 mg total) by mouth once daily. 180 capsule 3    [DISCONTINUED] ketoconazole (NIZORAL) 2 % shampoo Wash hair with medicated shampoo at least 2x/week - let sit on scalp at least 5 minutes prior to rinsing 120 mL 5    [DISCONTINUED] omeprazole (PRILOSEC) 40 MG capsule Take 1 capsule (40 mg total) by mouth once daily. 30 capsule 11     No facility-administered encounter medications on file as of 4/17/2025.

## 2025-04-18 ENCOUNTER — PATIENT MESSAGE (OUTPATIENT)
Dept: PRIMARY CARE CLINIC | Facility: CLINIC | Age: 50
End: 2025-04-18
Payer: COMMERCIAL

## 2025-04-18 DIAGNOSIS — E11.42 TYPE 2 DIABETES MELLITUS WITH DIABETIC POLYNEUROPATHY, WITHOUT LONG-TERM CURRENT USE OF INSULIN: ICD-10-CM

## 2025-04-18 DIAGNOSIS — I10 ESSENTIAL HYPERTENSION: ICD-10-CM

## 2025-04-18 DIAGNOSIS — E11.65 UNCONTROLLED TYPE 2 DIABETES MELLITUS WITH HYPERGLYCEMIA, WITHOUT LONG-TERM CURRENT USE OF INSULIN: Primary | ICD-10-CM

## 2025-04-18 DIAGNOSIS — E78.1 HYPERTRIGLYCERIDEMIA: ICD-10-CM

## 2025-04-18 DIAGNOSIS — Z00.00 WELLNESS EXAMINATION: ICD-10-CM

## 2025-04-18 DIAGNOSIS — E78.5 DYSLIPIDEMIA: ICD-10-CM

## 2025-04-18 DIAGNOSIS — E55.9 VITAMIN D DEFICIENCY: ICD-10-CM

## 2025-05-02 ENCOUNTER — TELEPHONE (OUTPATIENT)
Dept: PRIMARY CARE CLINIC | Facility: CLINIC | Age: 50
End: 2025-05-02
Payer: COMMERCIAL

## 2025-05-02 ENCOUNTER — PATIENT MESSAGE (OUTPATIENT)
Dept: PRIMARY CARE CLINIC | Facility: CLINIC | Age: 50
End: 2025-05-02
Payer: COMMERCIAL

## 2025-05-02 DIAGNOSIS — Z00.00 WELLNESS EXAMINATION: ICD-10-CM

## 2025-05-02 DIAGNOSIS — E11.42 TYPE 2 DIABETES MELLITUS WITH DIABETIC POLYNEUROPATHY, WITHOUT LONG-TERM CURRENT USE OF INSULIN: Chronic | ICD-10-CM

## 2025-05-02 DIAGNOSIS — E78.1 HYPERTRIGLYCERIDEMIA: Primary | ICD-10-CM

## 2025-05-02 DIAGNOSIS — E78.5 DYSLIPIDEMIA: ICD-10-CM

## 2025-05-02 NOTE — TELEPHONE ENCOUNTER
LOV with Ronna Orlando NP , 4/17/2025  Pt states her lost visit note is incorrect and would like changes made.

## 2025-05-02 NOTE — TELEPHONE ENCOUNTER
Copied from CRM #[id number]. Topic: Appointments - Amb Referral  >> May 2, 2025  8:37 AM Beti wrote:  Patient would like to get medical advice.  Symptoms (please be specific):   Pt is requesting lab orders be added to her 09/29/2025 fasting lab appt. Pt states if the lab orders are not able to be added to the appt to please cancel the lab appt.   How long have you had these symptoms: N/A  Would you like a call back, or a response through your [facility] portal?:  [facility]    Pharmacy name and phone # (copy from chart):   N/A  Comments:

## 2025-05-19 ENCOUNTER — PATIENT MESSAGE (OUTPATIENT)
Dept: PRIMARY CARE CLINIC | Facility: CLINIC | Age: 50
End: 2025-05-19
Payer: COMMERCIAL

## 2025-05-19 DIAGNOSIS — E11.65 UNCONTROLLED TYPE 2 DIABETES MELLITUS WITH HYPERGLYCEMIA, WITHOUT LONG-TERM CURRENT USE OF INSULIN: ICD-10-CM

## 2025-05-19 RX ORDER — TIRZEPATIDE 5 MG/.5ML
5 INJECTION, SOLUTION SUBCUTANEOUS
Qty: 2 ML | Refills: 0 | Status: CANCELLED | OUTPATIENT
Start: 2025-05-19

## 2025-05-19 NOTE — TELEPHONE ENCOUNTER
LOV with Ronna Orlando NP , 4/17/2025 (Establish care)  Pt requesting refill mounjaro. Order pended, if appropriate.

## 2025-05-26 ENCOUNTER — RESULTS FOLLOW-UP (OUTPATIENT)
Dept: PRIMARY CARE CLINIC | Facility: CLINIC | Age: 50
End: 2025-05-26

## 2025-06-09 ENCOUNTER — PATIENT MESSAGE (OUTPATIENT)
Dept: PRIMARY CARE CLINIC | Facility: CLINIC | Age: 50
End: 2025-06-09
Payer: COMMERCIAL

## 2025-06-09 DIAGNOSIS — E11.42 TYPE 2 DIABETES MELLITUS WITH DIABETIC POLYNEUROPATHY, WITHOUT LONG-TERM CURRENT USE OF INSULIN: ICD-10-CM

## 2025-06-09 DIAGNOSIS — E11.65 UNCONTROLLED TYPE 2 DIABETES MELLITUS WITH HYPERGLYCEMIA, WITHOUT LONG-TERM CURRENT USE OF INSULIN: Primary | ICD-10-CM

## 2025-06-09 NOTE — TELEPHONE ENCOUNTER
Patient requesting refill on Mounjaro 7.5 mg  Pt's  LOV with Ronna Orlando NP , 4/17/2025  Medication pending with dosage increase according to note dated 4/17/25  Patient states no side effects on current dose    Note dated 4/17/25:  IMPRESSION:  - Switched patient from Trulicity 3 mg injection weekly to Mounjaro 0.5 mg injection weekly for diabetes management due to elevated A1C levels.  - Plan gradual dose escalation of Mounjaro, starting at 0.5 mg and increasing every 3 weeks as tolerated.  - Continued metformin.

## 2025-08-18 DIAGNOSIS — E11.65 UNCONTROLLED TYPE 2 DIABETES MELLITUS WITH HYPERGLYCEMIA, WITHOUT LONG-TERM CURRENT USE OF INSULIN: ICD-10-CM

## 2025-08-18 DIAGNOSIS — E11.42 TYPE 2 DIABETES MELLITUS WITH DIABETIC POLYNEUROPATHY, WITHOUT LONG-TERM CURRENT USE OF INSULIN: ICD-10-CM

## 2025-08-18 RX ORDER — TIRZEPATIDE 10 MG/.5ML
10 INJECTION, SOLUTION SUBCUTANEOUS
Qty: 2 ML | Refills: 1 | Status: SHIPPED | OUTPATIENT
Start: 2025-08-18

## 2025-08-19 ENCOUNTER — OFFICE VISIT (OUTPATIENT)
Dept: PAIN MEDICINE | Facility: CLINIC | Age: 50
End: 2025-08-19
Payer: COMMERCIAL

## 2025-08-19 ENCOUNTER — TELEPHONE (OUTPATIENT)
Dept: PAIN MEDICINE | Facility: CLINIC | Age: 50
End: 2025-08-19

## 2025-08-19 ENCOUNTER — PATIENT MESSAGE (OUTPATIENT)
Dept: PAIN MEDICINE | Facility: CLINIC | Age: 50
End: 2025-08-19

## 2025-08-19 VITALS
WEIGHT: 203.5 LBS | SYSTOLIC BLOOD PRESSURE: 123 MMHG | BODY MASS INDEX: 37.45 KG/M2 | HEIGHT: 62 IN | HEART RATE: 71 BPM | DIASTOLIC BLOOD PRESSURE: 79 MMHG

## 2025-08-19 DIAGNOSIS — M70.61 GREATER TROCHANTERIC BURSITIS OF RIGHT HIP: Primary | ICD-10-CM

## 2025-08-19 DIAGNOSIS — M25.551 PAIN OF RIGHT HIP: ICD-10-CM

## 2025-08-19 PROCEDURE — 99999 PR PBB SHADOW E&M-EST. PATIENT-LVL IV: CPT | Mod: PBBFAC,,, | Performed by: STUDENT IN AN ORGANIZED HEALTH CARE EDUCATION/TRAINING PROGRAM

## 2025-08-19 PROCEDURE — 4010F ACE/ARB THERAPY RXD/TAKEN: CPT | Mod: CPTII,S$GLB,, | Performed by: STUDENT IN AN ORGANIZED HEALTH CARE EDUCATION/TRAINING PROGRAM

## 2025-08-19 PROCEDURE — 3008F BODY MASS INDEX DOCD: CPT | Mod: CPTII,S$GLB,, | Performed by: STUDENT IN AN ORGANIZED HEALTH CARE EDUCATION/TRAINING PROGRAM

## 2025-08-19 PROCEDURE — 3044F HG A1C LEVEL LT 7.0%: CPT | Mod: CPTII,S$GLB,, | Performed by: STUDENT IN AN ORGANIZED HEALTH CARE EDUCATION/TRAINING PROGRAM

## 2025-08-19 PROCEDURE — 3078F DIAST BP <80 MM HG: CPT | Mod: CPTII,S$GLB,, | Performed by: STUDENT IN AN ORGANIZED HEALTH CARE EDUCATION/TRAINING PROGRAM

## 2025-08-19 PROCEDURE — 99204 OFFICE O/P NEW MOD 45 MIN: CPT | Mod: S$GLB,,, | Performed by: STUDENT IN AN ORGANIZED HEALTH CARE EDUCATION/TRAINING PROGRAM

## 2025-08-19 PROCEDURE — 1159F MED LIST DOCD IN RCRD: CPT | Mod: CPTII,S$GLB,, | Performed by: STUDENT IN AN ORGANIZED HEALTH CARE EDUCATION/TRAINING PROGRAM

## 2025-08-19 PROCEDURE — 3074F SYST BP LT 130 MM HG: CPT | Mod: CPTII,S$GLB,, | Performed by: STUDENT IN AN ORGANIZED HEALTH CARE EDUCATION/TRAINING PROGRAM

## (undated) DEVICE — IRRIGATOR ENDOSCOPY DISP.

## (undated) DEVICE — SEE MEDLINE ITEM 152622

## (undated) DEVICE — BAG TISS RETRV MONARCH 10MM

## (undated) DEVICE — CLOSURE SKIN STERI STRIP 1/2X4

## (undated) DEVICE — SEE MEDLINE ITEM 156955

## (undated) DEVICE — SEE MEDLINE ITEM 146372

## (undated) DEVICE — KIT ANTIFOG

## (undated) DEVICE — MANIFOLD 4 PORT

## (undated) DEVICE — BLADE SURG CARBON STEEL SZ11

## (undated) DEVICE — SOL IRR NACL .9% 3000ML

## (undated) DEVICE — UNDERGLOVES BIOGEL PI SZ 7 LF

## (undated) DEVICE — TROCAR ENDOPATH XCEL 5MM 7.5CM

## (undated) DEVICE — NDL HYPO REG 25G X 1 1/2

## (undated) DEVICE — APPLIER CLIP ENDO LIGAMAX 5MM

## (undated) DEVICE — SCISSOR 5MMX35CM DIRECT DRIVE

## (undated) DEVICE — SUT MCRYL PLUS 4-0 PS2 27IN

## (undated) DEVICE — TROCAR ENDOPATH XCEL 11X100MM

## (undated) DEVICE — ELECTRODE REM PLYHSV RETURN 9

## (undated) DEVICE — SEE MEDLINE ITEM 157117

## (undated) DEVICE — PACK BASIC

## (undated) DEVICE — NDL INSUF ULTRA VERESS 120MM

## (undated) DEVICE — GLOVE BIOGEL ECLIPSE SZ 6.5

## (undated) DEVICE — SYR 10CC LUER LOCK

## (undated) DEVICE — SUT 0 VICRYL / UR6 (J603)

## (undated) DEVICE — COVER OVERHEAD SURG LT BLUE

## (undated) DEVICE — APPLICATOR CHLORAPREP ORN 26ML

## (undated) DEVICE — TUBING INSUFFLATION 10

## (undated) DEVICE — DRESSING TEGADERM 2 3/8 X 2.75

## (undated) DEVICE — SPONGE DERMA 8PLY 2X2

## (undated) DEVICE — DRAPE ABDOMINAL TIBURON 14X11

## (undated) DEVICE — SUT MONOCRYL 4-0 PS-2

## (undated) DEVICE — SEE MEDLINE ITEM 157116